# Patient Record
Sex: FEMALE | Race: WHITE | Employment: UNEMPLOYED | URBAN - METROPOLITAN AREA
[De-identification: names, ages, dates, MRNs, and addresses within clinical notes are randomized per-mention and may not be internally consistent; named-entity substitution may affect disease eponyms.]

---

## 2017-10-14 ENCOUNTER — APPOINTMENT (OUTPATIENT)
Dept: ULTRASOUND IMAGING | Age: 62
End: 2017-10-14
Attending: EMERGENCY MEDICINE
Payer: MEDICARE

## 2017-10-14 ENCOUNTER — HOSPITAL ENCOUNTER (EMERGENCY)
Age: 62
Discharge: HOME | End: 2017-10-14
Attending: EMERGENCY MEDICINE
Payer: MEDICARE

## 2017-10-14 VITALS
WEIGHT: 150 LBS | HEART RATE: 95 BPM | HEIGHT: 62 IN | TEMPERATURE: 98.1 F | DIASTOLIC BLOOD PRESSURE: 76 MMHG | SYSTOLIC BLOOD PRESSURE: 185 MMHG | RESPIRATION RATE: 20 BRPM | OXYGEN SATURATION: 95 % | BODY MASS INDEX: 27.6 KG/M2

## 2017-10-14 DIAGNOSIS — R60.9 PERIPHERAL EDEMA: Primary | ICD-10-CM

## 2017-10-14 PROCEDURE — 93971 EXTREMITY STUDY: CPT

## 2017-10-14 PROCEDURE — 99283 EMERGENCY DEPT VISIT LOW MDM: CPT | Performed by: EMERGENCY MEDICINE

## 2017-10-14 RX ORDER — HYDROCODONE BITARTRATE AND ACETAMINOPHEN 5; 325 MG/1; MG/1
1 TABLET ORAL
Qty: 10 TAB | Refills: 0 | Status: SHIPPED | OUTPATIENT
Start: 2017-10-14 | End: 2018-09-25

## 2017-10-14 RX ORDER — CEPHALEXIN 500 MG/1
500 CAPSULE ORAL 4 TIMES DAILY
COMMUNITY
End: 2018-09-25

## 2017-10-14 NOTE — ED NOTES
I have reviewed discharge instructions with the patient. The patient verbalized understanding. Patient left ED via Discharge Method: ambulatory to Home with (insert name of family/friend, self, transport). Calling friend    Opportunity for questions and clarification provided. Patient given 1 scripts. Medication explained to pt, and pt v/u to instructions. Pt in no acute distress at discharge.

## 2017-10-14 NOTE — DISCHARGE INSTRUCTIONS
Leg and Ankle Edema: Care Instructions  Your Care Instructions  Swelling in the legs, ankles, and feet is called edema. It is common after you sit or stand for a while. Long plane flights or car rides often cause swelling in the legs and feet. You may also have swelling if you have to stand for long periods of time at your job. Problems with the veins in the legs (varicose veins) and changes in hormones can also cause swelling. Sometimes the swelling in the ankles and feet is caused by a more serious problem, such as heart failure, infection, blood clots, or liver or kidney disease. Follow-up care is a key part of your treatment and safety. Be sure to make and go to all appointments, and call your doctor if you are having problems. Its also a good idea to know your test results and keep a list of the medicines you take. How can you care for yourself at home? · If your doctor gave you medicine, take it as prescribed. Call your doctor if you think you are having a problem with your medicine. · Whenever you are resting, raise your legs up. Try to keep the swollen area higher than the level of your heart. · Take breaks from standing or sitting in one position. ¨ Walk around to increase the blood flow in your lower legs. ¨ Move your feet and ankles often while you stand, or tighten and relax your leg muscles. · Wear support stockings. Put them on in the morning, before swelling gets worse. · Eat a balanced diet. Lose weight if you need to. · Limit the amount of salt (sodium) in your diet. Salt holds fluid in the body and may increase swelling. When should you call for help? Call 911 anytime you think you may need emergency care. For example, call if:  · You have symptoms of a blood clot in your lung (called a pulmonary embolism). These may include:  ¨ Sudden chest pain. ¨ Trouble breathing. ¨ Coughing up blood.   Call your doctor now or seek immediate medical care if:  · You have signs of a blood clot, such as:  ¨ Pain in your calf, back of the knee, thigh, or groin. ¨ Redness and swelling in your leg or groin. · You have symptoms of infection, such as:  ¨ Increased pain, swelling, warmth, or redness. ¨ Red streaks or pus. ¨ A fever. Watch closely for changes in your health, and be sure to contact your doctor if:  · Your swelling is getting worse. · You have new or worsening pain in your legs. · You do not get better as expected. Where can you learn more? Go to http://beatrice-roxann.info/. Enter F616 in the search box to learn more about \"Leg and Ankle Edema: Care Instructions. \"  Current as of: March 20, 2017  Content Version: 11.3  © 3845-1796 [employer]. Care instructions adapted under license by [employer] (which disclaims liability or warranty for this information). If you have questions about a medical condition or this instruction, always ask your healthcare professional. [place] [address] any warranty or liability for your use of this information.

## 2017-10-14 NOTE — ED TRIAGE NOTES
Swelling and pain left lower leg since 10/9. Seen at an urgent care and given Rx for Keflex 500 mg. Pt states area was getting better and now becoming severely worse.

## 2017-10-14 NOTE — ED PROVIDER NOTES
HPI Comments: On Clindamycin currently from  for presumed cellulitis. Patient is a 65 y.o. female presenting with ankle swelling. The history is provided by the patient. Ankle swelling    This is a new problem. The problem occurs constantly. The problem has been gradually worsening. The pain is present in the left ankle. The pain is at a severity of 2/10. The pain is mild. Treatments tried: oral antibiotics. There has been a history of trauma (cut with razor). History reviewed. No pertinent past medical history. History reviewed. No pertinent surgical history. History reviewed. No pertinent family history. Social History     Social History    Marital status:      Spouse name: N/A    Number of children: N/A    Years of education: N/A     Occupational History    Not on file. Social History Main Topics    Smoking status: Not on file    Smokeless tobacco: Not on file    Alcohol use Not on file    Drug use: Not on file    Sexual activity: Not on file     Other Topics Concern    Not on file     Social History Narrative    No narrative on file         ALLERGIES: Review of patient's allergies indicates no known allergies. Review of Systems   Constitutional: Negative. Negative for chills and fever. HENT: Negative. Negative for congestion, ear pain, postnasal drip and rhinorrhea. Eyes: Negative for pain and visual disturbance. Respiratory: Negative for cough and wheezing. Cardiovascular: Negative for chest pain and leg swelling. Gastrointestinal: Negative. Negative for abdominal distention and abdominal pain. Endocrine: Negative. Negative for polydipsia, polyphagia and polyuria. Genitourinary: Negative. Negative for difficulty urinating, flank pain and frequency. Musculoskeletal: Negative. Negative for arthralgias and myalgias. Skin: Negative. Neurological: Negative. Negative for dizziness and headaches. Hematological: Negative.         Vitals: 10/14/17 0033   BP: 192/78   Pulse: (!) 105   Resp: 20   Temp: 97.4 °F (36.3 °C)   SpO2: 93%   Weight: 68 kg (150 lb)   Height: 5' 2\" (1.575 m)            Physical Exam   Constitutional: She is oriented to person, place, and time. She appears well-developed and well-nourished. No distress. HENT:   Head: Normocephalic and atraumatic. Cardiovascular: Intact distal pulses. Pulmonary/Chest: Effort normal.   Abdominal: Soft. Musculoskeletal: She exhibits edema. LLE edema pretibial and ankle;  Skin symmetrical color and temp to right side   Neurological: She is alert and oriented to person, place, and time. Skin: Skin is warm and dry. Psychiatric: She has a normal mood and affect. Her behavior is normal.   Nursing note and vitals reviewed. MDM  Number of Diagnoses or Management Options  Peripheral edema: new and requires workup  Diagnosis management comments: Patient understands that the edema she isn't into the left lower extremity still could be related to the cellulitis she is being treated for as well as there is a healing cut from a razor injury of the left lower extremity. Ultrasound will exclude DVT and if negative would continue with the antibiotic therapy, compressive therapy and follow up with primary care physician. No arterial occlusion suspected, and given the unilateral nature also do not suspect heart failure, kidney disease or liver disease.        Amount and/or Complexity of Data Reviewed  Tests in the radiology section of CPT®: ordered and reviewed      ED Course       Procedures

## 2018-09-25 ENCOUNTER — APPOINTMENT (OUTPATIENT)
Dept: GENERAL RADIOLOGY | Age: 63
DRG: 193 | End: 2018-09-25
Attending: EMERGENCY MEDICINE
Payer: MEDICARE

## 2018-09-25 ENCOUNTER — HOSPITAL ENCOUNTER (INPATIENT)
Age: 63
LOS: 2 days | Discharge: HOME HEALTH | DRG: 193 | End: 2018-09-28
Attending: EMERGENCY MEDICINE | Admitting: INTERNAL MEDICINE
Payer: MEDICARE

## 2018-09-25 DIAGNOSIS — D72.829 LEUKOCYTOSIS, UNSPECIFIED TYPE: ICD-10-CM

## 2018-09-25 DIAGNOSIS — N30.00 ACUTE CYSTITIS WITHOUT HEMATURIA: Primary | ICD-10-CM

## 2018-09-25 DIAGNOSIS — R09.02 HYPOXIA: ICD-10-CM

## 2018-09-25 LAB
ALBUMIN SERPL-MCNC: 2.8 G/DL (ref 3.2–4.6)
ALBUMIN/GLOB SERPL: 0.6 {RATIO} (ref 1.2–3.5)
ALP SERPL-CCNC: 115 U/L (ref 50–136)
ALT SERPL-CCNC: 26 U/L (ref 12–65)
ANION GAP SERPL CALC-SCNC: 10 MMOL/L (ref 7–16)
ARTERIAL PATENCY WRIST A: POSITIVE
AST SERPL-CCNC: 24 U/L (ref 15–37)
BASE EXCESS BLDA CALC-SCNC: 1.9 MMOL/L (ref 0–3)
BASOPHILS # BLD: 0.1 K/UL (ref 0–0.2)
BASOPHILS NFR BLD: 0 % (ref 0–2)
BDY SITE: ABNORMAL
BILIRUB SERPL-MCNC: 0.9 MG/DL (ref 0.2–1.1)
BUN SERPL-MCNC: 16 MG/DL (ref 8–23)
CALCIUM SERPL-MCNC: 8.3 MG/DL (ref 8.3–10.4)
CHLORIDE SERPL-SCNC: 95 MMOL/L (ref 98–107)
CO2 SERPL-SCNC: 26 MMOL/L (ref 21–32)
COHGB MFR BLD: 0.9 % (ref 0.5–1.5)
CREAT SERPL-MCNC: 0.96 MG/DL (ref 0.6–1)
DIFFERENTIAL METHOD BLD: ABNORMAL
DO-HGB BLD-MCNC: 11 % (ref 0–5)
EOSINOPHIL # BLD: 0 K/UL (ref 0–0.8)
EOSINOPHIL NFR BLD: 0 % (ref 0.5–7.8)
ERYTHROCYTE [DISTWIDTH] IN BLOOD BY AUTOMATED COUNT: 13.2 %
FLUAV AG NPH QL IA: NEGATIVE
FLUBV AG NPH QL IA: NEGATIVE
GLOBULIN SER CALC-MCNC: 4.7 G/DL (ref 2.3–3.5)
GLUCOSE SERPL-MCNC: 118 MG/DL (ref 65–100)
HCO3 BLDA-SCNC: 24 MMOL/L (ref 22–26)
HCT VFR BLD AUTO: 40.7 % (ref 35.8–46.3)
HGB BLD-MCNC: 13.1 G/DL (ref 11.7–15.4)
HGB BLDMV-MCNC: 14.6 GM/DL (ref 11.7–15)
IMM GRANULOCYTES # BLD: 0.2 K/UL (ref 0–0.5)
IMM GRANULOCYTES NFR BLD AUTO: 1 % (ref 0–5)
LACTATE BLD-SCNC: 1.9 MMOL/L (ref 0.5–1.9)
LYMPHOCYTES # BLD: 1.9 K/UL (ref 0.5–4.6)
LYMPHOCYTES NFR BLD: 9 % (ref 13–44)
MCH RBC QN AUTO: 27.9 PG (ref 26.1–32.9)
MCHC RBC AUTO-ENTMCNC: 32.2 G/DL (ref 31.4–35)
MCV RBC AUTO: 86.6 FL (ref 79.6–97.8)
METHGB MFR BLD: 0.3 % (ref 0–1.5)
MONOCYTES # BLD: 2.2 K/UL (ref 0.1–1.3)
MONOCYTES NFR BLD: 10 % (ref 4–12)
NEUTS SEG # BLD: 17.9 K/UL (ref 1.7–8.2)
NEUTS SEG NFR BLD: 81 % (ref 43–78)
NRBC # BLD: 0 K/UL (ref 0–0.2)
OXYHGB MFR BLDA: 87.6 % (ref 94–97)
PCO2 BLDA: 31 MMHG (ref 35–45)
PH BLDA: 7.51 [PH] (ref 7.35–7.45)
PLATELET # BLD AUTO: 273 K/UL (ref 150–450)
PMV BLD AUTO: 9.9 FL (ref 9.4–12.3)
PO2 BLDA: 53 MMHG (ref 80–105)
POTASSIUM SERPL-SCNC: 3.7 MMOL/L (ref 3.5–5.1)
PROT SERPL-MCNC: 7.5 G/DL (ref 6.3–8.2)
RBC # BLD AUTO: 4.7 M/UL (ref 4.05–5.2)
SAO2 % BLD: 89 % (ref 92–98.5)
SERVICE CMNT-IMP: ABNORMAL
SODIUM SERPL-SCNC: 131 MMOL/L (ref 136–145)
SPECIMEN SOURCE: NORMAL
VENTILATION MODE VENT: ABNORMAL
WBC # BLD AUTO: 22.2 K/UL (ref 4.3–11.1)

## 2018-09-25 PROCEDURE — 36600 WITHDRAWAL OF ARTERIAL BLOOD: CPT

## 2018-09-25 PROCEDURE — 82803 BLOOD GASES ANY COMBINATION: CPT

## 2018-09-25 PROCEDURE — 99284 EMERGENCY DEPT VISIT MOD MDM: CPT | Performed by: EMERGENCY MEDICINE

## 2018-09-25 PROCEDURE — 85025 COMPLETE CBC W/AUTO DIFF WBC: CPT

## 2018-09-25 PROCEDURE — 80053 COMPREHEN METABOLIC PANEL: CPT

## 2018-09-25 PROCEDURE — 83605 ASSAY OF LACTIC ACID: CPT

## 2018-09-25 PROCEDURE — 71046 X-RAY EXAM CHEST 2 VIEWS: CPT

## 2018-09-25 PROCEDURE — 94640 AIRWAY INHALATION TREATMENT: CPT

## 2018-09-25 PROCEDURE — 81003 URINALYSIS AUTO W/O SCOPE: CPT | Performed by: EMERGENCY MEDICINE

## 2018-09-25 PROCEDURE — 74011000250 HC RX REV CODE- 250: Performed by: EMERGENCY MEDICINE

## 2018-09-25 PROCEDURE — 87804 INFLUENZA ASSAY W/OPTIC: CPT

## 2018-09-25 RX ORDER — IPRATROPIUM BROMIDE AND ALBUTEROL SULFATE 2.5; .5 MG/3ML; MG/3ML
SOLUTION RESPIRATORY (INHALATION)
Status: ACTIVE
Start: 2018-09-25 | End: 2018-09-26

## 2018-09-25 RX ORDER — IPRATROPIUM BROMIDE AND ALBUTEROL SULFATE 2.5; .5 MG/3ML; MG/3ML
3 SOLUTION RESPIRATORY (INHALATION)
Status: COMPLETED | OUTPATIENT
Start: 2018-09-25 | End: 2018-09-25

## 2018-09-25 RX ADMIN — IPRATROPIUM BROMIDE AND ALBUTEROL SULFATE 3 ML: .5; 3 SOLUTION RESPIRATORY (INHALATION) at 23:56

## 2018-09-25 NOTE — IP AVS SNAPSHOT
[address] [facility] 
[phone] Patient: Natasha Maxwell MRN: [id number] JDB:[id number] About your hospitalization You were admitted on:  September 26, 2018 You last received care in the:  [facility] You were discharged on:  September 28, 2018 Why you were hospitalized Your primary diagnosis was:  Acute Hypoxemic Respiratory Failure (Hcc) Your diagnoses also included:  Cap (Community Acquired Pneumonia), Leukocytosis, Hypoxia, Tobacco Abuse Follow-up Information Follow up With Details Comments Contact Info None  [facility] is connecting patient with a primary care physician. They will call you with an appointment date and time. If you do not hear from them by Monday afternoon,  please call [phone] aand ask for the . None [phone]) Patient stated that they have no PCP [address] [address] 
[phone] Discharge Orders None A check itzel indicates which time of day the medication should be taken. My Medications START taking these medications Instructions Each Dose to Equal  
 Morning Noon Evening Bedtime  
 albuterol 90 mcg/actuation inhaler Commonly known as:  PROAIR HFA Your next dose is:  Per as needed schedule Take 2 Puffs by inhalation every four (4) hours as needed for Wheezing. 2 Puff  
    
   
   
   
  
 albuterol-ipratropium 2.5 mg-0.5 mg/3 ml Nebu Commonly known as:  Wolf Carver Your next dose is:  Per as needed schedule 3 mL by Nebulization route every four (4) hours as needed. 3 mL  
    
   
   
   
  
 azithromycin 500 mg Tab Commonly known as:  Yann Ayers Your last dose was:  09/28/18 am  
Your next dose is:  09/29/18 am  
   
 Take 1 Tab by mouth daily.   
 500 mg  
    
  
   
   
   
  
 benzonatate 100 mg capsule Commonly known as:  TESSALON Your next dose is:  Per as needed schedule Take 1 Cap by mouth three (3) times daily as needed for Cough for up to 7 days. 100 mg Nebulizer & Compressor machine Your next dose is:  Per as needed schedule 1 Each by Nebulization route every four (4) hours as needed. 1 Each  
    
   
   
   
  
 nicotine 14 mg/24 hr patch Commonly known as:  Brigette Miller Your last dose was:  09/28/18 am  
Your next dose is:  09/29/18 am  
   
 1 Patch by TransDERmal route daily for 30 days. 1 Patch Where to Get Your Medications Information on where to get these meds will be given to you by the nurse or doctor. ! Ask your nurse or doctor about these medications  
  albuterol 90 mcg/actuation inhaler  
 albuterol-ipratropium 2.5 mg-0.5 mg/3 ml Nebu  
 azithromycin 500 mg Tab  
 benzonatate 100 mg capsule Nebulizer & Compressor machine  
 nicotine 14 mg/24 hr patch Discharge Instructions DISCHARGE SUMMARY from Nurse PATIENT INSTRUCTIONS: 
 
After general anesthesia or intravenous sedation, for 24 hours or while taking prescription Narcotics: · Limit your activities · Do not drive and operate hazardous machinery · Do not make important personal or business decisions · Do  not drink alcoholic beverages · If you have not urinated within 8 hours after discharge, please contact your surgeon on call. What to do at Home: 
Recommended activity: Activity as tolerated. If you experience any of the following symptoms temp > 101.5, worsening cough or wheezing, shortness of breath or fatigue not relieved with rest, please follow up with MD. 
 
*  Please give a list of your current medications to your Primary Care Provider.  
 
*  Please update this list whenever your medications are discontinued, doses are 
 changed, or new medications (including over-the-counter products) are added. *  Please carry medication information at all times in case of emergency situations. These are general instructions for a healthy lifestyle: No smoking/ No tobacco products/ Avoid exposure to second hand smoke Surgeon General's Warning:  Quitting smoking now greatly reduces serious risk to your health. Obesity, smoking, and sedentary lifestyle greatly increases your risk for illness A healthy diet, regular physical exercise & weight monitoring are important for maintaining a healthy lifestyle You may be retaining fluid if you have a history of heart failure or if you experience any of the following symptoms:  Weight gain of 3 pounds or more overnight or 5 pounds in a week, increased swelling in our hands or feet or shortness of breath while lying flat in bed. Please call your doctor as soon as you notice any of these symptoms; do not wait until your next office visit. Recognize signs and symptoms of STROKE: 
 
F-face looks uneven A-arms unable to move or move unevenly S-speech slurred or non-existent T-time-call 911 as soon as signs and symptoms begin-DO NOT go Back to bed or wait to see if you get better-TIME IS BRAIN. Warning Signs of HEART ATTACK Call 911 if you have these symptoms: 
? Chest discomfort. Most heart attacks involve discomfort in the center of the chest that lasts more than a few minutes, or that goes away and comes back. It can feel like uncomfortable pressure, squeezing, fullness, or pain. ? Discomfort in other areas of the upper body. Symptoms can include pain or discomfort in one or both arms, the back, neck, jaw, or stomach. ? Shortness of breath with or without chest discomfort. ? Other signs may include breaking out in a cold sweat, nausea, or lightheadedness. Don't wait more than five minutes to call [address]!  Fast action can save your life. Calling 911 is almost always the fastest way to get lifesaving treatment. Emergency Medical Services staff can begin treatment when they arrive  up to an hour sooner than if someone gets to the hospital by car. The discharge information has been reviewed with the patient. The patient verbalized understanding. Discharge medications reviewed with the patient and appropriate educational materials and side effects teaching were provided. Pneumonia: Care Instructions Your Care Instructions Pneumonia is an infection of the lungs. Most cases are caused by infections from bacteria or viruses. Pneumonia may be mild or very severe. If it is caused by bacteria, you will be treated with antibiotics. It may take a few weeks to a few months to recover fully from pneumonia, depending on how sick you were and whether your overall health is good. Follow-up care is a key part of your treatment and safety. Be sure to make and go to all appointments, and call your doctor if you are having problems. It's also a good idea to know your test results and keep a list of the medicines you take. How can you care for yourself at home? · Take your antibiotics exactly as directed. Do not stop taking the medicine just because you are feeling better. You need to take the full course of antibiotics. · Take your medicines exactly as prescribed. Call your doctor if you think you are having a problem with your medicine. · Get plenty of rest and sleep. You may feel weak and tired for a while, but your energy level will improve with time. · To prevent dehydration, drink plenty of fluids, enough so that your urine is light yellow or clear like water. Choose water and other caffeine-free clear liquids until you feel better. If you have kidney, heart, or liver disease and have to limit fluids, talk with your doctor before you increase the amount of fluids you drink. · Take care of your cough so you can rest. A cough that brings up mucus from your lungs is common with pneumonia. It is one way your body gets rid of the infection. But if coughing keeps you from resting or causes severe fatigue and chest-wall pain, talk to your doctor. He or she may suggest that you take a medicine to reduce the cough. · Use a vaporizer or humidifier to add moisture to your bedroom. Follow the directions for cleaning the machine. · Do not smoke or allow others to smoke around you. Smoke will make your cough last longer. If you need help quitting, talk to your doctor about stop-smoking programs and medicines. These can increase your chances of quitting for good. · Take an over-the-counter pain medicine, such as acetaminophen (Tylenol), ibuprofen (Advil, Motrin), or naproxen (Aleve). Read and follow all instructions on the label. · Do not take two or more pain medicines at the same time unless the doctor told you to. Many pain medicines have acetaminophen, which is Tylenol. Too much acetaminophen (Tylenol) can be harmful. · If you were given a spirometer to measure how well your lungs are working, use it as instructed. This can help your doctor tell how your recovery is going. · To prevent pneumonia in the future, talk to your doctor about getting a flu vaccine (once a year) and a pneumococcal vaccine (one time only for most people). When should you call for help? Call 911 anytime you think you may need emergency care. For example, call if: 
  · You have severe trouble breathing.  
 Call your doctor now or seek immediate medical care if: 
  · You cough up dark brown or bloody mucus (sputum).  
  · You have new or worse trouble breathing.  
  · You are dizzy or lightheaded, or you feel like you may faint.  
 Watch closely for changes in your health, and be sure to contact your doctor if: 
  · You have a new or higher fever.  
  · You are coughing more deeply or more often.   · You are not getting better after 2 days (48 hours).  
  · You do not get better as expected. Where can you learn more? Go to http://beatrice-roxann.info/. Enter [id number] in the search box to learn more about \"Pneumonia: Care Instructions. \" Current as of: December 6, 2017 Content Version: 11.7 © 2129-8045 Hurray!. Care instructions adapted under license by [employer] (which disclaims liability or warranty for this information). If you have questions about a medical condition or this instruction, always ask your healthcare professional. [address] any warranty or liability for your use of this information. ___________________________________________________________________________________________________________________________________ [employer] Announcement We are excited to announce that we are making your provider's discharge notes available to you in [place]. You will see these notes when they are completed and signed by the physician that discharged you from your recent hospital stay. If you have any questions or concerns about any information you see in [place], please call the Health Information Department where you were seen or reach out to your Primary Care Provider for more information about your plan of care. Introducing [facility]! [employer] introduces [place] patient portal. Now you can access parts of your medical record, email your doctor's office, and request medication refills online. 1. In your internet browser, go to [email or web address] 2. Click on the First Time User? Click Here link in the Sign In box. You will see the New Member Sign Up page. 3. Enter your [place] Access Code exactly as it appears below. You will not need to use this code after youve completed the sign-up process. If you do not sign up before the expiration date, you must request a new code. · Nursing Home Quality Access Code: [email or web address] Expires: 12/24/2018  8:15 PM 
 
4. Enter the last four digits of your Social Security Number (xxxx) and Date of Birth (mm/dd/yyyy) as indicated and click Submit. You will be taken to the next sign-up page. 5. Create a Nursing Home Quality ID. This will be your Nursing Home Quality login ID and cannot be changed, so think of one that is secure and easy to remember. 6. Create a Nursing Home Quality password. You can change your password at any time. 7. Enter your Password Reset Question and Answer. This can be used at a later time if you forget your password. 8. Enter your e-mail address. You will receive e-mail notification when new information is available in [address] 9. Click Sign Up. You can now view and download portions of your medical record. 10. Click the Download Summary menu link to download a portable copy of your medical information. If you have questions, please visit the Frequently Asked Questions section of the Nursing Home Quality website. Remember, Nursing Home Quality is NOT to be used for urgent needs. For medical emergencies, dial 911. Now available from your iPhone and Android! Introducing Darinel Padilla As a [facility] patient, I wanted to make you aware of our electronic visit tool called Darinel Bulmaroclarencegabriella. [facility] 24/7 allows you to connect within minutes with a medical provider 24 hours a day, seven days a week via a mobile device or tablet or logging into a secure website from your computer. You can access Darinel Padilla from anywhere in the [place]. A virtual visit might be right for you when you have a simple condition and feel like you just dont want to get out of bed, or cant get away from work for an appointment, when your regular [facility] provider is not available (evenings, weekends or holidays), or when youre out of town and need minor care.   Electronic visits cost only $49 and if the [facility] 24/7 provider determines a prescription is needed to treat your condition, one can be electronically transmitted to a nearby pharmacy*. Please take a moment to enroll today if you have not already done so. The enrollment process is free and takes just a few minutes. To enroll, please download the [employer] 24/7 inocencio to your tablet or phone, or visit [email or web address] to enroll on your computer. And, as an [address] [place] [address] patient with a App[employer] account, the results of your visits will be scanned into your electronic medical record and your primary care provider will be able to view the scanned results. We urge you to continue to see your regular [employer] provider for your ongoing medical care. And while your primary care provider may not be the one available when you seek a [employer] virtual visit, the peace of mind you get from getting a real diagnosis real time can be priceless. For more information on [employer] view our Frequently Asked Questions (FAQs) at [email or web address]. Sincerely, 
 
Anjali Walsh MD 
Chief Medical Officer [facility] Laura Steve *:  certain medications cannot be prescribed via [employer]. Unresulted Labs-Please follow up with your PCP about these lab tests Order Current Status CULTURE, BLOOD Preliminary result CULTURE, BLOOD Preliminary result CULTURE, RESPIRATORY/SPUTUM/BRONCH W GRAM STAIN Preliminary result CULTURE, URINE Preliminary result Providers Seen During Your Hospitalization Provider Specialty Primary office phone Thor Navarro MD Emergency Medicine [phone] Nicholas Pires MD Internal Medicine [phone] Immunizations Administered for This Admission Name Date  
 TB Skin Test (PPD) Intradermal 9/27/2018 Your Primary Care Physician (PCP) Primary Care Physician Office Phone Office Fax  NONE ** None ** ** None **  
  
 You are allergic to the following No active allergies Recent Documentation Height Weight Breastfeeding? BMI OB Status Smoking Status 1.575 m 63.5 kg No 25.61 kg/m2 Menopause Current Every Day Smoker Emergency Contacts Name Discharge Info Relation Home Work Mobile José Dillon  Other Relative [5] [phone] Patient Belongings The following personal items are in your possession at time of discharge: 
  Dental Appliances: Lowers, Uppers, With patient  Visual Aid: None      Home Medications: None   Jewelry: None  Clothing: Pants, Shirt    Other Valuables: None Please provide this summary of care documentation to your next provider. Signatures-by signing, you are acknowledging that this After Visit Summary has been reviewed with you and you have received a copy. Patient Signature:  ____________________________________________________________ Date:  ____________________________________________________________  
  
[facility] Provider Signature:  ____________________________________________________________ Date:  ____________________________________________________________

## 2018-09-25 NOTE — IP AVS SNAPSHOT
[address] 
[phone] Patient: Randa Glez MRN: [id number] KCX:[id number] A check itzel indicates which time of day the medication should be taken. My Medications START taking these medications Instructions Each Dose to Equal  
 Morning Noon Evening Bedtime  
 albuterol 90 mcg/actuation inhaler Commonly known as:  PROAIR HFA Your next dose is:  Per as needed schedule Take 2 Puffs by inhalation every four (4) hours as needed for Wheezing. 2 Puff  
    
   
   
   
  
 albuterol-ipratropium 2.5 mg-0.5 mg/3 ml Nebu Commonly known as:  Eddi Dee Your next dose is:  Per as needed schedule 3 mL by Nebulization route every four (4) hours as needed. 3 mL  
    
   
   
   
  
 azithromycin 500 mg Tab Commonly known as:  Lukas Solorio Your last dose was:  09/28/18 am  
Your next dose is:  09/29/18 am  
   
 Take 1 Tab by mouth daily. 500 mg  
    
  
   
   
   
  
 benzonatate 100 mg capsule Commonly known as:  TESSALON Your next dose is:  Per as needed schedule Take 1 Cap by mouth three (3) times daily as needed for Cough for up to 7 days. 100 mg Nebulizer & Compressor machine Your next dose is:  Per as needed schedule 1 Each by Nebulization route every four (4) hours as needed. 1 Each  
    
   
   
   
  
 nicotine 14 mg/24 hr patch Commonly known as:  Hernan Skinner Your last dose was:  09/28/18 am  
Your next dose is:  09/29/18 am  
   
 1 Patch by TransDERmal route daily for 30 days. 1 Patch Where to Get Your Medications Information on where to get these meds will be given to you by the nurse or doctor. ! Ask your nurse or doctor about these medications  
  albuterol 90 mcg/actuation inhaler  
 albuterol-ipratropium 2.5 mg-0.5 mg/3 ml Nebu  
 azithromycin 500 mg Tab benzonatate 100 mg capsule Nebulizer & Compressor machine  
 nicotine 14 mg/24 hr patch

## 2018-09-25 NOTE — IP AVS SNAPSHOT
Summary of Care Report The Summary of Care report has been created to help improve care coordination. Users with access to [employer] or [employer] [address] (Web-based application) may access additional patient information including the Discharge Summary. If you are not currently a [address] user and need more information, please call the number listed below in the [place] [address] section and ask to be connected with Medical Records. Facility Information Name Address Phone [address] [address] [address] [phone] Patient Information Patient Name Sex EMANI Yañez ([id number]) Female 1955 Discharge Information Admitting Provider Service Area Unit Lázaro Gonzalez MD / [id number]  8 [facility] / [phone] Discharge Provider Discharge Date/Time Discharge Disposition Destination (none) 2018 (Pending) AHR (none) Patient Language Language ENGLISH [13] Hospital Problems as of 2018  Never Reviewed Class Noted - Resolved Last Modified POA Active Problems CAP (community acquired pneumonia)  2018 - Present 2018 by Lázaro Gonzalez MD Unknown Entered by Lázaro Gonzalez MD  
  Leukocytosis  2018 - Present 2018 by Lázaro Gonzalez MD Unknown Entered by Lázaro Gonzalez MD  
  * (Principal)Acute hypoxemic respiratory failure ([facility] [address]  2018 - Present 2018 by Lázaro Gonzalez MD Unknown Entered by Lázaro Gonzalez MD  
  Tobacco abuse  2018 - Present 2018 by Lázaro Gonzalez MD Unknown Entered by Lázaro Gonzalez MD  
  
You are allergic to the following No active allergies Current Discharge Medication List  
  
START taking these medications Dose & Instructions Dispensing Information Comments albuterol 90 mcg/actuation inhaler Commonly known as:  PROAIR HFA Dose:  2 Puff Take 2 Puffs by inhalation every four (4) hours as needed for Wheezing. Quantity:  1 Inhaler Refills:  0  
   
 albuterol-ipratropium 2.5 mg-0.5 mg/3 ml Nebu Commonly known as:  Matson Balding Dose:  3 mL  
3 mL by Nebulization route every four (4) hours as needed. Quantity:  30 Nebule Refills:  0  
   
 azithromycin 500 mg Tab Commonly known as:  Helder Hove Dose:  500 mg Take 1 Tab by mouth daily. Quantity:  5 Tab Refills:  0  
   
 benzonatate 100 mg capsule Commonly known as:  TESSALON Dose:  100 mg Take 1 Cap by mouth three (3) times daily as needed for Cough for up to 7 days. Quantity:  21 Cap Refills:  0 Nebulizer & Compressor machine Dose:  1 Each  
1 Each by Nebulization route every four (4) hours as needed. Quantity:  1 Each Refills:  0  
   
 nicotine 14 mg/24 hr patch Commonly known as:  Brumfield Pester Dose:  1 Patch 1 Patch by TransDERmal route daily for 30 days. Quantity:  30 Patch Refills:  0 Current Immunizations Name Date  
 TB Skin Test (PPD) Intradermal 9/27/2018 Follow-up Information Follow up With Details Comments Contact Info None  [facility] is connecting patient with a primary care physician. They will call you with an appointment date and time. If you do not hear from them by Monday afternoon,  please call [phone] aand ask for the . None [phone] Patient stated that they have no PCP [address] [address] 
[phone] Discharge Instructions DISCHARGE SUMMARY from Nurse PATIENT INSTRUCTIONS: 
 
After general anesthesia or intravenous sedation, for 24 hours or while taking prescription Narcotics: · Limit your activities · Do not drive and operate hazardous machinery · Do not make important personal or business decisions · Do  not drink alcoholic beverages · If you have not urinated within 8 hours after discharge, please contact your surgeon on call. What to do at Home: 
Recommended activity: Activity as tolerated. If you experience any of the following symptoms temp > 101.5, worsening cough or wheezing, shortness of breath or fatigue not relieved with rest, please follow up with MD. 
 
*  Please give a list of your current medications to your Primary Care Provider. *  Please update this list whenever your medications are discontinued, doses are 
    changed, or new medications (including over-the-counter products) are added. *  Please carry medication information at all times in case of emergency situations. These are general instructions for a healthy lifestyle: No smoking/ No tobacco products/ Avoid exposure to second hand smoke Surgeon General's Warning:  Quitting smoking now greatly reduces serious risk to your health. Obesity, smoking, and sedentary lifestyle greatly increases your risk for illness A healthy diet, regular physical exercise & weight monitoring are important for maintaining a healthy lifestyle You may be retaining fluid if you have a history of heart failure or if you experience any of the following symptoms:  Weight gain of 3 pounds or more overnight or 5 pounds in a week, increased swelling in our hands or feet or shortness of breath while lying flat in bed. Please call your doctor as soon as you notice any of these symptoms; do not wait until your next office visit. Recognize signs and symptoms of STROKE: 
 
F-face looks uneven A-arms unable to move or move unevenly S-speech slurred or non-existent T-time-call 911 as soon as signs and symptoms begin-DO NOT go Back to bed or wait to see if you get better-TIME IS BRAIN. Warning Signs of HEART ATTACK Call 911 if you have these symptoms: ? Chest discomfort. Most heart attacks involve discomfort in the center of the chest that lasts more than a few minutes, or that goes away and comes back. It can feel like uncomfortable pressure, squeezing, fullness, or pain. ? Discomfort in other areas of the upper body. Symptoms can include pain or discomfort in one or both arms, the back, neck, jaw, or stomach. ? Shortness of breath with or without chest discomfort. ? Other signs may include breaking out in a cold sweat, nausea, or lightheadedness. Don't wait more than five minutes to call [address]! Fast action can save your life. Calling 911 is almost always the fastest way to get lifesaving treatment. Emergency Medical Services staff can begin treatment when they arrive  up to an hour sooner than if someone gets to the hospital by car. The discharge information has been reviewed with the patient. The patient verbalized understanding. Discharge medications reviewed with the patient and appropriate educational materials and side effects teaching were provided. Pneumonia: Care Instructions Your Care Instructions Pneumonia is an infection of the lungs. Most cases are caused by infections from bacteria or viruses. Pneumonia may be mild or very severe. If it is caused by bacteria, you will be treated with antibiotics. It may take a few weeks to a few months to recover fully from pneumonia, depending on how sick you were and whether your overall health is good. Follow-up care is a key part of your treatment and safety. Be sure to make and go to all appointments, and call your doctor if you are having problems. It's also a good idea to know your test results and keep a list of the medicines you take. How can you care for yourself at home? · Take your antibiotics exactly as directed. Do not stop taking the medicine just because you are feeling better. You need to take the full course of antibiotics. · Take your medicines exactly as prescribed. Call your doctor if you think you are having a problem with your medicine. · Get plenty of rest and sleep. You may feel weak and tired for a while, but your energy level will improve with time. · To prevent dehydration, drink plenty of fluids, enough so that your urine is light yellow or clear like water. Choose water and other caffeine-free clear liquids until you feel better. If you have kidney, heart, or liver disease and have to limit fluids, talk with your doctor before you increase the amount of fluids you drink. · Take care of your cough so you can rest. A cough that brings up mucus from your lungs is common with pneumonia. It is one way your body gets rid of the infection. But if coughing keeps you from resting or causes severe fatigue and chest-wall pain, talk to your doctor. He or she may suggest that you take a medicine to reduce the cough. · Use a vaporizer or humidifier to add moisture to your bedroom. Follow the directions for cleaning the machine. · Do not smoke or allow others to smoke around you. Smoke will make your cough last longer. If you need help quitting, talk to your doctor about stop-smoking programs and medicines. These can increase your chances of quitting for good. · Take an over-the-counter pain medicine, such as acetaminophen (Tylenol), ibuprofen (Advil, Motrin), or naproxen (Aleve). Read and follow all instructions on the label. · Do not take two or more pain medicines at the same time unless the doctor told you to. Many pain medicines have acetaminophen, which is Tylenol. Too much acetaminophen (Tylenol) can be harmful. · If you were given a spirometer to measure how well your lungs are working, use it as instructed. This can help your doctor tell how your recovery is going.  
· To prevent pneumonia in the future, talk to your doctor about getting a flu vaccine (once a year) and a pneumococcal vaccine (one time only for most people). When should you call for help? Call 911 anytime you think you may need emergency care. For example, call if: 
  · You have severe trouble breathing.  
 Call your doctor now or seek immediate medical care if: 
  · You cough up dark brown or bloody mucus (sputum).  
  · You have new or worse trouble breathing.  
  · You are dizzy or lightheaded, or you feel like you may faint.  
 Watch closely for changes in your health, and be sure to contact your doctor if: 
  · You have a new or higher fever.  
  · You are coughing more deeply or more often.  
  · You are not getting better after 2 days (48 hours).  
  · You do not get better as expected. Where can you learn more? Go to http://beatrice-roxann.info/. Enter 01.84.63.10.33 in the search box to learn more about \"Pneumonia: Care Instructions. \" Current as of: December 6, 2017 Content Version: 11.7 © 5548-3364 [employer]. Care instructions adapted under license by [employer] (which disclaims liability or warranty for this information). If you have questions about a medical condition or this instruction, always ask your healthcare professional. [address] any warranty or liability for your use of this information. ___________________________________________________________________________________________________________________________________ Chart Review Routing History No Routing History on File

## 2018-09-26 ENCOUNTER — APPOINTMENT (OUTPATIENT)
Dept: CT IMAGING | Age: 63
DRG: 193 | End: 2018-09-26
Attending: EMERGENCY MEDICINE
Payer: MEDICARE

## 2018-09-26 PROBLEM — D72.829 LEUKOCYTOSIS: Status: ACTIVE | Noted: 2018-09-26

## 2018-09-26 PROBLEM — Z72.0 TOBACCO ABUSE: Status: ACTIVE | Noted: 2018-09-26

## 2018-09-26 PROBLEM — J96.01 ACUTE HYPOXEMIC RESPIRATORY FAILURE (HCC): Status: ACTIVE | Noted: 2018-09-26

## 2018-09-26 PROBLEM — R09.02 HYPOXIA: Status: RESOLVED | Noted: 2018-09-26 | Resolved: 2018-09-26

## 2018-09-26 PROBLEM — J18.9 CAP (COMMUNITY ACQUIRED PNEUMONIA): Status: ACTIVE | Noted: 2018-09-26

## 2018-09-26 PROBLEM — R09.02 HYPOXIA: Status: ACTIVE | Noted: 2018-09-26

## 2018-09-26 LAB
AMORPH CRY URNS QL MICRO: ABNORMAL
BACTERIA URNS QL MICRO: ABNORMAL /HPF
CASTS URNS QL MICRO: ABNORMAL /LPF
CRYSTALS URNS QL MICRO: 0 /LPF
D DIMER PPP FEU-MCNC: 2.36 UG/ML(FEU)
EPI CELLS #/AREA URNS HPF: ABNORMAL /HPF
MUCOUS THREADS URNS QL MICRO: 0 /LPF
RBC #/AREA URNS HPF: ABNORMAL /HPF
WBC URNS QL MICRO: ABNORMAL /HPF

## 2018-09-26 PROCEDURE — 96361 HYDRATE IV INFUSION ADD-ON: CPT | Performed by: EMERGENCY MEDICINE

## 2018-09-26 PROCEDURE — 87086 URINE CULTURE/COLONY COUNT: CPT

## 2018-09-26 PROCEDURE — 74011000258 HC RX REV CODE- 258: Performed by: EMERGENCY MEDICINE

## 2018-09-26 PROCEDURE — 74011250636 HC RX REV CODE- 250/636: Performed by: FAMILY MEDICINE

## 2018-09-26 PROCEDURE — 85379 FIBRIN DEGRADATION QUANT: CPT

## 2018-09-26 PROCEDURE — 74011250637 HC RX REV CODE- 250/637: Performed by: INTERNAL MEDICINE

## 2018-09-26 PROCEDURE — 65270000029 HC RM PRIVATE

## 2018-09-26 PROCEDURE — 74011636320 HC RX REV CODE- 636/320: Performed by: EMERGENCY MEDICINE

## 2018-09-26 PROCEDURE — 87186 SC STD MICRODIL/AGAR DIL: CPT

## 2018-09-26 PROCEDURE — 96365 THER/PROPH/DIAG IV INF INIT: CPT | Performed by: EMERGENCY MEDICINE

## 2018-09-26 PROCEDURE — 87040 BLOOD CULTURE FOR BACTERIA: CPT

## 2018-09-26 PROCEDURE — 74011250637 HC RX REV CODE- 250/637: Performed by: FAMILY MEDICINE

## 2018-09-26 PROCEDURE — 71260 CT THORAX DX C+: CPT

## 2018-09-26 PROCEDURE — 77030032490 HC SLV COMPR SCD KNE COVD -B

## 2018-09-26 PROCEDURE — 87088 URINE BACTERIA CULTURE: CPT

## 2018-09-26 PROCEDURE — 77030020263 HC SOL INJ SOD CL0.9% LFCR 1000ML

## 2018-09-26 PROCEDURE — 81015 MICROSCOPIC EXAM OF URINE: CPT

## 2018-09-26 PROCEDURE — 74011250636 HC RX REV CODE- 250/636: Performed by: EMERGENCY MEDICINE

## 2018-09-26 PROCEDURE — 74011250636 HC RX REV CODE- 250/636: Performed by: INTERNAL MEDICINE

## 2018-09-26 RX ORDER — GUAIFENESIN 600 MG/1
1200 TABLET, EXTENDED RELEASE ORAL EVERY 12 HOURS
Status: DISCONTINUED | OUTPATIENT
Start: 2018-09-26 | End: 2018-09-28 | Stop reason: HOSPADM

## 2018-09-26 RX ORDER — SODIUM CHLORIDE 0.9 % (FLUSH) 0.9 %
10 SYRINGE (ML) INJECTION
Status: COMPLETED | OUTPATIENT
Start: 2018-09-26 | End: 2018-09-26

## 2018-09-26 RX ORDER — SODIUM CHLORIDE 0.9 % (FLUSH) 0.9 %
5-10 SYRINGE (ML) INJECTION AS NEEDED
Status: DISCONTINUED | OUTPATIENT
Start: 2018-09-26 | End: 2018-09-28 | Stop reason: HOSPADM

## 2018-09-26 RX ORDER — IBUPROFEN 200 MG
1 TABLET ORAL DAILY
Status: DISCONTINUED | OUTPATIENT
Start: 2018-09-26 | End: 2018-09-28 | Stop reason: HOSPADM

## 2018-09-26 RX ORDER — SODIUM CHLORIDE 0.9 % (FLUSH) 0.9 %
5-10 SYRINGE (ML) INJECTION EVERY 8 HOURS
Status: DISCONTINUED | OUTPATIENT
Start: 2018-09-26 | End: 2018-09-28 | Stop reason: HOSPADM

## 2018-09-26 RX ORDER — AZITHROMYCIN 250 MG/1
500 TABLET, FILM COATED ORAL EVERY 24 HOURS
Status: DISCONTINUED | OUTPATIENT
Start: 2018-09-26 | End: 2018-09-28 | Stop reason: HOSPADM

## 2018-09-26 RX ORDER — HEPARIN SODIUM 5000 [USP'U]/ML
5000 INJECTION, SOLUTION INTRAVENOUS; SUBCUTANEOUS EVERY 8 HOURS
Status: DISCONTINUED | OUTPATIENT
Start: 2018-09-26 | End: 2018-09-28 | Stop reason: HOSPADM

## 2018-09-26 RX ORDER — ACETAMINOPHEN 325 MG/1
650 TABLET ORAL
Status: DISCONTINUED | OUTPATIENT
Start: 2018-09-26 | End: 2018-09-28 | Stop reason: HOSPADM

## 2018-09-26 RX ORDER — IPRATROPIUM BROMIDE AND ALBUTEROL SULFATE 2.5; .5 MG/3ML; MG/3ML
3 SOLUTION RESPIRATORY (INHALATION)
Status: DISCONTINUED | OUTPATIENT
Start: 2018-09-26 | End: 2018-09-28 | Stop reason: HOSPADM

## 2018-09-26 RX ORDER — SODIUM CHLORIDE 9 MG/ML
75 INJECTION, SOLUTION INTRAVENOUS CONTINUOUS
Status: DISCONTINUED | OUTPATIENT
Start: 2018-09-26 | End: 2018-09-27

## 2018-09-26 RX ORDER — BENZONATATE 100 MG/1
100 CAPSULE ORAL
Status: DISCONTINUED | OUTPATIENT
Start: 2018-09-26 | End: 2018-09-28 | Stop reason: HOSPADM

## 2018-09-26 RX ADMIN — SODIUM CHLORIDE 1000 ML: 900 INJECTION, SOLUTION INTRAVENOUS at 01:04

## 2018-09-26 RX ADMIN — Medication 10 ML: at 03:27

## 2018-09-26 RX ADMIN — CEFTRIAXONE SODIUM 1 G: 1 INJECTION, POWDER, FOR SOLUTION INTRAMUSCULAR; INTRAVENOUS at 04:18

## 2018-09-26 RX ADMIN — HEPARIN SODIUM 5000 UNITS: 5000 INJECTION INTRAVENOUS; SUBCUTANEOUS at 21:05

## 2018-09-26 RX ADMIN — AZITHROMYCIN 500 MG: 250 TABLET, FILM COATED ORAL at 06:28

## 2018-09-26 RX ADMIN — GUAIFENESIN 1200 MG: 600 TABLET, EXTENDED RELEASE ORAL at 21:05

## 2018-09-26 RX ADMIN — ACETAMINOPHEN 650 MG: 325 TABLET ORAL at 16:01

## 2018-09-26 RX ADMIN — GUAIFENESIN 1200 MG: 600 TABLET, EXTENDED RELEASE ORAL at 05:15

## 2018-09-26 RX ADMIN — IOPAMIDOL 100 ML: 755 INJECTION, SOLUTION INTRAVENOUS at 03:27

## 2018-09-26 RX ADMIN — Medication 10 ML: at 21:06

## 2018-09-26 RX ADMIN — Medication 10 ML: at 16:02

## 2018-09-26 RX ADMIN — BENZONATATE 100 MG: 100 CAPSULE ORAL at 16:01

## 2018-09-26 RX ADMIN — SODIUM CHLORIDE 100 ML: 900 INJECTION, SOLUTION INTRAVENOUS at 03:27

## 2018-09-26 RX ADMIN — SODIUM CHLORIDE 75 ML/HR: 900 INJECTION, SOLUTION INTRAVENOUS at 05:33

## 2018-09-26 NOTE — PROGRESS NOTES
made initial visit. Pt was asleep appearing comfortable with no pain level expressed or observed.  prayed for pt and left a spiritual care card letting the pt know that a  came by and prayed for her.  provided spiritual care through presence, prayer, and leaving a spiritual care card.

## 2018-09-26 NOTE — PROGRESS NOTES
Progress Note Patient: Natalie Pierce MRN: [id number]  SSN: [id number] YOB: 1955  Age: 59 y.o. Sex: female Admit Date: 9/25/2018 LOS: 0 days Subjective: F/U RLL pneumonia Breathing much better with current treatment. Eating and drinking well. No chest pain. SOB improving. Current Facility-Administered Medications Medication Dose Route Frequency  sodium chloride (NS) flush 5-10 mL  5-10 mL IntraVENous Q8H  
 sodium chloride (NS) flush 5-10 mL  5-10 mL IntraVENous PRN  
 azithromycin (ZITHROMAX) tablet 500 mg  500 mg Oral Q24H  
 [START ON 9/27/2018] cefTRIAXone (ROCEPHIN) 1 g in 0.9% sodium chloride (MBP/ADV) 50 mL  1 g IntraVENous Q24H  
 acetaminophen (TYLENOL) tablet 650 mg  650 mg Oral Q4H PRN  
 guaiFENesin ER (MUCINEX) tablet 1,200 mg  1,200 mg Oral Q12H  
 0.9% sodium chloride infusion  75 mL/hr IntraVENous CONTINUOUS Objective:  
 
Vitals:  
 09/26/18 0544 09/26/18 1025 09/26/18 1208 09/26/18 1333 BP:  107/51 105/53 121/70 Pulse:  94 93 92 Resp:  18 17 18 Temp:   98.8 °F (37.1 °C) (!) 100.7 °F (38.2 °C) SpO2: 96% 97% 95% 99% Weight:      
Height:      
  
  
Intake and Output: 
Current Shift:   
Last three shifts:   
 
Physical Exam:  
General:  Alert, cooperative, no distress, appears stated age. Eyes:  Conjunctivae/corneas clear. Ears:  Normal TMs and external ear canals both ears. Nose: Nares normal. Septum midline. .  
Mouth/Throat: Lips, mucosa, and tongue normal. Teeth and gums normal.  
Neck:  no JVD. Back:   Symmetric, no curvature. ROM normal. No CVA tenderness. Lungs:   Mild inspiratory wheeze at RLL Heart:  Regular rate and rhythm, S1, S2 normal, no murmur, click, rub or gallop. Abdomen:   Soft, non-tender. Bowel sounds normal. No masses,  No organomegaly. Extremities: Extremities normal, atraumatic, no cyanosis or edema. Pulses: 2+ and symmetric all extremities. Skin: Skin color, texture, turgor normal. No rashes or lesions Lymph nodes: Cervical, supraclavicular, and axillary nodes normal.  
Neurologic: CNII-XII intact. Normal strength, sensation and reflexes throughout. Lab/Data Review: 
 
Recent Results (from the past 24 hour(s)) CBC WITH AUTOMATED DIFF Collection Time: 09/25/18  8:18 PM  
Result Value Ref Range WBC 22.2 (H) 4.3 - 11.1 K/uL  
 RBC 4.70 4.05 - 5.2 M/uL  
 HGB 13.1 11.7 - 15.4 g/dL HCT 40.7 35.8 - 46.3 % MCV 86.6 79.6 - 97.8 FL  
 MCH 27.9 26.1 - 32.9 PG  
 MCHC 32.2 31.4 - 35.0 g/dL  
 RDW 13.2 % PLATELET 463 243 - 113 K/uL MPV 9.9 9.4 - 12.3 FL ABSOLUTE NRBC 0.00 0.0 - 0.2 K/uL  
 DF AUTOMATED NEUTROPHILS 81 (H) 43 - 78 % LYMPHOCYTES 9 (L) 13 - 44 % MONOCYTES 10 4.0 - 12.0 % EOSINOPHILS 0 (L) 0.5 - 7.8 % BASOPHILS 0 0.0 - 2.0 % IMMATURE GRANULOCYTES 1 0.0 - 5.0 %  
 ABS. NEUTROPHILS 17.9 (H) 1.7 - 8.2 K/UL  
 ABS. LYMPHOCYTES 1.9 0.5 - 4.6 K/UL  
 ABS. MONOCYTES 2.2 (H) 0.1 - 1.3 K/UL  
 ABS. EOSINOPHILS 0.0 0.0 - 0.8 K/UL  
 ABS. BASOPHILS 0.1 0.0 - 0.2 K/UL  
 ABS. IMM. GRANS. 0.2 0.0 - 0.5 K/UL METABOLIC PANEL, COMPREHENSIVE Collection Time: 09/25/18  8:18 PM  
Result Value Ref Range Sodium 131 (L) 136 - 145 mmol/L Potassium 3.7 3.5 - 5.1 mmol/L Chloride 95 (L) 98 - 107 mmol/L  
 CO2 26 21 - 32 mmol/L Anion gap 10 7 - 16 mmol/L Glucose 118 (H) 65 - 100 mg/dL BUN 16 8 - 23 MG/DL Creatinine 0.96 0.6 - 1.0 MG/DL  
 GFR est AA >60 >60 ml/min/1.73m2 GFR est non-AA >60 >60 ml/min/1.73m2 Calcium 8.3 8.3 - 10.4 MG/DL Bilirubin, total 0.9 0.2 - 1.1 MG/DL  
 ALT (SGPT) 26 12 - 65 U/L  
 AST (SGOT) 24 15 - 37 U/L Alk. phosphatase 115 50 - 136 U/L Protein, total 7.5 6.3 - 8.2 g/dL Albumin 2.8 (L) 3.2 - 4.6 g/dL Globulin 4.7 (H) 2.3 - 3.5 g/dL A-G Ratio 0.6 (L) 1.2 - 3.5 INFLUENZA A & B AG (RAPID TEST)  Collection Time: 09/25/18  8:19 PM  
 Result Value Ref Range Influenza A Ag NEGATIVE  NEG Influenza B Ag NEGATIVE  NEG Source NASOPHARYNGEAL    
POC LACTIC ACID Collection Time: 09/25/18 10:19 PM  
Result Value Ref Range Lactic Acid (POC) 1.9 0.5 - 1.9 mmol/L  
BLOOD GAS, ARTERIAL Collection Time: 09/25/18 11:35 PM  
Result Value Ref Range pH 7.51 (H) 7.35 - 7.45    
 PCO2 31 (L) 35 - 45 mmHg PO2 53 (L) 80 - 105 mmHg BICARBONATE 24 22 - 26 mmol/L  
 BASE EXCESS 1.9 0 - 3 mmol/L  
 TOTAL HEMOGLOBIN 14.6 11.7 - 15.0 GM/DL  
 O2 SAT 89 (L) 92 - 98.5 % Arterial O2 Hgb 87.6 (L) 94 - 97 % CARBOXYHEMOGLOBIN 0.9 0.5 - 1.5 % METHEMOGLOBIN 0.3 0.0 - 1.5 % DEOXYHEMOGLOBIN 11 (H) 0.0 - 5.0 % SITE LR   
 ALLENS TEST POSITIVE    
 MODE RA Respiratory comment: Dr mendez at 9 25 2018 11 40 40 PM. Read back. D DIMER Collection Time: 09/26/18 12:00 AM  
Result Value Ref Range D DIMER 2.36 (HH) <0.56 ug/ml(FEU) URINE MICROSCOPIC Collection Time: 09/26/18 12:02 AM  
Result Value Ref Range WBC 10-20 0 /hpf  
 RBC 3-5 0 /hpf Epithelial cells 0-3 0 /hpf Bacteria 4+ (H) 0 /hpf Casts HYALINE 0 /lpf Crystals, urine 0 0 /LPF Amorphous Crystals 2+ (H) 0 Mucus 0 0 /lpf CULTURE, URINE Collection Time: 09/26/18  3:15 AM  
Result Value Ref Range Special Requests: NO SPECIAL REQUESTS Culture result:     
  NO GROWTH AFTER SHORT PERIOD OF INCUBATION. FURTHER RESULTS TO FOLLOW AFTER OVERNIGHT INCUBATION. Assessment/ Plan:  
 
Principal Problem: 
  Acute hypoxemic respiratory failure ([address] (9/26/2018) Active Problems: 
  CAP (community acquired pneumonia) (9/26/2018) Leukocytosis (9/26/2018) Tobacco abuse (9/26/2018) Agree with admitting diagnosis. Continue current antibiotics. Add benzonatate perles. Order duoneb treatments as needed. Order respiratory culture. Na low on admission but having good PO intake.  Continue NS and repeat Na level tomorrow. Order nicotine patch. DVT prophylaxis - add heparin Signed By: Juvencio Sharma DO September 26, 2018

## 2018-09-26 NOTE — PROGRESS NOTES
Patient sleeping  Respirations even and unlabored on room air  No signs of distress  No visual cues of pain  Will continue to monitor

## 2018-09-26 NOTE — ED NOTES
TRANSFER - OUT REPORT: 
 
Verbal report given to Arabella Ryan RN(name) on Randa Glez  being transferred to 8th floor(unit) for routine progression of care Report consisted of patients Situation, Background, Assessment and  
Recommendations(SBAR). Information from the following report(s) ED Summary was reviewed with the receiving nurse. Lines:  
Peripheral IV 09/26/18 Left Antecubital (Active) Site Assessment Clean, dry, & intact 9/26/2018  5:48 AM  
Phlebitis Assessment 0 9/26/2018  5:48 AM  
Infiltration Assessment 0 9/26/2018  5:48 AM  
Dressing Status Clean, dry, & intact 9/26/2018  5:48 AM  
Hub Color/Line Status Green 9/26/2018  5:48 AM  
Alcohol Cap Used Yes 9/26/2018  5:48 AM  
  
 
Opportunity for questions and clarification was provided.    
 
Patient transported with: 
 pt chart, pt belongings, IV fluids, oxygen @ 2L NC

## 2018-09-26 NOTE — PROGRESS NOTES
Admission assessment complete  Patient alert and oriented x4  Respirations even and unlabored on 2L nasal cannula  No signs of distress  Dual assessment completed with Armen Wallace RN  No skin breakdown noted  Blanchable redness to L heel  Safety measures in place  Will continue to monitor

## 2018-09-26 NOTE — PROGRESS NOTES
TRANSFER - IN REPORT: 
 
Verbal report received from Kritsy(name) on Daphne Lawton Oven  being received from ER(unit) for routine progression of care Report consisted of patients Situation, Background, Assessment and  
Recommendations(SBAR). Information from the following report(s) SBAR was reviewed with the receiving nurse. Opportunity for questions and clarification was provided. Assessment completed upon patients arrival to unit and care assumed.

## 2018-09-26 NOTE — H&P
Hospitalist H&P Note Admit Date:  2018 10:07 PM  
Name:  Merlin Delacruz Age:  59 y.o. 
:  1955 MRN:  [id number] PCP:  None Treatment Team: Attending Provider: Raissa Holley MD; Primary Nurse: Sona Daigle HPI:  
Ms. Gavi Tesfaye is a 58 y/o female with no significant PMH p/w fatigue, body aches and SOB for several days. No recent travel or sick contacts. She did have subjective fevers. She did not get a flu shot this year. She went to a nearby urgent care where she was noted to be hypoxic (68% per ED note review) and was then sent to the ED here. D-dimer was elevated; CT neg for PE but did show RLL consolidation. Flu swab is pending. She is on 2L NC O2 and feels better. She takes no meds at home. ROS otherwise negative. 10 systems reviewed and negative except as noted in HPI. History reviewed. No pertinent past medical history. History reviewed. No pertinent surgical history. No Known Allergies Social History Substance Use Topics  Smoking status: Current Every Day Smoker Types: Cigars  Smokeless tobacco: Not on file  Alcohol use No  
  
Family History Problem Relation Age of Onset  Cancer Mother  Liver Disease Father There is no immunization history on file for this patient. PTA Medications: 
None Objective:  
 
Patient Vitals for the past 24 hrs: 
 Temp Pulse Resp BP SpO2  
18 - - - - 96 %  
18 97.9 °F (36.6 °C) 96 20 100/50 97 % Oxygen Therapy O2 Sat (%): 96 % (18) O2 Device: Nasal cannula (18) O2 Flow Rate (L/min): 3 l/min (18) No intake or output data in the 24 hours ending 18 Physical Exam: 
General:    Well nourished. Alert. Eyes:   Normal sclera. Extraocular movements intact. ENT:  Normocephalic, atraumatic. Moist mucous membranes CV:   RRR. No m/r/g. Peripheral pulses 2+. Capillary refill <2s. Lungs:  Scattered rhonchi. Abdomen: Soft, nontender, nondistended. Extremities: Warm and dry. No cyanosis or edema. Neurologic: CN II-XII grossly intact. Sensation intact. Skin:     No rashes or jaundice. Normal coloration Psych:  Normal mood and affect. I reviewed the labs, imaging, EKGs, telemetry, and other studies done this admission. Data Review:  
Recent Results (from the past 24 hour(s)) CBC WITH AUTOMATED DIFF Collection Time: 09/25/18  8:18 PM  
Result Value Ref Range WBC 22.2 (H) 4.3 - 11.1 K/uL  
 RBC 4.70 4.05 - 5.2 M/uL  
 HGB 13.1 11.7 - 15.4 g/dL HCT 40.7 35.8 - 46.3 % MCV 86.6 79.6 - 97.8 FL  
 MCH 27.9 26.1 - 32.9 PG  
 MCHC 32.2 31.4 - 35.0 g/dL  
 RDW 13.2 % PLATELET 294 867 - 581 K/uL MPV 9.9 9.4 - 12.3 FL ABSOLUTE NRBC 0.00 0.0 - 0.2 K/uL  
 DF AUTOMATED NEUTROPHILS 81 (H) 43 - 78 % LYMPHOCYTES 9 (L) 13 - 44 % MONOCYTES 10 4.0 - 12.0 % EOSINOPHILS 0 (L) 0.5 - 7.8 % BASOPHILS 0 0.0 - 2.0 % IMMATURE GRANULOCYTES 1 0.0 - 5.0 %  
 ABS. NEUTROPHILS 17.9 (H) 1.7 - 8.2 K/UL  
 ABS. LYMPHOCYTES 1.9 0.5 - 4.6 K/UL  
 ABS. MONOCYTES 2.2 (H) 0.1 - 1.3 K/UL  
 ABS. EOSINOPHILS 0.0 0.0 - 0.8 K/UL  
 ABS. BASOPHILS 0.1 0.0 - 0.2 K/UL  
 ABS. IMM. GRANS. 0.2 0.0 - 0.5 K/UL METABOLIC PANEL, COMPREHENSIVE Collection Time: 09/25/18  8:18 PM  
Result Value Ref Range Sodium 131 (L) 136 - 145 mmol/L Potassium 3.7 3.5 - 5.1 mmol/L Chloride 95 (L) 98 - 107 mmol/L  
 CO2 26 21 - 32 mmol/L Anion gap 10 7 - 16 mmol/L Glucose 118 (H) 65 - 100 mg/dL BUN 16 8 - 23 MG/DL Creatinine 0.96 0.6 - 1.0 MG/DL  
 GFR est AA >60 >60 ml/min/1.73m2 GFR est non-AA >60 >60 ml/min/1.73m2 Calcium 8.3 8.3 - 10.4 MG/DL Bilirubin, total 0.9 0.2 - 1.1 MG/DL  
 ALT (SGPT) 26 12 - 65 U/L  
 AST (SGOT) 24 15 - 37 U/L Alk. phosphatase 115 50 - 136 U/L Protein, total 7.5 6.3 - 8.2 g/dL Albumin 2.8 (L) 3.2 - 4.6 g/dL Globulin 4.7 (H) 2.3 - 3.5 g/dL A-G Ratio 0.6 (L) 1.2 - 3.5 INFLUENZA A & B AG (RAPID TEST) Collection Time: 09/25/18  8:19 PM  
Result Value Ref Range Influenza A Ag NEGATIVE  NEG Influenza B Ag NEGATIVE  NEG Source NASOPHARYNGEAL    
POC LACTIC ACID Collection Time: 09/25/18 10:19 PM  
Result Value Ref Range Lactic Acid (POC) 1.9 0.5 - 1.9 mmol/L  
BLOOD GAS, ARTERIAL Collection Time: 09/25/18 11:35 PM  
Result Value Ref Range pH 7.51 (H) 7.35 - 7.45    
 PCO2 31 (L) 35 - 45 mmHg PO2 53 (L) 80 - 105 mmHg BICARBONATE 24 22 - 26 mmol/L  
 BASE EXCESS 1.9 0 - 3 mmol/L  
 TOTAL HEMOGLOBIN 14.6 11.7 - 15.0 GM/DL  
 O2 SAT 89 (L) 92 - 98.5 % Arterial O2 Hgb 87.6 (L) 94 - 97 % CARBOXYHEMOGLOBIN 0.9 0.5 - 1.5 % METHEMOGLOBIN 0.3 0.0 - 1.5 % DEOXYHEMOGLOBIN 11 (H) 0.0 - 5.0 % SITE LR   
 ALLENS TEST POSITIVE    
 MODE RA Respiratory comment: Dr mendez at 9 25 2018 11 40 40 PM. Read back. D DIMER Collection Time: 09/26/18 12:00 AM  
Result Value Ref Range D DIMER 2.36 (HH) <0.56 ug/ml(FEU) URINE MICROSCOPIC Collection Time: 09/26/18 12:02 AM  
Result Value Ref Range WBC 10-20 0 /hpf  
 RBC 3-5 0 /hpf Epithelial cells 0-3 0 /hpf Bacteria 4+ (H) 0 /hpf Casts HYALINE 0 /lpf Crystals, urine 0 0 /LPF Amorphous Crystals 2+ (H) 0 Mucus 0 0 /lpf All Micro Results Procedure Component Value Units Date/Time CULTURE, BLOOD [[id number]] Collected:  09/26/18 0413 Order Status:  Completed Specimen:  Whole Blood from Blood Updated:  09/26/18 5544 CULTURE, BLOOD [[id number]] Collected:  09/26/18 0413 Order Status:  Completed Specimen:  Whole Blood from Blood Updated:  09/26/18 7742 Apurva Campbell [[id number]] Collected:  09/26/18 0315 Order Status:  Completed Specimen:  Urine from Clean catch Updated:  09/26/18 4255 INFLUENZA A & B AG (RAPID TEST) [[id number]] Collected:  09/25/18 2019 Order Status:  Completed Specimen:  Nasopharyngeal from Nasal washing Updated:  09/25/18 2049 Influenza A Ag NEGATIVE      
   NEGATIVE FOR THE PRESENCE OF INFLUENZA A ANTIGEN 
INFECTION DUE TO INFLUENZA A CANNOT BE RULED OUT. BECAUSE THE ANTIGEN PRESENT IN THE SAMPLE MAY BE BELOW 
THE DETECTION LIMIT OF THE TEST. A NEGATIVE TEST IS PRESUMPTIVE AND IT IS RECOMMENDED THAT THESE RESULTS BE CONFIRMED BY VIRAL CULTURE OR AN FDA-CLEARED INFLUENZA A AND B MOLECULAR ASSAY. Influenza B Ag NEGATIVE      
   NEGATIVE FOR THE PRESENCE OF INFLUENZA B ANTIGEN 
INFECTION DUE TO INFLUENZA B CANNOT BE RULED OUT. BECAUSE THE ANTIGEN PRESENT IN THE SAMPLE MAY BE BELOW 
THE DETECTION LIMIT OF THE TEST. A NEGATIVE TEST IS PRESUMPTIVE AND IT IS RECOMMENDED THAT THESE RESULTS BE CONFIRMED BY VIRAL CULTURE OR AN FDA-CLEARED INFLUENZA A AND B MOLECULAR ASSAY. Source NASOPHARYNGEAL Current Facility-Administered Medications Medication Dose Route Frequency  guaiFENesin ER (MUCINEX) tablet 1,200 mg  1,200 mg Oral Q12H  
 0.9% sodium chloride infusion  75 mL/hr IntraVENous CONTINUOUS  
 albuterol-ipratropium (DUO-NEB) 2.5 mg-0.5 mg/3 ml nebulizer solution No current outpatient prescriptions on file. Other Studies: Xr Chest Pa Lat Result Date: 9/25/2018 EXAM:  XR CHEST PA LAT INDICATION:  cough, shob COMPARISON:  None. FINDINGS: A portable AP radiograph of the chest was obtained at 2151 hours. The patient is on a cardiac monitor. The lungs are clear. The cardiac and mediastinal contours and pulmonary vascularity are normal.  The bones and soft tissues are grossly within normal limits. IMPRESSION: Normal chest.  
 
Ct Chest W Cont Result Date: 9/26/2018 CTA OF THE CHEST - PE STUDY HISTORY: Shortness of breath and hypoxia COMPARISON: None TECHNIQUE: A helical acquisition was performed through the chest utilizing 0.66ZS slice thickness during the infusion of 100 cc of Isovue-370. 3-D post-processed images were created on an independent workstation. Multiplanar reformats were obtained. The exam was focused on the pulmonary arteries. Dose reduction techniques used: Automated exposure control, adjustment of the mAs and/or kVp according to patient's size, and iterative reconstruction techniques. FINDINGS: *  PULMONARY VESSELS: No evidence of pulmonary embolism. *  PLEURA / PERICARDIUM: Within normal limits. *  GEE / MEDIASTINUM: Hiatal hernia. *  LUNGS: Right lower lobe airspace disease *  TRACHEOBRONCHIAL TREE: Within normal limits. *  AORTA: Within normal limits. *  CORONARY ARTERIES: No coronary artery calcification is seen. *  CHEST WALL/AXILLA: Within normal limits. *  VISUALIZED UPPER ABDOMEN: Within normal limits. *  SPINE / BONES: Within normal limits. *  ADDITIONAL COMMENTS: None. IMPRESSION: No evidence of pulmonary emboli. Right lower lobe pneumonia. Hiatal hernia. Date of Dictation: 9/26/2018 3:54 AM 
 
 
Assessment and Plan:  
 
Hospital Problems as of 9/26/2018  Never Reviewed Codes Class Noted - Resolved POA  
 CAP (community acquired pneumonia) ICD-10-CM: J18.9 ICD-9-CM: 248  9/26/2018 - Present Unknown Leukocytosis ICD-10-CM: Y04.461 ICD-9-CM: 288.60  9/26/2018 - Present Unknown * (Principal)Acute hypoxemic respiratory failure ([facility] ICD-10-CM: J96.01 
ICD-9-CM: 518.81  9/26/2018 - Present Unknown Tobacco abuse ICD-10-CM: Z72.0 ICD-9-CM: 305.1  9/26/2018 - Present Unknown RESOLVED: Hypoxia ICD-10-CM: R09.02 
ICD-9-CM: 799.02  9/26/2018 - 9/26/2018 Unknown Plan: # Acute hypoxemic respiratory failure - 2/2 CAP 
 - Rocephin/Azithro; IVFs; LA 1.9 
 - flu swab pending - Supp O2 via NC, tolerating currently # RLL CAP 
 - as above # Leukocytosis - 2/2 above # Tobacco abuse 
 - educated DVT ppx: SCDs Code status:  Full Estimated LOS:  Greater than 2 midnights Risk:  high Signed: 
Iraida Sewell MD

## 2018-09-26 NOTE — ED TRIAGE NOTES
Pt was at [facility] urgent care for some flu-like symptoms x4days, gen body aches, cough, fever/chills, diarrhea. Pt has tried OTC cough/cold meds with no relief. While pt was at [facility] urgent care, they found her O2 level to be 68%, upon EMS arrival they found pt's fingers to be very cold, they put her on their O2 monitor and got 98% on 5L, they dropped her down to 2L, still 98%. Pt does have diminished breath sounds bilaterally in lower lobes. Upper lobes sound coarse bilaterally.

## 2018-09-26 NOTE — PROGRESS NOTES
SBAR shift report received from [facility]. Pt stable. Assessment complete. Pt is lying in bed, resting quietly. Resp even, unlabored. Pt is alert, orient X 4. Pt appears in no acute distress at this time. Pt is on 2L nasal cannula 02. Pt denies pain or SOB. Pt has SCD's in place. Pt encouraged to call for assistance, call light in reach. Safety measures in place. Will continue to monitor

## 2018-09-26 NOTE — ED PROVIDER NOTES
HPI Comments: Patient states she has felt badly for the past 3-4 days. She has had generalized body aches and a tactile fever. She has a cough occasionally productive of sputum. She does not smoke cigarettes but states she smokes \"small cigars\" about a pack a week. She denies any vomiting but has had some diarrhea. She has been trying over-the-counter cough and cold medicines without any improvement. She went to an urgent care and was found to be hypoxic and was sent here. She states she is feeling much better on the supplemental oxygen. She is not on supplemental oxygen at home. Elements of this note were created using speech recognition software. As such, errors of speech recognition may be present. Patient is a 59 y.o. female presenting with general illness. The history is provided by the patient. Generalized Body Aches No past medical history on file. No past surgical history on file. No family history on file. Social History Social History  Marital status:  Spouse name: N/A  
 Number of children: N/A  
 Years of education: N/A Occupational History  Not on file. Social History Main Topics  Smoking status: Not on file  Smokeless tobacco: Not on file  Alcohol use Not on file  Drug use: Not on file  Sexual activity: Not on file Other Topics Concern  Not on file Social History Narrative  No narrative on file ALLERGIES: Review of patient's allergies indicates no known allergies. Review of Systems Constitutional: Positive for fever. Negative for chills. Gastrointestinal: Positive for diarrhea. Negative for nausea and vomiting. All other systems reviewed and are negative. Vitals:  
 09/25/18 2015 BP: 100/50 Pulse: 96  
Resp: 20 Temp: 97.9 °F (36.6 °C) SpO2: 97% Weight: 63.5 kg (140 lb) Height: 5' 2\" (1.575 m) Physical Exam  
 Constitutional: She is oriented to person, place, and time. She appears well-developed and well-nourished. HENT:  
Head: Normocephalic and atraumatic. Eyes: Conjunctivae are normal. Pupils are equal, round, and reactive to light. Neck: Normal range of motion. Neck supple. Cardiovascular: Normal heart sounds. No murmur heard. Pulmonary/Chest: No respiratory distress. She has no wheezes. Mildly coarse breath sounds bilaterally Abdominal: Soft. Bowel sounds are normal.  
Musculoskeletal: She exhibits no edema or tenderness. Neurological: She is alert and oriented to person, place, and time. Skin: Skin is warm and dry. Psychiatric: She has a normal mood and affect. Her behavior is normal.  
Nursing note and vitals reviewed. MDM Number of Diagnoses or Management Options Acute cystitis without hematuria: new and requires workup Hypoxia: new and requires workup Leukocytosis, unspecified type: new and requires workup Diagnosis management comments: 1:14 AM elevated d-dimer, will get a CT scan. 3:35 AM spoke with hospitalist, to see patient for admission Amount and/or Complexity of Data Reviewed Clinical lab tests: ordered and reviewed Tests in the radiology section of CPT®: ordered and reviewed Discuss the patient with other providers: yes Risk of Complications, Morbidity, and/or Mortality Presenting problems: high Diagnostic procedures: moderate Management options: moderate Patient Progress Patient progress: improved ED Course Procedures

## 2018-09-27 LAB
ANION GAP SERPL CALC-SCNC: 8 MMOL/L (ref 7–16)
BUN SERPL-MCNC: 9 MG/DL (ref 8–23)
CALCIUM SERPL-MCNC: 8.3 MG/DL (ref 8.3–10.4)
CHLORIDE SERPL-SCNC: 101 MMOL/L (ref 98–107)
CO2 SERPL-SCNC: 26 MMOL/L (ref 21–32)
CREAT SERPL-MCNC: 0.52 MG/DL (ref 0.6–1)
ERYTHROCYTE [DISTWIDTH] IN BLOOD BY AUTOMATED COUNT: 13.3 %
GLUCOSE SERPL-MCNC: 115 MG/DL (ref 65–100)
HCT VFR BLD AUTO: 37.6 % (ref 35.8–46.3)
HGB BLD-MCNC: 12 G/DL (ref 11.7–15.4)
MAGNESIUM SERPL-MCNC: 1.9 MG/DL (ref 1.8–2.4)
MCH RBC QN AUTO: 28.1 PG (ref 26.1–32.9)
MCHC RBC AUTO-ENTMCNC: 31.9 G/DL (ref 31.4–35)
MCV RBC AUTO: 88.1 FL (ref 79.6–97.8)
NRBC # BLD: 0 K/UL (ref 0–0.2)
PLATELET # BLD AUTO: 236 K/UL (ref 150–450)
PMV BLD AUTO: 10 FL (ref 9.4–12.3)
POTASSIUM SERPL-SCNC: 3.3 MMOL/L (ref 3.5–5.1)
RBC # BLD AUTO: 4.27 M/UL (ref 4.05–5.2)
SODIUM SERPL-SCNC: 135 MMOL/L (ref 136–145)
WBC # BLD AUTO: 10.9 K/UL (ref 4.3–11.1)

## 2018-09-27 PROCEDURE — 74011250636 HC RX REV CODE- 250/636: Performed by: FAMILY MEDICINE

## 2018-09-27 PROCEDURE — 80048 BASIC METABOLIC PNL TOTAL CA: CPT

## 2018-09-27 PROCEDURE — 74011000302 HC RX REV CODE- 302: Performed by: INTERNAL MEDICINE

## 2018-09-27 PROCEDURE — 65270000029 HC RM PRIVATE

## 2018-09-27 PROCEDURE — 83735 ASSAY OF MAGNESIUM: CPT

## 2018-09-27 PROCEDURE — 77010033678 HC OXYGEN DAILY

## 2018-09-27 PROCEDURE — 97161 PT EVAL LOW COMPLEX 20 MIN: CPT

## 2018-09-27 PROCEDURE — 74011000258 HC RX REV CODE- 258: Performed by: INTERNAL MEDICINE

## 2018-09-27 PROCEDURE — 86580 TB INTRADERMAL TEST: CPT | Performed by: INTERNAL MEDICINE

## 2018-09-27 PROCEDURE — 74011250637 HC RX REV CODE- 250/637: Performed by: INTERNAL MEDICINE

## 2018-09-27 PROCEDURE — 74011250636 HC RX REV CODE- 250/636: Performed by: INTERNAL MEDICINE

## 2018-09-27 PROCEDURE — 97165 OT EVAL LOW COMPLEX 30 MIN: CPT

## 2018-09-27 PROCEDURE — 74011250637 HC RX REV CODE- 250/637: Performed by: FAMILY MEDICINE

## 2018-09-27 PROCEDURE — 36415 COLL VENOUS BLD VENIPUNCTURE: CPT

## 2018-09-27 PROCEDURE — 85027 COMPLETE CBC AUTOMATED: CPT

## 2018-09-27 PROCEDURE — 97530 THERAPEUTIC ACTIVITIES: CPT

## 2018-09-27 PROCEDURE — 87070 CULTURE OTHR SPECIMN AEROBIC: CPT

## 2018-09-27 PROCEDURE — 94760 N-INVAS EAR/PLS OXIMETRY 1: CPT

## 2018-09-27 RX ORDER — POTASSIUM CHLORIDE 1.5 G/1.77G
40 POWDER, FOR SOLUTION ORAL 2 TIMES DAILY WITH MEALS
Status: DISCONTINUED | OUTPATIENT
Start: 2018-09-27 | End: 2018-09-28 | Stop reason: HOSPADM

## 2018-09-27 RX ADMIN — HEPARIN SODIUM 5000 UNITS: 5000 INJECTION INTRAVENOUS; SUBCUTANEOUS at 05:09

## 2018-09-27 RX ADMIN — Medication 10 ML: at 21:23

## 2018-09-27 RX ADMIN — POTASSIUM CHLORIDE 40 MEQ: 1.5 POWDER, FOR SOLUTION ORAL at 09:20

## 2018-09-27 RX ADMIN — Medication 10 ML: at 18:30

## 2018-09-27 RX ADMIN — GUAIFENESIN 1200 MG: 600 TABLET, EXTENDED RELEASE ORAL at 09:16

## 2018-09-27 RX ADMIN — GUAIFENESIN 1200 MG: 600 TABLET, EXTENDED RELEASE ORAL at 21:22

## 2018-09-27 RX ADMIN — AZITHROMYCIN 500 MG: 250 TABLET, FILM COATED ORAL at 05:09

## 2018-09-27 RX ADMIN — Medication 10 ML: at 05:09

## 2018-09-27 RX ADMIN — TUBERCULIN PURIFIED PROTEIN DERIVATIVE 5 UNITS: 5 INJECTION, SOLUTION INTRADERMAL at 18:34

## 2018-09-27 RX ADMIN — BENZONATATE 100 MG: 100 CAPSULE ORAL at 05:17

## 2018-09-27 RX ADMIN — CEFTRIAXONE SODIUM 1 G: 1 INJECTION, POWDER, FOR SOLUTION INTRAMUSCULAR; INTRAVENOUS at 05:08

## 2018-09-27 RX ADMIN — HEPARIN SODIUM 5000 UNITS: 5000 INJECTION INTRAVENOUS; SUBCUTANEOUS at 18:29

## 2018-09-27 RX ADMIN — HEPARIN SODIUM 5000 UNITS: 5000 INJECTION INTRAVENOUS; SUBCUTANEOUS at 21:22

## 2018-09-27 RX ADMIN — POTASSIUM CHLORIDE 40 MEQ: 1.5 POWDER, FOR SOLUTION ORAL at 18:29

## 2018-09-27 NOTE — PROGRESS NOTES
Pt rested very little through out evening shift. Pt had no complaints or issues. SBAR end of shift report given to oncoming RN.

## 2018-09-27 NOTE — PROGRESS NOTES
Patient lying in bed  respriations even and unlabored on 2L nasal cannula  No signs of distress  No visual cues of pain  Safety measures in place Family at bedside

## 2018-09-27 NOTE — PROGRESS NOTES
OCCUPATIONAL THERAPY: Initial Assessment and Discharge 9/27/2018 INPATIENT: Hospital Day: 3 [place] / [place] / Product Type: Medicare /  
  
NAME/AGE/GENDER: Francisco J Rosas is a 59 y.o. female PRIMARY DIAGNOSIS:  CAP (community acquired pneumonia) Hypoxia Acute hypoxemic respiratory failure (HCC) Acute hypoxemic respiratory failure ([address] ICD-10: Treatment Diagnosis:  
 · Generalized Muscle Weakness (M62.81) Precautions/Allergies: 
   Review of patient's allergies indicates no known allergies. ASSESSMENT:  
 
Ms. Kala Felipe presents acute hypoxic respiratory failure. Upon arrival, pt sitting edge of bed and on 2L O2. Pt AOx4 and agreeable to OT evaluation. Pt reports living in a 1-story home with ~6 steps to enter. Pt reports having friends staying with her. At baseline, pt independent with ADLs and functional mobility. Pt endorses no recent falls and no use of any DME. Today, pt performed sit to stand transfer with supervision. Pt ambulated in room and completed self-grooming at sink with supervision. Pt left sitting upright in bed, with needs met, call light within reach, and RN notified. Pt is believed to be functioning at baseline for ADLs and functional mobility. Pt to be discharged from OT services at this time. This section established at most recent assessment PROBLEM LIST (Impairments causing functional limitations): 1. Decreased Activity Tolerance 2. Increased Shortness of Breath INTERVENTIONS PLANNED: (Benefits and precautions of occupational therapy have been discussed with the patient.) 1. none TREATMENT PLAN: Frequency/Duration: 
Rehabilitation Potential For Stated Goals:   
 
RECOMMENDED REHABILITATION/EQUIPMENT: (at time of discharge pending progress): Due to the probability of continued deficits (see above) this patient will not likely need continued skilled occupational therapy after discharge. Equipment:  
? None at this time OCCUPATIONAL PROFILE AND HISTORY:  
History of Present Injury/Illness (Reason for Referral): 
See H&P Past Medical History/Comorbidities: Ms. Robert Michel  has no past medical history on file. Ms. Robert Michel  has no past surgical history on file. Social History/Living Environment:  
Home Environment: Private residence # Steps to Enter: 6 One/Two Story Residence: One story Living Alone: No 
Support Systems: Friends \ neighbors, Family member(s) Patient Expects to be Discharged to[de-identified] Private residence Current DME Used/Available at Home: None Prior Level of Function/Work/Activity: 
Independent with ADls and functional mobility. Personal Factors:   
      Sex:  female Age:  59 y.o. Number of Personal Factors/Comorbidities that affect the Plan of Care: Brief history (0):  LOW COMPLEXITY ASSESSMENT OF OCCUPATIONAL PERFORMANCE[de-identified]  
Activities of Daily Living:  
Basic ADLs (From Assessment) Complex ADLs (From Assessment) Feeding: Independent Oral Facial Hygiene/Grooming: Independent Bathing: Minimum assistance Upper Body Dressing: Independent Lower Body Dressing: Independent Toileting: Independent Instrumental ADL Meal Preparation: Maximum assistance Homemaking: Maximum assistance Grooming/Bathing/Dressing Activities of Daily Living Bed/Mat Mobility Rolling: Independent Supine to Sit: Independent Sit to Stand: Supervision Scooting: Independent Most Recent Physical Functioning:  
Gross Assessment: 
  
         
  
Posture: 
  
Balance: 
Sitting: Intact Standing: Intact Bed Mobility: 
Rolling: Independent Supine to Sit: Independent Scooting: Independent Wheelchair Mobility: 
  
Transfers: 
Sit to Stand: Supervision Stand to Sit: Supervision Patient Vitals for the past 6 hrs: 
 BP BP Patient Position SpO2 Pulse  
09/27/18 0714 116/59 At rest 91 % 84  
09/27/18 1052 143/69 - 98 % 80 Physical Skills Involved: 1. Activity Tolerance Cognitive Skills Affected (resulting in the inability to perform in a timely and safe manner): 1. none Psychosocial Skills Affected: 1. Habits/Routines Number of elements that affect the Plan of Care: 1-3:  LOW COMPLEXITY CLINICAL DECISION MAKING:  
[employer] AM-PAC 6 Clicks Daily Activity Inpatient Short Form How much help from another person does the patient currently need. .. Total A Lot A Little None 1. Putting on and taking off regular lower body clothing? [] 1   [] 2   [] 3   [x] 4  
2. Bathing (including washing, rinsing, drying)? [] 1   [] 2   [x] 3   [] 4  
3. Toileting, which includes using toilet, bedpan or urinal?   [] 1   [] 2   [] 3   [x] 4  
4. Putting on and taking off regular upper body clothing? [] 1   [] 2   [] 3   [x] 4  
5. Taking care of personal grooming such as brushing teeth? [] 1   [] 2   [] 3   [x] 4  
6. Eating meals? [] 1   [] 2   [] 3   [x] 4  
© 2007, Trustees of [facility] [employer], under license to [employer]. All rights reserved Score:  Initial: 23 Most Recent: X (Date: -- ) Interpretation of Tool:  Represents activities that are increasingly more difficult (i.e. Bed mobility, Transfers, Gait). Score 24 23 22-20 19-15 14-10 9-7 6 Modifier CH CI CJ CK CL CM CN   
 
? Self Care:  
  - CURRENT STATUS: CI - 1%-19% impaired, limited or restricted  - GOAL STATUS: CI - 1%-19% impaired, limited or restricted  - D/C STATUS:  CI - 1%-19% impaired, limited or restricted [place] / [place] / Product Type: Medicare /   
 
Medical Necessity:    
· Reason for Services/Other Comments: · Use of outcome tool(s) and clinical judgement create a POC that gives a: LOW COMPLEXITY  
 
 
 
TREATMENT:  
(In addition to Assessment/Re-Assessment sessions the following treatments were rendered) Pre-treatment Symptoms/Complaints:  \"I feel much better today. \" 
Pain: Initial: Pain Intensity 1: 0 /10 Post Session:  same Assessment/Reassessment only, no treatment provided today Braces/Orthotics/Lines/Etc:  
· O2 Device: Nasal cannula Treatment/Session Assessment:   
· Response to Treatment:  eval only. · Interdisciplinary Collaboration:  
o Occupational Therapist 
o Registered Nurse · After treatment position/precautions:  
o Supine in bed 
o Bed/Chair-wheels locked 
o Bed in low position 
o Call light within reach 
o RN notified · Compliance with Program/Exercises: 
· Recommendations/Intent for next treatment session: Total Treatment Duration: OT Patient Time In/Time Out Time In: 7078 Time Out: 9109 Irina Landaverde, OT

## 2018-09-27 NOTE — PROGRESS NOTES
Problem: Mobility Impaired (Adult and Pediatric) Goal: *Acute Goals and Plan of Care (Insert Text) Discharge Goals: 
(1.)Ms. Peterson Barajas will transfer from bed to chair and chair to bed with INDEPENDENT using the least restrictive device within 7 treatment day(s). (2.)Ms. Peterson Barajas will ambulate with INDEPENDENT for 1000+ feet with stable vital signs with the least restrictive device within 7 treatment day(s). (3.)Ms. Nugent will ascend/descend 10 steps with handrail with stable vital signs with SUPERVISION within 7 treatment days. (4.)Ms. Peterson Barajas will tolerate 25+ minutes of therapeutic activity/exercise to improve activity tolerance within 7 treatment days. ________________________________________________________________________________________________ PHYSICAL THERAPY: Initial Assessment, Treatment Day: Day of Assessment, PM 9/27/2018 INPATIENT: Hospital Day: 3 [place] / Product Type: Medicare /  
  
NAME/AGE/GENDER: Veronica Link is a 59 y.o. female PRIMARY DIAGNOSIS: CAP (community acquired pneumonia) Hypoxia Acute hypoxemic respiratory failure (HCC) Acute hypoxemic respiratory failure ([facility] [address] ICD-10: Treatment Diagnosis: · Difficulty in walking, Not elsewhere classified (R26.2) Precaution/Allergies: 
Review of patient's allergies indicates no known allergies. ASSESSMENT:  
 
Ms. Peterson Barajas is a pleasant 59 y.o. Female with primary diagnosis of CAP and Hypoxia. Pt is supine in bed upon contact, A&O x 4, and agreeable to PT Evaluation. Pt states she is independent with ambulation at baseline and lives with a roommate, with no DME at home. Pt states she does no drive and has not had any recent falls. Pt is on 2L O2 currently with O2 sats of 93% and is not on supplemental O2 at home. Pt transferred from supine to sitting EOB independently, demonstrating good standing balance.  Pt performed STS with supervision, demonstrating good standing balance without support. Pt ambulated 750ft with no AD and SBA and 2L O2 NC, with O2 sats between 91-95%, with verbal/visual cuing for safety awareness, deep breathing techniques, and pacing before returning to sitting EOB with increased SOB. Educated pt about importance of mobility for overall health and recovery from pneumonia, pt verbalized understanding. Pt left sitting EOB with all needs met and within reach with nursing notified. Pt will benefit from skilled therapy for duration of hospital stay to address decreased activity tolerance. No DC needs anticipated. This section established at most recent assessment PROBLEM LIST (Impairments causing functional limitations): 1. Decreased Ambulation Ability/Technique 2. Decreased Activity Tolerance 3. Decreased Pacing Skills 4. Increased Fatigue 5. Increased Shortness of Breath 6. Decreased [place] with Home Exercise Program 
 INTERVENTIONS PLANNED: (Benefits and precautions of physical therapy have been discussed with the patient.) 1. Balance Exercise 2. Bed Mobility 3. Family Education 4. Gait Training 5. Home Exercise Program (HEP) 6. Manual Therapy 7. Neuromuscular Re-education/Strengthening 8. Range of Motion (ROM) 9. Therapeutic Activites 10. Therapeutic Exercise/Strengthening 11. Transfer Training 12. Group Therapy TREATMENT PLAN: Frequency/Duration: 3 times a week for duration of hospital stay Rehabilitation Potential For Stated Goals: Good RECOMMENDED REHABILITATION/EQUIPMENT: (at time of discharge pending progress): Due to the probability of continued deficits (see above) this patient will not likely need continued skilled physical therapy after discharge. Equipment:  
? None at this time HISTORY:  
History of Present Injury/Illness (Reason for Referral): Ms. Dorcas Machuca is a 60 y/o female with no significant PMH p/w fatigue, body aches and SOB for several days. No recent travel or sick contacts.  She did have subjective fevers. She did not get a flu shot this year. She went to a nearby urgent care where she was noted to be hypoxic (68% per ED note review) and was then sent to the ED here. D-dimer was elevated; CT neg for PE but did show RLL consolidation. Flu swab is pending. She is on 2L NC O2 and feels better. She takes no meds at home. ROS otherwise negative. Past Medical History/Comorbidities: Ms. Khloe Gill  has no past medical history on file. Ms. Klhoe Gill  has no past surgical history on file. Social History/Living Environment:  
Home Environment: Private residence # Steps to Enter: 10 
Rails to Enter: Yes Hand Rails : Right Wheelchair Ramp: No 
One/Two Story Residence: One story Living Alone: No 
Support Systems: Friends \ neighbors, Family member(s) Patient Expects to be Discharged to[de-identified] Private residence Current DME Used/Available at Home: None Tub or Shower Type: Tub/Shower combination Prior Level of Function/Work/Activity: 
Independent with ambulation, lives with roommate, no falls, no supplemental O2, no AD, doesn't drive Number of Personal Factors/Comorbidities that affect the Plan of Care: 1-2: MODERATE COMPLEXITY EXAMINATION:  
Most Recent Physical Functioning:  
Gross Assessment: 
AROM: Within functional limits Strength: Within functional limits Coordination: Within functional limits Tone: Normal 
         
  
Posture: 
  
Balance: 
Sitting: Intact Standing: Intact Bed Mobility: 
Supine to Sit: Independent Scooting: Independent Wheelchair Mobility: 
  
Transfers: 
Sit to Stand: Supervision Stand to Sit: Supervision Gait: 
  
Speed/Katt: Slow;Shuffled Step Length: Left shortened;Right shortened Gait Abnormalities: Decreased step clearance Distance (ft): 750 Feet (ft) Assistive Device: Other (comment) (none) Ambulation - Level of Assistance: Stand-by assistance Body Structures Involved: 1. Heart 2. Lungs 3. Muscles Body Functions Affected: 1. Cardio 2. Respiratory 3. Neuromusculoskeletal 
4. Movement Related Activities and Participation Affected: 1. Mobility 2. Domestic Life 3. Interpersonal Interactions and Relationships 4. Community, Social and [place] Number of elements that affect the Plan of Care: 4+: HIGH COMPLEXITY CLINICAL PRESENTATION:  
Presentation: Evolving clinical presentation with changing clinical characteristics: MODERATE COMPLEXITY CLINICAL DECISION MAKIN[address] [place] [address] AM-PAC 6 Clicks Basic Mobility Inpatient Short Form How much difficulty does the patient currently have. .. Unable A Lot A Little None 1. Turning over in bed (including adjusting bedclothes, sheets and blankets)? [] 1   [] 2   [] 3   [x] 4  
2. Sitting down on and standing up from a chair with arms ( e.g., wheelchair, bedside commode, etc.)   [] 1   [] 2   [] 3   [x] 4  
3. Moving from lying on back to sitting on the side of the bed? [] 1   [] 2   [] 3   [x] 4 How much help from another person does the patient currently need. .. Total A Lot A Little None 4. Moving to and from a bed to a chair (including a wheelchair)? [] 1   [] 2   [] 3   [x] 4  
5. Need to walk in hospital room? [] 1   [] 2   [] 3   [x] 4  
6. Climbing 3-5 steps with a railing? [] 1   [] 2   [x] 3   [] 4  
© , Trustees of [address] [place] [address], under license to [employer]. All rights reserved Score:  Initial: 23 Most Recent: X (Date: -- ) Interpretation of Tool:  Represents activities that are increasingly more difficult (i.e. Bed mobility, Transfers, Gait). Score 24 23 22-20 19-15 14-10 9-7 6 Modifier CH CI CJ CK CL CM CN   
 
? Mobility - Walking and Moving Around:  
  - CURRENT STATUS: CI - 1%-19% impaired, limited or restricted  - GOAL STATUS: CH - 0% impaired, limited or restricted  - D/C STATUS:  ---------------To be determined--------------- [place] / [place] / Product Type: Medicare /   
 
 Medical Necessity:    
· Patient demonstrates good rehab potential due to higher previous functional level. Reason for Services/Other Comments: 
· Patient continues to require skilled intervention due to impaired activity tolerance and functional mobility. Use of outcome tool(s) and clinical judgement create a POC that gives a: Clear prediction of patient's progress: LOW COMPLEXITY  
  
 
 
 
TREATMENT:  
(In addition to Assessment/Re-Assessment sessions the following treatments were rendered) Pre-treatment Symptoms/Complaints:  No complaints Pain: Initial:  
Pain Intensity 1: 0  Post Session:  0/10 In addition to PT Evaluation: 
Therapeutic Activity: (    8 minutes): Therapeutic activities including Bed transfers, Ambulation on level ground and education on importance of mobility for activity tolerance, pacing, deep breathing techniques, and safety awareness to improve mobility and strength. Required minimal   verbal/visual cuing to promote correct breathing techniques. Braces/Orthotics/Lines/Etc:  
· O2 Device: Nasal cannula Treatment/Session Assessment:   
· Response to Treatment:  See above · Interdisciplinary Collaboration:  
o Physical Therapist 
o Registered Nurse · After treatment position/precautions:  
o Bed/Chair-wheels locked 
o Bed in low position 
o Call light within reach 
o RN notified 
o sitting EOB · Compliance with Program/Exercises: Will assess as treatment progresses. · Recommendations/Intent for next treatment session: \"Next visit will focus on advancements to more challenging activities and reduction in assistance provided\". Total Treatment Duration: PT Patient Time In/Time Out Time In: 1618 Time Out: 8300 Barry Bennett, PT

## 2018-09-27 NOTE — PROGRESS NOTES
Patient sitting in chair  Respirations even and unlabored on 2L nasal cannula  No signs of distress  No complaints of pain  Will continue to monitor

## 2018-09-27 NOTE — PROGRESS NOTES
Progress Note Patient: Pedro Parkinson MRN: [id number]  SSN: [id number] YOB: 1955  Age: 59 y.o. Sex: female Admit Date: 9/25/2018 LOS: 1 day Subjective: F/U RLL pneumonia 
 
24hr Tmax 100.7. Breathing much better with current treatment. Eating and drinking well. No chest pain. SOB improving. WBC down to 10.9 from 22.2. K low today. Respiratory culture has not been obtained. Current Facility-Administered Medications Medication Dose Route Frequency  potassium chloride (KLOR-CON) packet 40 mEq  40 mEq Oral BID WITH MEALS  sodium chloride (NS) flush 5-10 mL  5-10 mL IntraVENous Q8H  
 sodium chloride (NS) flush 5-10 mL  5-10 mL IntraVENous PRN  
 azithromycin (ZITHROMAX) tablet 500 mg  500 mg Oral Q24H  cefTRIAXone (ROCEPHIN) 1 g in 0.9% sodium chloride (MBP/ADV) 50 mL  1 g IntraVENous Q24H  
 acetaminophen (TYLENOL) tablet 650 mg  650 mg Oral Q4H PRN  
 guaiFENesin ER (MUCINEX) tablet 1,200 mg  1,200 mg Oral Q12H  
 0.9% sodium chloride infusion  75 mL/hr IntraVENous CONTINUOUS  
 benzonatate (TESSALON) capsule 100 mg  100 mg Oral TID PRN  
 albuterol-ipratropium (DUO-NEB) 2.5 MG-0.5 MG/3 ML  3 mL Nebulization Q4H PRN  
 heparin (porcine) injection 5,000 Units  5,000 Units SubCUTAneous Q8H  
 nicotine (NICODERM CQ) 14 mg/24 hr patch 1 Patch  1 Patch TransDERmal DAILY Objective:  
 
Vitals:  
 09/26/18 2018 09/26/18 2352 09/27/18 0422 09/27/18 7738 BP: 95/57 123/71 144/70 116/59 Pulse: 68 70 80 84 Resp: 20 20 20 17 Temp: 98.2 °F (36.8 °C) 98.6 °F (37 °C) 97.7 °F (36.5 °C) 98.2 °F (36.8 °C) SpO2: 96% 94% 94% 91% Weight:      
Height:      
  
  
Intake and Output: 
Current Shift:   
Last three shifts: 09/25 1901 - 09/27 0700 In: 240 [P.O.:240] Out: 500 [Urine:500] Physical Exam:  
General:  Alert, cooperative, no distress, appears stated age. Eyes:  Conjunctivae/corneas clear. Ears:  Normal TMs and external ear canals both ears. Nose: Nares normal. Septum midline. .  
Mouth/Throat: Lips, mucosa, and tongue normal. Teeth and gums normal.  
Neck:  no JVD. Back:   Symmetric, no curvature. ROM normal. No CVA tenderness. Lungs:   Mild inspiratory wheeze at RLL Heart:  Regular rate and rhythm, S1, S2 normal, no murmur, click, rub or gallop. Abdomen:   Soft, non-tender. Bowel sounds normal. No masses,  No organomegaly. Extremities: Extremities normal, atraumatic, no cyanosis or edema. Pulses: 2+ and symmetric all extremities. Skin: Skin color, texture, turgor normal. No rashes or lesions Lymph nodes: Cervical, supraclavicular, and axillary nodes normal.  
Neurologic:  Normal strength, sensation and reflexes throughout. Lab/Data Review: 
 
Recent Results (from the past 24 hour(s)) METABOLIC PANEL, BASIC Collection Time: 09/27/18  6:25 AM  
Result Value Ref Range Sodium 135 (L) 136 - 145 mmol/L Potassium 3.3 (L) 3.5 - 5.1 mmol/L Chloride 101 98 - 107 mmol/L  
 CO2 26 21 - 32 mmol/L Anion gap 8 7 - 16 mmol/L Glucose 115 (H) 65 - 100 mg/dL BUN 9 8 - 23 MG/DL Creatinine 0.52 (L) 0.6 - 1.0 MG/DL  
 GFR est AA >60 >60 ml/min/1.73m2 GFR est non-AA >60 >60 ml/min/1.73m2 Calcium 8.3 8.3 - 10.4 MG/DL  
CBC W/O DIFF Collection Time: 09/27/18  6:25 AM  
Result Value Ref Range WBC 10.9 4.3 - 11.1 K/uL  
 RBC 4.27 4.05 - 5.2 M/uL  
 HGB 12.0 11.7 - 15.4 g/dL HCT 37.6 35.8 - 46.3 % MCV 88.1 79.6 - 97.8 FL  
 MCH 28.1 26.1 - 32.9 PG  
 MCHC 31.9 31.4 - 35.0 g/dL  
 RDW 13.3 % PLATELET 892 437 - 543 K/uL MPV 10.0 9.4 - 12.3 FL ABSOLUTE NRBC 0.00 0.0 - 0.2 K/uL Assessment/ Plan:  
 
Principal Problem: 
  Acute hypoxemic respiratory failure ([address] (9/26/2018) Active Problems: 
  CAP (community acquired pneumonia) (9/26/2018) Leukocytosis (9/26/2018) Tobacco abuse (9/26/2018) Continue current antibiotics. Respiratory culture has been sent to lab. Try to titrate off O2 if can tolerate, but if not, may send home with oxygen. Continue duoneb treatments as needed. Order respiratory culture. Na levels improved. Stop NS. Supplement K and check Mg levels. DVT prophylaxis - add heparin Signed By: Brandee Lobato DO September 27, 2018

## 2018-09-27 NOTE — PROGRESS NOTES
Verified Phone Number(s): [phone], or call Kelli at [phone] Preferred : Patient, with Kelli as emergency contact Expected Discharge Date: 09/29/2018 Follow-up within one week of Discharge with [facility] PCP? Yes No PCP - Needs new [facility] PCP established? Needs new PCP Patient Request Regarding PCP? Requests Dr. Brianna Gtz if accepting patients (friend goes to her), otherwise no preference. Patient Preference for Day or Time of Appointments? Any time of day.  Needed? No 
 
Met with patient regarding discharge planning. Patient is  and lives alone in her own home normally, but has a friend staying with her currently. Patient is independent in all ADLs at baseline. She uses no DME. She states she would like \"some oxygen to go\" if possible. Patient does not currently have a qualifying diagnosis for home O2.  will work with attending if home O2 is needed in order to find a qualifying diagnosis. Patient has no PCP currently and has requested referral for PCP follow-up. Case Management will continue to follow. Care Management Interventions PCP Verified by CM: No (Assign to [facility] physician for PCP coverage.) Transition of Care Consult (CM Consult): Discharge Planning Discharge Durable Medical Equipment: No 
Physical Therapy Consult: Yes Occupational Therapy Consult: Yes Speech Therapy Consult: No 
Current Support Network: Lives Alone, Own Home (A friend is temporarily living with her.) Confirm Follow Up Transport: Friends Plan discussed with Pt/Family/Caregiver: Yes Freedom of Choice Offered: Yes Discharge Location Discharge Placement: Home

## 2018-09-27 NOTE — PROGRESS NOTES
Report received from Óscar Mcfarlane RN. Alert and oriented sitting up in chair. Respirations even and unlabored on 2L NC. Diminished with auscultation. Call light within reach. Will continue to monitor.

## 2018-09-28 ENCOUNTER — HOME HEALTH ADMISSION (OUTPATIENT)
Dept: HOME HEALTH SERVICES | Facility: HOME HEALTH | Age: 63
End: 2018-09-28

## 2018-09-28 VITALS
HEART RATE: 68 BPM | DIASTOLIC BLOOD PRESSURE: 71 MMHG | RESPIRATION RATE: 17 BRPM | TEMPERATURE: 98.3 F | SYSTOLIC BLOOD PRESSURE: 118 MMHG | OXYGEN SATURATION: 94 % | HEIGHT: 62 IN | BODY MASS INDEX: 25.76 KG/M2 | WEIGHT: 140 LBS

## 2018-09-28 LAB
ANION GAP SERPL CALC-SCNC: 6 MMOL/L (ref 7–16)
BUN SERPL-MCNC: 9 MG/DL (ref 8–23)
CALCIUM SERPL-MCNC: 8.3 MG/DL (ref 8.3–10.4)
CHLORIDE SERPL-SCNC: 101 MMOL/L (ref 98–107)
CO2 SERPL-SCNC: 28 MMOL/L (ref 21–32)
CREAT SERPL-MCNC: 0.46 MG/DL (ref 0.6–1)
GLUCOSE SERPL-MCNC: 88 MG/DL (ref 65–100)
POTASSIUM SERPL-SCNC: 4 MMOL/L (ref 3.5–5.1)
SODIUM SERPL-SCNC: 135 MMOL/L (ref 136–145)

## 2018-09-28 PROCEDURE — 74011250637 HC RX REV CODE- 250/637: Performed by: FAMILY MEDICINE

## 2018-09-28 PROCEDURE — 74011250636 HC RX REV CODE- 250/636: Performed by: INTERNAL MEDICINE

## 2018-09-28 PROCEDURE — 80048 BASIC METABOLIC PNL TOTAL CA: CPT

## 2018-09-28 PROCEDURE — 74011250637 HC RX REV CODE- 250/637: Performed by: INTERNAL MEDICINE

## 2018-09-28 PROCEDURE — 74011250636 HC RX REV CODE- 250/636: Performed by: FAMILY MEDICINE

## 2018-09-28 PROCEDURE — 36415 COLL VENOUS BLD VENIPUNCTURE: CPT

## 2018-09-28 PROCEDURE — 74011000258 HC RX REV CODE- 258: Performed by: INTERNAL MEDICINE

## 2018-09-28 RX ORDER — ALBUTEROL SULFATE 90 UG/1
2 AEROSOL, METERED RESPIRATORY (INHALATION)
Qty: 1 INHALER | Refills: 0 | Status: ON HOLD | OUTPATIENT
Start: 2018-09-28 | End: 2020-08-26 | Stop reason: SDUPTHER

## 2018-09-28 RX ORDER — IPRATROPIUM BROMIDE AND ALBUTEROL SULFATE 2.5; .5 MG/3ML; MG/3ML
3 SOLUTION RESPIRATORY (INHALATION)
Qty: 30 NEBULE | Refills: 0 | Status: SHIPPED | OUTPATIENT
Start: 2018-09-28 | End: 2020-07-20

## 2018-09-28 RX ORDER — NEBULIZER AND COMPRESSOR
1 EACH MISCELLANEOUS
Qty: 1 EACH | Refills: 0 | Status: SHIPPED | OUTPATIENT
Start: 2018-09-28 | End: 2020-07-20

## 2018-09-28 RX ORDER — AZITHROMYCIN 500 MG/1
500 TABLET, FILM COATED ORAL DAILY
Qty: 5 TAB | Refills: 0 | Status: SHIPPED | OUTPATIENT
Start: 2018-09-28 | End: 2020-07-20

## 2018-09-28 RX ORDER — BENZONATATE 100 MG/1
100 CAPSULE ORAL
Qty: 21 CAP | Refills: 0 | Status: SHIPPED | OUTPATIENT
Start: 2018-09-28 | End: 2018-10-05

## 2018-09-28 RX ORDER — IBUPROFEN 200 MG
1 TABLET ORAL DAILY
Qty: 30 PATCH | Refills: 0 | Status: SHIPPED | OUTPATIENT
Start: 2018-09-28 | End: 2018-10-28

## 2018-09-28 RX ADMIN — BENZONATATE 100 MG: 100 CAPSULE ORAL at 02:53

## 2018-09-28 RX ADMIN — HEPARIN SODIUM 5000 UNITS: 5000 INJECTION INTRAVENOUS; SUBCUTANEOUS at 05:35

## 2018-09-28 RX ADMIN — POTASSIUM CHLORIDE 40 MEQ: 1.5 POWDER, FOR SOLUTION ORAL at 07:42

## 2018-09-28 RX ADMIN — Medication 10 ML: at 05:34

## 2018-09-28 RX ADMIN — CEFTRIAXONE SODIUM 1 G: 1 INJECTION, POWDER, FOR SOLUTION INTRAMUSCULAR; INTRAVENOUS at 03:49

## 2018-09-28 RX ADMIN — GUAIFENESIN 1200 MG: 600 TABLET, EXTENDED RELEASE ORAL at 07:41

## 2018-09-28 RX ADMIN — AZITHROMYCIN 500 MG: 250 TABLET, FILM COATED ORAL at 05:36

## 2018-09-28 NOTE — PROGRESS NOTES
Resting in bed, watching TV. Family at bedside. Denies any pain or discomfort at this time. Call light within reach.

## 2018-09-28 NOTE — PROGRESS NOTES
Patient is discharging home with nebulized medications, but does not have a home nebulizer. Home nebulizer arranged through [employer]. No other discharge planning needs identified at this time. Care Management Interventions PCP Verified by CM: No (Assign to [facility] physician for PCP coverage.) Transition of Care Consult (CM Consult): Discharge Planning Discharge Durable Medical Equipment: No 
Physical Therapy Consult: Yes Occupational Therapy Consult: Yes Speech Therapy Consult: No 
Current Support Network: Lives Alone, Own Home (A friend is temporarily living with her.) Confirm Follow Up Transport: Friends Plan discussed with Pt/Family/Caregiver: Yes Freedom of Choice Offered: Yes Discharge Location Discharge Placement: Home

## 2018-09-28 NOTE — PROGRESS NOTES
LATE NOTE: In accordance with Medicare Guidelines, a copy of the Important Letter from Medicare was presented to the patient in anticipation of expected discharge within 48 hours. An oral explanation was provided and all questions were answered. Patient signed one copy of the Important Letter from Medicare which was placed in the patient's medical chart, and a copy of the Important Letter from Medicare was provided to the patient. Care Managers were notified.

## 2018-09-28 NOTE — PROGRESS NOTES
Tessalon given for hacking cough. Patient resting in bed, awaken by coughing per patient. Call light within reach. Will continue to monitor.

## 2018-09-28 NOTE — DISCHARGE SUMMARY
Hospitalist Discharge Summary     Patient ID:  Miguel Navarro  [id number]  70 y.o.  1955  Admit date: 9/25/2018 10:07 PM  Discharge date and time: 9/28/2018  Attending: Clovis Loaiza MD  PCP:  None  Treatment Team: Attending Provider: Clovis Loaiza MD; Utilization Review: [place] ; Care Manager: Elly Burkitt, RN; Physician: Patrick Martinez DO    Principal Diagnosis Acute hypoxemic respiratory failure [facility])   Principal Problem:    Acute hypoxemic respiratory failure ([address] (9/26/2018)    Active Problems:    CAP (community acquired pneumonia) (9/26/2018)      Leukocytosis (9/26/2018)      Tobacco abuse (9/26/2018)     Hospital Course: 58 y/o female with no significant PMH p/w fatigue, body aches and SOB for several days. No recent travel or sick contacts. She did have subjective fevers. She did not get a flu shot this year. She went to a nearby urgent care where she was noted to be hypoxic (68% per ED note review) and was then sent to the ED here. D-dimer was elevated; CT neg for PE but did show RLL consolidation. Flu swab negative. Patient was started on duonebs, azithromycin, and ceftriaxone for community acquired pneumonia. Throughout stay, patient's dyspnea has improved and CTA today. Patient states she has been told by rommates that she will stop breathuing at night. Has not been tested for sleep apnea. Admits to snoring. Satting well on RA even during ambulation. Discharge with nebulizer machine, duonebs, azithromycin for the next 5 days, nicotine patch, tessalon perles, and Proair written at discharge. Please refer to the admission H&P for details of presentation.  In summary, the patient is stable for discharge     Significant Diagnostic Studies:       Labs: Results:       Chemistry Recent Labs      09/27/18 0625  09/25/18 2018   GLU  115*  118*   NA  135*  131*   K  3.3*  3.7   CL  101  95*   CO2  26  26   BUN  9  16   CREA  0.52*  0.96   CA  8.3  8.3   AGAP  8 10   AP   --   115   TP   --   7.5   ALB   --   2.8*   GLOB   --   4.7*   AGRAT   --   0.6*      CBC w/Diff Recent Labs      09/27/18   0625  09/25/18 2018   WBC  10.9  22.2*   RBC  4.27  4.70   HGB  12.0  13.1   HCT  37.6  40.7   PLT  236  273   GRANS   --   81*   LYMPH   --   9*   EOS   --   0*      Cardiac Enzymes No results for input(s): CPK, CKND1, MALLORY in the last 72 hours. No lab exists for component: CKRMB, TROIP   Coagulation No results for input(s): PTP, INR, APTT in the last 72 hours. No lab exists for component: INREXT    Lipid Panel No results found for: CHOL, CHOLPOCT, CHOLX, CHLST, CHOLV, 235898, HDL, LDL, LDLC, DLDLP, 924468, VLDLC, VLDL, TGLX, TRIGL, TRIGP, TGLPOCT, CHHD, CHHDX   BNP No results for input(s): BNPP in the last 72 hours. Liver Enzymes Recent Labs      09/25/18 2018   TP  7.5   ALB  2.8*   AP  115   SGOT  24      Thyroid Studies No results found for: T4, T3U, TSH, TSHEXT         Discharge Exam:  Visit Vitals    /71 (BP 1 Location: Right arm, BP Patient Position: At rest)    Pulse 68    Temp 98.3 °F (36.8 °C)    Resp 17    Ht 5' 2\" (1.575 m)    Wt 63.5 kg (140 lb)    SpO2 94%    Breastfeeding No    BMI 25.61 kg/m2     General appearance: alert, cooperative, no distress, appears stated age  Lungs: clear to auscultation bilaterally. No respiratory distress   Heart: regular rate and rhythm, S1, S2 normal, no murmur, click, rub or gallop  Abdomen: soft, non-tender. Bowel sounds normal.  Extremities: no cyanosis or edema  Neurologic: Grossly normal    Disposition: home   Discharge Condition: stable  Patient Instructions: as above   Current Discharge Medication List      START taking these medications    Details   albuterol-ipratropium (DUO-NEB) 2.5 mg-0.5 mg/3 ml nebu 3 mL by Nebulization route every four (4) hours as needed. Qty: 30 Nebule, Refills: 0      azithromycin (ZITHROMAX) 500 mg tab Take 1 Tab by mouth daily.   Qty: 5 Tab, Refills: 0      benzonatate (TESSALON) 100 mg capsule Take 1 Cap by mouth three (3) times daily as needed for Cough for up to 7 days. Qty: 21 Cap, Refills: 0      nicotine (NICODERM CQ) 14 mg/24 hr patch 1 Patch by TransDERmal route daily for 30 days. Qty: 30 Patch, Refills: 0      albuterol (PROAIR HFA) 90 mcg/actuation inhaler Take 2 Puffs by inhalation every four (4) hours as needed for Wheezing. Qty: 1 Inhaler, Refills: 0      Nebulizer & Compressor machine 1 Each by Nebulization route every four (4) hours as needed. Qty: 1 Each, Refills: 0             Activity: up and thelma   Diet: Regular   Wound Care: none     Follow-up PCP of her choice in one week. May benefit from outpatient sleep study if PCP determines.    ·     Time spent to discharge patient 35 minutes  Signed:  Louis Strickland DO  9/28/2018  7:40 AM

## 2018-09-28 NOTE — PROGRESS NOTES
Oxygen Qualifier Room air: SpO2 with O2 and liter flow Resting SpO2 93% Ambulating SpO2 90% Completed by: 
 
Serena Parks, RT

## 2018-09-28 NOTE — PROGRESS NOTES
Patient will discharge home today. She is independent in all ADLs at baseline. She was tested and did not qualify for home O2. After discussion, patient was agreeable to follow-up by [facility] for RN to follow-up with her for her PNA. Patient had no PCP at admission. Sary MelaGopi is arranging follow-up PCP appointment. No other discharge planning needs identified at this time. Case Management will remain available to assist as needed. Care Management Interventions PCP Verified by CM: No (Assign to [facility] physician for PCP coverage.) Transition of Care Consult (CM Consult): Discharge Planning Discharge Durable Medical Equipment: No 
Physical Therapy Consult: Yes Occupational Therapy Consult: Yes Speech Therapy Consult: No 
Current Support Network: Lives Alone, Own Home (A friend is temporarily living with her.) Confirm Follow Up Transport: Friends Plan discussed with Pt/Family/Caregiver: Yes Freedom of Choice Offered: Yes Discharge Location Discharge Placement: Home

## 2018-09-28 NOTE — PROGRESS NOTES
[address] [facility] Face to Face Encounter Patients Name: Gwendolyn Troncoso    YOB: 1955 Ordering Physician: Kath Paul MD 
 
Primary Diagnosis: CAP (community acquired pneumonia) Hypoxia Date of Face to Face:   9/28/2018 Face to Face Encounter findings are related to primary reason for home care:   yes. 1. I certify that the patient needs intermittent care as follows: skilled nursing care:  skilled observation/assessment, patient education, complex care plan management and rehabilitative nursing 2. I certify that this patient is homebound, that is: 1) patient requires the use of a N/A device, special transportation, or assistance of another to leave the home; or 2) patient's condition makes leaving the home medically contraindicated; and 3) patient has a normal inability to leave the home and leaving the home requires considerable and taxing effort. Patient may leave the home for infrequent and short duration for medical reasons, and occasional absences for non-medical reasons. Homebound status is due to the following functional limitations: Patient with increased shortness of breath and elevated heart rate with ambulation greater than 20 feet limiting patient's ability to ambulate safely within the community. Patient with strength deficits limiting the performance of all ADL's without caregiver assistance or the use of an assistive device. Patient with poor safety awareness and is at risk for falls without assistance of another person and the use of an assistive device. Patient with poor ambulation endurance limiting their safe ability to ascend/descend the required number of steps to leave the home.  
 
3. I certify that this patient is under my care and that I, or a nurse practitioner or [address] [place] [address], or clinical nurse specialist, or certified nurse midwife, working with me, had a Face-to-Face Encounter that meets the physician Face-to-Face Encounter requirements. The following are the clinical findings from the [address] encounter that support the need for skilled services and is a summary of the encounter: Medical Record See discharge summary and summary of the patient's illness Jeronimo Kirkpatrick RN 
9/28/2018 THE FOLLOWING TO BE COMPLETED BY THE COMMUNITY PHYSICIAN: 
 
I concur with the findings described above from the F2F encounter that this patient is homebound and in need of a skilled service. Certifying Physician: _____________________________________ Printed Certifying Physician Name: _____________________________________ Date: _________________

## 2018-09-28 NOTE — PROGRESS NOTES
Patient remains in stable condition. No acute distress noted. No needs noted or voiced at this time. Safety measures remain in place. Call light is within reach.  Report to oncoming shift.

## 2018-09-28 NOTE — PROGRESS NOTES
Discharge instructions, follow up information, medication list, and prescription information provided and explained to the pt. IV removed from right hand, no remote telemetry on. Opportunity for questions provided. Case Management to get patient a nebulizer for home prior to discharge. Patient states has already called her ride. Instructed to call once ready to leave.

## 2018-09-28 NOTE — DISCHARGE INSTRUCTIONS
DISCHARGE SUMMARY from Nurse    PATIENT INSTRUCTIONS:    After general anesthesia or intravenous sedation, for 24 hours or while taking prescription Narcotics:  · Limit your activities  · Do not drive and operate hazardous machinery  · Do not make important personal or business decisions  · Do  not drink alcoholic beverages  · If you have not urinated within 8 hours after discharge, please contact your surgeon on call. What to do at Home:  Recommended activity: Activity as tolerated. If you experience any of the following symptoms temp > 101.5, worsening cough or wheezing, shortness of breath or fatigue not relieved with rest, please follow up with MD.    *  Please give a list of your current medications to your Primary Care Provider. *  Please update this list whenever your medications are discontinued, doses are      changed, or new medications (including over-the-counter products) are added. *  Please carry medication information at all times in case of emergency situations. These are general instructions for a healthy lifestyle:    No smoking/ No tobacco products/ Avoid exposure to second hand smoke  Surgeon General's Warning:  Quitting smoking now greatly reduces serious risk to your health. Obesity, smoking, and sedentary lifestyle greatly increases your risk for illness    A healthy diet, regular physical exercise & weight monitoring are important for maintaining a healthy lifestyle    You may be retaining fluid if you have a history of heart failure or if you experience any of the following symptoms:  Weight gain of 3 pounds or more overnight or 5 pounds in a week, increased swelling in our hands or feet or shortness of breath while lying flat in bed. Please call your doctor as soon as you notice any of these symptoms; do not wait until your next office visit.     Recognize signs and symptoms of STROKE:    F-face looks uneven    A-arms unable to move or move unevenly    S-speech slurred or non-existent    T-time-call 911 as soon as signs and symptoms begin-DO NOT go       Back to bed or wait to see if you get better-TIME IS BRAIN. Warning Signs of HEART ATTACK     Call 911 if you have these symptoms:   Chest discomfort. Most heart attacks involve discomfort in the center of the chest that lasts more than a few minutes, or that goes away and comes back. It can feel like uncomfortable pressure, squeezing, fullness, or pain.  Discomfort in other areas of the upper body. Symptoms can include pain or discomfort in one or both arms, the back, neck, jaw, or stomach.  Shortness of breath with or without chest discomfort.  Other signs may include breaking out in a cold sweat, nausea, or lightheadedness. Don't wait more than five minutes to call 911 - MINUTES MATTER! Fast action can save your life. Calling 911 is almost always the fastest way to get lifesaving treatment. Emergency Medical Services staff can begin treatment when they arrive -- up to an hour sooner than if someone gets to the hospital by car. The discharge information has been reviewed with the patient. The patient verbalized understanding. Discharge medications reviewed with the patient and appropriate educational materials and side effects teaching were provided. Pneumonia: Care Instructions  Your Care Instructions    Pneumonia is an infection of the lungs. Most cases are caused by infections from bacteria or viruses. Pneumonia may be mild or very severe. If it is caused by bacteria, you will be treated with antibiotics. It may take a few weeks to a few months to recover fully from pneumonia, depending on how sick you were and whether your overall health is good. Follow-up care is a key part of your treatment and safety. Be sure to make and go to all appointments, and call your doctor if you are having problems. It's also a good idea to know your test results and keep a list of the medicines you take.   How can you care for yourself at home? · Take your antibiotics exactly as directed. Do not stop taking the medicine just because you are feeling better. You need to take the full course of antibiotics. · Take your medicines exactly as prescribed. Call your doctor if you think you are having a problem with your medicine. · Get plenty of rest and sleep. You may feel weak and tired for a while, but your energy level will improve with time. · To prevent dehydration, drink plenty of fluids, enough so that your urine is light yellow or clear like water. Choose water and other caffeine-free clear liquids until you feel better. If you have kidney, heart, or liver disease and have to limit fluids, talk with your doctor before you increase the amount of fluids you drink. · Take care of your cough so you can rest. A cough that brings up mucus from your lungs is common with pneumonia. It is one way your body gets rid of the infection. But if coughing keeps you from resting or causes severe fatigue and chest-wall pain, talk to your doctor. He or she may suggest that you take a medicine to reduce the cough. · Use a vaporizer or humidifier to add moisture to your bedroom. Follow the directions for cleaning the machine. · Do not smoke or allow others to smoke around you. Smoke will make your cough last longer. If you need help quitting, talk to your doctor about stop-smoking programs and medicines. These can increase your chances of quitting for good. · Take an over-the-counter pain medicine, such as acetaminophen (Tylenol), ibuprofen (Advil, Motrin), or naproxen (Aleve). Read and follow all instructions on the label. · Do not take two or more pain medicines at the same time unless the doctor told you to. Many pain medicines have acetaminophen, which is Tylenol. Too much acetaminophen (Tylenol) can be harmful. · If you were given a spirometer to measure how well your lungs are working, use it as instructed.  This can help your doctor tell how your recovery is going. · To prevent pneumonia in the future, talk to your doctor about getting a flu vaccine (once a year) and a pneumococcal vaccine (one time only for most people). When should you call for help? Call 911 anytime you think you may need emergency care. For example, call if:    · You have severe trouble breathing.    Call your doctor now or seek immediate medical care if:    · You cough up dark brown or bloody mucus (sputum).     · You have new or worse trouble breathing.     · You are dizzy or lightheaded, or you feel like you may faint.    Watch closely for changes in your health, and be sure to contact your doctor if:    · You have a new or higher fever.     · You are coughing more deeply or more often.     · You are not getting better after 2 days (48 hours).     · You do not get better as expected. Where can you learn more? Go to http://beatrice-roxann.info/. Enter [id number] in the search box to learn more about \"Pneumonia: Care Instructions. \"  Current as of: December 6, 2017  Content Version: 11.7  © 5171-1925 [employer]. Care instructions adapted under license by [employer] (which disclaims liability or warranty for this information). If you have questions about a medical condition or this instruction, always ask your healthcare professional. [address] any warranty or liability for your use of this information.     ___________________________________________________________________________________________________________________________________

## 2018-09-29 LAB
BACTERIA SPEC CULT: ABNORMAL
BACTERIA SPEC CULT: ABNORMAL
SERVICE CMNT-IMP: ABNORMAL

## 2018-09-30 LAB
BACTERIA SPEC CULT: NORMAL
GRAM STN SPEC: NORMAL
SERVICE CMNT-IMP: NORMAL

## 2018-10-01 LAB
BACTERIA SPEC CULT: NORMAL
BACTERIA SPEC CULT: NORMAL
SERVICE CMNT-IMP: NORMAL
SERVICE CMNT-IMP: NORMAL

## 2018-10-02 ENCOUNTER — HOME CARE VISIT (OUTPATIENT)
Dept: HOME HEALTH SERVICES | Facility: HOME HEALTH | Age: 63
End: 2018-10-02

## 2020-07-01 ENCOUNTER — APPOINTMENT (OUTPATIENT)
Dept: GENERAL RADIOLOGY | Age: 65
DRG: 871 | End: 2020-07-01
Attending: EMERGENCY MEDICINE
Payer: MEDICARE

## 2020-07-01 ENCOUNTER — HOSPITAL ENCOUNTER (INPATIENT)
Age: 65
LOS: 19 days | Discharge: NURSING, REHAB OR LONG-TERM CARE | DRG: 871 | End: 2020-07-20
Attending: EMERGENCY MEDICINE | Admitting: INTERNAL MEDICINE
Payer: MEDICARE

## 2020-07-01 DIAGNOSIS — Z72.0 TOBACCO ABUSE: ICD-10-CM

## 2020-07-01 DIAGNOSIS — J18.9 COMMUNITY ACQUIRED PNEUMONIA, UNSPECIFIED LATERALITY: ICD-10-CM

## 2020-07-01 DIAGNOSIS — U07.1 PNEUMONIA DUE TO COVID-19 VIRUS: ICD-10-CM

## 2020-07-01 DIAGNOSIS — J18.9 COMMUNITY ACQUIRED PNEUMONIA OF RIGHT LOWER LOBE OF LUNG: Primary | ICD-10-CM

## 2020-07-01 DIAGNOSIS — R65.20 SEPSIS WITH ACUTE HYPOXIC RESPIRATORY FAILURE WITHOUT SEPTIC SHOCK, DUE TO UNSPECIFIED ORGANISM (HCC): ICD-10-CM

## 2020-07-01 DIAGNOSIS — J98.6 ELEVATED DIAPHRAGM: ICD-10-CM

## 2020-07-01 DIAGNOSIS — Z20.822 SUSPECTED COVID-19 VIRUS INFECTION: ICD-10-CM

## 2020-07-01 DIAGNOSIS — J44.1 CHRONIC OBSTRUCTIVE PULMONARY DISEASE WITH ACUTE EXACERBATION (HCC): ICD-10-CM

## 2020-07-01 DIAGNOSIS — J96.01 ACUTE RESPIRATORY FAILURE WITH HYPOXEMIA (HCC): ICD-10-CM

## 2020-07-01 DIAGNOSIS — J96.01 SEPSIS WITH ACUTE HYPOXIC RESPIRATORY FAILURE WITHOUT SEPTIC SHOCK, DUE TO UNSPECIFIED ORGANISM (HCC): ICD-10-CM

## 2020-07-01 DIAGNOSIS — R09.02 HYPOXIA: ICD-10-CM

## 2020-07-01 DIAGNOSIS — A41.9 SEPSIS WITH ACUTE HYPOXIC RESPIRATORY FAILURE WITHOUT SEPTIC SHOCK, DUE TO UNSPECIFIED ORGANISM (HCC): ICD-10-CM

## 2020-07-01 DIAGNOSIS — J12.82 PNEUMONIA DUE TO COVID-19 VIRUS: ICD-10-CM

## 2020-07-01 DIAGNOSIS — D72.829 LEUKOCYTOSIS, UNSPECIFIED TYPE: ICD-10-CM

## 2020-07-01 DIAGNOSIS — J96.01 ACUTE HYPOXEMIC RESPIRATORY FAILURE (HCC): ICD-10-CM

## 2020-07-01 LAB
ALBUMIN SERPL-MCNC: 2.1 G/DL (ref 3.2–4.6)
ALBUMIN/GLOB SERPL: 0.4 {RATIO} (ref 1.2–3.5)
ALP SERPL-CCNC: 77 U/L (ref 50–136)
ALT SERPL-CCNC: 37 U/L (ref 12–65)
ANION GAP SERPL CALC-SCNC: 4 MMOL/L (ref 7–16)
ARTERIAL PATENCY WRIST A: YES
AST SERPL-CCNC: 71 U/L (ref 15–37)
BASE DEFICIT BLD-SCNC: 2 MMOL/L
BASOPHILS # BLD: 0 K/UL (ref 0–0.2)
BASOPHILS NFR BLD: 0 % (ref 0–2)
BDY SITE: NORMAL
BILIRUB DIRECT SERPL-MCNC: 0.2 MG/DL
BILIRUB SERPL-MCNC: 0.5 MG/DL (ref 0.2–1.1)
BUN SERPL-MCNC: 35 MG/DL (ref 8–23)
CALCIUM SERPL-MCNC: 7.9 MG/DL (ref 8.3–10.4)
CHLORIDE SERPL-SCNC: 95 MMOL/L (ref 98–107)
CO2 BLD-SCNC: 23 MMOL/L
CO2 SERPL-SCNC: 32 MMOL/L (ref 21–32)
COLLECT TIME,HTIME: 1418
COVID-19 RAPID TEST, COVR: DETECTED
CREAT SERPL-MCNC: 0.79 MG/DL (ref 0.6–1)
DIFFERENTIAL METHOD BLD: ABNORMAL
EOSINOPHIL # BLD: 0 K/UL (ref 0–0.8)
EOSINOPHIL NFR BLD: 0 % (ref 0.5–7.8)
ERYTHROCYTE [DISTWIDTH] IN BLOOD BY AUTOMATED COUNT: 13.5 % (ref 11.9–14.6)
GAS FLOW.O2 O2 DELIVERY SYS: NORMAL L/MIN
GLOBULIN SER CALC-MCNC: 4.8 G/DL (ref 2.3–3.5)
GLUCOSE SERPL-MCNC: 87 MG/DL (ref 65–100)
HCO3 BLD-SCNC: 22.1 MMOL/L (ref 22–26)
HCT VFR BLD AUTO: 43.1 % (ref 35.8–46.3)
HGB BLD-MCNC: 13.3 G/DL (ref 11.7–15.4)
IMM GRANULOCYTES # BLD AUTO: 0 K/UL (ref 0–0.5)
IMM GRANULOCYTES NFR BLD AUTO: 1 % (ref 0–5)
LACTATE SERPL-SCNC: 0.8 MMOL/L (ref 0.4–2)
LYMPHOCYTES # BLD: 0.5 K/UL (ref 0.5–4.6)
LYMPHOCYTES NFR BLD: 7 % (ref 13–44)
MCH RBC QN AUTO: 27.4 PG (ref 26.1–32.9)
MCHC RBC AUTO-ENTMCNC: 30.9 G/DL (ref 31.4–35)
MCV RBC AUTO: 88.7 FL (ref 79.6–97.8)
MONOCYTES # BLD: 0.6 K/UL (ref 0.1–1.3)
MONOCYTES NFR BLD: 10 % (ref 4–12)
NEUTS SEG # BLD: 5 K/UL (ref 1.7–8.2)
NEUTS SEG NFR BLD: 82 % (ref 43–78)
NRBC # BLD: 0 K/UL (ref 0–0.2)
O2/TOTAL GAS SETTING VFR VENT: 100 %
PCO2 BLD: 35.6 MMHG (ref 35–45)
PH BLD: 7.4 [PH] (ref 7.35–7.45)
PLATELET # BLD AUTO: 172 K/UL (ref 150–450)
PMV BLD AUTO: 10.6 FL (ref 9.4–12.3)
PO2 BLD: 75 MMHG (ref 75–100)
POTASSIUM SERPL-SCNC: 4.6 MMOL/L (ref 3.5–5.1)
PROCALCITONIN SERPL-MCNC: 1.59 NG/ML
PROT SERPL-MCNC: 6.9 G/DL (ref 6.3–8.2)
RBC # BLD AUTO: 4.86 M/UL (ref 4.05–5.2)
SAO2 % BLD: 95 % (ref 95–98)
SERVICE CMNT-IMP: NORMAL
SODIUM SERPL-SCNC: 131 MMOL/L (ref 136–145)
SOURCE, COVRS: ABNORMAL
SPECIMEN TYPE: NORMAL
WBC # BLD AUTO: 6.1 K/UL (ref 4.3–11.1)

## 2020-07-01 PROCEDURE — 94761 N-INVAS EAR/PLS OXIMETRY MLT: CPT

## 2020-07-01 PROCEDURE — 80076 HEPATIC FUNCTION PANEL: CPT

## 2020-07-01 PROCEDURE — 71045 X-RAY EXAM CHEST 1 VIEW: CPT

## 2020-07-01 PROCEDURE — 74011250636 HC RX REV CODE- 250/636: Performed by: INTERNAL MEDICINE

## 2020-07-01 PROCEDURE — 74011250636 HC RX REV CODE- 250/636: Performed by: EMERGENCY MEDICINE

## 2020-07-01 PROCEDURE — 74011250637 HC RX REV CODE- 250/637: Performed by: EMERGENCY MEDICINE

## 2020-07-01 PROCEDURE — 74011250637 HC RX REV CODE- 250/637: Performed by: INTERNAL MEDICINE

## 2020-07-01 PROCEDURE — 36600 WITHDRAWAL OF ARTERIAL BLOOD: CPT

## 2020-07-01 PROCEDURE — 83605 ASSAY OF LACTIC ACID: CPT

## 2020-07-01 PROCEDURE — 87635 SARS-COV-2 COVID-19 AMP PRB: CPT

## 2020-07-01 PROCEDURE — 80048 BASIC METABOLIC PNL TOTAL CA: CPT

## 2020-07-01 PROCEDURE — 74019 RADEX ABDOMEN 2 VIEWS: CPT

## 2020-07-01 PROCEDURE — 77010033711 HC HIGH FLOW OXYGEN

## 2020-07-01 PROCEDURE — 87086 URINE CULTURE/COLONY COUNT: CPT

## 2020-07-01 PROCEDURE — 99285 EMERGENCY DEPT VISIT HI MDM: CPT

## 2020-07-01 PROCEDURE — 85025 COMPLETE CBC W/AUTO DIFF WBC: CPT

## 2020-07-01 PROCEDURE — 87040 BLOOD CULTURE FOR BACTERIA: CPT

## 2020-07-01 PROCEDURE — 84145 PROCALCITONIN (PCT): CPT

## 2020-07-01 PROCEDURE — 65610000006 HC RM INTENSIVE CARE

## 2020-07-01 PROCEDURE — 74011000258 HC RX REV CODE- 258: Performed by: INTERNAL MEDICINE

## 2020-07-01 PROCEDURE — 81003 URINALYSIS AUTO W/O SCOPE: CPT

## 2020-07-01 PROCEDURE — 93005 ELECTROCARDIOGRAM TRACING: CPT | Performed by: INTERNAL MEDICINE

## 2020-07-01 PROCEDURE — 94762 N-INVAS EAR/PLS OXIMTRY CONT: CPT

## 2020-07-01 PROCEDURE — 82803 BLOOD GASES ANY COMBINATION: CPT

## 2020-07-01 RX ORDER — SODIUM CHLORIDE 0.9 % (FLUSH) 0.9 %
5-40 SYRINGE (ML) INJECTION EVERY 8 HOURS
Status: DISCONTINUED | OUTPATIENT
Start: 2020-07-01 | End: 2020-07-16 | Stop reason: SDUPTHER

## 2020-07-01 RX ORDER — ACETAMINOPHEN 325 MG/1
650 TABLET ORAL
Status: DISCONTINUED | OUTPATIENT
Start: 2020-07-01 | End: 2020-07-19

## 2020-07-01 RX ORDER — DIPHENHYDRAMINE HYDROCHLORIDE 50 MG/ML
12.5 INJECTION, SOLUTION INTRAMUSCULAR; INTRAVENOUS
Status: DISCONTINUED | OUTPATIENT
Start: 2020-07-01 | End: 2020-07-20 | Stop reason: HOSPADM

## 2020-07-01 RX ORDER — ONDANSETRON 2 MG/ML
4 INJECTION INTRAMUSCULAR; INTRAVENOUS
Status: DISCONTINUED | OUTPATIENT
Start: 2020-07-01 | End: 2020-07-20 | Stop reason: HOSPADM

## 2020-07-01 RX ORDER — BISACODYL 5 MG
5 TABLET, DELAYED RELEASE (ENTERIC COATED) ORAL DAILY PRN
Status: DISCONTINUED | OUTPATIENT
Start: 2020-07-01 | End: 2020-07-20 | Stop reason: HOSPADM

## 2020-07-01 RX ORDER — IBUPROFEN 200 MG
1 TABLET ORAL EVERY 24 HOURS
Status: DISCONTINUED | OUTPATIENT
Start: 2020-07-01 | End: 2020-07-19

## 2020-07-01 RX ORDER — ACETAMINOPHEN 500 MG
1000 TABLET ORAL
Status: COMPLETED | OUTPATIENT
Start: 2020-07-01 | End: 2020-07-01

## 2020-07-01 RX ORDER — SODIUM CHLORIDE 0.9 % (FLUSH) 0.9 %
5-40 SYRINGE (ML) INJECTION AS NEEDED
Status: DISCONTINUED | OUTPATIENT
Start: 2020-07-01 | End: 2020-07-16 | Stop reason: SDUPTHER

## 2020-07-01 RX ORDER — SODIUM CHLORIDE 9 MG/ML
75 INJECTION, SOLUTION INTRAVENOUS CONTINUOUS
Status: DISCONTINUED | OUTPATIENT
Start: 2020-07-01 | End: 2020-07-02

## 2020-07-01 RX ORDER — IPRATROPIUM BROMIDE AND ALBUTEROL SULFATE 2.5; .5 MG/3ML; MG/3ML
3 SOLUTION RESPIRATORY (INHALATION)
Status: DISCONTINUED | OUTPATIENT
Start: 2020-07-01 | End: 2020-07-20 | Stop reason: HOSPADM

## 2020-07-01 RX ORDER — ASCORBIC ACID 500 MG
1000 TABLET ORAL 2 TIMES DAILY
Status: COMPLETED | OUTPATIENT
Start: 2020-07-01 | End: 2020-07-11

## 2020-07-01 RX ORDER — ENOXAPARIN SODIUM 100 MG/ML
40 INJECTION SUBCUTANEOUS EVERY 24 HOURS
Status: DISCONTINUED | OUTPATIENT
Start: 2020-07-01 | End: 2020-07-20 | Stop reason: HOSPADM

## 2020-07-01 RX ORDER — DEXAMETHASONE SODIUM PHOSPHATE 100 MG/10ML
6 INJECTION INTRAMUSCULAR; INTRAVENOUS EVERY 6 HOURS
Status: DISCONTINUED | OUTPATIENT
Start: 2020-07-02 | End: 2020-07-07

## 2020-07-01 RX ORDER — VANCOMYCIN/0.9 % SOD CHLORIDE 1.5G/250ML
1500 PLASTIC BAG, INJECTION (ML) INTRAVENOUS ONCE
Status: COMPLETED | OUTPATIENT
Start: 2020-07-01 | End: 2020-07-01

## 2020-07-01 RX ORDER — UREA 10 %
220 LOTION (ML) TOPICAL DAILY
Status: COMPLETED | OUTPATIENT
Start: 2020-07-02 | End: 2020-07-11

## 2020-07-01 RX ADMIN — Medication 1000 MG: at 20:58

## 2020-07-01 RX ADMIN — ACETAMINOPHEN 1000 MG: 500 TABLET, FILM COATED ORAL at 15:14

## 2020-07-01 RX ADMIN — PIPERACILLIN AND TAZOBACTAM 3.38 G: 3; .375 INJECTION, POWDER, FOR SOLUTION INTRAVENOUS at 17:20

## 2020-07-01 RX ADMIN — PIPERACILLIN AND TAZOBACTAM 3.38 G: 3; .375 INJECTION, POWDER, FOR SOLUTION INTRAVENOUS at 23:09

## 2020-07-01 RX ADMIN — VANCOMYCIN HYDROCHLORIDE 1500 MG: 10 INJECTION, POWDER, LYOPHILIZED, FOR SOLUTION INTRAVENOUS at 21:38

## 2020-07-01 RX ADMIN — SODIUM CHLORIDE 10 ML: 9 INJECTION, SOLUTION INTRAMUSCULAR; INTRAVENOUS; SUBCUTANEOUS at 22:00

## 2020-07-01 RX ADMIN — DEXAMETHASONE SODIUM PHOSPHATE 6 MG: 10 INJECTION INTRAMUSCULAR; INTRAVENOUS at 23:09

## 2020-07-01 RX ADMIN — SODIUM CHLORIDE 75 ML/HR: 9 INJECTION, SOLUTION INTRAVENOUS at 17:29

## 2020-07-01 RX ADMIN — ENOXAPARIN SODIUM 40 MG: 40 INJECTION SUBCUTANEOUS at 17:24

## 2020-07-01 RX ADMIN — SODIUM CHLORIDE 1000 ML: 9 INJECTION, SOLUTION INTRAVENOUS at 14:00

## 2020-07-01 NOTE — ED TRIAGE NOTES
Pt arrives via EMS from home. Pt has had a fever and has been vomiting since last night. Per EMS Pt has rales and diminished lung sounds. Pt was cyanotic upon arrival per EMS. /80, , O2 83% Pt was place on 15L non re eather, Temp 102.2,   Pt received 1g Rocephin and 1000mL NS. EMS placed 18g LAC.

## 2020-07-01 NOTE — H&P
History and Physical    Patient: Clare Carmichael MRN: [id number]  SSN: [id number]    YOB: 1955  Age: 60 y.o. Sex: female      Subjective:      Clare Carmichael is a 60 y.o. female who was brought to ER due to being found with fever, diarrhea and feeling very sick. Patient has several family members being sick with fever and diarrhea. Yesterday, patient started with nausea, vomiting and fever. This morning, patient is somnolent and with fever. EMS was called and checked her temperature showing 102.2 F with O2 saturation 82% on room air. Her skin was mottled. She received Rocephin on the field and she was given IV fluid and brought to ER. Hospitalist service is requested to admit the patient. PMH   Unknown at this time   Patient must have had lung problem since she is using inhalers. No past medical history on file. No past surgical history on file. Family History   Problem Relation Age of Onset    Cancer Mother     Liver Disease Father      Social History     Tobacco Use    Smoking status: Current Every Day Smoker     Types: Cigars   Substance Use Topics    Alcohol use: No      Prior to Admission medications    Medication Sig Start Date End Date Taking? Authorizing Provider   albuterol-ipratropium (DUO-NEB) 2.5 mg-0.5 mg/3 ml nebu 3 mL by Nebulization route every four (4) hours as needed. 9/28/18   Elena Virk DO   azithromycin (ZITHROMAX) 500 mg tab Take 1 Tab by mouth daily. 9/28/18   Elena Virk DO   [facility]ol [facility] 90 mcg/actuation inhaler Take 2 Puffs by inhalation every four (4) hours as needed for Wheezing. 9/28/18   Elena Virk DO   Nebulizer & Compressor machine 1 Each by Nebulization route every four (4) hours as needed. 9/28/18   Elena Virk DO        No Known Allergies    Review of Systems:    10-point review of systems is negative except what is mentioned in the present illness section.      Objective: Vitals:    07/01/20 1314 07/01/20 1315 07/01/20 1353   BP: 144/64     Pulse: (!) 102 95    Resp: 24     Temp: (!) 103.1 °F (39.5 °C)     SpO2: (!) 86% (!) 75% 92%   Weight: 68 kg (150 lb)     Height: 5' 3\" (1.6 m)          Physical Exam:    General:                    The patient is a female in acute respiratory distress. She is on high flow oxygen. Confused. Head:                                   Normocephalic/atraumatic. Eyes:                                   palpebral pallor. No scleral icterus. ENT:                                    External auricular and nasal exam within normal limits. Mucous membranes are dry. Neck:                                   Supple, non-tender, no JVD. Lungs:                       decreased to auscultation bilaterally with occasional wheezes and crackles. No respiratory accessory muscle use. Heart:                                  Regular rhythm, tachycardia, without murmurs, rubs, or gallops. Abdomen:                  Soft, non-tender, non-distended with normoactive bowel sounds. Genitourinary:           No tenderness over the bladder or bilateral CVAs. Extremities:               Without clubbing, cyanosis, or edema. Skin:                                    mottled skin on all extremities especially on both arms. Pulses:                      Radial and dorsalis pedis pulses present 2+ bilaterally. Capillary refill <2s. Neurologic:                CN II-XII grossly intact and symmetrical.                                               Moving all four extremities well with no focal deficits. Psychiatric:                seems confused.      Lab and data     Recent Results (from the past 24 hour(s))   CBC WITH AUTOMATED DIFF    Collection Time: 07/01/20  1:41 PM   Result Value Ref Range    WBC 6.1 4.3 - 11.1 K/uL    RBC 4.86 4.05 - 5.2 M/uL    HGB 13.3 11.7 - 15.4 g/dL    HCT 43.1 35.8 - 46.3 %    MCV 88.7 79.6 - 97.8 FL    MCH 27.4 26.1 - 32.9 PG    MCHC 30.9 (L) 31.4 - 35.0 g/dL    RDW 13.5 11.9 - 14.6 %    PLATELET 386 709 - 860 K/uL    MPV 10.6 9.4 - 12.3 FL    ABSOLUTE NRBC 0.00 0.0 - 0.2 K/uL    DF AUTOMATED      NEUTROPHILS 82 (H) 43 - 78 %    LYMPHOCYTES 7 (L) 13 - 44 %    MONOCYTES 10 4.0 - 12.0 %    EOSINOPHILS 0 (L) 0.5 - 7.8 %    BASOPHILS 0 0.0 - 2.0 %    IMMATURE GRANULOCYTES 1 0.0 - 5.0 %    ABS. NEUTROPHILS 5.0 1.7 - 8.2 K/UL    ABS. LYMPHOCYTES 0.5 0.5 - 4.6 K/UL    ABS. MONOCYTES 0.6 0.1 - 1.3 K/UL    ABS. EOSINOPHILS 0.0 0.0 - 0.8 K/UL    ABS. BASOPHILS 0.0 0.0 - 0.2 K/UL    ABS. IMM. GRANS. 0.0 0.0 - 0.5 K/UL   METABOLIC PANEL, BASIC    Collection Time: 07/01/20  1:41 PM   Result Value Ref Range    Sodium 131 (L) 136 - 145 mmol/L    Potassium 4.6 3.5 - 5.1 mmol/L    Chloride 95 (L) 98 - 107 mmol/L    CO2 32 21 - 32 mmol/L    Anion gap 4 (L) 7 - 16 mmol/L    Glucose 87 65 - 100 mg/dL    BUN 35 (H) 8 - 23 MG/DL    Creatinine 0.79 0.6 - 1.0 MG/DL    GFR est AA >60 >60 ml/min/1.73m2    GFR est non-AA >60 >60 ml/min/1.73m2    Calcium 7.9 (L) 8.3 - 10.4 MG/DL   HEPATIC FUNCTION PANEL    Collection Time: 07/01/20  1:41 PM   Result Value Ref Range    Protein, total 6.9 6.3 - 8.2 g/dL    Albumin 2.1 (L) 3.2 - 4.6 g/dL    Globulin 4.8 (H) 2.3 - 3.5 g/dL    A-G Ratio 0.4 (L) 1.2 - 3.5      Bilirubin, total 0.5 0.2 - 1.1 MG/DL    Bilirubin, direct 0.2 <0.4 MG/DL    Alk.  phosphatase 77 50 - 136 U/L    AST (SGOT) 71 (H) 15 - 37 U/L    ALT (SGPT) 37 12 - 65 U/L   LACTIC ACID    Collection Time: 07/01/20  1:41 PM   Result Value Ref Range    Lactic acid 0.8 0.4 - 2.0 MMOL/L   POC G3    Collection Time: 07/01/20  2:14 PM   Result Value Ref Range    Device: Non rebreather      FIO2 (POC) 100 %    pH (POC) 7.40 7.35 - 7.45      pCO2 (POC) 35.6 35 - 45 MMHG    pO2 (POC) 75 75 - 100 MMHG    HCO3 (POC) 22.1 22 - 26 MMOL/L    sO2 (POC) 95 95 - 98 %    Base deficit (POC) 2 mmol/L    Allens test (POC) YES      Site RIGHT RADIAL      Specimen type (POC) ARTERIAL      Performed by John     CO2, POC 23 MMOL/L    COLLECT TIME 1,418        XR abdomen   7-1-2020  IMPRESSION: No free intraperitoneal air. Right lower lobe consolidation. XR chest   7-1-2020  IMPRESSION:     1. Consolidation in the right lung base.     2. Lucency along the right hemidiaphragm. Recommend a lateral decubitus view of  the abdomen to assess for free intraperitoneal air.       I have reviewed chest x-ray myself. Assessment:     Hospital Problems  Never Reviewed          Codes Class Noted POA    Suspected COVID-19 virus infection ICD-10-CM: Z20.828  ICD-9-CM: V01.79  7/1/2020 Unknown        Acute respiratory failure with hypoxemia [facility]) ICD-10-CM: J96.01  ICD-9-CM: 518.81  7/1/2020 Unknown        Sepsis ([facility] ICD-10-CM: A41.9  ICD-9-CM: 038.9, 995.91  7/1/2020 Unknown        CAP (community acquired pneumonia) ICD-10-CM: J18.9  ICD-9-CM: 131  9/26/2018 Yes        Leukocytosis ICD-10-CM: O72.418  ICD-9-CM: 288.60  9/26/2018 Yes        * (Principal) [facility] hypox[facility] ICD-10-CM: J96.01  ICD-9-CM: 518.81  9/26/2018 Yes        Tobacco abuse ICD-10-CM: Z72.0  ICD-9-CM: 305.1  9/26/2018 Yes              Plan:     Acute respiratory failure with hypoxia   Suspected COVID19 infection. Sepsis   Pneumonia   Admit to ICU since she requires very high amount of oxygen and shows signs of poor circulation. Blood and urine culture   Empiric IV antibiotics. Steroid   Nebulizer  Vitamin C and Zinc.   Tilley's catheter     Smoking. I advised patient to quit. Nicotine patch. Patient requires hospital stay as an in-patient and anticipated stay is more than 2 midnights due to the serious nature of the illness. I have discussed the plan of care with patient. I have discussed with patient regarding advance directive.  Patient would like to have a full-code status. Healthcare power of  is to be determined. DVT prophylaxis : Lovenox SC     Patient has no pain now. Will monitor. Further treatments will depend on initial responses and findings. I called phone number [phone] a few times. It showed busy or non-working signals.      Signed By: Adrienne Ventura MD     July 1, 2020

## 2020-07-01 NOTE — ED PROVIDER NOTES
Healthy 59-year-old white female brought in by EMS due to concern for possible COVID. Per EMS, several family members have had fever and diarrhea. Yesterday evening patient developed nausea and vomiting with fever. This morning family noticed that she was somnolent. EMS was called and found her with a temperature of 102.2 and O2 saturation of 83%. Patient was cyanotic and mottled. EMS treated her with Rocephin and IV fluids. Patient responds appropriately to voice and voices no specific complaints. She denies pain and shortness of breath. The history is provided by the patient and the EMS personnel. Respiratory Distress   Associated symptoms include a fever and vomiting. Pertinent negatives include no headaches, no neck pain, no cough, no chest pain, no abdominal pain and no rash. No past medical history on file. No past surgical history on file.       Family History:   Problem Relation Age of Onset    Cancer Mother     Liver Disease Father        Social History     Socioeconomic History    Marital status:      Spouse name: Not on file    Number of children: Not on file    Years of education: Not on file    Highest education level: Not on file   Occupational History    Not on file   Social Needs    Financial resource strain: Not on file    Food insecurity     Worry: Not on file     Inability: Not on file    Transportation needs     Medical: Not on file     Non-medical: Not on file   Tobacco Use    Smoking status: Current Every Day Smoker     Types: Cigars   Substance and Sexual Activity    Alcohol use: No    Drug use: No    Sexual activity: Never   Lifestyle    Physical activity     Days per week: Not on file     Minutes per session: Not on file    Stress: Not on file   Relationships    Social connections     Talks on phone: Not on file     Gets together: Not on file     Attends Anabaptism service: Not on file     Active member of club or organization: Not on file Attends meetings of clubs or organizations: Not on file     Relationship status: Not on file    Intimate partner violence     Fear of current or ex partner: Not on file     Emotionally abused: Not on file     Physically abused: Not on file     Forced sexual activity: Not on file   Other Topics Concern    Not on file   Social History Narrative    Not on file         ALLERGIES: Patient has no known allergies. Review of Systems   Constitutional: Positive for fever. HENT: Negative for congestion. Respiratory: Negative for cough and shortness of breath. Cardiovascular: Negative for chest pain. Gastrointestinal: Positive for diarrhea, nausea and vomiting. Negative for abdominal pain. Genitourinary: Negative for dysuria. Musculoskeletal: Negative for neck pain. Skin: Negative for rash. Neurological: Negative for headaches. Psychiatric/Behavioral: Positive for confusion. There were no vitals filed for this visit. Physical Exam  Vitals signs and nursing note reviewed. Constitutional:       General: She is not in acute distress. Appearance: Normal appearance. She is not toxic-appearing. Comments: Somnolent but responds appropriately to voice   HENT:      Head: Normocephalic and atraumatic. Nose: Nose normal.      Mouth/Throat:      Mouth: Mucous membranes are moist.      Pharynx: Oropharynx is clear. Eyes:      Extraocular Movements: Extraocular movements intact. Conjunctiva/sclera: Conjunctivae normal.      Pupils: Pupils are equal, round, and reactive to light. Neck:      Musculoskeletal: Normal range of motion and neck supple. No neck rigidity. Cardiovascular:      Rate and Rhythm: Regular rhythm. Tachycardia present. Heart sounds: No murmur. Pulmonary:      Comments: Coarse rhonchi throughout. No wheezes  Abdominal:      General: There is no distension. Palpations: Abdomen is soft. Tenderness: There is no abdominal tenderness.  There is no guarding. Musculoskeletal: Normal range of motion. General: No swelling. Skin:     General: Skin is warm and dry. Neurological:      Comments: Somnolent, responds appropriately to voice, moves all extremities. Psychiatric:         Mood and Affect: Mood normal.         Behavior: Behavior normal.          MDM  Number of Diagnoses or Management Options  Diagnosis management comments: This x-ray shows right lower lobe infiltrate. ABG shows hypoxia with PaO2 75 on a nonrebreather. Blood work all unremarkable. Urinalysis no signs of infection. Blood cultures are pending. Patient has already received Rocephin by EMS. She has been treated with IV fluids. Tylenol for fever. Hospitalist consulted for admission for hypoxia due to pneumonia versus COVID. COVID swab has been sent.        Amount and/or Complexity of Data Reviewed  Clinical lab tests: ordered and reviewed  Tests in the radiology section of CPT®: ordered and reviewed  Tests in the medicine section of CPT®: ordered and reviewed  Discuss the patient with other providers: yes  Independent visualization of images, tracings, or specimens: yes    Risk of Complications, Morbidity, and/or Mortality  Presenting problems: high  Diagnostic procedures: high  Management options: high           Procedures

## 2020-07-01 NOTE — ED NOTES
TRANSFER - OUT REPORT:    Verbal report given to Francisco Valle RN(name) on Benito Awad  being transferred to 1(unit) for routine progression of care       Report consisted of patients Situation, Background, Assessment and   Recommendations(SBAR). Information from the following report(s) ED Summary was reviewed with the receiving nurse. Lines:   Peripheral IV 07/01/20 Right Antecubital (Active)       Peripheral IV 07/01/20 Left Antecubital (Active)        Opportunity for questions and clarification was provided.       Patient transported with:   Registered Nurse

## 2020-07-01 NOTE — PROGRESS NOTES
TRANSFER - IN REPORT:    Verbal report received from Madonna RN(name) on Brendan Simon  being received from ED (unit) for routine progression of care      Report consisted of patients Situation, Background, Assessment and   Recommendations(SBAR). Information from the following report(s) SBAR, ED Summary, MAR and Alarm Parameters  was reviewed with the receiving nurse. Opportunity for questions and clarification was provided. Assessment completed upon patients arrival to unit and care assumed.

## 2020-07-02 PROBLEM — J44.1 CHRONIC OBSTRUCTIVE PULMONARY DISEASE WITH ACUTE EXACERBATION (HCC): Status: ACTIVE | Noted: 2020-07-02

## 2020-07-02 PROBLEM — U07.1 COVID-19 VIRUS INFECTION: Status: ACTIVE | Noted: 2020-07-02

## 2020-07-02 PROBLEM — J12.82 PNEUMONIA DUE TO COVID-19 VIRUS: Status: ACTIVE | Noted: 2020-07-02

## 2020-07-02 PROBLEM — J98.6 ELEVATED DIAPHRAGM: Status: ACTIVE | Noted: 2020-07-02

## 2020-07-02 LAB
ABO + RH BLD: NORMAL
ANION GAP SERPL CALC-SCNC: 8 MMOL/L (ref 7–16)
ARTERIAL PATENCY WRIST A: YES
ATRIAL RATE: 104 BPM
BASE DEFICIT BLD-SCNC: 2 MMOL/L
BASOPHILS # BLD: 0 K/UL (ref 0–0.2)
BASOPHILS NFR BLD: 0 % (ref 0–2)
BDY SITE: ABNORMAL
BUN SERPL-MCNC: 16 MG/DL (ref 8–23)
CALCIUM SERPL-MCNC: 7.7 MG/DL (ref 8.3–10.4)
CALCULATED P AXIS, ECG09: 56 DEGREES
CALCULATED R AXIS, ECG10: 1 DEGREES
CALCULATED T AXIS, ECG11: 58 DEGREES
CHLORIDE SERPL-SCNC: 101 MMOL/L (ref 98–107)
CO2 BLD-SCNC: 24 MMOL/L
CO2 SERPL-SCNC: 26 MMOL/L (ref 21–32)
COLLECT TIME,HTIME: 540
CREAT SERPL-MCNC: 0.55 MG/DL (ref 0.6–1)
DIAGNOSIS, 93000: NORMAL
DIFFERENTIAL METHOD BLD: ABNORMAL
EOSINOPHIL # BLD: 0 K/UL (ref 0–0.8)
EOSINOPHIL NFR BLD: 0 % (ref 0.5–7.8)
ERYTHROCYTE [DISTWIDTH] IN BLOOD BY AUTOMATED COUNT: 13.9 % (ref 11.9–14.6)
FLOW RATE ISTAT,IFRATE: 60 L/MIN
GAS FLOW.O2 O2 DELIVERY SYS: ABNORMAL L/MIN
GLUCOSE SERPL-MCNC: 121 MG/DL (ref 65–100)
HCO3 BLD-SCNC: 22.8 MMOL/L (ref 22–26)
HCT VFR BLD AUTO: 44.8 % (ref 35.8–46.3)
HGB BLD-MCNC: 12.9 G/DL (ref 11.7–15.4)
IMM GRANULOCYTES # BLD AUTO: 0 K/UL (ref 0–0.5)
IMM GRANULOCYTES NFR BLD AUTO: 1 % (ref 0–5)
LYMPHOCYTES # BLD: 0.6 K/UL (ref 0.5–4.6)
LYMPHOCYTES NFR BLD: 11 % (ref 13–44)
MCH RBC QN AUTO: 27.5 PG (ref 26.1–32.9)
MCHC RBC AUTO-ENTMCNC: 28.8 G/DL (ref 31.4–35)
MCV RBC AUTO: 95.5 FL (ref 79.6–97.8)
MONOCYTES # BLD: 0.2 K/UL (ref 0.1–1.3)
MONOCYTES NFR BLD: 4 % (ref 4–12)
NEUTS SEG # BLD: 4.6 K/UL (ref 1.7–8.2)
NEUTS SEG NFR BLD: 84 % (ref 43–78)
NRBC # BLD: 0 K/UL (ref 0–0.2)
O2/TOTAL GAS SETTING VFR VENT: 85 %
P-R INTERVAL, ECG05: 164 MS
PCO2 BLD: 39.4 MMHG (ref 35–45)
PH BLD: 7.37 [PH] (ref 7.35–7.45)
PLATELET # BLD AUTO: 125 K/UL (ref 150–450)
PMV BLD AUTO: 11 FL (ref 9.4–12.3)
PO2 BLD: 69 MMHG (ref 75–100)
POTASSIUM SERPL-SCNC: 4.1 MMOL/L (ref 3.5–5.1)
Q-T INTERVAL, ECG07: 308 MS
QRS DURATION, ECG06: 92 MS
QTC CALCULATION (BEZET), ECG08: 403 MS
RBC # BLD AUTO: 4.69 M/UL (ref 4.05–5.2)
SAO2 % BLD: 93 % (ref 95–98)
SERVICE CMNT-IMP: ABNORMAL
SODIUM SERPL-SCNC: 135 MMOL/L (ref 136–145)
SPECIMEN TYPE: ABNORMAL
VENTRICULAR RATE, ECG03: 103 BPM
WBC # BLD AUTO: 5.5 K/UL (ref 4.3–11.1)

## 2020-07-02 PROCEDURE — 36600 WITHDRAWAL OF ARTERIAL BLOOD: CPT

## 2020-07-02 PROCEDURE — 77010033711 HC HIGH FLOW OXYGEN

## 2020-07-02 PROCEDURE — 74011000258 HC RX REV CODE- 258: Performed by: INTERNAL MEDICINE

## 2020-07-02 PROCEDURE — 85025 COMPLETE CBC W/AUTO DIFF WBC: CPT

## 2020-07-02 PROCEDURE — C1751 CATH, INF, PER/CENT/MIDLINE: HCPCS

## 2020-07-02 PROCEDURE — 74011000250 HC RX REV CODE- 250: Performed by: INTERNAL MEDICINE

## 2020-07-02 PROCEDURE — 74011250637 HC RX REV CODE- 250/637: Performed by: INTERNAL MEDICINE

## 2020-07-02 PROCEDURE — 94640 AIRWAY INHALATION TREATMENT: CPT

## 2020-07-02 PROCEDURE — 77030012341 HC CHMB SPCR OPTC MDI VYRM -A

## 2020-07-02 PROCEDURE — 02HV33Z INSERTION OF INFUSION DEVICE INTO SUPERIOR VENA CAVA, PERCUTANEOUS APPROACH: ICD-10-PCS | Performed by: INTERNAL MEDICINE

## 2020-07-02 PROCEDURE — 74011250636 HC RX REV CODE- 250/636: Performed by: INTERNAL MEDICINE

## 2020-07-02 PROCEDURE — 77030021668 HC NEB PREFIL KT VYRM -A

## 2020-07-02 PROCEDURE — 80048 BASIC METABOLIC PNL TOTAL CA: CPT

## 2020-07-02 PROCEDURE — 82803 BLOOD GASES ANY COMBINATION: CPT

## 2020-07-02 PROCEDURE — 86900 BLOOD TYPING SEROLOGIC ABO: CPT

## 2020-07-02 PROCEDURE — 65610000006 HC RM INTENSIVE CARE

## 2020-07-02 PROCEDURE — 94762 N-INVAS EAR/PLS OXIMTRY CONT: CPT

## 2020-07-02 PROCEDURE — 36415 COLL VENOUS BLD VENIPUNCTURE: CPT

## 2020-07-02 PROCEDURE — 99223 1ST HOSP IP/OBS HIGH 75: CPT | Performed by: INTERNAL MEDICINE

## 2020-07-02 PROCEDURE — 36573 INSJ PICC RS&I 5 YR+: CPT | Performed by: INTERNAL MEDICINE

## 2020-07-02 PROCEDURE — 87635 SARS-COV-2 COVID-19 AMP PRB: CPT

## 2020-07-02 RX ORDER — SODIUM CHLORIDE 9 MG/ML
250 INJECTION, SOLUTION INTRAVENOUS AS NEEDED
Status: DISCONTINUED | OUTPATIENT
Start: 2020-07-02 | End: 2020-07-13

## 2020-07-02 RX ORDER — ALBUTEROL SULFATE 90 UG/1
2 AEROSOL, METERED RESPIRATORY (INHALATION)
Status: DISCONTINUED | OUTPATIENT
Start: 2020-07-02 | End: 2020-07-20 | Stop reason: HOSPADM

## 2020-07-02 RX ORDER — SODIUM CHLORIDE 9 MG/ML
50 INJECTION, SOLUTION INTRAVENOUS ONCE
Status: DISPENSED | OUTPATIENT
Start: 2020-07-02 | End: 2020-07-03

## 2020-07-02 RX ORDER — SODIUM CHLORIDE 0.9 % (FLUSH) 0.9 %
20 SYRINGE (ML) INJECTION AS NEEDED
Status: DISCONTINUED | OUTPATIENT
Start: 2020-07-02 | End: 2020-07-20 | Stop reason: HOSPADM

## 2020-07-02 RX ORDER — BUDESONIDE AND FORMOTEROL FUMARATE DIHYDRATE 160; 4.5 UG/1; UG/1
2 AEROSOL RESPIRATORY (INHALATION)
Status: DISCONTINUED | OUTPATIENT
Start: 2020-07-02 | End: 2020-07-20 | Stop reason: HOSPADM

## 2020-07-02 RX ORDER — SODIUM CHLORIDE 9 MG/ML
50 INJECTION, SOLUTION INTRAVENOUS EVERY 24 HOURS
Status: COMPLETED | OUTPATIENT
Start: 2020-07-03 | End: 2020-07-06

## 2020-07-02 RX ORDER — SODIUM CHLORIDE 0.9 % (FLUSH) 0.9 %
20 SYRINGE (ML) INJECTION EVERY 8 HOURS
Status: DISCONTINUED | OUTPATIENT
Start: 2020-07-02 | End: 2020-07-20 | Stop reason: HOSPADM

## 2020-07-02 RX ADMIN — DEXAMETHASONE SODIUM PHOSPHATE 6 MG: 10 INJECTION INTRAMUSCULAR; INTRAVENOUS at 05:21

## 2020-07-02 RX ADMIN — DEXAMETHASONE SODIUM PHOSPHATE 6 MG: 10 INJECTION INTRAMUSCULAR; INTRAVENOUS at 13:00

## 2020-07-02 RX ADMIN — Medication 220 MG: at 09:00

## 2020-07-02 RX ADMIN — PIPERACILLIN AND TAZOBACTAM 3.38 G: 3; .375 INJECTION, POWDER, FOR SOLUTION INTRAVENOUS at 08:30

## 2020-07-02 RX ADMIN — Medication 200 MG: at 17:00

## 2020-07-02 RX ADMIN — ALBUTEROL SULFATE 2 PUFF: 90 AEROSOL, METERED RESPIRATORY (INHALATION) at 15:07

## 2020-07-02 RX ADMIN — BUDESONIDE AND FORMOTEROL FUMARATE DIHYDRATE 2 PUFF: 160; 4.5 AEROSOL RESPIRATORY (INHALATION) at 19:36

## 2020-07-02 RX ADMIN — SODIUM CHLORIDE 20 ML: 9 INJECTION, SOLUTION INTRAMUSCULAR; INTRAVENOUS; SUBCUTANEOUS at 14:00

## 2020-07-02 RX ADMIN — BUDESONIDE AND FORMOTEROL FUMARATE DIHYDRATE 2 PUFF: 160; 4.5 AEROSOL RESPIRATORY (INHALATION) at 02:00

## 2020-07-02 RX ADMIN — ENOXAPARIN SODIUM 40 MG: 40 INJECTION SUBCUTANEOUS at 18:16

## 2020-07-02 RX ADMIN — SODIUM CHLORIDE 10 ML: 9 INJECTION, SOLUTION INTRAMUSCULAR; INTRAVENOUS; SUBCUTANEOUS at 06:00

## 2020-07-02 RX ADMIN — Medication 1000 MG: at 09:00

## 2020-07-02 RX ADMIN — SODIUM CHLORIDE 20 ML: 9 INJECTION, SOLUTION INTRAMUSCULAR; INTRAVENOUS; SUBCUTANEOUS at 22:31

## 2020-07-02 RX ADMIN — Medication 1000 MG: at 18:16

## 2020-07-02 RX ADMIN — SODIUM CHLORIDE 10 ML: 9 INJECTION, SOLUTION INTRAMUSCULAR; INTRAVENOUS; SUBCUTANEOUS at 22:31

## 2020-07-02 RX ADMIN — DEXAMETHASONE SODIUM PHOSPHATE 6 MG: 10 INJECTION INTRAMUSCULAR; INTRAVENOUS at 18:16

## 2020-07-02 RX ADMIN — IPRATROPIUM BROMIDE AND ALBUTEROL SULFATE 3 ML: .5; 3 SOLUTION RESPIRATORY (INHALATION) at 19:36

## 2020-07-02 NOTE — PROGRESS NOTES
Problem: Risk for Spread of Infection  Goal: Prevent transmission of infectious organism to others  Description: Prevent the transmission of infectious organisms to other patients, staff members, and visitors. Outcome: Progressing Towards Goal     Problem: Patient Education:  Go to Education Activity  Goal: Patient/Family Education  Outcome: Progressing Towards Goal     Problem: Falls - Risk of  Goal: *Absence of Falls  Description: Document Qasim Carver Fall Risk and appropriate interventions in the flowsheet. Outcome: Progressing Towards Goal  Note: Fall Risk Interventions:  Mobility Interventions: Assess mobility with egress test, Bed/chair exit alarm, Communicate number of staff needed for ambulation/transfer         Medication Interventions: Assess postural VS orthostatic hypotension, Bed/chair exit alarm, Evaluate medications/consider consulting pharmacy, Patient to call before getting OOB, Teach patient to arise slowly, Utilize gait belt for transfers/ambulation    Elimination Interventions: Bed/chair exit alarm, Call light in reach, Elevated toilet seat, Patient to call for help with toileting needs              Problem: Patient Education: Go to Patient Education Activity  Goal: Patient/Family Education  Outcome: Progressing Towards Goal     Problem: Pressure Injury - Risk of  Goal: *Prevention of pressure injury  Description: Document Dale Scale and appropriate interventions in the flowsheet.   Outcome: Progressing Towards Goal  Note: Pressure Injury Interventions:  Sensory Interventions: Assess changes in LOC, Assess need for specialty bed, Float heels, Discuss PT/OT consult with provider, Keep linens dry and wrinkle-free, Maintain/enhance activity level, Minimize linen layers    Moisture Interventions: Absorbent underpads, Assess need for specialty bed, Apply protective barrier, creams and emollients, Check for incontinence Q2 hours and as needed, Contain wound drainage, Internal/External urinary devices    Activity Interventions: Assess need for specialty bed, Pressure redistribution bed/mattress(bed type)    Mobility Interventions: Assess need for specialty bed, Chair cushion, Float heels, HOB 30 degrees or less, Pressure redistribution bed/mattress (bed type)    Nutrition Interventions: Document food/fluid/supplement intake, Discuss nutritional consult with provider                     Problem: Patient Education: Go to Patient Education Activity  Goal: Patient/Family Education  Outcome: Progressing Towards Goal

## 2020-07-02 NOTE — PROGRESS NOTES
07/02/20 0643   Oxygen Therapy   O2 Sat (%) 100 %   Pulse via Oximetry 83 beats per minute   O2 Device Heated; Hi flow nasal cannula   O2 Flow Rate (L/min) 60 l/min   O2 Temperature 87.8 °F (31 °C)   FIO2 (%) 75 %

## 2020-07-02 NOTE — PROGRESS NOTES
PICC Placement Note    PRE-PROCEDURE VERIFICATION  Correct Procedure: yes. Time out completed with assistant Milly Booth RN and all persons present in agreement with time out. Correct Site:  yes  Temperature: Temp: 98.6 °F (37 °C), Temperature Source: Temp Source: Oral  Recent Labs     07/02/20  1005   BUN 16   CREA 0.55*   *   WBC 5.5     Allergies: Patient has no known allergies. Education materials for [facility] Care given to patient or family. PROCEDURE DETAIL  A double lumen PICC line was started for vascular access. The following documentation is in addition to the PICC properties in the lines/airways flowsheet :  Lot #: [id number]  xylocaine used: yes  Mid-Arm Circumference: 29 (cm)  Internal Catheter Length: 37 (cm)  Internal Catheter Total Length: 38 (cm)  Vein Selection for PICC:right basilic  Central Line Bundle followed yes  Complication Related to Insertion: none  Both the insertion guidewire and ECG guidewire were removed intact all ports have positive blood return and were flush well with normal saline. The location of the tip of the PICC is verified using ECG technology. The tip is in the SVC per ECG reading. See image below.          Line is okay to use: yes

## 2020-07-02 NOTE — CDMP QUERY
Pt admitted with Acute respiratory failure, Covid-19 positive./Pt noted to have confusion. If possible, please document in the progress notes and d/c summary if you are evaluating and / or treating any of the following: ? Metabolic Encephalopathy 
? Encephalopathy due to medications or drugs (please specify) ? Other Encephalopathy 
? Other, please specify ? Clinically unable to determine The medical record reflects the following: 
 
   Risk Factors: Sepsis Clinical Indicators:7-1  physical exam - confused,  7-2 less confused Treatment: serial labs, monitor patient Thank you, Sami Dear, RN Clinical  
[phone]

## 2020-07-02 NOTE — PROGRESS NOTES
Progress Note    Patient: Edy Ferrer MRN: [id number]  SSN: [id number]    YOB: 1955  Age: 60 y.o. Sex: female      Admit Date: 7/1/2020    LOS: 1 day     Subjective:     Edy Ferrer is a 60 y.o. female who was brought to ER due to being found with fever, diarrhea and feeling very sick.      Patient has several family members being sick with fever and diarrhea. One day prior to admission, patient started with nausea, vomiting and fever.      On the day of admission, patient was somnolent and with fever.      EMS was called and checked her temperature showing 102.2 F with O2 saturation 82% on room air. Her skin was mottled.      She received Rocephin on the field and she was given IV fluid and brought to ER.      She has been receiving Empiric IV antibiotics including Vancomycin and Zosyn since admission. She is positive for COVID19 infection now. She is more alert and awake and eating. Still needing high flow oxygen. Objective:     Vitals:    07/02/20 0643 07/02/20 0700 07/02/20 0800 07/02/20 0900   BP:  147/58 157/67 134/49   Pulse:  78 83 82   Resp:  28 20 30   Temp:   98.6 °F (37 °C)    SpO2: 100%      Weight:       Height:            Intake and Output:  Current Shift: No intake/output data recorded. Last three shifts: 06/30 1901 - 07/02 0700  In: 1575 [I.V.:1575]  Out: 1350 [Urine:1350]    Physical Exam:   General:                    The patient is a female in acute respiratory distress. She is on high flow oxygen. Less confused. Able to answer simple questions. SACM:                                   CVNNVFIDENEOH/OOZKHUEUWK. Eyes:                                   palpebral pallor. No scleral icterus. ENT:                                    External auricular and nasal exam within normal limits.                                             EECVZN membranes are dry. Neck:                                   Supple, non-tender, no JVD.   Lungs:                       decreased to auscultation bilaterally with no wheezes and crackles.                                             No respiratory accessory muscle use. Heart:                                  Regular rhythm, tachycardia, without murmurs, rubs, or gallops. Abdomen:                  Soft, non-tender, non-distended with normoactive bowel sounds. Genitourinary:           No tenderness over the bladder or bilateral CVAs. Extremities:               Without clubbing, cyanosis, or edema. Skin:                                    mottled skin on all extremities especially on both arms. Pulses:                      Radial and dorsalis pedis pulses present 2+ bilaterally.                                               Capillary refill <2s. Neurologic:                CN II-XII grossly intact and symmetrical.                                               Moving all four extremities well with no focal deficits. Psychiatric:                less confused. Able to answer orientation to self and place. Lab/Data Review:    Recent Results (from the past 24 hour(s))   CBC WITH AUTOMATED DIFF    Collection Time: 07/01/20  1:41 PM   Result Value Ref Range    WBC 6.1 4.3 - 11.1 K/uL    RBC 4.86 4.05 - 5.2 M/uL    HGB 13.3 11.7 - 15.4 g/dL    HCT 43.1 35.8 - 46.3 %    MCV 88.7 79.6 - 97.8 FL    MCH 27.4 26.1 - 32.9 PG    MCHC 30.9 (L) 31.4 - 35.0 g/dL    RDW 13.5 11.9 - 14.6 %    PLATELET 128 803 - 950 K/uL    MPV 10.6 9.4 - 12.3 FL    ABSOLUTE NRBC 0.00 0.0 - 0.2 K/uL    DF AUTOMATED      NEUTROPHILS 82 (H) 43 - 78 %    LYMPHOCYTES 7 (L) 13 - 44 %    MONOCYTES 10 4.0 - 12.0 %    EOSINOPHILS 0 (L) 0.5 - 7.8 %    BASOPHILS 0 0.0 - 2.0 %    IMMATURE GRANULOCYTES 1 0.0 - 5.0 %    ABS. NEUTROPHILS 5.0 1.7 - 8.2 K/UL    ABS. LYMPHOCYTES 0.5 0.5 - 4.6 K/UL    ABS. MONOCYTES 0.6 0.1 - 1.3 K/UL    ABS. EOSINOPHILS 0.0 0.0 - 0.8 K/UL    ABS. BASOPHILS 0.0 0.0 - 0.2 K/UL    ABS. IMM.  GRANS. 0.0 0.0 - 0.5 K/UL METABOLIC PANEL, BASIC    Collection Time: 07/01/20  1:41 PM   Result Value Ref Range    Sodium 131 (L) 136 - 145 mmol/L    Potassium 4.6 3.5 - 5.1 mmol/L    Chloride 95 (L) 98 - 107 mmol/L    CO2 32 21 - 32 mmol/L    Anion gap 4 (L) 7 - 16 mmol/L    Glucose 87 65 - 100 mg/dL    BUN 35 (H) 8 - 23 MG/DL    Creatinine 0.79 0.6 - 1.0 MG/DL    GFR est AA >60 >60 ml/min/1.73m2    GFR est non-AA >60 >60 ml/min/1.73m2    Calcium 7.9 (L) 8.3 - 10.4 MG/DL   HEPATIC FUNCTION PANEL    Collection Time: 07/01/20  1:41 PM   Result Value Ref Range    Protein, total 6.9 6.3 - 8.2 g/dL    Albumin 2.1 (L) 3.2 - 4.6 g/dL    Globulin 4.8 (H) 2.3 - 3.5 g/dL    A-G Ratio 0.4 (L) 1.2 - 3.5      Bilirubin, total 0.5 0.2 - 1.1 MG/DL    Bilirubin, direct 0.2 <0.4 MG/DL    Alk. phosphatase 77 50 - 136 U/L    AST (SGOT) 71 (H) 15 - 37 U/L    ALT (SGPT) 37 12 - 65 U/L   CULTURE, BLOOD    Collection Time: 07/01/20  1:41 PM   Result Value Ref Range    Special Requests: LEFT ANTECUBITAL      Culture result: NO GROWTH AFTER 15 HOURS     CULTURE, BLOOD    Collection Time: 07/01/20  1:41 PM   Result Value Ref Range    Special Requests: RIGHT ANTECUBITAL      Culture result: NO GROWTH AFTER 15 HOURS     LACTIC ACID    Collection Time: 07/01/20  1:41 PM   Result Value Ref Range    Lactic acid 0.8 0.4 - 2.0 MMOL/L   PROCALCITONIN    Collection Time: 07/01/20  1:41 PM   Result Value Ref Range    Procalcitonin 1.59 ng/mL   EKG, 12 LEAD, INITIAL    Collection Time: 07/01/20  1:43 PM   Result Value Ref Range    Ventricular Rate 103 BPM    Atrial Rate 104 BPM    P-R Interval 164 ms    QRS Duration 92 ms    Q-T Interval 308 ms    QTC Calculation (Bezet) 403 ms    Calculated P Axis 56 degrees    Calculated R Axis 1 degrees    Calculated T Axis 58 degrees    Diagnosis       !! AGE AND GENDER SPECIFIC ECG ANALYSIS !!   Sinus tachycardia  Incomplete right bundle branch block  Anteroseptal infarct , age undetermined  Abnormal ECG  No previous ECGs available  Confirmed by ZACH CHAIREZ (), Luz Marshall ([id number]) on 7/2/2020 7:21:53 AM     POC G3    Collection Time: 07/01/20  2:14 PM   Result Value Ref Range    Device: Non rebreather      FIO2 (POC) 100 %    pH (POC) 7.40 7.35 - 7.45      pCO2 (POC) 35.6 35 - 45 MMHG    pO2 (POC) 75 75 - 100 MMHG    HCO3 (POC) 22.1 22 - 26 MMOL/L    sO2 (POC) 95 95 - 98 %    Base deficit (POC) 2 mmol/L    Allens test (POC) YES      Site RIGHT RADIAL      Specimen type (POC) ARTERIAL      Performed by John     CO2, POC 23 MMOL/L    COLLECT TIME 1,418     CULTURE, URINE    Collection Time: 07/01/20  2:17 PM   Result Value Ref Range    Special Requests: NO SPECIAL REQUESTS      Culture result:        NO GROWTH AFTER SHORT PERIOD OF INCUBATION. FURTHER RESULTS TO FOLLOW AFTER OVERNIGHT INCUBATION.    COVID-19 RAPID TEST    Collection Time: 07/01/20  3:27 PM   Result Value Ref Range    Specimen source SWAB      COVID-19 rapid test Detected (A) NOTD     POC G3    Collection Time: 07/02/20  5:57 AM   Result Value Ref Range    Device: High Flow Nasal Cannula      FIO2 (POC) 85 %    pH (POC) 7.37 7.35 - 7.45      pCO2 (POC) 39.4 35 - 45 MMHG    pO2 (POC) 69 (L) 75 - 100 MMHG    HCO3 (POC) 22.8 22 - 26 MMOL/L    sO2 (POC) 93 (L) 95 - 98 %    Base deficit (POC) 2 mmol/L    Allens test (POC) YES      Site RIGHT RADIAL      Specimen type (POC) ARTERIAL      Performed by Laura     CO2, POC 24 MMOL/L    Flow rate (POC) 60.000 L/min    COLLECT TIME 540       Results     Procedure Component Value Units Date/Time    CULTURE, URINE [[id number]] Collected:  07/01/20 1545    Order Status:  Canceled Specimen:  Cath Urine     COVID-19 RAPID TEST [[id number]]  (Abnormal) Collected:  07/01/20 1527    Order Status:  Completed Specimen:  Nasopharyngeal Updated:  07/01/20 2795     Specimen source SWAB        COVID-19 rapid test Detected        Comment:      The specimen is POSITIVE for SARS-CoV-2, the novel coronavirus associated with COVID-19. This test has been authorized by the FDA under an Emergency Use Authorization (EUA) for use by authorized laboratories. Fact sheet for Healthcare Providers: kstattoo.com  Fact sheet for Patients: kstattoo.com       Methodology: Isothermal Nucleic Acid Amplification  RESULTS VERIFIED, PHONED TO AND READ BACK BY  DR Colleen Favre 1513  RESULTS VERIFIED, PHONED TO AND READ BACK BY  DR Colleen Favre 1513 07/01/20 VELASQUEZ         CULTURE, URINE [[id number]] Collected:  07/01/20 1417    Order Status:  Completed Specimen:  Cath Urine Updated:  07/02/20 0817     Special Requests: NO SPECIAL REQUESTS        Culture result:       NO GROWTH AFTER SHORT PERIOD OF INCUBATION. FURTHER RESULTS TO FOLLOW AFTER OVERNIGHT INCUBATION. CULTURE, BLOOD [[id number]] Collected:  07/01/20 1341    Order Status:  Completed Specimen:  Blood Updated:  07/02/20 0618     Special Requests: LEFT ANTECUBITAL        Culture result: NO GROWTH AFTER 15 HOURS       CULTURE, BLOOD [[id number]] Collected:  07/01/20 1341    Order Status:  Completed Specimen:  Blood Updated:  07/02/20 0618     Special Requests: RIGHT ANTECUBITAL        Culture result: NO GROWTH AFTER 15 HOURS            XR abdomen   7-1-2020  IMPRESSION: No free intraperitoneal air. Right lower lobe consolidation.     XR chest   7-1-2020  IMPRESSION:     1. Consolidation in the right lung base.     2. Lucency along the right hemidiaphragm.  Recommend a lateral decubitus view of  the abdomen to assess for free intraperitoneal air.       Current Facility-Administered Medications:     sodium chloride (NS) flush 5-40 mL, 5-40 mL, IntraVENous, Q8H, Génesis Morse MD, 10 mL at 07/02/20 0600    sodium chloride (NS) flush 5-40 mL, 5-40 mL, IntraVENous, PRN, Génesis Morse MD    acetaminophen (TYLENOL) tablet 650 mg, 650 mg, Oral, Q4H PRN, Génesis Morse MD    diphenhydrAMINE (BENADRYL) injection 12.5 mg, 12.5 mg, IntraVENous, Q4H PRN, Génesis Morse MD    bisacodyL (DULCOLAX) tablet 5 mg, 5 mg, Oral, DAILY PRN, Génesis Morse MD    ondansetron (ZOFRAN) injection 4 mg, 4 mg, IntraVENous, Q4H PRN, Génesis Morse MD    enoxaparin (LOVENOX) injection 40 mg, 40 mg, SubCUTAneous, Q24H, Génesis Morse MD, 40 mg at 07/01/20 1724    ascorbic acid (vitamin C) (VITAMIN C) tablet 1,000 mg, 1,000 mg, Oral, BID, Sofía Amezquita MD, 1,000 mg at 07/02/20 0900    zinc sulfate tablet 220 mg, 220 mg, Oral, DAILY, Génesis Morse MD, 220 mg at 07/02/20 0900    albuterol-ipratropium (DUO-NEB) 2.5 MG-0.5 MG/3 ML, 3 mL, Nebulization, Q4H PRN, Génesis Morse MD    nicotine (NICODERM CQ) 14 mg/24 hr patch 1 Patch, 1 Patch, TransDERmal, Q24H, Géneiss Morse MD, 1 Patch at 07/01/20 1555    dexamethasone (DECADRON) 10 mg/mL injection 6 mg, 6 mg, IntraVENous, Q6H, Génesis Morse MD, 6 mg at 07/02/20 1785      Assessment:     Principal Problem:    Acute hypoxemic respiratory failure ([facility] [address] (9/26/2018)    Active Problems:    CAP (community acquired pneumonia) (9/26/2018)      Leukocytosis (9/26/2018)      Tobacco abuse (9/26/2018)      Suspected COVID-19 virus infection (7/1/2020)      Acute respiratory failure with hypoxemia ([address] (7/1/2020)      Sepsis ([facility] [address] (7/1/2020)        Plan:     Acute respiratory failure with hypoxia   Positive COVID19 infection. Metabolic Encephalopathy on admission   Sepsis   Pneumonia   Blood and urine culture so far are negative. Will stop antibacterials. Steroid, cut back on dose. Nebulizer  Vitamin C and Zinc.   Tilley's catheter   Ask ID to see patient for role of Remdesivir. Ask pulmonologist to see patient due to need for high amount of oxygen.      Smoking. I advised patient to quit.    Nicotine patch.        I have discussed the plan of care with patient and care team.      DVT prophylaxis : Lovenox SC        Signed By: Berhane Mccormack MD July 2, 2020

## 2020-07-02 NOTE — PROGRESS NOTES
Bedside and Verbal shift change report given to [address] (oncoming nurse) by Bashir House (offgoing nurse). Report included the following information SBAR, Kardex, Intake/Output, MAR, Recent Results, Cardiac Rhythm NSR and Alarm Parameters .

## 2020-07-02 NOTE — PROGRESS NOTES
Pharmacokinetic Consult to Pharmacist    Caroline Romeo is a 60 y.o. female being treated for sepsis with vanc. Height: 5' 3\" (160 cm)  Weight: 65.9 kg (145 lb 3.2 oz)  Lab Results   Component Value Date/Time    BUN 35 (H) 07/01/2020 01:41 PM    Creatinine 0.79 07/01/2020 01:41 PM    WBC 6.1 07/01/2020 01:41 PM    Procalcitonin 1.59 07/01/2020 01:41 PM    Lactic acid 0.8 07/01/2020 01:41 PM    Lactic Acid (POC) 1.9 09/25/2018 10:19 PM      Estimated Creatinine Clearance: 65.6 mL/min (based on SCr of 0.79 mg/dL). CULTURES:  7/1: Covid rapid test- positive         UAx2- pending         BCx2 pending    Day 1 of vancomycin. Goal trough is 15-20mcg/ml. Vancomycin dose initiated at 1.5gm load then 1gm q12hr. Pharmacy to monitor and adjust per protocol  Will continue to follow patient and order levels when clinically indicated.     Thank you for this consult,  Owen AbrahamD

## 2020-07-02 NOTE — PROGRESS NOTES
Orders for convalescent plasma discussed with Dr. Austin Eason. Treatment discussed with pt and consents signed, faxed, and placed in chart.

## 2020-07-02 NOTE — CONSULTS
Infectious Disease Consult    Today's Date: 7/2/2020   Admit Date: 7/1/2020    Impression:   · COVID-19 infection (7/1)  · Acute hypoxic respiratory failure  · COPD    Plan:   · Presentation is entirely consistent with acute viral respiratory illness. Would not continue zosyn under these circumstances, especially due to amount of fluid involved. Additionally, will stop maintenance fluids as well. She may benefit from diuresis. · Start remdesivir today for Jimmy. · Agree with dexamethasone and plan for convalescent plasma. · Trend inflammatory labs. · Repeat procalcitonin in AM and consider antibiotics if worsening. Anti-infectives:   · IV vanc/zosyn    Subjective:   Date of Consultation:  July 2, 2020  Referring Physician: Dr. Richmond Aburto    Patient is a 60 y.o. female who was brought to the ER from home with fever, diarrhea, and myalgias. She had positive sick contacts. She was given antibiotics and aggressive fluids in the field due to relative hypotension. Overnight, she received vanc/zosyn. Her COVID test on presentation was positive. Her procalcitonin was mildly elevated at 1.46. She reports illness since the past weekend. She is now on high flow nasal cannula. 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    Family History   Problem Relation Age of Onset    Cancer Mother     Liver Disease Father       Social History     Tobacco Use    Smoking status: Current Every Day Smoker     Types: Cigars   Substance Use Topics    Alcohol use: No     No past surgical history on file. Prior to Admission medications    Medication Sig Start Date End Date Taking? Authorizing Provider   albuterol-ipratropium (DUO-NEB) 2.5 mg-0.5 mg/3 ml nebu 3 mL by Nebulization route every four (4) hours as needed. 18   Tami Lipscomb, DO   azithromycin (ZITHROMAX) 500 mg tab Take 1 Tab by mouth daily. 18   Tami Lipscomb DO   al[facility]erol [facility]) 90 mcg/actuation inhaler Take 2 Puffs by inhalation every four (4) hours as needed for Wheezing. 18   Tami Lipscomb, DO   Nebulizer & Compressor machine 1 Each by Nebulization route every four (4) hours as needed. 18   Tami Lipscomb, DO       No Known Allergies     Review of Systems:  A comprehensive review of systems was negative except for that written in the History of Present Illness. Objective:     Visit Vitals  /46   Pulse 74   Temp 98.6 °F (37 °C)   Resp (!) 32   Ht 5' 3\" (1.6 m)   Wt 65.9 kg (145 lb 3.2 oz)   SpO2 100%   BMI 25.72 kg/m²     Temp (24hrs), Av.2 °F (36.8 °C), Min:97.6 °F (36.4 °C), Max:98.6 °F (37 °C)       Lines:  Peripheral IV:       Physical Exam:    General:  Alert, cooperative, appears fatigued   Eyes:  Sclera anicteric. Pupils equally round and reactive to light. Mouth/Throat: Mucous membranes normal, oral pharynx clear   Neck: Supple   Lungs:   Coarse breath sounds; no wheezing or rhonchi;   CV:  Regular rate and rhythm,no murmur, click, rub or gallop   Abdomen:   Soft, non-tender.  bowel sounds normal. non-distended   Extremities: No cyanosis; trace LE edema   Skin: Skin color, texture, turgor normal. no acute rash or lesions   Lymph nodes: Cervical and supraclavicular normal   Musculoskeletal: No swelling or deformity   Lines/Devices:  Intact, no erythema, drainage or tenderness   Psych: Alert and oriented, normal mood affect given the setting       Data Review:     CBC:  Recent Labs     20  1005 20  1341   WBC 5.5 6. 1   GRANS 84* 82*   MONOS 4 10   EOS 0* 0*   ANEU 4.6 5.0   ABL 0.6 0.5   HGB 12.9 13.3   HCT 44.8 43.1   * 172       BMP:  Recent Labs     07/02/20  1005 07/01/20  1341   CREA 0.55* 0.79   BUN 16 35*   * 131*   K 4.1 4.6    95*   CO2 26 32   AGAP 8 4*   * 87       LFTS:  Recent Labs     07/01/20  1341   TBILI 0.5   ALT 37   AP 77   TP 6.9   ALB 2.1*       Microbiology:     All Micro Results     Procedure Component Value Units Date/Time    CULTURE, RESPIRATORY/SPUTUM/BRONCH [place] STAIN [[id number]]     Order Status:  Sent Specimen:  Sputum     CULTURE, URINE [[id number]] Collected:  07/01/20 1417    Order Status:  Completed Specimen:  Cath Urine Updated:  07/02/20 0817     Special Requests: NO SPECIAL REQUESTS        Culture result:       NO GROWTH AFTER SHORT PERIOD OF INCUBATION. FURTHER RESULTS TO FOLLOW AFTER OVERNIGHT INCUBATION. CULTURE, BLOOD [[id number]] Collected:  07/01/20 1341    Order Status:  Completed Specimen:  Blood Updated:  07/02/20 0618     Special Requests: LEFT ANTECUBITAL        Culture result: NO GROWTH AFTER 15 HOURS       CULTURE, BLOOD [[id number]] Collected:  07/01/20 1341    Order Status:  Completed Specimen:  Blood Updated:  07/02/20 0618     Special Requests: RIGHT ANTECUBITAL        Culture result: NO GROWTH AFTER 15 HOURS       COVID-19 RAPID TEST [[id number]]  (Abnormal) Collected:  07/01/20 1527    Order Status:  Completed Specimen:  Nasopharyngeal Updated:  07/01/20 1714     Specimen source SWAB        COVID-19 rapid test Detected        Comment:      The specimen is POSITIVE for SARS-CoV-2, the novel coronavirus associated with COVID-19. This test has been authorized by the FDA under an Emergency Use Authorization (EUA) for use by authorized laboratories.         Fact sheet for Healthcare Providers: kstattoo.com  Fact sheet for Patients: kstattoo.com       Methodology: Isothermal Nucleic Acid Amplification  RESULTS VERIFIED, PHONED TO AND READ BACK BY  DR Wesley Fitzgerald 1513  RESULTS VERIFIED, PHONED TO AND READ BACK BY  DR Wesley Fitzgerald 151 20 [place]         CULTURE, URINE [[id number]] Collected:  20 1545    Order Status:  Canceled Specimen:  Cath Urine           Imagin/1/20 KUB: FINDINGS: Supine and a lateral decubitus view the abdomen were obtained. Gas is  scattered throughout the colon. Consolidation is present in the right lower lobe  and the right hemidiaphragm is elevated. On the lateral decubitus view, there is  no free intraperitoneal air. 20 CXR: IMPRESSION:     1. Consolidation in the right lung base.     2. Lucency along the right hemidiaphragm. Recommend a lateral decubitus view of  the abdomen to assess for free intraperitoneal air.     Signed By: Gama Ríos MD     2020

## 2020-07-02 NOTE — PROGRESS NOTES
Patient arrives to 531 from ED and placed on monitor. Pt is alert and oriented x4. Patient follows commands appropriately. Pupils are round, brisk, 3mm. Able to move extremities spontaneously. Pt is on optiflow 60L 85%. Lung sounds are coarse bilat. Abd intact, semi soft. BS active. Tilley is patent and draining. BUE and BLE are red/yusef and cool to the touch. Pulses are palpable and present. Skin assessment completed with TYLER Hernandez. Skin is cool, dry, intact. No skin breakdown noted. Sacrum intact. Allevyn placed.

## 2020-07-02 NOTE — PROGRESS NOTES
07/01/20 2024   Oxygen Therapy   O2 Sat (%) 95 %   Pulse via Oximetry 70 beats per minute   O2 Device Heated; Hi flow nasal cannula   O2 Flow Rate (L/min) 60 l/min   O2 Temperature 87.8 °F (31 °C)   FIO2 (%) 85 %

## 2020-07-02 NOTE — CONSULTS
CONSULT NOTE    Daphne White    2020    Date of Admission:  2020    The patient's chart is reviewed and the patient is discussed with the staff. Subjective:     Patient is a 60 y.o.  female seen and evaluated at the request of Dr. Benja Francois. Patient is a smoker who came in with worsening hypoxemia, fevers, diarrhea and dyspnea. She reports she has been staying home and non-one was sick but per chart indicates several family were sick. Temp was 102.2 and saturation was 82% ion admission. CXR with infiltrates. Started ABX and optiflow. Only uses albuterol and home and ?still smoking. Rapid COVID testing was positive. Has been coughing up yellow sputum. In [facility] currently and oxygen needs up on 70% optiflow and 60 LPM. Patient states dyspnea is less. Review of Systems:  +Fever  -Headaches  -Chest pain  +Dyspnea, +wheezing,+ cough  -Abdominal pain, -constipation  -Leg swelling  All other organ systems grossly normal.      Patient Active Problem List   Diagnosis Code    CAP (community acquired pneumonia) J18.9    Leukocytosis D72.829    Acute hypoxemic respiratory failure ([facility] J96.01    Tobacco abuse Z72.0    Suspected COVID-19 virus infection Z20.828    Acute respiratory failure with hypoxemia (HCC) J96.01    Sepsis ([facility] A41.9    Pneumonia due to COVID-19 virus U07.1, J12.89    Chronic obstructive pulmonary disease with acute exacerbation ([facility]  -- unspecified type J44.1    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 Taking? Nebulizer & Compressor machine   No No   Si Each by Nebulization route every four (4) hours as needed. albuterol (PROAIR HFA) 90 mcg/actuation inhaler   No No   Sig: Take 2 Puffs by inhalation every four (4) hours as needed for Wheezing.    albuterol-ipratropium (DUO-NEB) 2.5 mg-0.5 mg/3 ml nebu   No No   Sig: 3 mL by Nebulization route every four (4) hours as needed. azithromycin (ZITHROMAX) 500 mg tab   No No   Sig: Take 1 Tab by mouth daily. Facility-Administered Medications: None       No past medical history on file. No past surgical history on file.   Social History     Socioeconomic History    Marital status:      Spouse name: Not on file    Number of children: Not on file    Years of education: Not on file    Highest education level: Not on file   Occupational History    Not on file   Social Needs    Financial resource strain: Not on file    Food insecurity     Worry: Not on file     Inability: Not on file    Transportation needs     Medical: Not on file     Non-medical: Not on file   Tobacco Use    Smoking status: Current Every Day Smoker     Types: Cigars   Substance and Sexual Activity    Alcohol use: No    Drug use: No    Sexual activity: Never   Lifestyle    Physical activity     Days per week: Not on file     Minutes per session: Not on file    Stress: Not on file   Relationships    Social connections     Talks on phone: Not on file     Gets together: Not on file     Attends [employer] service: Not on file     Active member of club or organization: Not on file     Attends meetings of clubs or organizations: Not on file     Relationship status: Not on file    Intimate partner violence     Fear of current or ex partner: Not on file     Emotionally abused: Not on file     Physically abused: Not on file     Forced sexual activity: Not on file   Other Topics Concern    Not on file   Social History Narrative    Not on file     Family History   Problem Relation Age of Onset    Cancer Mother     Liver Disease Father      No Known Allergies    Current Facility-Administered Medications   Medication Dose Route Frequency    budesonide-formoteroL (SYMBICORT) 160-4.5 mcg/actuation HFA inhaler 2 Puff  2 Puff Inhalation BID RT    albuterol (PROVENTIL HFA, VENTOLIN HFA, PROAIR HFA) inhaler 2 Puff  2 Puff Inhalation Q4H PRN    0.9% sodium chloride infusion 250 mL  250 mL IntraVENous PRN    piperacillin-tazobactam (ZOSYN) 3.375 g in 0.9% sodium chloride (MBP/ADV) 100 mL  3.375 g IntraVENous Q8H    sodium chloride (NS) flush 5-40 mL  5-40 mL IntraVENous Q8H    sodium chloride (NS) flush 5-40 mL  5-40 mL IntraVENous PRN    acetaminophen (TYLENOL) tablet 650 mg  650 mg Oral Q4H PRN    diphenhydrAMINE (BENADRYL) injection 12.5 mg  12.5 mg IntraVENous Q4H PRN    bisacodyL (DULCOLAX) tablet 5 mg  5 mg Oral DAILY PRN    ondansetron (ZOFRAN) injection 4 mg  4 mg IntraVENous Q4H PRN    enoxaparin (LOVENOX) injection 40 mg  40 mg SubCUTAneous Q24H    ascorbic acid (vitamin C) (VITAMIN C) tablet 1,000 mg  1,000 mg Oral BID    zinc sulfate tablet 220 mg  220 mg Oral DAILY    albuterol-ipratropium (DUO-NEB) 2.5 MG-0.5 MG/3 ML  3 mL Nebulization Q4H PRN    nicotine (NICODERM CQ) 14 mg/24 hr patch 1 Patch  1 Patch TransDERmal Q24H    dexamethasone (DECADRON) 10 mg/mL injection 6 mg  6 mg IntraVENous Q6H         Objective:     Vitals:    07/02/20 0643 07/02/20 0700 07/02/20 0800 07/02/20 0900   BP:  147/58 157/67 134/49   Pulse:  78 83 82   Resp:  28 20 30   Temp:   98.6 °F (37 °C)    SpO2: 100%      Weight:       Height:           PHYSICAL EXAM     Constitutional:  the patient is well developed and in no acute distress on optiflow at 70% and 60 LPM  EENMT:  Sclera clear, pupils equal, oral mucosa moist  Respiratory: mild wheezing and coarse in bases. Cardiovascular:  RRR without M,G,R  Gastrointestinal: soft and non-tender; with positive bowel sounds. Musculoskeletal: warm without cyanosis. There is no lower extremity edema. Skin:  no jaundice or rashes, no wounds   Neurologic: no gross neuro deficits     Psychiatric:  alert and oriented x 3    CXR:    B/l infiltrates R > left with elevated Right diaphragm.         Recent Labs     07/02/20  1005 07/01/20  1341   WBC 5.5 6.1   HGB 12.9 13.3   HCT 44.8 43.1   * 172 Recent Labs     07/01/20  1341   *   K 4.6   CL 95*   GLU 87   CO2 32   BUN 35*   CREA 0.79   CA 7.9*   ALB 2.1*   TBILI 0.5   ALT 37     Recent Labs     07/02/20  0557 07/01/20  1414   PHI 7.37 7.40   PCO2I 39.4 35.6   PO2I 69* 75   HCO3I 22.8 22.1     Recent Labs     07/01/20  1341   LAC 0.8       Assessment:  (Medical Decision Making)     Hospital Problems  Never Reviewed          Codes Class Noted POA    Pneumonia due to COVID-19 virus ICD-10-CM: U07.1, J12.89  ICD-9-CM: 480.8  7/2/2020 Unknown        Chronic obstructive pulmonary disease with acute exacerbation ([facility]  -- unspecified type ICD-10-CM: J44.1  ICD-9-CM: 491.21  7/2/2020 Unknown        Elevated diaphragm ICD-10-CM: J98.6  ICD-9-CM: 519.4  7/2/2020 Unknown        Acute respiratory failure with hypoxemia ([facility] ICD-10-CM: J96.01  ICD-9-CM: 518.81  7/1/2020 Unknown        Seps[facility] ([facility] ICD-10-CM: A41.9  ICD-9-CM: 038.9, 995.91  7/1/2020 Unknown        CAP (community acquired pneumonia) ICD-10-CM: J18.9  ICD-9-CM: 350  9/26/2018 Yes        Leukocytosis ICD-10-CM: P94.682  ICD-9-CM: 288.60  9/26/2018 Yes        * (Principal) Acute hypoxemic respiratory failure (HCC) ICD-10-CM: J96.01  ICD-9-CM: 518.81  9/26/2018 Yes        Tobacco abuse ICD-10-CM: Z72.0  ICD-9-CM: 305.1  9/26/2018 Yes            Smoker with likely COPD came in with fevers/cough/PNA and is COVID +. Possible multiple family members were ill per CHART. On Optiflow and ABX. Plan:  (Medical Decision Making)     -- continue optiflow and taper as tolerated. I was able to decrease to 54% FIO2. Told respiratory to taper as tolerated. --add Symbicort and prn albuterol HFA. --on vanco and did get dose of Zosyn yesterday will order to complete at least 7 days of therapy.  Get sputum sample  -- For Covid + --> continue decadron D 1/10, Add convulsant plasma today and ID to check for Remdesavir today  -- continue vit C, Zn  --agree with nicotine patch and needs to quit smoking.   --sputum sample if expectorating.  -- labs tomorrow. --will need spirometry in the future in the office. -- PUD prophylaxis      More than 50% of the time documented was spent in face-to-face contact with the patient and in the care of the patient on the floor/unit where the patient is located. Thank you very much for this referral.  We appreciate the opportunity to participate in this patient's care. Will follow along with above stated plan.     Shirin Wilson MD

## 2020-07-03 LAB
ALBUMIN SERPL-MCNC: 1.8 G/DL (ref 3.2–4.6)
ALBUMIN/GLOB SERPL: 0.4 {RATIO} (ref 1.2–3.5)
ALP SERPL-CCNC: 80 U/L (ref 50–136)
ALT SERPL-CCNC: 46 U/L (ref 12–65)
ANION GAP SERPL CALC-SCNC: 8 MMOL/L (ref 7–16)
AST SERPL-CCNC: 88 U/L (ref 15–37)
BASOPHILS # BLD: 0 K/UL (ref 0–0.2)
BASOPHILS NFR BLD: 0 % (ref 0–2)
BILIRUB SERPL-MCNC: 0.3 MG/DL (ref 0.2–1.1)
BUN SERPL-MCNC: 12 MG/DL (ref 8–23)
CALCIUM SERPL-MCNC: 7.5 MG/DL (ref 8.3–10.4)
CHLORIDE SERPL-SCNC: 100 MMOL/L (ref 98–107)
CO2 SERPL-SCNC: 26 MMOL/L (ref 21–32)
COVID-19, XGCOVT: DETECTED
CREAT SERPL-MCNC: 0.38 MG/DL (ref 0.6–1)
CRP SERPL-MCNC: 12.3 MG/DL (ref 0–0.9)
DIFFERENTIAL METHOD BLD: ABNORMAL
EOSINOPHIL # BLD: 0 K/UL (ref 0–0.8)
EOSINOPHIL NFR BLD: 0 % (ref 0.5–7.8)
ERYTHROCYTE [DISTWIDTH] IN BLOOD BY AUTOMATED COUNT: 13.9 % (ref 11.9–14.6)
FERRITIN SERPL-MCNC: 1106 NG/ML (ref 8–388)
GLOBULIN SER CALC-MCNC: 4.1 G/DL (ref 2.3–3.5)
GLUCOSE SERPL-MCNC: 138 MG/DL (ref 65–100)
HCT VFR BLD AUTO: 37.8 % (ref 35.8–46.3)
HGB BLD-MCNC: 12.3 G/DL (ref 11.7–15.4)
IMM GRANULOCYTES # BLD AUTO: 0.1 K/UL (ref 0–0.5)
IMM GRANULOCYTES NFR BLD AUTO: 1 % (ref 0–5)
LDH SERPL L TO P-CCNC: 389 U/L (ref 110–210)
LYMPHOCYTES # BLD: 0.6 K/UL (ref 0.5–4.6)
LYMPHOCYTES NFR BLD: 10 % (ref 13–44)
MAGNESIUM SERPL-MCNC: 1.7 MG/DL (ref 1.8–2.4)
MCH RBC QN AUTO: 28.4 PG (ref 26.1–32.9)
MCHC RBC AUTO-ENTMCNC: 32.5 G/DL (ref 31.4–35)
MCV RBC AUTO: 87.3 FL (ref 79.6–97.8)
MONOCYTES # BLD: 0.4 K/UL (ref 0.1–1.3)
MONOCYTES NFR BLD: 6 % (ref 4–12)
NEUTS SEG # BLD: 5 K/UL (ref 1.7–8.2)
NEUTS SEG NFR BLD: 83 % (ref 43–78)
NRBC # BLD: 0 K/UL (ref 0–0.2)
PHOSPHATE SERPL-MCNC: 1.4 MG/DL (ref 2.3–3.7)
PLATELET # BLD AUTO: 171 K/UL (ref 150–450)
PLATELET COMMENTS,PCOM: SLIGHT
PMV BLD AUTO: 10.4 FL (ref 9.4–12.3)
POTASSIUM SERPL-SCNC: 4 MMOL/L (ref 3.5–5.1)
PROCALCITONIN SERPL-MCNC: 0.62 NG/ML
PROT SERPL-MCNC: 5.9 G/DL (ref 6.3–8.2)
RBC # BLD AUTO: 4.33 M/UL (ref 4.05–5.2)
RBC MORPH BLD: ABNORMAL
SODIUM SERPL-SCNC: 134 MMOL/L (ref 136–145)
WBC # BLD AUTO: 6.1 K/UL (ref 4.3–11.1)
WBC MORPH BLD: ABNORMAL

## 2020-07-03 PROCEDURE — 74011250636 HC RX REV CODE- 250/636: Performed by: INTERNAL MEDICINE

## 2020-07-03 PROCEDURE — 65610000006 HC RM INTENSIVE CARE

## 2020-07-03 PROCEDURE — 30233L1 TRANSFUSION OF NONAUTOLOGOUS FRESH PLASMA INTO PERIPHERAL VEIN, PERCUTANEOUS APPROACH: ICD-10-PCS | Performed by: INTERNAL MEDICINE

## 2020-07-03 PROCEDURE — 36430 TRANSFUSION BLD/BLD COMPNT: CPT

## 2020-07-03 PROCEDURE — 83735 ASSAY OF MAGNESIUM: CPT

## 2020-07-03 PROCEDURE — 74011250637 HC RX REV CODE- 250/637: Performed by: INTERNAL MEDICINE

## 2020-07-03 PROCEDURE — 94640 AIRWAY INHALATION TREATMENT: CPT

## 2020-07-03 PROCEDURE — 36592 COLLECT BLOOD FROM PICC: CPT

## 2020-07-03 PROCEDURE — 83615 LACTATE (LD) (LDH) ENZYME: CPT

## 2020-07-03 PROCEDURE — 99291 CRITICAL CARE FIRST HOUR: CPT | Performed by: INTERNAL MEDICINE

## 2020-07-03 PROCEDURE — 74011000250 HC RX REV CODE- 250: Performed by: INTERNAL MEDICINE

## 2020-07-03 PROCEDURE — 80053 COMPREHEN METABOLIC PANEL: CPT

## 2020-07-03 PROCEDURE — 84145 PROCALCITONIN (PCT): CPT

## 2020-07-03 PROCEDURE — 82728 ASSAY OF FERRITIN: CPT

## 2020-07-03 PROCEDURE — 86140 C-REACTIVE PROTEIN: CPT

## 2020-07-03 PROCEDURE — 84100 ASSAY OF PHOSPHORUS: CPT

## 2020-07-03 PROCEDURE — 74011000250 HC RX REV CODE- 250: Performed by: FAMILY MEDICINE

## 2020-07-03 PROCEDURE — 74011000258 HC RX REV CODE- 258: Performed by: INTERNAL MEDICINE

## 2020-07-03 PROCEDURE — 85025 COMPLETE CBC W/AUTO DIFF WBC: CPT

## 2020-07-03 PROCEDURE — C9113 INJ PANTOPRAZOLE SODIUM, VIA: HCPCS | Performed by: INTERNAL MEDICINE

## 2020-07-03 PROCEDURE — 74011250636 HC RX REV CODE- 250/636: Performed by: FAMILY MEDICINE

## 2020-07-03 RX ORDER — MAGNESIUM SULFATE HEPTAHYDRATE 40 MG/ML
2 INJECTION, SOLUTION INTRAVENOUS ONCE
Status: COMPLETED | OUTPATIENT
Start: 2020-07-03 | End: 2020-07-03

## 2020-07-03 RX ADMIN — PANTOPRAZOLE SODIUM 40 MG: 40 INJECTION, POWDER, FOR SOLUTION INTRAVENOUS at 08:27

## 2020-07-03 RX ADMIN — SODIUM CHLORIDE 20 ML: 9 INJECTION, SOLUTION INTRAMUSCULAR; INTRAVENOUS; SUBCUTANEOUS at 22:00

## 2020-07-03 RX ADMIN — REMDESIVIR 100 MG: 5 INJECTION INTRAVENOUS at 16:12

## 2020-07-03 RX ADMIN — DEXAMETHASONE SODIUM PHOSPHATE 6 MG: 10 INJECTION INTRAMUSCULAR; INTRAVENOUS at 17:09

## 2020-07-03 RX ADMIN — Medication 1000 MG: at 17:08

## 2020-07-03 RX ADMIN — BUDESONIDE AND FORMOTEROL FUMARATE DIHYDRATE 2 PUFF: 160; 4.5 AEROSOL RESPIRATORY (INHALATION) at 07:58

## 2020-07-03 RX ADMIN — Medication 1000 MG: at 08:27

## 2020-07-03 RX ADMIN — DEXAMETHASONE SODIUM PHOSPHATE 6 MG: 10 INJECTION INTRAMUSCULAR; INTRAVENOUS at 00:30

## 2020-07-03 RX ADMIN — BUDESONIDE AND FORMOTEROL FUMARATE DIHYDRATE 2 PUFF: 160; 4.5 AEROSOL RESPIRATORY (INHALATION) at 20:03

## 2020-07-03 RX ADMIN — ALBUTEROL SULFATE 2 PUFF: 90 AEROSOL, METERED RESPIRATORY (INHALATION) at 07:59

## 2020-07-03 RX ADMIN — DEXAMETHASONE SODIUM PHOSPHATE 6 MG: 10 INJECTION INTRAMUSCULAR; INTRAVENOUS at 23:46

## 2020-07-03 RX ADMIN — DEXAMETHASONE SODIUM PHOSPHATE 6 MG: 10 INJECTION INTRAMUSCULAR; INTRAVENOUS at 06:00

## 2020-07-03 RX ADMIN — ENOXAPARIN SODIUM 40 MG: 40 INJECTION SUBCUTANEOUS at 16:12

## 2020-07-03 RX ADMIN — MAGNESIUM SULFATE HEPTAHYDRATE 2 G: 40 INJECTION, SOLUTION INTRAVENOUS at 20:30

## 2020-07-03 RX ADMIN — SODIUM PHOSPHATE, MONOBASIC, MONOHYDRATE: 276; 142 INJECTION, SOLUTION INTRAVENOUS at 20:31

## 2020-07-03 RX ADMIN — SODIUM CHLORIDE 50 ML: 900 INJECTION, SOLUTION INTRAVENOUS at 16:23

## 2020-07-03 RX ADMIN — SODIUM CHLORIDE 20 ML: 9 INJECTION, SOLUTION INTRAMUSCULAR; INTRAVENOUS; SUBCUTANEOUS at 06:01

## 2020-07-03 RX ADMIN — DEXAMETHASONE SODIUM PHOSPHATE 6 MG: 10 INJECTION INTRAMUSCULAR; INTRAVENOUS at 12:20

## 2020-07-03 RX ADMIN — Medication 220 MG: at 08:27

## 2020-07-03 RX ADMIN — SODIUM CHLORIDE 10 ML: 9 INJECTION, SOLUTION INTRAMUSCULAR; INTRAVENOUS; SUBCUTANEOUS at 06:01

## 2020-07-03 NOTE — PROGRESS NOTES
Bedside, Verbal and Written shift change report given to [facility], RN (oncoming nurse) by Starleen Dance, RN (offgoing nurse). Report included the following information SBAR, Kardex, ED Summary, Intake/Output, MAR, Med Rec Status, Cardiac Rhythm sr and Alarm Parameters .

## 2020-07-03 NOTE — INTERDISCIPLINARY ROUNDS
Interdisciplinary team rounds were held 7/3/2020 with the following team members:Physician and Respiratory Therapy and the patient. Plan of care discussed. See clinical pathway and/or care plan for interventions and desired outcomes.

## 2020-07-03 NOTE — PROGRESS NOTES
Dr. Adwoa Montes notified about pt's electrolyte replacement. Orders for Nutritional support and replacement.

## 2020-07-03 NOTE — PROGRESS NOTES
[facility] Critical Care COVID-19 Note:    Critical Care Note: 7/3/2020    Sang Adames  Admission Date: 7/1/2020     Length of Stay: 2 days  Background: 60 y.o. y/o female with acute hypoxemic respiratory failure secondary to COVID-19. Notable PMH: smoker, possible COPD  Drug Allergies: No Known Allergies  24 Hour events: Weaned Optiflow to 53% lowest, currently 60%    ROS:  +Fever  -Headaches  -Chest pain  +Dyspnea, +wheezing,+ cough  -Abdominal pain, -constipation  -Leg swelling  All other organ systems grossly normal.    Lines: (insertion date)   Peripheral IV 07/01/20 Left Antecubital   1 day  Peripheral IV 07/01/20 Right Antecubital  1 day  PICC Double Lumen 21/63/13 Right;Basilic  Less than 1 day  Urinary Catheter 07/01/20 2- way 1 day    Drips: current dose (range)  None    Anti-infectives: none    COVID-19 Meds:  Remdesivir day 1/5  Dexamethasone day 2/10    Visit Vitals  /65   Pulse 85   Temp 97.4 °F (36.3 °C)   Resp 16   Ht 5' 3\" (1.6 m)   Wt 155 lb 1.6 oz (70.4 kg)   SpO2 94%   BMI 27.47 kg/m²     Pertinent Exam:            Constitutional:  intubated and mechanically ventilated. EENMT:  Sclera clear, pupils equal, oral mucosa moist  Respiratory: crackles bilateral  Cardiovascular:  RRR with no M,G,R;  Gastrointestinal:  soft with no tenderness; positive bowel sounds present  Musculoskeletal:  warm with no cyanosis, no lower extremity edema  Skin:  no jaundice or ecchymosis  Neurologic: no gross neuro deficits     Psychiatric: sedated and paralyzed    Pertinent Labs: WBC 6.1, Platelets 678, Cr 4.16    SARS-CoV-2 LAB Results  [facility] Test: No results found for: COV2NT   [facility] Test: No results found for: EDPR  [facility] Test: No components found for: [id number]     Pertinent Rads: none today  CXR Results  (Last 48 hours)               07/01/20 1328  XR CHEST PORT Final result    Impression:  IMPRESSION:       1. Consolidation in the right lung base.        2. Lucency along the right hemidiaphragm. Recommend a lateral decubitus view of   the abdomen to assess for free intraperitoneal air. Findings were discussed with the physician caring for the patient in the   emergency department. Narrative:  PORTABLE CHEST 1 VIEW       HISTORY: Hypoxia and respiratory distress. COMPARISON: 9/25/2018       FINDINGS: The right hemidiaphragm is elevated. Consolidation is present in the   right lung base. There is likely very minimal early consolidation in the left   lung base. There is no large pleural effusion. There is a lucency along the right hemidiaphragm that may be aerated lung,   however, pneumoperitoneum could cause a similar appearance. Consider an   additional lateral decubitus view of the abdomen for further assessment. Ventilator Settings:  5' 3\" (1.6 m)  Mode FIO2 Rate Tidal Volume Pressure PEEP      60 %               Peak airway pressure:     Minute ventilation:    ABG:   Recent Labs     07/02/20  0557 07/01/20  1414   PHI 7.37 7.40   PCO2I 39.4 35.6   PO2I 69* 75   HCO3I 22.8 22.1     Impression: 60 y.o. y/o female with acute hypoxemic respiratory failure secondary to COVID-19. NEURO: no issues  CV:   1. Volume Status: avoid fluids when possible. Gentle fluid bolus vs pressors for hypotension. Lasix if normotensive nd hypoxemic. PULM:   1. Acute hypoxemic/hypercapneic respiratory failure: continue AirVo, wean as able  2. COVID-19 Pneumonia/ARDS: dexamethasone day 2/10  3. Smoking: on inhalers, stop smoking, alex as outpatient  RENAL: no issues currently  GI:   1. Nutrition: oral diet  HEME: no acute issues  ID: WBC normal, afebrile.    1. COVID-19: COVID meds as above    ENDO: not on chronic steroids, monitor hyperglycemia  Skin: no decub, turns, preventive care  Prophy: Lovenox, PPI    Full Code    The patient is critically ill with respiratory failure, circulatory failure and requires high complexity decision making for assessment and support including frequent ventilator adjustment , frequent evaluation and titration of therapies , application of advanced monitoring technologies and extensive interpretation of multiple databases  Time devoted to patient care services described in this note- 15 min face to face/ 16 min total evaluation time    Cumulative time devoted to patient care services by me for day of service -31 min     Serge Landry MD

## 2020-07-03 NOTE — PROGRESS NOTES
Infectious Disease Progress Note    Today's Date: 7/3/2020   Admit Date: 2020    Impression:   · COVID-19 infection ()  · Acute hypoxic respiratory failure  · COPD  · Inflammatory syndrome - elevated CRP, ferritin    Plan:   · Continue remdesivir as outlined. · Continue dexamethasone. · Pt s/p CP. · Daily CMP. · Trend inflammatory labs q48hrs. · ID will sign off. Please reconsult ID with new questions or concerns. Anti-infectives:   · S/p IV vanc/zosyn    Subjective:   Date of Consultation:  July 3, 2020  Referring Physician: Dr. Emily Rodriguez    Resp status stable; fever improved; No Known Allergies     Review of Systems:  A comprehensive review of systems was negative except for that written in the History of Present Illness. Objective:     Visit Vitals  /54   Pulse 78   Temp 98.8 °F (37.1 °C)   Resp 24   Ht 5' 3\" (1.6 m)   Wt 70.4 kg (155 lb 1.6 oz)   SpO2 95%   BMI 27.47 kg/m²     Temp (24hrs), Av.4 °F (36.9 °C), Min:97.4 °F (36.3 °C), Max:99.2 °F (37.3 °C)       Lines:  Peripheral IV:       Physical Exam:    General:  Alert, cooperative, appears fatigued   Eyes:  Sclera anicteric   Mouth/Throat: oral pharynx clear   Neck: Supple   Lungs:   Breathing comfortably   CV:  Nl rate;    Abdomen:   non-distended   Extremities: No cyanosis; trace LE edema   Skin: no acute rash or lesions   : No Tilley present   Musculoskeletal: No swelling or deformity   Lines/Devices:  Intact, no erythema, drainage or tenderness   Psych: Alert and oriented, normal mood affect given the setting       Data Review:     CBC:  Recent Labs     205 20  1005 20  1341   WBC 6.1 5.5 6.1   GRANS 83* 84* 82*   MONOS 6 4 10   EOS 0* 0* 0*   ANEU 5.0 4.6 5.0   ABL 0.6 0.6 0.5   HGB 12.3 12.9 13.3   HCT 37.8 44.8 43.1    125* 172       BMP:  Recent Labs     20  0415 20  1005 20  1341   CREA 0.38* 0.55* 0.79   BUN 12 16 35*   * 135* 131*   K 4.0 4.1 4.6    101 95* CO2 26 26 32   AGAP 8 8 4*   * 121* 87       LFTS:  Recent Labs     07/03/20  0415 07/01/20  1341   TBILI 0.3 0.5   ALT 46 37   AP 80 77   TP 5.9* 6.9   ALB 1.8* 2.1*       Microbiology:     All Micro Results     Procedure Component Value Units Date/Time    CULTURE, BLOOD [[id number]] Collected:  07/01/20 1341    Order Status:  Completed Specimen:  Blood Updated:  07/03/20 1015     Special Requests: LEFT ANTECUBITAL        Culture result: NO GROWTH 2 DAYS       CULTURE, BLOOD [[id number]] Collected:  07/01/20 1341    Order Status:  Completed Specimen:  Blood Updated:  07/03/20 1015     Special Requests: RIGHT ANTECUBITAL        Culture result: NO GROWTH 2 DAYS       CULTURE, URINE [[id number]] Collected:  07/01/20 1417    Order Status:  Completed Specimen:  Cath Urine Updated:  07/03/20 0846     Special Requests: NO SPECIAL REQUESTS        Culture result: NO GROWTH 1 DAY       SARS-COV-2 [[id number]]  (Abnormal) Collected:  07/02/20 1000    Order Status:  Completed Specimen:  Nasopharyngeal Updated:  07/03/20 0812     COVID-19 Detected        Comment: PERFORMED AT [facility]       CULTURE, RESPIRATORY/SPUTUM/BRONCH [place] STAIN [[id number]]     Order Status:  Sent Specimen:  Sputum     COVID-19 RAPID TEST [[id number]]  (Abnormal) Collected:  07/01/20 1527    Order Status:  Completed Specimen:  Nasopharyngeal Updated:  07/01/20 1714     Specimen source SWAB        COVID-19 rapid test Detected        Comment:      The specimen is POSITIVE for SARS-CoV-2, the novel coronavirus associated with COVID-19. This test has been authorized by the FDA under an Emergency Use Authorization (EUA) for use by authorized laboratories.         Fact sheet for Healthcare Providers: ConventionUpdate.co.nz  Fact sheet for Patients: ConventionUpdate.co.nz       Methodology: Isothermal Nucleic Acid Amplification  RESULTS VERIFIED, PHONED TO AND READ BACK BY  DR Garima Shah [id number]  RESULTS VERIFIED, PHONED TO AND READ BACK BY  DR Garima Shah [id number] 07/01/20 VELASQUEZ         CULTURE, URINE [[id number]] Collected:  07/01/20 1545    Order Status:  Canceled Specimen:  Cath Urine           Imaging:   No new imaging    Signed By: Erasmo Olmos MD     July 3, 2020

## 2020-07-03 NOTE — PROGRESS NOTES
Progress Note    Patient: Perry Peng MRN: [id number]  SSN: [id number]    YOB: 1955  Age: 60 y.o. Sex: female      Admit Date: 2020    LOS: 2 days     Subjective:     Perry Peng is a 60 y.o. female who was brought to ER due to being found with fever, diarrhea and feeling very sick.      Patient has several family members being sick with fever and diarrhea. One day prior to admission, patient started with nausea, vomiting and fever.      On the day of admission, patient was somnolent and with fever.      EMS was called and checked her temperature showing 102.2 F with O2 saturation 82% on room air. Her skin was mottled.      She received Rocephin on the field and she was given IV fluid and brought to ER.      She has been receiving Empiric IV antibiotics including Vancomycin and Zosyn since admission. She is positive for COVID19 infection. Still on high flow oxygen. Objective:     Vitals:    20 0801 20 0831 20 0840 20 0856   BP: 135/55 155/65     Pulse: 77 85     Resp: 17 16     Temp:       SpO2: 96%  95% 94%   Weight:       Height:            Intake and Output:  Current Shift: No intake/output data recorded. Last three shifts:  1901 -  0700  In: 1815 [P.O.:240; I.V.:1575]  Out: 1975 [DBFQ]    Physical Exam:   General:                    The patient is a female in acute respiratory distress. She is on high flow oxygen. Less confused. Able to answer simple questions. KYOC:                                   FALWBVURAOBQQ/XCGBVTLTCJ. Eyes:                                   palpebral pallor. No scleral icterus. ENT:                                    External auricular and nasal exam within normal limits.                                             YDDEEL membranes are dry. Neck:                                   Supple, non-tender, no JVD.   Lungs:                       decreased to auscultation bilaterally with no wheezes and crackles.                                             No respiratory accessory muscle use. Heart:                                  Regular rhythm, tachycardia, without murmurs, rubs, or gallops. Abdomen:                  Soft, non-tender, non-distended with normoactive bowel sounds. Genitourinary:           No tenderness over the bladder or bilateral CVAs. Extremities:               Without clubbing, cyanosis, or edema. Skin:                                    mottled skin on all extremities especially on both arms. Pulses:                      Radial and dorsalis pedis pulses present 2+ bilaterally.                                               Capillary refill <2s. Neurologic:                CN II-XII grossly intact and symmetrical.                                               Moving all four extremities well with no focal deficits. Psychiatric:                less confused. Able to answer orientation to self and place.        Lab/Data Review:    Recent Results (from the past 24 hour(s))   SARS-COV-2    Collection Time: 07/02/20 10:00 AM   Result Value Ref Range    COVID-19 Detected (A) NOTD     METABOLIC PANEL, BASIC    Collection Time: 07/02/20 10:05 AM   Result Value Ref Range    Sodium 135 (L) 136 - 145 mmol/L    Potassium 4.1 3.5 - 5.1 mmol/L    Chloride 101 98 - 107 mmol/L    CO2 26 21 - 32 mmol/L    Anion gap 8 7 - 16 mmol/L    Glucose 121 (H) 65 - 100 mg/dL    BUN 16 8 - 23 MG/DL    Creatinine 0.55 (L) 0.6 - 1.0 MG/DL    GFR est AA >60 >60 ml/min/1.73m2    GFR est non-AA >60 >60 ml/min/1.73m2    Calcium 7.7 (L) 8.3 - 10.4 MG/DL   CBC WITH AUTOMATED DIFF    Collection Time: 07/02/20 10:05 AM   Result Value Ref Range    WBC 5.5 4.3 - 11.1 K/uL    RBC 4.69 4.05 - 5.2 M/uL    HGB 12.9 11.7 - 15.4 g/dL    HCT 44.8 35.8 - 46.3 %    MCV 95.5 79.6 - 97.8 FL    MCH 27.5 26.1 - 32.9 PG    MCHC 28.8 (L) 31.4 - 35.0 g/dL    RDW 13.9 11.9 - 14.6 %    PLATELET 908 (L) 569 - 450 K/uL    MPV 11.0 9.4 - 12.3 FL    ABSOLUTE NRBC 0.00 0.0 - 0.2 K/uL    DF AUTOMATED      NEUTROPHILS 84 (H) 43 - 78 %    LYMPHOCYTES 11 (L) 13 - 44 %    MONOCYTES 4 4.0 - 12.0 %    EOSINOPHILS 0 (L) 0.5 - 7.8 %    BASOPHILS 0 0.0 - 2.0 %    IMMATURE GRANULOCYTES 1 0.0 - 5.0 %    ABS. NEUTROPHILS 4.6 1.7 - 8.2 K/UL    ABS. LYMPHOCYTES 0.6 0.5 - 4.6 K/UL    ABS. MONOCYTES 0.2 0.1 - 1.3 K/UL    ABS. EOSINOPHILS 0.0 0.0 - 0.8 K/UL    ABS. BASOPHILS 0.0 0.0 - 0.2 K/UL    ABS. IMM. GRANS. 0.0 0.0 - 0.5 K/UL   CONVALESCENT PLASMA, ALLOCATE    Collection Time: 07/02/20 11:00 AM   Result Value Ref Range    Comment FDA STUDY NUMBER [id number]     Unit number [id number]     Blood component type ConvPls,     Unit division B0     Status of unit ALLOCATED    BLOOD TYPE, (ABO+RH)    Collection Time: 07/02/20  8:03 PM   Result Value Ref Range    ABO/Rh(D) AB POSITIVE    CBC WITH AUTOMATED DIFF    Collection Time: 07/03/20  4:15 AM   Result Value Ref Range    WBC 6.1 4.3 - 11.1 K/uL    RBC 4.33 4.05 - 5.2 M/uL    HGB 12.3 11.7 - 15.4 g/dL    HCT 37.8 35.8 - 46.3 %    MCV 87.3 79.6 - 97.8 FL    MCH 28.4 26.1 - 32.9 PG    MCHC 32.5 31.4 - 35.0 g/dL    RDW 13.9 11.9 - 14.6 %    PLATELET 970 415 - 456 K/uL    MPV 10.4 9.4 - 12.3 FL    ABSOLUTE NRBC 0.00 0.0 - 0.2 K/uL    NEUTROPHILS 83 (H) 43 - 78 %    LYMPHOCYTES 10 (L) 13 - 44 %    MONOCYTES 6 4.0 - 12.0 %    EOSINOPHILS 0 (L) 0.5 - 7.8 %    BASOPHILS 0 0.0 - 2.0 %    IMMATURE GRANULOCYTES 1 0.0 - 5.0 %    ABS. NEUTROPHILS 5.0 1.7 - 8.2 K/UL    ABS. LYMPHOCYTES 0.6 0.5 - 4.6 K/UL    ABS. MONOCYTES 0.4 0.1 - 1.3 K/UL    ABS. EOSINOPHILS 0.0 0.0 - 0.8 K/UL    ABS. BASOPHILS 0.0 0.0 - 0.2 K/UL    ABS. IMM.  GRANS. 0.1 0.0 - 0.5 K/UL    RBC COMMENTS SLIGHT  HYPOCHROMIA        WBC COMMENTS OCCASIONAL      PLATELET COMMENTS SLIGHT      DF AUTOMATED     PHOSPHORUS    Collection Time: 07/03/20  4:15 AM   Result Value Ref Range    Phosphorus 1.4 (L) 2.3 - 3.7 MG/DL   MAGNESIUM    Collection Time: 07/03/20  4:15 AM Result Value Ref Range    Magnesium 1.7 (L) 1.8 - 2.4 mg/dL   METABOLIC PANEL, COMPREHENSIVE    Collection Time: 07/03/20  4:15 AM   Result Value Ref Range    Sodium 134 (L) 136 - 145 mmol/L    Potassium 4.0 3.5 - 5.1 mmol/L    Chloride 100 98 - 107 mmol/L    CO2 26 21 - 32 mmol/L    Anion gap 8 7 - 16 mmol/L    Glucose 138 (H) 65 - 100 mg/dL    BUN 12 8 - 23 MG/DL    Creatinine 0.38 (L) 0.6 - 1.0 MG/DL    GFR est AA >60 >60 ml/min/1.73m2    GFR est non-AA >60 >60 ml/min/1.73m2    Calcium 7.5 (L) 8.3 - 10.4 MG/DL    Bilirubin, total 0.3 0.2 - 1.1 MG/DL    ALT (SGPT) 46 12 - 65 U/L    AST (SGOT) 88 (H) 15 - 37 U/L    Alk.  phosphatase 80 50 - 136 U/L    Protein, total 5.9 (L) 6.3 - 8.2 g/dL    Albumin 1.8 (L) 3.2 - 4.6 g/dL    Globulin 4.1 (H) 2.3 - 3.5 g/dL    A-G Ratio 0.4 (L) 1.2 - 3.5     PROCALCITONIN    Collection Time: 07/03/20  4:15 AM   Result Value Ref Range    Procalcitonin 0.62 ng/mL   FERRITIN    Collection Time: 07/03/20  4:15 AM   Result Value Ref Range    Ferritin 1,106 (H) 8 - 388 NG/ML   LD    Collection Time: 07/03/20  4:15 AM   Result Value Ref Range     (H) 110 - 210 U/L   C REACTIVE PROTEIN, QT    Collection Time: 07/03/20  4:15 AM   Result Value Ref Range    C-Reactive protein 12.3 (H) 0.0 - 0.9 mg/dL     Results     Procedure Component Value Units Date/Time    CULTURE, RESPIRATORY/SPUTUM/BRONCH [place] STAIN [[id number]]     Order Status:  Sent Specimen:  Sputum     SARS-COV-2 [[id number]]  (Abnormal) Collected:  07/02/20 1000    Order Status:  Completed Specimen:  Nasopharyngeal Updated:  07/03/20 0812     COVID-19 Detected        Comment: PERFORMED AT [facility]       CULTURE, URINE [[id number]] Collected:  07/01/20 1545    Order Status:  Canceled Specimen:  Cath Urine     COVID-19 RAPID TEST [[id number]]  (Abnormal) Collected:  07/01/20 1527    Order Status:  Completed Specimen:  Nasopharyngeal Updated:  07/01/20 1714     Specimen source SWAB        COVID-19 rapid test Detected        Comment:      The specimen is POSITIVE for SARS-CoV-2, the novel coronavirus associated with COVID-19. This test has been authorized by the FDA under an Emergency Use Authorization (EUA) for use by authorized laboratories. Fact sheet for Healthcare Providers: kstattoo.com  Fact sheet for Patients: kstattoo.com       Methodology: Isothermal Nucleic Acid Amplification  RESULTS VERIFIED, PHONED TO AND READ BACK BY  DR Tony Rush 1513  RESULTS VERIFIED, PHONED TO AND READ BACK BY  DR Tony Rush 1513 07/01/20 V[place]ASQUEZ         CULTURE, URINE [[id number]] Collected:  07/01/20 1417    Order Status:  Completed Specimen:  Cath Urine Updated:  07/03/20 0846     Special Requests: NO SPECIAL REQUESTS        Culture result: NO GROWTH 1 DAY       CULTURE, BLOOD [[id number]] Collected:  07/01/20 1341    Order Status:  Completed Specimen:  Blood Updated:  07/02/20 0618     Special Requests: LEFT ANTECUBITAL        Culture result: NO GROWTH AFTER 15 HOURS       CULTURE, BLOOD [[id number]] Collected:  07/01/20 1341    Order Status:  Completed Specimen:  Blood Updated:  07/02/20 0618     Special Requests: RIGHT ANTECUBITAL        Culture result: NO GROWTH AFTER 15 HOURS            XR abdomen   7-1-2020  IMPRESSION: No free intraperitoneal air. Right lower lobe consolidation.     XR chest   7-1-2020  IMPRESSION:     1. Consolidation in the right lung base.     2. Lucency along the right hemidiaphragm.  Recommend a lateral decubitus view of  the abdomen to assess for free intraperitoneal air.       Current Facility-Administered Medications:     budesonide-formoteroL (SYMBICORT) 160-4.5 mcg/actuation HFA inhaler 2 Puff, 2 Puff, Inhalation, BID RT, Tammy Kc MD, 2 Puff at 07/03/20 0758    albuterol (PROVENTIL HFA, VENTOLIN HFA, PROAIR HFA) inhaler 2 Puff, 2 Puff, Inhalation, Q4H PRN, Tammy Kc MD, 2 Puff at 07/03/20 0759    0.9% sodium chloride infusion 250 mL, 250 mL, IntraVENous, PRN, Joelle Kc MD    remdesivir 100 mg in 0.9% sodium chloride 250 mL IVPB, 100 mg, IntraVENous, Q24H **AND** 0.9% sodium chloride infusion 50 mL, 50 mL, IntraVENous, Q24H, Renzo Norris MD    pantoprazole (PROTONIX) 40 mg in 0.9% sodium chloride 10 mL injection, 40 mg, IntraVENous, DAILY, Génesis Morse MD, 40 mg at 07/03/20 0827    sodium chloride (NS) flush 20 mL, 20 mL, InterCATHeter, Q8H, Dinh Kc MD, 20 mL at 07/03/20 0601    sodium chloride (NS) flush 20 mL, 20 mL, InterCATHeter, PRN, Joelle Kc MD    sodium chloride (NS) flush 5-40 mL, 5-40 mL, IntraVENous, Q8H, Génesis Morse MD, 10 mL at 07/03/20 0601    sodium chloride (NS) flush 5-40 mL, 5-40 mL, IntraVENous, PRN, Génesis Morse MD    acetaminophen (TYLENOL) tablet 650 mg, 650 mg, Oral, Q4H PRN, Bandar Grewal MD    diphenhydrAMINE (BENADRYL) injection 12.5 mg, 12.5 mg, IntraVENous, Q4H PRN, Génesis Morse MD    bisacodyL (DULCOLAX) tablet 5 mg, 5 mg, Oral, DAILY PRN, Génesis Morse MD    ondansetron (ZOFRAN) injection 4 mg, 4 mg, IntraVENous, Q4H PRN, Génesis Morse MD    enoxaparin (LOVENOX) injection 40 mg, 40 mg, SubCUTAneous, Q24H, Génesis Morse MD, 40 mg at 07/02/20 1816    ascorbic acid (vitamin C) (VITAMIN C) tablet 1,000 mg, 1,000 mg, Oral, BID, Bandar Grewal MD, 1,000 mg at 07/03/20 0827    zinc sulfate tablet 220 mg, 220 mg, Oral, DAILY, Génesis Morse MD, 220 mg at 07/03/20 0827    albuterol-ipratropium (DUO-NEB) 2.5 MG-0.5 MG/3 ML, 3 mL, Nebulization, Q4H PRN, Bandar Grewal MD, 3 mL at 07/02/20 1936    nicotine (NICODERM CQ) 14 mg/24 hr patch 1 Patch, 1 Patch, TransDERmal, Q24H, Génesis Morse MD, 1 Patch at 07/02/20 1817    dexamethasone (DECADRON) 10 mg/mL injection 6 mg, 6 mg, IntraVENous, Q6H, Génesis Morse MD, 6 mg at 07/03/20 0600      Assessment:     Principal Problem:    Acute hypoxemic respiratory failure ([address] (9/26/2018)    Active Problems:    CAP (community acquired pneumonia) (9/26/2018)      Leukocytosis (9/26/2018)      Tobacco abuse (9/26/2018)      Acute respiratory failure with hypoxemia ([address] (7/1/2020)      Sepsis ([address] (7/1/2020)      Pneumonia due to COVID-19 virus (7/2/2020)      Chronic obstructive pulmonary disease with acute exacerbation ([address]  -- unspecified type (7/2/2020)      Elevated diaphragm (7/2/2020)        Plan:     Acute respiratory failure with hypoxia   Positive COVID19 infection. Metabolic Encephalopathy on admission   Sepsis   Pneumonia   Blood and urine culture so far are negative. Stopped antibacterials. On Dexamethasone. Nebulizer  Vitamin C and Zinc.   Tilley's catheter   ID is following. Patient is on Remdesivir. Pulmonology is helping.      Smoking. I advised patient to quit.    Nicotine patch.        I have discussed the plan of care with patient and care team.      DVT prophylaxis : Lovenox SC        Signed By: Brea Collier MD     July 3, 2020

## 2020-07-04 LAB
ALBUMIN SERPL-MCNC: 1.8 G/DL (ref 3.2–4.6)
ALBUMIN/GLOB SERPL: 0.5 {RATIO} (ref 1.2–3.5)
ALP SERPL-CCNC: 77 U/L (ref 50–136)
ALT SERPL-CCNC: 47 U/L (ref 12–65)
ANION GAP SERPL CALC-SCNC: 9 MMOL/L (ref 7–16)
AST SERPL-CCNC: 80 U/L (ref 15–37)
BACTERIA SPEC CULT: NORMAL
BASOPHILS # BLD: 0 K/UL (ref 0–0.2)
BASOPHILS NFR BLD: 0 % (ref 0–2)
BILIRUB SERPL-MCNC: 0.3 MG/DL (ref 0.2–1.1)
BLD PROD TYP BPU: NORMAL
BLOOD BANK CMNT PATIENT-IMP: NORMAL
BPU ID: NORMAL
BUN SERPL-MCNC: 14 MG/DL (ref 8–23)
CALCIUM SERPL-MCNC: 7.2 MG/DL (ref 8.3–10.4)
CHLORIDE SERPL-SCNC: 98 MMOL/L (ref 98–107)
CO2 SERPL-SCNC: 28 MMOL/L (ref 21–32)
CREAT SERPL-MCNC: 0.56 MG/DL (ref 0.6–1)
DIFFERENTIAL METHOD BLD: ABNORMAL
EOSINOPHIL # BLD: 0 K/UL (ref 0–0.8)
EOSINOPHIL NFR BLD: 0 % (ref 0.5–7.8)
ERYTHROCYTE [DISTWIDTH] IN BLOOD BY AUTOMATED COUNT: 13.8 % (ref 11.9–14.6)
GLOBULIN SER CALC-MCNC: 4 G/DL (ref 2.3–3.5)
GLUCOSE SERPL-MCNC: 177 MG/DL (ref 65–100)
HCT VFR BLD AUTO: 39.3 % (ref 35.8–46.3)
HGB BLD-MCNC: 12.1 G/DL (ref 11.7–15.4)
IMM GRANULOCYTES # BLD AUTO: 0.3 K/UL (ref 0–0.5)
IMM GRANULOCYTES NFR BLD AUTO: 6 % (ref 0–5)
LYMPHOCYTES # BLD: 0.8 K/UL (ref 0.5–4.6)
LYMPHOCYTES NFR BLD: 20 % (ref 13–44)
MAGNESIUM SERPL-MCNC: 1.9 MG/DL (ref 1.8–2.4)
MCH RBC QN AUTO: 27.3 PG (ref 26.1–32.9)
MCHC RBC AUTO-ENTMCNC: 30.8 G/DL (ref 31.4–35)
MCV RBC AUTO: 88.5 FL (ref 79.6–97.8)
MONOCYTES # BLD: 0.5 K/UL (ref 0.1–1.3)
MONOCYTES NFR BLD: 12 % (ref 4–12)
NEUTS SEG # BLD: 2.6 K/UL (ref 1.7–8.2)
NEUTS SEG NFR BLD: 62 % (ref 43–78)
NRBC # BLD: 0 K/UL (ref 0–0.2)
PHOSPHATE SERPL-MCNC: 2.5 MG/DL (ref 2.3–3.7)
PLATELET # BLD AUTO: 189 K/UL (ref 150–450)
PLATELET COMMENTS,PCOM: ADEQUATE
PMV BLD AUTO: 10.3 FL (ref 9.4–12.3)
POTASSIUM SERPL-SCNC: 3.8 MMOL/L (ref 3.5–5.1)
PROT SERPL-MCNC: 5.8 G/DL (ref 6.3–8.2)
RBC # BLD AUTO: 4.44 M/UL (ref 4.05–5.2)
RBC MORPH BLD: ABNORMAL
SERVICE CMNT-IMP: NORMAL
SODIUM SERPL-SCNC: 135 MMOL/L (ref 136–145)
STATUS OF UNIT,%ST: NORMAL
UNIT DIVISION, %UDIV: NORMAL
WBC # BLD AUTO: 4.2 K/UL (ref 4.3–11.1)
WBC MORPH BLD: ABNORMAL

## 2020-07-04 PROCEDURE — 74011000258 HC RX REV CODE- 258: Performed by: INTERNAL MEDICINE

## 2020-07-04 PROCEDURE — 85025 COMPLETE CBC W/AUTO DIFF WBC: CPT

## 2020-07-04 PROCEDURE — 99232 SBSQ HOSP IP/OBS MODERATE 35: CPT | Performed by: INTERNAL MEDICINE

## 2020-07-04 PROCEDURE — 94640 AIRWAY INHALATION TREATMENT: CPT

## 2020-07-04 PROCEDURE — 36592 COLLECT BLOOD FROM PICC: CPT

## 2020-07-04 PROCEDURE — 65610000006 HC RM INTENSIVE CARE

## 2020-07-04 PROCEDURE — 94762 N-INVAS EAR/PLS OXIMTRY CONT: CPT

## 2020-07-04 PROCEDURE — C9113 INJ PANTOPRAZOLE SODIUM, VIA: HCPCS | Performed by: INTERNAL MEDICINE

## 2020-07-04 PROCEDURE — 80053 COMPREHEN METABOLIC PANEL: CPT

## 2020-07-04 PROCEDURE — 84100 ASSAY OF PHOSPHORUS: CPT

## 2020-07-04 PROCEDURE — 83735 ASSAY OF MAGNESIUM: CPT

## 2020-07-04 PROCEDURE — 74011000250 HC RX REV CODE- 250: Performed by: INTERNAL MEDICINE

## 2020-07-04 PROCEDURE — 77010033711 HC HIGH FLOW OXYGEN

## 2020-07-04 PROCEDURE — 74011250636 HC RX REV CODE- 250/636: Performed by: INTERNAL MEDICINE

## 2020-07-04 PROCEDURE — 74011250637 HC RX REV CODE- 250/637: Performed by: INTERNAL MEDICINE

## 2020-07-04 RX ADMIN — DEXAMETHASONE SODIUM PHOSPHATE 6 MG: 10 INJECTION INTRAMUSCULAR; INTRAVENOUS at 12:11

## 2020-07-04 RX ADMIN — BUDESONIDE AND FORMOTEROL FUMARATE DIHYDRATE 2 PUFF: 160; 4.5 AEROSOL RESPIRATORY (INHALATION) at 08:00

## 2020-07-04 RX ADMIN — SODIUM CHLORIDE 10 ML: 9 INJECTION, SOLUTION INTRAMUSCULAR; INTRAVENOUS; SUBCUTANEOUS at 05:06

## 2020-07-04 RX ADMIN — SODIUM CHLORIDE 50 ML: 900 INJECTION, SOLUTION INTRAVENOUS at 16:09

## 2020-07-04 RX ADMIN — PANTOPRAZOLE SODIUM 40 MG: 40 INJECTION, POWDER, FOR SOLUTION INTRAVENOUS at 08:25

## 2020-07-04 RX ADMIN — SODIUM CHLORIDE 10 ML: 9 INJECTION, SOLUTION INTRAMUSCULAR; INTRAVENOUS; SUBCUTANEOUS at 14:00

## 2020-07-04 RX ADMIN — BISACODYL 5 MG: 5 TABLET, COATED ORAL at 12:11

## 2020-07-04 RX ADMIN — SODIUM CHLORIDE 20 ML: 9 INJECTION, SOLUTION INTRAMUSCULAR; INTRAVENOUS; SUBCUTANEOUS at 22:00

## 2020-07-04 RX ADMIN — REMDESIVIR 100 MG: 5 INJECTION INTRAVENOUS at 16:09

## 2020-07-04 RX ADMIN — Medication 1000 MG: at 08:25

## 2020-07-04 RX ADMIN — SODIUM CHLORIDE 20 ML: 9 INJECTION, SOLUTION INTRAMUSCULAR; INTRAVENOUS; SUBCUTANEOUS at 14:00

## 2020-07-04 RX ADMIN — BUDESONIDE AND FORMOTEROL FUMARATE DIHYDRATE 2 PUFF: 160; 4.5 AEROSOL RESPIRATORY (INHALATION) at 21:03

## 2020-07-04 RX ADMIN — ENOXAPARIN SODIUM 40 MG: 40 INJECTION SUBCUTANEOUS at 16:09

## 2020-07-04 RX ADMIN — Medication 1000 MG: at 17:39

## 2020-07-04 RX ADMIN — DEXAMETHASONE SODIUM PHOSPHATE 6 MG: 10 INJECTION INTRAMUSCULAR; INTRAVENOUS at 17:39

## 2020-07-04 RX ADMIN — Medication 220 MG: at 08:25

## 2020-07-04 RX ADMIN — DEXAMETHASONE SODIUM PHOSPHATE 6 MG: 10 INJECTION INTRAMUSCULAR; INTRAVENOUS at 05:05

## 2020-07-04 RX ADMIN — SODIUM CHLORIDE 20 ML: 9 INJECTION, SOLUTION INTRAMUSCULAR; INTRAVENOUS; SUBCUTANEOUS at 05:05

## 2020-07-04 RX ADMIN — SODIUM CHLORIDE 10 ML: 9 INJECTION, SOLUTION INTRAMUSCULAR; INTRAVENOUS; SUBCUTANEOUS at 22:00

## 2020-07-04 NOTE — PROGRESS NOTES
Bedside, Verbal and Written shift change report given to [facility], RN (oncoming nurse) by Fatou Ching RN (offgoing nurse). Report included the following information Intake/Output, MAR, Cardiac Rhythm sr and Alarm Parameters .

## 2020-07-04 NOTE — PROGRESS NOTES
Silvino Cheung  Admission Date: 7/1/2020             Daily Progress Note: 7/4/2020   Patient is a 60 y.o.  female seen and evaluated at the request of Dr. Caba .     Patient is a smoker who came in with worsening hypoxemia, fevers, diarrhea and dyspnea. She reports she has been staying home and non-one was sick but per chart indicates several family were sick. Temp was 102.2 and saturation was 82% ion admission. CXR with infiltrates. Started ABX and optiflow. Only uses albuterol and home and ?still smoking. Rapid COVID testing was positive.  Has been coughing up yellow sputum.      In [facility] currently and oxygen needs up on 70% optiflow and 60 LPM. Patient states dyspnea is less  Subjective:   Afebrile, remains on Optiflow at [place]  Mode FIO2 Rate Tidal Volume Pressure PEEP      50 %                   Review of Systems  Constitutional: negative for fevers, chills and sweats  Respiratory: positive for cough, sputum or dyspnea on exertion  Cardiovascular: positive for dyspnea, fatigue  Gastrointestinal: negative for nausea, vomiting, diarrhea and constipation    Current Facility-Administered Medications   Medication Dose Route Frequency    NUTRITIONAL SUPPORT ELECTROLYTE PRN ORDERS   Does Not Apply PRN    budesonide-formoteroL (SYMBICORT) 160-4.5 mcg/actuation HFA inhaler 2 Puff  2 Puff Inhalation BID RT    albuterol (PROVENTIL HFA, VENTOLIN HFA, PROAIR HFA) inhaler 2 Puff  2 Puff Inhalation Q4H PRN    0.9% sodium chloride infusion 250 mL  250 mL IntraVENous PRN    remdesivir 100 mg in 0.9% sodium chloride 250 mL IVPB  100 mg IntraVENous Q24H    And    0.9% sodium chloride infusion 50 mL  50 mL IntraVENous Q24H    pantoprazole (PROTONIX) 40 mg in 0.9% sodium chloride 10 mL injection  40 mg IntraVENous DAILY    sodium chloride (NS) flush 20 mL  20 mL InterCATHeter Q8H    sodium chloride (NS) flush 20 mL  20 mL InterCATHeter PRN    sodium chloride (NS) flush 5-40 mL 5-40 mL IntraVENous Q8H    sodium chloride (NS) flush 5-40 mL  5-40 mL IntraVENous PRN    acetaminophen (TYLENOL) tablet 650 mg  650 mg Oral Q4H PRN    diphenhydrAMINE (BENADRYL) injection 12.5 mg  12.5 mg IntraVENous Q4H PRN    bisacodyL (DULCOLAX) tablet 5 mg  5 mg Oral DAILY PRN    ondansetron (ZOFRAN) injection 4 mg  4 mg IntraVENous Q4H PRN    enoxaparin (LOVENOX) injection 40 mg  40 mg SubCUTAneous Q24H    ascorbic acid (vitamin C) (VITAMIN C) tablet 1,000 mg  1,000 mg Oral BID    zinc sulfate tablet 220 mg  220 mg Oral DAILY    albuterol-ipratropium (DUO-NEB) 2.5 MG-0.5 MG/3 ML  3 mL Nebulization Q4H PRN    nicotine (NICODERM CQ) 14 mg/24 hr patch 1 Patch  1 Patch TransDERmal Q24H    dexamethasone (DECADRON) 10 mg/mL injection 6 mg  6 mg IntraVENous Q6H         Objective:     Vitals:    07/04/20 0936 07/04/20 1002 07/04/20 1032 07/04/20 1101   BP: 172/79 159/77 140/69 134/59   Pulse: 76 73 71 64   Resp: 18 17 23 21   Temp:       SpO2: 91% 92% 94% 94%   Weight:       Height:         Intake and Output:   07/02 1901 - 07/04 0700  In: 1635.8 [P.O.:880; I.V.:550]  Out: 3982 [GUZIO:2546]  07/04 0701 - 07/04 1900  In: 120 [P.O.:120]  Out: 400 [Urine:400]      Intake/Output Summary (Last 24 hours) at 7/4/2020 1147  Last data filed at 7/4/2020 2334  Gross per 24 hour   Intake 1275.8 ml   Output 1650 ml   Net -374.2 ml       Physical Exam:            GEN: well developed and in no acute distress,   HEENT:  PERRL, EOMI, no alar flaring or epistaxis, oral mucosa moist without cyanosis,   NECK:  no JVD, no retractions, no thyromegaly or masses,   LUNGS:  crackles  HEART:  RRR with no M,G,R;  ABDOMEN:  soft with no tenderness, positive bowel sounds present  EXTREMITIES:  warm with no cyanosis, no edema  SKIN:  no jaundice or ecchymosis   NEURO:  alert and oriented, grossly non-focal      Lines/Drains:optiflow, PICC  Nutrition:regular  Activity:ambulate with assistance    CHEST XRAY:   7/1/2020        LAB  Recent Labs     07/04/20  0351 07/03/20  0415 07/02/20  1005   WBC 4.2* 6.1 5.5   HGB 12.1 12.3 12.9   HCT 39.3 37.8 44.8    171 125*     Recent Labs     07/04/20  0351 07/03/20  0415 07/02/20  1005   * 134* 135*   K 3.8 4.0 4.1   CL 98 100 101   CO2 28 26 26   * 138* 121*   BUN 14 12 16   CREA 0.56* 0.38* 0.55*   MG 1.9 1.7*  --    PHOS 2.5 1.4*  --      ABG:  No results found for: PH, PHI, PCO2, PCO2I, PO2, PO2I, HCO3, HCO3I, FIO2, FIO2I    Cultures:   Results     Procedure Component Value Units Date/Time    CULTURE, RESPIRATORY/SPUTUM/BRONCH Eugenia Mcneal [[id number]]     Order Status:  Sent Specimen:  Sputum     SARS-COV-2 [[id number]]  (Abnormal) Collected:  07/02/20 1000    Order Status:  Completed Specimen:  Nasopharyngeal Updated:  07/03/20 0812     COVID-19 Detected        Comment: PERFORMED AT [facility]       CULTURE, URINE [[id number]] Collected:  07/01/20 1545    Order Status:  Canceled Specimen:  Cath Urine     COVID-19 RAPID TEST [[id number]]  (Abnormal) Collected:  07/01/20 1527    Order Status:  Completed Specimen:  Nasopharyngeal Updated:  07/01/20 1714     Specimen source SWAB        COVID-19 rapid test Detected        Comment:      The specimen is POSITIVE for SARS-CoV-2, the novel coronavirus associated with COVID-19. This test has been authorized by the FDA under an Emergency Use Authorization (EUA) for use by authorized laboratories.         Fact sheet for Healthcare Providers: ConventionUpdate.co.nz  Fact sheet for Patients: ConventionUpdate.co.nz       Methodology: Isothermal Nucleic Acid Amplification  RESULTS VERIFIED, PHONED TO AND READ BACK BY  DR Kirby Soria 1513  RESULTS VERIFIED, PHONED TO AND READ BACK BY  DR Kirby Soria 1513 07/01/20 VELASQUEZ         CULTURE, URINE [[id number]] Collected:  07/01/20 1417    Order Status:  Completed Specimen:  Cath Urine Updated:  07/04/20 0830     Special Requests: NO SPECIAL REQUESTS        Culture result: NO GROWTH 2 DAYS       CULTURE, BLOOD [[id number]] Collected:  07/01/20 1341    Order Status:  Completed Specimen:  Blood Updated:  07/04/20 0608     Special Requests: LEFT ANTECUBITAL        Culture result: NO GROWTH 3 DAYS       CULTURE, BLOOD [[id number]] Collected:  07/01/20 1341    Order Status:  Completed Specimen:  Blood Updated:  07/04/20 0608     Special Requests: RIGHT ANTECUBITAL        Culture result: NO GROWTH 3 DAYS               Assessment:     Hospital Problems  Never Reviewed          Codes Class Noted POA    Pneumonia due to COVID-19 virus ICD-10-CM: U07.1, J12.89  ICD-9-CM: 480.8  7/2/2020 Unknown        Chronic obstructive pulmonary disease with acute exacerbation ([facility]  -- unspecified type ICD-10-CM: J44.1  ICD-9-CM: 491.21  7/2/2020 Unknown        Elevated diaphragm ICD-10-CM: J98.6  ICD-9-CM: 519.4  7/2/2020 Unknown        Acute respiratory failure with hypoxemia ([facility] ICD-10-CM: J96.01  ICD-9-CM: 518.81  7/1/2020 Unknown        Sepsis ([facility] ICD-10-CM: A41.9  ICD-9-CM: 038.9, 995.91  7/1/2020 Unknown        CAP (community acquired pneumonia) ICD-10-CM: J18.9  ICD-9-CM: 301  9/26/2018 Yes        Leukocytosis ICD-10-CM: I44.439  ICD-9-CM: 288.60  9/26/2018 Yes        * (Principal) Acute hypoxemic respiratory failure ([facility] ICD-10-CM: J96.01  ICD-9-CM: 518.81  9/26/2018 Yes        Tobacco abuse ICD-10-CM: Z72.0  ICD-9-CM: 305.1  9/26/2018 Yes              PLAN:   --COVID + dexamethasone D3/10  --cont inhalers, will need alex as outpatient  --I/Os  --monitor hyperglycemia r/t steroids  --wean O2 as tolerated  --mobilize if able in room        Krissy Tomlin NP   Lungs:  Crackles bilateral  Heart:  RRR with no Murmur/Rubs/Gallops    Additional Comments:  Continue remdesivir and dexa  Wean O2    I have spoken with and examined the patient. I agree with the above assessment and plan as documented.     Sumanth White MD      More than 50% of time documented was spent in face-to-face contact with the patient and in the care of the patient on the floor/unit where the patient is located.

## 2020-07-04 NOTE — PROGRESS NOTES
Progress Note    Patient: Sang Adames MRN: [id number]  SSN: [id number]    YOB: 1955  Age: 60 y.o. Sex: female      Admit Date: 7/1/2020    LOS: 3 days     Subjective:     Sang Adames is a 60 y.o. female who was brought to ER due to being found with fever, diarrhea and feeling very sick.      Patient has several family members being sick with fever and diarrhea. One day prior to admission, patient started with nausea, vomiting and fever.      On the day of admission, patient was somnolent and with fever.      EMS was called and checked her temperature showing 102.2 F with O2 saturation 82% on room air. Her skin was mottled.      She received Rocephin on the field and she was given IV fluid and brought to ER.      She was receiving Empiric IV antibiotics including Vancomycin and Zosyn since admission. She is positive for COVID19 infection. Those antibiotics were stopped. Still on high flow oxygen. Tilley's catheter is draining good amount of urine. Objective:     Vitals:    07/04/20 0732 07/04/20 0801 07/04/20 0830 07/04/20 0832   BP: 139/63 135/61  140/76   Pulse: 65 66  89   Resp: 22 25  25   Temp:       SpO2: 94% 92% 93% 94%   Weight:       Height:            Intake and Output:  Current Shift: No intake/output data recorded. Last three shifts: 07/02 1901 - 07/04 0700  In: 1635.8 [P.O.:880; I.V.:550]  Out: 1875 [Urine:1875]    Physical Exam:   General:                    The patient is a female in acute respiratory distress. She is on high flow oxygen. Answering questions better. DWCZ:                                   YKBMPGCLAXLPJ/FGAHCWPCKX. Eyes:                                   palpebral pallor. No scleral icterus. ENT:                                    External auricular and nasal exam within normal limits.                                             DJZUIN membranes are dry. Neck:                                   Supple, non-tender, no JVD.   Lungs:                       decreased to auscultation bilaterally with no wheezes and crackles.                                             No respiratory accessory muscle use. Heart:                                  Regular rhythm, tachycardia, without murmurs, rubs, or gallops. Abdomen:                  Soft, non-tender, non-distended with normoactive bowel sounds. Genitourinary:           No tenderness over the bladder or bilateral CVAs. Extremities:               Without clubbing, cyanosis, or edema. Skin:                                    mottled skin on all extremities especially on both arms. Pulses:                      Radial and dorsalis pedis pulses present 2+ bilaterally.                                               Capillary refill <2s. Neurologic:                CN II-XII grossly intact and symmetrical.                                               Moving all four extremities well with no focal deficits. Lab/Data Review:    Recent Results (from the past 24 hour(s))   PHOSPHORUS    Collection Time: 07/04/20  3:51 AM   Result Value Ref Range    Phosphorus 2.5 2.3 - 3.7 MG/DL   MAGNESIUM    Collection Time: 07/04/20  3:51 AM   Result Value Ref Range    Magnesium 1.9 1.8 - 2.4 mg/dL   METABOLIC PANEL, COMPREHENSIVE    Collection Time: 07/04/20  3:51 AM   Result Value Ref Range    Sodium 135 (L) 136 - 145 mmol/L    Potassium 3.8 3.5 - 5.1 mmol/L    Chloride 98 98 - 107 mmol/L    CO2 28 21 - 32 mmol/L    Anion gap 9 7 - 16 mmol/L    Glucose 177 (H) 65 - 100 mg/dL    BUN 14 8 - 23 MG/DL    Creatinine 0.56 (L) 0.6 - 1.0 MG/DL    GFR est AA >60 >60 ml/min/1.73m2    GFR est non-AA >60 >60 ml/min/1.73m2    Calcium 7.2 (L) 8.3 - 10.4 MG/DL    Bilirubin, total 0.3 0.2 - 1.1 MG/DL    ALT (SGPT) 47 12 - 65 U/L    AST (SGOT) 80 (H) 15 - 37 U/L    Alk.  phosphatase 77 50 - 136 U/L    Protein, total 5.8 (L) 6.3 - 8.2 g/dL    Albumin 1.8 (L) 3.2 - 4.6 g/dL    Globulin 4.0 (H) 2.3 - 3.5 g/dL    A-G Ratio 0.5 (L) 1.2 - 3.5     CBC WITH AUTOMATED DIFF    Collection Time: 07/04/20  3:51 AM   Result Value Ref Range    WBC 4.2 (L) 4.3 - 11.1 K/uL    RBC 4.44 4.05 - 5.2 M/uL    HGB 12.1 11.7 - 15.4 g/dL    HCT 39.3 35.8 - 46.3 %    MCV 88.5 79.6 - 97.8 FL    MCH 27.3 26.1 - 32.9 PG    MCHC 30.8 (L) 31.4 - 35.0 g/dL    RDW 13.8 11.9 - 14.6 %    PLATELET 452 996 - 545 K/uL    MPV 10.3 9.4 - 12.3 FL    ABSOLUTE NRBC 0.00 0.0 - 0.2 K/uL    NEUTROPHILS 62 43 - 78 %    LYMPHOCYTES 20 13 - 44 %    MONOCYTES 12 4.0 - 12.0 %    EOSINOPHILS 0 (L) 0.5 - 7.8 %    BASOPHILS 0 0.0 - 2.0 %    IMMATURE GRANULOCYTES 6 (H) 0.0 - 5.0 %    ABS. NEUTROPHILS 2.6 1.7 - 8.2 K/UL    ABS. LYMPHOCYTES 0.8 0.5 - 4.6 K/UL    ABS. MONOCYTES 0.5 0.1 - 1.3 K/UL    ABS. EOSINOPHILS 0.0 0.0 - 0.8 K/UL    ABS. BASOPHILS 0.0 0.0 - 0.2 K/UL    ABS. IMM. GRANS. 0.3 0.0 - 0.5 K/UL    RBC COMMENTS NORMOCYTIC/NORMOCHROMIC      WBC COMMENTS Result Confirmed By Smear      PLATELET COMMENTS ADEQUATE      DF AUTOMATED       Results     Procedure Component Value Units Date/Time    CULTURE, RESPIRATORY/SPUTUM/BRONCH Ashleigh Gallegos [[id number]]     Order Status:  Sent Specimen:  Sputum     SARS-COV-2 [[id number]]  (Abnormal) Collected:  07/02/20 1000    Order Status:  Completed Specimen:  Nasopharyngeal Updated:  07/03/20 0812     COVID-19 Detected        Comment: PERFORMED AT [facility]       CULTURE, URINE [[id number]] Collected:  07/01/20 1545    Order Status:  Canceled Specimen:  Cath Urine     COVID-19 RAPID TEST [[id number]]  (Abnormal) Collected:  07/01/20 1527    Order Status:  Completed Specimen:  Nasopharyngeal Updated:  07/01/20 1714     Specimen source SWAB        COVID-19 rapid test Detected        Comment:      The specimen is POSITIVE for SARS-CoV-2, the novel coronavirus associated with COVID-19. This test has been authorized by the FDA under an Emergency Use Authorization (EUA) for use by authorized laboratories.         Fact sheet for Healthcare Providers: kstattoo.com  Fact sheet for Patients: kstattoo.com       Methodology: Isothermal Nucleic Acid Amplification  RESULTS VERIFIED, PHONED TO AND READ BACK BY  DR Lázaro Acevedo 1513  RESULTS VERIFIED, PHONED TO AND READ BACK BY  DR Lázaro Acevedo 1513 07/01/20 VELASQUEZ         CULTURE, URINE [[id number]] Collected:  07/01/20 1417    Order Status:  Completed Specimen:  Cath Urine Updated:  07/04/20 0830     Special Requests: NO SPECIAL REQUESTS        Culture result: NO GROWTH 2 DAYS       CULTURE, BLOOD [[id number]] Collected:  07/01/20 1341    Order Status:  Completed Specimen:  Blood Updated:  07/04/20 0608     Special Requests: LEFT ANTECUBITAL        Culture result: NO GROWTH 3 DAYS       CULTURE, BLOOD [[id number]] Collected:  07/01/20 1341    Order Status:  Completed Specimen:  Blood Updated:  07/04/20 0608     Special Requests: RIGHT ANTECUBITAL        Culture result: NO GROWTH 3 DAYS            XR abdomen   7-1-2020  IMPRESSION: No free intraperitoneal air. Right lower lobe consolidation.     XR chest   7-1-2020  IMPRESSION:     1. Consolidation in the right lung base.     2. Lucency along the right hemidiaphragm.  Recommend a lateral decubitus view of  the abdomen to assess for free intraperitoneal air.       Current Facility-Administered Medications:     NUTRITIONAL SUPPORT ELECTROLYTE PRN ORDERS, , Does Not Apply, PRN, Rickie Krishnamurthy MD    budesonide-formoteroL (SYMBICORT) 160-4.5 mcg/actuation HFA inhaler 2 Puff, 2 Puff, Inhalation, BID RT, Dinh Kc MD, 2 Puff at 07/04/20 0800    albuterol (PROVENTIL HFA, VENTOLIN HFA, PROAIR HFA) inhaler 2 Puff, 2 Puff, Inhalation, Q4H PRN, Dinh Kc MD, 2 Puff at 07/03/20 0759    0.9% sodium chloride infusion 250 mL, 250 mL, IntraVENous, PRN, Evan Kc MD    remdesivir 100 mg in 0.9% sodium chloride 250 mL IVPB, 100 mg, IntraVENous, Q24H, 100 mg at 07/03/20 1612 **AND** 0.9% sodium chloride infusion 50 mL, 50 mL, IntraVENous, Q24H, Ree Stephen MD, 50 mL at 07/03/20 1623    pantoprazole (PROTONIX) 40 mg in 0.9% sodium chloride 10 mL injection, 40 mg, IntraVENous, DAILY, Génesis Morse MD, 40 mg at 07/04/20 0825    sodium chloride (NS) flush 20 mL, 20 mL, InterCATHeter, Q8H, Dinh Kc MD, 20 mL at 07/04/20 0505    sodium chloride (NS) flush 20 mL, 20 mL, InterCATHeter, PRN, Nba Kc MD    sodium chloride (NS) flush 5-40 mL, 5-40 mL, IntraVENous, Q8H, Génesis Morse MD, 10 mL at 07/04/20 0506    sodium chloride (NS) flush 5-40 mL, 5-40 mL, IntraVENous, PRN, Génesis Morse MD    acetaminophen (TYLENOL) tablet 650 mg, 650 mg, Oral, Q4H PRN, Abdirahman Chino MD    diphenhydrAMINE (BENADRYL) injection 12.5 mg, 12.5 mg, IntraVENous, Q4H PRN, Génesis Morse MD    bisacodyL (DULCOLAX) tablet 5 mg, 5 mg, Oral, DAILY PRN, Génesis Morse MD    ondansetron (ZOFRAN) injection 4 mg, 4 mg, IntraVENous, Q4H PRN, Génesis Morse MD    enoxaparin (LOVENOX) injection 40 mg, 40 mg, SubCUTAneous, Q24H, Génesis Morse MD, 40 mg at 07/03/20 1612    ascorbic acid (vitamin C) (VITAMIN C) tablet 1,000 mg, 1,000 mg, Oral, BID, Abdirahman Chino MD, 1,000 mg at 07/04/20 0825    zinc sulfate tablet 220 mg, 220 mg, Oral, DAILY, Génesis Morse MD, 220 mg at 07/04/20 0825    albuterol-ipratropium (DUO-NEB) 2.5 MG-0.5 MG/3 ML, 3 mL, Nebulization, Q4H PRN, Génesis Morse MD, 3 mL at 07/02/20 1936    nicotine (NICODERM CQ) 14 mg/24 hr patch 1 Patch, 1 Patch, TransDERmal, Q24H, Génesis Morse MD, 1 Patch at 07/03/20 1612    dexamethasone (DECADRON) 10 mg/mL injection 6 mg, 6 mg, IntraVENous, Q6H, Génesis Morse MD, 6 mg at 07/04/20 0505      Assessment:     Principal Problem:    Acute hypoxemic respiratory failure ([facility] (9/26/2018)    Active Problems:    CAP (community acquired pneumonia) (9/26/2018)      Leukocytosis (9/26/2018)      Tobacco abuse (9/26/2018)      Acute respiratory failure with hypoxemia ([facility] (7/1/2020)      Sepsis ([facility] (7/1/2020)      Pneumonia due to COVID-19 virus (7/2/2020)      Chronic obstructive pulmonary disease with acute exacerbation ([facility] [address]  -- unspecified type (7/2/2020)      Elevated diaphragm (7/2/2020)        Plan:     Acute respiratory failure with hypoxia   Positive COVID19 infection. Metabolic Encephalopathy on admission, improved. Sepsis   Viral Pneumonia   Blood and urine culture so far are negative. Stopped antibacterials. On Dexamethasone. Nebulizer  Vitamin C and Zinc.   Tilley's catheter is removed today. ID is following. Patient is on Remdesivir until 7-6-2020 and will see if further doses are needed. Pulmonology is helping.      Smoking. I advised patient to quit.    Nicotine patch.        I have discussed the plan of care with patient and care team.      DVT prophylaxis : Lovenox SC        Signed By: Arlene Traore MD     July 4, 2020

## 2020-07-05 LAB
ALBUMIN SERPL-MCNC: 2.1 G/DL (ref 3.2–4.6)
ALBUMIN/GLOB SERPL: 0.5 {RATIO} (ref 1.2–3.5)
ALP SERPL-CCNC: 87 U/L (ref 50–136)
ALT SERPL-CCNC: 52 U/L (ref 12–65)
ANION GAP SERPL CALC-SCNC: 7 MMOL/L (ref 7–16)
AST SERPL-CCNC: 65 U/L (ref 15–37)
BASOPHILS # BLD: 0.1 K/UL (ref 0–0.2)
BASOPHILS NFR BLD: 1 % (ref 0–2)
BILIRUB SERPL-MCNC: 0.3 MG/DL (ref 0.2–1.1)
BUN SERPL-MCNC: 14 MG/DL (ref 8–23)
CALCIUM SERPL-MCNC: 7.8 MG/DL (ref 8.3–10.4)
CHLORIDE SERPL-SCNC: 95 MMOL/L (ref 98–107)
CO2 SERPL-SCNC: 32 MMOL/L (ref 21–32)
CREAT SERPL-MCNC: 0.5 MG/DL (ref 0.6–1)
DIFFERENTIAL METHOD BLD: ABNORMAL
EOSINOPHIL # BLD: 0 K/UL (ref 0–0.8)
EOSINOPHIL NFR BLD: 0 % (ref 0.5–7.8)
ERYTHROCYTE [DISTWIDTH] IN BLOOD BY AUTOMATED COUNT: 13.4 % (ref 11.9–14.6)
GLOBULIN SER CALC-MCNC: 4.3 G/DL (ref 2.3–3.5)
GLUCOSE SERPL-MCNC: 195 MG/DL (ref 65–100)
HCT VFR BLD AUTO: 42.8 % (ref 35.8–46.3)
HGB BLD-MCNC: 14.1 G/DL (ref 11.7–15.4)
IMM GRANULOCYTES # BLD AUTO: 0.4 K/UL (ref 0–0.5)
IMM GRANULOCYTES NFR BLD AUTO: 8 % (ref 0–5)
LYMPHOCYTES # BLD: 1 K/UL (ref 0.5–4.6)
LYMPHOCYTES NFR BLD: 19 % (ref 13–44)
MAGNESIUM SERPL-MCNC: 1.9 MG/DL (ref 1.8–2.4)
MCH RBC QN AUTO: 28.3 PG (ref 26.1–32.9)
MCHC RBC AUTO-ENTMCNC: 32.9 G/DL (ref 31.4–35)
MCV RBC AUTO: 85.8 FL (ref 79.6–97.8)
MONOCYTES # BLD: 0.5 K/UL (ref 0.1–1.3)
MONOCYTES NFR BLD: 10 % (ref 4–12)
NEUTS SEG # BLD: 3.2 K/UL (ref 1.7–8.2)
NEUTS SEG NFR BLD: 62 % (ref 43–78)
NRBC # BLD: 0 K/UL (ref 0–0.2)
PHOSPHATE SERPL-MCNC: 1.6 MG/DL (ref 2.3–3.7)
PLATELET # BLD AUTO: 241 K/UL (ref 150–450)
PLATELET COMMENTS,PCOM: ADEQUATE
PMV BLD AUTO: 10.2 FL (ref 9.4–12.3)
POTASSIUM SERPL-SCNC: 3.7 MMOL/L (ref 3.5–5.1)
PROT SERPL-MCNC: 6.4 G/DL (ref 6.3–8.2)
RBC # BLD AUTO: 4.99 M/UL (ref 4.05–5.2)
RBC MORPH BLD: ABNORMAL
SODIUM SERPL-SCNC: 134 MMOL/L (ref 136–145)
WBC # BLD AUTO: 5.2 K/UL (ref 4.3–11.1)
WBC MORPH BLD: ABNORMAL

## 2020-07-05 PROCEDURE — 74011250636 HC RX REV CODE- 250/636: Performed by: INTERNAL MEDICINE

## 2020-07-05 PROCEDURE — 84100 ASSAY OF PHOSPHORUS: CPT

## 2020-07-05 PROCEDURE — 85025 COMPLETE CBC W/AUTO DIFF WBC: CPT

## 2020-07-05 PROCEDURE — 74011000250 HC RX REV CODE- 250: Performed by: INTERNAL MEDICINE

## 2020-07-05 PROCEDURE — 94640 AIRWAY INHALATION TREATMENT: CPT

## 2020-07-05 PROCEDURE — 99233 SBSQ HOSP IP/OBS HIGH 50: CPT | Performed by: INTERNAL MEDICINE

## 2020-07-05 PROCEDURE — 94762 N-INVAS EAR/PLS OXIMTRY CONT: CPT

## 2020-07-05 PROCEDURE — 74011000258 HC RX REV CODE- 258: Performed by: INTERNAL MEDICINE

## 2020-07-05 PROCEDURE — 80053 COMPREHEN METABOLIC PANEL: CPT

## 2020-07-05 PROCEDURE — 74011250637 HC RX REV CODE- 250/637: Performed by: INTERNAL MEDICINE

## 2020-07-05 PROCEDURE — C9113 INJ PANTOPRAZOLE SODIUM, VIA: HCPCS | Performed by: INTERNAL MEDICINE

## 2020-07-05 PROCEDURE — 83735 ASSAY OF MAGNESIUM: CPT

## 2020-07-05 PROCEDURE — 65610000006 HC RM INTENSIVE CARE

## 2020-07-05 RX ADMIN — SODIUM CHLORIDE 50 ML: 900 INJECTION, SOLUTION INTRAVENOUS at 16:21

## 2020-07-05 RX ADMIN — DEXAMETHASONE SODIUM PHOSPHATE 6 MG: 10 INJECTION INTRAMUSCULAR; INTRAVENOUS at 12:13

## 2020-07-05 RX ADMIN — DEXAMETHASONE SODIUM PHOSPHATE 6 MG: 10 INJECTION INTRAMUSCULAR; INTRAVENOUS at 01:02

## 2020-07-05 RX ADMIN — Medication 1000 MG: at 17:19

## 2020-07-05 RX ADMIN — SODIUM CHLORIDE 20 ML: 9 INJECTION, SOLUTION INTRAMUSCULAR; INTRAVENOUS; SUBCUTANEOUS at 13:33

## 2020-07-05 RX ADMIN — Medication 1000 MG: at 08:02

## 2020-07-05 RX ADMIN — SODIUM CHLORIDE 20 ML: 9 INJECTION, SOLUTION INTRAMUSCULAR; INTRAVENOUS; SUBCUTANEOUS at 22:00

## 2020-07-05 RX ADMIN — SODIUM CHLORIDE 20 ML: 9 INJECTION, SOLUTION INTRAMUSCULAR; INTRAVENOUS; SUBCUTANEOUS at 08:03

## 2020-07-05 RX ADMIN — SODIUM CHLORIDE 10 ML: 9 INJECTION, SOLUTION INTRAMUSCULAR; INTRAVENOUS; SUBCUTANEOUS at 22:00

## 2020-07-05 RX ADMIN — ENOXAPARIN SODIUM 40 MG: 40 INJECTION SUBCUTANEOUS at 16:21

## 2020-07-05 RX ADMIN — PANTOPRAZOLE SODIUM 40 MG: 40 INJECTION, POWDER, FOR SOLUTION INTRAVENOUS at 08:02

## 2020-07-05 RX ADMIN — ACETAMINOPHEN 650 MG: 325 TABLET, FILM COATED ORAL at 22:52

## 2020-07-05 RX ADMIN — Medication 20 ML: at 08:03

## 2020-07-05 RX ADMIN — BUDESONIDE AND FORMOTEROL FUMARATE DIHYDRATE 2 PUFF: 160; 4.5 AEROSOL RESPIRATORY (INHALATION) at 19:44

## 2020-07-05 RX ADMIN — DEXAMETHASONE SODIUM PHOSPHATE 6 MG: 10 INJECTION INTRAMUSCULAR; INTRAVENOUS at 08:02

## 2020-07-05 RX ADMIN — Medication 220 MG: at 08:02

## 2020-07-05 RX ADMIN — SODIUM CHLORIDE 10 ML: 9 INJECTION, SOLUTION INTRAMUSCULAR; INTRAVENOUS; SUBCUTANEOUS at 13:33

## 2020-07-05 RX ADMIN — REMDESIVIR 100 MG: 5 INJECTION INTRAVENOUS at 16:21

## 2020-07-05 RX ADMIN — SODIUM PHOSPHATE, MONOBASIC, MONOHYDRATE: 276; 142 INJECTION, SOLUTION INTRAVENOUS at 09:50

## 2020-07-05 RX ADMIN — SODIUM CHLORIDE 10 ML: 9 INJECTION, SOLUTION INTRAMUSCULAR; INTRAVENOUS; SUBCUTANEOUS at 08:03

## 2020-07-05 RX ADMIN — BUDESONIDE AND FORMOTEROL FUMARATE DIHYDRATE 2 PUFF: 160; 4.5 AEROSOL RESPIRATORY (INHALATION) at 08:29

## 2020-07-05 RX ADMIN — DEXAMETHASONE SODIUM PHOSPHATE 6 MG: 10 INJECTION INTRAMUSCULAR; INTRAVENOUS at 17:19

## 2020-07-05 NOTE — PROGRESS NOTES
Call received from monitor room saying pt Osat 76%. Upon entering room pt short of breath, restless in bed. Pt took prolonged time to recover, increased Airvo FIO2 to 90% to hold Osat above 90. RT made aware and to assess pt.

## 2020-07-05 NOTE — PROGRESS NOTES
[facility] Verduzco is unable to visit patient presently due to covid restrictions    Reviewed chart for spiritual concerns    No needs noted currently  Patient is resting peacefully      Jose Rafael Licea, staff

## 2020-07-05 NOTE — PROGRESS NOTES
Chart screened by LIYAH  for discharge planning. Patient admitted by hospitalist service due to [address] positive / acute respiratory failure with hypoxia. Patient is presently in room [facility] in [facility]. Per chart review, patient is on high flow O2 at 60lpm. She is on day 4/10 of Remdesivir. SW attempted to contact patient via telephone call to room, but no answer. SW attempted to contact patient's son, Joycelyn Miriam [phone] (busy tone with no voicemail available. Will await further progress during this admission to determine specific DC plan. LIYAH will continue to monitor this case. Care Management Interventions  Mode of Transport at Discharge:  Other (see comment)  Transition of Care Consult (CM Consult): Discharge Planning(Not consulted)  Physical Therapy Consult: Yes  Occupational Therapy Consult: Yes  Speech Therapy Consult: No  Discharge Location  Discharge Placement: Unable to determine at this time

## 2020-07-05 NOTE — ACP (ADVANCE CARE PLANNING)
Attempted to contact patient via telephone contact to room [phone]; no answer. Attempted to contact son, Claudia Mistry, via telephone contact; busy tone. Unable to complete ACP discussion.

## 2020-07-05 NOTE — PROGRESS NOTES
Progress Note    Patient: Bacilio Albarran MRN: [id number]  SSN: [id number]    YOB: 1955  Age: 60 y.o. Sex: female      Admit Date: 7/1/2020    LOS: 4 days     Subjective:     Bacilio Albarran is a 60 y.o. female who was brought to ER due to being found with fever, diarrhea and feeling very sick.      Patient has several family members being sick with fever and diarrhea. One day prior to admission, patient started with nausea, vomiting and fever.      On the day of admission, patient was somnolent and with fever.      EMS was called and checked her temperature showing 102.2 F with O2 saturation 82% on room air. Her skin was mottled.      She received Rocephin on the field and she was given IV fluid and brought to ER.      She was receiving Empiric IV antibiotics including Vancomycin and Zosyn since admission. She is positive for COVID19 infection. Those antibiotics were stopped. Still on high flow oxygen. Tilley's catheter is removed. Objective:     Vitals:    07/05/20 0332 07/05/20 0706 07/05/20 0818 07/05/20 0829   BP: 157/63 153/77     Pulse: 73 72     Resp: 11 17     Temp: 98.1 °F (36.7 °C) 99 °F (37.2 °C)     SpO2: 92% 91% 96% 92%   Weight:       Height:            Intake and Output:  Current Shift: 07/05 0701 - 07/05 1900  In: 120 [P.O.:120]  Out: 600 [Urine:600]  Last three shifts: 07/03 1901 - 07/05 0700  In: 1120 [P.O.:520; I.V.:600]  Out: 3250 [Urine:3250]    Physical Exam:   General:                    The patient is a female in acute respiratory distress. She is on high flow oxygen. Answering questions better. Alert and oreinted. YITK:                                   [id number]/[id number]. Eyes:                                   palpebral pallor. No scleral icterus. ENT:                                    External auricular and nasal exam within normal limits.                                             VVSFZQ membranes are dry. Neck:                                   Supple, non-tender, no JVD. Lungs:                       decreased to auscultation bilaterally with no wheezes and crackles.                                             No respiratory accessory muscle use. Heart:                                  Regular rhythm, tachycardia, without murmurs, rubs, or gallops. Abdomen:                  Soft, non-tender, non-distended with normoactive bowel sounds. Genitourinary:           No tenderness over the bladder or bilateral CVAs. Extremities:               Without clubbing, cyanosis, or edema. Skin:                                    mottled skin on all extremities especially on both arms. Pulses:                      Radial and dorsalis pedis pulses present 2+ bilaterally.                                               Capillary refill <2s. Neurologic:                CN II-XII grossly intact and symmetrical.                                               Moving all four extremities well with no focal deficits. Lab/Data Review:    Recent Results (from the past 24 hour(s))   CBC WITH AUTOMATED DIFF    Collection Time: 07/05/20  3:32 AM   Result Value Ref Range    WBC 5.2 4.3 - 11.1 K/uL    RBC 4.99 4.05 - 5.2 M/uL    HGB 14.1 11.7 - 15.4 g/dL    HCT 42.8 35.8 - 46.3 %    MCV 85.8 79.6 - 97.8 FL    MCH 28.3 26.1 - 32.9 PG    MCHC 32.9 31.4 - 35.0 g/dL    RDW 13.4 11.9 - 14.6 %    PLATELET 546 360 - 690 K/uL    MPV 10.2 9.4 - 12.3 FL    ABSOLUTE NRBC 0.00 0.0 - 0.2 K/uL    NEUTROPHILS 62 43 - 78 %    LYMPHOCYTES 19 13 - 44 %    MONOCYTES 10 4.0 - 12.0 %    EOSINOPHILS 0 (L) 0.5 - 7.8 %    BASOPHILS 1 0.0 - 2.0 %    IMMATURE GRANULOCYTES 8 (H) 0.0 - 5.0 %    ABS. NEUTROPHILS 3.2 1.7 - 8.2 K/UL    ABS. LYMPHOCYTES 1.0 0.5 - 4.6 K/UL    ABS. MONOCYTES 0.5 0.1 - 1.3 K/UL    ABS. EOSINOPHILS 0.0 0.0 - 0.8 K/UL    ABS. BASOPHILS 0.1 0.0 - 0.2 K/UL    ABS. IMM. GRANS.  0.4 0.0 - 0.5 K/UL    RBC COMMENTS NORMOCYTIC/NORMOCHROMIC      WBC COMMENTS Result Confirmed By Smear      PLATELET COMMENTS ADEQUATE      DF AUTOMATED     METABOLIC PANEL, COMPREHENSIVE    Collection Time: 07/05/20  3:33 AM   Result Value Ref Range    Sodium 134 (L) 136 - 145 mmol/L    Potassium 3.7 3.5 - 5.1 mmol/L    Chloride 95 (L) 98 - 107 mmol/L    CO2 32 21 - 32 mmol/L    Anion gap 7 7 - 16 mmol/L    Glucose 195 (H) 65 - 100 mg/dL    BUN 14 8 - 23 MG/DL    Creatinine 0.50 (L) 0.6 - 1.0 MG/DL    GFR est AA >60 >60 ml/min/1.73m2    GFR est non-AA >60 >60 ml/min/1.73m2    Calcium 7.8 (L) 8.3 - 10.4 MG/DL    Bilirubin, total 0.3 0.2 - 1.1 MG/DL    ALT (SGPT) 52 12 - 65 U/L    AST (SGOT) 65 (H) 15 - 37 U/L    Alk. phosphatase 87 50 - 136 U/L    Protein, total 6.4 6.3 - 8.2 g/dL    Albumin 2.1 (L) 3.2 - 4.6 g/dL    Globulin 4.3 (H) 2.3 - 3.5 g/dL    A-G Ratio 0.5 (L) 1.2 - 3.5     MAGNESIUM    Collection Time: 07/05/20  3:33 AM   Result Value Ref Range    Magnesium 1.9 1.8 - 2.4 mg/dL   PHOSPHORUS    Collection Time: 07/05/20  3:33 AM   Result Value Ref Range    Phosphorus 1.6 (L) 2.3 - 3.7 MG/DL     Results     Procedure Component Value Units Date/Time    CULTURE, RESPIRATORY/SPUTUM/BRONCH Pasha Pennant STAIN [[id number]] Collected:  07/02/20 1100    Order Status:  Canceled Specimen:  Sputum     SARS-COV-2 [[id number]]  (Abnormal) Collected:  07/02/20 1000    Order Status:  Completed Specimen:  Nasopharyngeal Updated:  07/03/20 0812     COVID-19 Detected        Comment: PERFORMED AT [facility]       CULTURE, URINE [[id number]] Collected:  07/01/20 1545    Order Status:  Canceled Specimen:  Cath Urine     COVID-19 RAPID TEST [[id number]]  (Abnormal) Collected:  07/01/20 1527    Order Status:  Completed Specimen:  Nasopharyngeal Updated:  07/01/20 1714     Specimen source SWAB        COVID-19 rapid test Detected        Comment:      The specimen is POSITIVE for SARS-CoV-2, the novel coronavirus associated with COVID-19.        This test has been authorized by the FDA under an Emergency Use Authorization (EUA) for use by authorized laboratories. Fact sheet for Healthcare Providers: ConventionUpdate.co.nz  Fact sheet for Patients: ConventionUpdate.co.nz       Methodology: Isothermal Nucleic Acid Amplification  RESULTS VERIFIED, PHONED TO AND READ BACK BY  DR Raynold Severance 1513  RESULTS VERIFIED, PHONED TO AND READ BACK BY  DR Raynold Severance 1513 07/01/20 VELASQUEZ         CULTURE, URINE [[id number]] Collected:  07/01/20 1417    Order Status:  Completed Specimen:  Cath Urine Updated:  07/04/20 0830     Special Requests: NO SPECIAL REQUESTS        Culture result: NO GROWTH 2 DAYS       CULTURE, BLOOD [[id number]] Collected:  07/01/20 1341    Order Status:  Completed Specimen:  Blood Updated:  07/05/20 0637     Special Requests: LEFT ANTECUBITAL        Culture result: NO GROWTH 4 DAYS       CULTURE, BLOOD [[id number]] Collected:  07/01/20 1341    Order Status:  Completed Specimen:  Blood Updated:  07/05/20 0637     Special Requests: RIGHT ANTECUBITAL        Culture result: NO GROWTH 4 DAYS            XR abdomen   7-1-2020  IMPRESSION: No free intraperitoneal air. Right lower lobe consolidation.     XR chest   7-1-2020  IMPRESSION:     1. Consolidation in the right lung base.     2. Lucency along the right hemidiaphragm.  Recommend a lateral decubitus view of  the abdomen to assess for free intraperitoneal air.       Current Facility-Administered Medications:     sodium phosphate 30 mmol in 0.9% sodium chloride 250 mL infusion, , IntraVENous, ONCE, Génesis Morse MD    NUTRITIONAL SUPPORT ELECTROLYTE PRN ORDERS, , Does Not Apply, PRN, Stacy Moarles MD    budesonide-formoteroL (SYMBICORT) 160-4.5 mcg/actuation HFA inhaler 2 Puff, 2 Puff, Inhalation, BID RT, Kendall Kc MD, 2 Puff at 07/05/20 0829    albuterol (PROVENTIL HFA, VENTOLIN HFA, PROAIR HFA) inhaler 2 Puff, 2 Puff, Inhalation, Q4H PRN, Kendall Kc MD, 2 Puff at 07/03/20 0759    0.9% sodium chloride infusion 250 mL, 250 mL, IntraVENous, PRN, De, Dionicio Martínez MD    remdesivir 100 mg in 0.9% sodium chloride 250 mL IVPB, 100 mg, IntraVENous, Q24H, 100 mg at 07/04/20 1609 **AND** 0.9% sodium chloride infusion 50 mL, 50 mL, IntraVENous, Q24H, Manuela Morocho MD, 50 mL at 07/04/20 1609    pantoprazole (PROTONIX) 40 mg in 0.9% sodium chloride 10 mL injection, 40 mg, IntraVENous, DAILY, Kerwin Das MD, 40 mg at 07/05/20 0802    sodium chloride (NS) flush 20 mL, 20 mL, InterCATHeter, Q8H, Dinh Kc MD, 20 mL at 07/05/20 0803    sodium chloride (NS) flush 20 mL, 20 mL, InterCATHeter, PRN, Dinh Kc MD, 20 mL at 07/05/20 0803    sodium chloride (NS) flush 5-40 mL, 5-40 mL, IntraVENous, Q8H, Génesis Morse MD, 10 mL at 07/05/20 0803    sodium chloride (NS) flush 5-40 mL, 5-40 mL, IntraVENous, PRN, Génesis Morse MD    acetaminophen (TYLENOL) tablet 650 mg, 650 mg, Oral, Q4H PRN, Kerwin Das MD    diphenhydrAMINE (BENADRYL) injection 12.5 mg, 12.5 mg, IntraVENous, Q4H PRN, Génesis Morse MD    bisacodyL (DULCOLAX) tablet 5 mg, 5 mg, Oral, DAILY PRN, Kerwin Das MD, 5 mg at 07/04/20 1211    ondansetron (ZOFRAN) injection 4 mg, 4 mg, IntraVENous, Q4H PRN, Génesis Morse MD    enoxaparin (LOVENOX) injection 40 mg, 40 mg, SubCUTAneous, Q24H, Génesis Morse MD, 40 mg at 07/04/20 1609    ascorbic acid (vitamin C) (VITAMIN C) tablet 1,000 mg, 1,000 mg, Oral, BID, Génesis Morse MD, 1,000 mg at 07/05/20 0802    zinc sulfate tablet 220 mg, 220 mg, Oral, DAILY, Génesis Morse MD, 220 mg at 07/05/20 0802    albuterol-ipratropium (DUO-NEB) 2.5 MG-0.5 MG/3 ML, 3 mL, Nebulization, Q4H PRN, Génesis Morse MD, 3 mL at 07/02/20 1936    nicotine (NICODERM CQ) 14 mg/24 hr patch 1 Patch, 1 Patch, TransDERmal, Q24H, Génesis Morse MD, 1 Patch at 07/04/20 1609    dexamethasone (DECADRON) 10 mg/mL injection 6 mg, 6 mg, IntraVENous, Q6H, Génesis Morse MD, 6 mg at 07/05/20 0802      Assessment:     Principal Problem:    Acute hypoxemic respiratory failure ([address] (9/26/2018)    Active Problems:    CAP (community acquired pneumonia) (9/26/2018)      Leukocytosis (9/26/2018)      Tobacco abuse (9/26/2018)      Acute respiratory failure with hypoxemia ([address] (7/1/2020)      Sepsis ([address] (7/1/2020)      Pneumonia due to COVID-19 virus (7/2/2020)      Chronic obstructive pulmonary disease with acute exacerbation ([address]  -- unspecified type (7/2/2020)      Elevated diaphragm (7/2/2020)        Plan:     Acute respiratory failure with hypoxia   Positive COVID19 infection. Metabolic Encephalopathy on admission, improved. Sepsis   Viral Pneumonia   Blood and urine culture so far are negative. Stopped antibacterials. On Dexamethasone. Nebulizer  Vitamin C and Zinc.   ID is following. Patient is on Remdesivir until 7-6-2020 and will see if further doses are needed. Pulmonology is helping. Still on high flow oxygen.      Smoking. I advised patient to quit.    Nicotine patch.        I have discussed the plan of care with patient and care team.      DVT prophylaxis : Lovenox SC        Signed By: Juan Weiner MD     July 5, 2020

## 2020-07-05 NOTE — PROGRESS NOTES
Bedside, Verbal and Written shift change report given to Lania Russo RN (oncoming nurse) by Arbie Fothergill, RN (offgoing nurse). Report included the following information SBAR, ED Summary, Intake/Output, MAR, Med Rec Status, Cardiac Rhythm sr and Alarm Parameters .

## 2020-07-05 NOTE — PROGRESS NOTES
Bedside, Verbal and Written shift change report given to Mohini Alexander RN (oncoming nurse) by St. suzanne RN (offgoing nurse). Report included the following information SBAR, Kardex, ED Summary, Procedure Summary, Intake/Output, MAR, Recent Results and Cardiac Rhythm NSR.

## 2020-07-05 NOTE — PROGRESS NOTES
Jorge Saunders  Admission Date: 7/1/2020             Daily Progress Note: 7/5/2020   Patient is [phone] y.o.  female seen and evaluated at the request of Dr. John Bishop.     Patient is a smoker who came in with worsening hypoxemia, fevers, diarrhea and dyspnea. She reports she has been staying home and non-one was sick but per chart indicates several family were sick. Temp was 102.2 and saturation was 82% ion admission. CXR with infiltrates. Started ABX and optiflow. Only uses albuterol and home and ?still smoking. Rapid COVID testing was positive. Has been coughing up yellow sputum.      In [facility] currently and oxygen needs up on 70% optiflow and 60 LPM. Patient states dyspnea is less  Subjective:   Remains afebrile. Optiflow at 60 L/min FiO2 90%, increased this AM from 55 L/min 55%. Reports dyspnea with exertion.   Eating OK      Review of Systems  Constitutional: positive for fatigue  Respiratory: positive for cough, pneumonia or dyspnea on exertion  Cardiovascular: positive for fatigue, dyspnea on exertion  Gastrointestinal: negative for nausea, vomiting, diarrhea and constipation    Current Facility-Administered Medications   Medication Dose Route Frequency    sodium phosphate 30 mmol in 0.9% sodium chloride 250 mL infusion   IntraVENous ONCE    NUTRITIONAL SUPPORT ELECTROLYTE PRN ORDERS   Does Not Apply PRN    budesonide-formoteroL (SYMBICORT) 160-4.5 mcg/actuation HFA inhaler 2 Puff  2 Puff Inhalation BID RT    albuterol (PROVENTIL HFA, VENTOLIN HFA, PROAIR HFA) inhaler 2 Puff  2 Puff Inhalation Q4H PRN    0.9% sodium chloride infusion 250 mL  250 mL IntraVENous PRN    remdesivir 100 mg in 0.9% sodium chloride 250 mL IVPB  100 mg IntraVENous Q24H    And    0.9% sodium chloride infusion 50 mL  50 mL IntraVENous Q24H    pantoprazole (PROTONIX) 40 mg in 0.9% sodium chloride 10 mL injection  40 mg IntraVENous DAILY    sodium chloride (NS) flush 20 mL  20 mL InterCATHeter Q8H    sodium chloride (NS) flush 20 mL  20 mL InterCATHeter PRN    sodium chloride (NS) flush 5-40 mL  5-40 mL IntraVENous Q8H    sodium chloride (NS) flush 5-40 mL  5-40 mL IntraVENous PRN    acetaminophen (TYLENOL) tablet 650 mg  650 mg Oral Q4H PRN    diphenhydrAMINE (BENADRYL) injection 12.5 mg  12.5 mg IntraVENous Q4H PRN    bisacodyL (DULCOLAX) tablet 5 mg  5 mg Oral DAILY PRN    ondansetron (ZOFRAN) injection 4 mg  4 mg IntraVENous Q4H PRN    enoxaparin (LOVENOX) injection 40 mg  40 mg SubCUTAneous Q24H    ascorbic acid (vitamin C) (VITAMIN C) tablet 1,000 mg  1,000 mg Oral BID    zinc sulfate tablet 220 mg  220 mg Oral DAILY    albuterol-ipratropium (DUO-NEB) 2.5 MG-0.5 MG/3 ML  3 mL Nebulization Q4H PRN    nicotine (NICODERM CQ) 14 mg/24 hr patch 1 Patch  1 Patch TransDERmal Q24H    dexamethasone (DECADRON) 10 mg/mL injection 6 mg  6 mg IntraVENous Q6H         Objective:     Vitals:    07/05/20 0829 07/05/20 0958 07/05/20 1001 07/05/20 1101   BP:  147/76 157/80 137/56   Pulse:  84 84 72   Resp:  15 29 20   Temp:       SpO2: 92% 96% 95% 92%   Weight:       Height:         Intake and Output:   07/03 1901 - 07/05 0700  In: 1120 [P.O.:520;  I.V.:600]  Out: 3250 [Urine:3250]  07/05 0701 - 07/05 1900  In: 120 [P.O.:120]  Out: 600 [Urine:600]      Intake/Output Summary (Last 24 hours) at 7/5/2020 1106  Last data filed at 7/5/2020 0816  Gross per 24 hour   Intake 420 ml   Output 2500 ml   Net -2080 ml       Physical Exam:            GEN: well developed and in no acute distress,   HEENT:  PERRL, EOMI, no alar flaring or epistaxis, oral mucosa moist without cyanosis,   NECK:  no JVD, no retractions, no thyromegaly or masses,   LUNGS:  crackles on Optiflow 90%  HEART:  RRR with no M,G,R;  ABDOMEN:  soft with no tenderness, positive bowel sounds present  EXTREMITIES:  warm with no cyanosis, plus 1  edema  SKIN:  no jaundice or ecchymosis   NEURO:  alert and oriented, grossly non-focal      Lines/Drains:PICC, gabriele  Nutrition:regular  Activity:ambulate with assistance    CHEST XRAY:   7/1/2020          LAB  Recent Labs     07/05/20  0332 07/04/20  0351 07/03/20  0415   WBC 5.2 4.2* 6.1   HGB 14.1 12.1 12.3   HCT 42.8 39.3 37.8    189 171     Recent Labs     07/05/20  0333 07/04/20  0351 07/03/20  0415   * 135* 134*   K 3.7 3.8 4.0   CL 95* 98 100   CO2 32 28 26   * 177* 138*   BUN 14 14 12   CREA 0.50* 0.56* 0.38*   MG 1.9 1.9 1.7*   PHOS 1.6* 2.5 1.4*     ABG:  No results found for: PH, PHI, PCO2, PCO2I, PO2, PO2I, HCO3, HCO3I, FIO2, FIO2I    Cultures:   Results     Procedure Component Value Units Date/Time    CULTURE, RESPIRATORY/SPUTUM/BRONCH Dufm Pun [[id number]] Collected:  07/02/20 1100    Order Status:  Canceled Specimen:  Sputum     SARS-COV-2 [[id number]]  (Abnormal) Collected:  07/02/20 1000    Order Status:  Completed Specimen:  Nasopharyngeal Updated:  07/03/20 0812     COVID-19 Detected        Comment: PERFORMED AT [facility]       CULTURE, URINE [[id number]] Collected:  07/01/20 1545    Order Status:  Canceled Specimen:  Cath Urine     COVID-19 RAPID TEST [[id number]]  (Abnormal) Collected:  07/01/20 1527    Order Status:  Completed Specimen:  Nasopharyngeal Updated:  07/01/20 1714     Specimen source SWAB        COVID-19 rapid test Detected        Comment:      The specimen is POSITIVE for SARS-CoV-2, the novel coronavirus associated with COVID-19. This test has been authorized by the FDA under an Emergency Use Authorization (EUA) for use by authorized laboratories.         Fact sheet for Healthcare Providers: ConventionUpdate.co.nz  Fact sheet for Patients: ConventionUpdate.co.nz       Methodology: Isothermal Nucleic Acid Amplification  RESULTS VERIFIED, PHONED TO AND READ BACK BY  DR Milad Forrest 1513  RESULTS VERIFIED, PHONED TO AND READ BACK BY  DR Milad Forrest 1513 07/01/20 VELASQUEZ         CULTURE, URINE [[id number]] Collected:  07/01/20 1414 Order Status:  Completed Specimen:  Cath Urine Updated:  07/04/20 0830     Special Requests: NO SPECIAL REQUESTS        Culture result: NO GROWTH 2 DAYS       CULTURE, BLOOD [[id number]] Collected:  07/01/20 1341    Order Status:  Completed Specimen:  Blood Updated:  07/05/20 0637     Special Requests: LEFT ANTECUBITAL        Culture result: NO GROWTH 4 DAYS       CULTURE, BLOOD [[id number]] Collected:  07/01/20 1341    Order Status:  Completed Specimen:  Blood Updated:  07/05/20 0637     Special Requests: RIGHT ANTECUBITAL        Culture result: NO GROWTH 4 DAYS               Assessment:     Hospital Problems  Never Reviewed          Codes Class Noted POA    Pneumonia due to COVID-19 virus ICD-10-CM: U07.1, J12.89  ICD-9-CM: 480.8  7/2/2020 Unknown        Chronic obstructive pulmonary disease with acute exacerbation ([facility]  -- unspecified type ICD-10-CM: J44.1  ICD-9-CM: 491.21  7/2/2020 Unknown        Elevated diaphragm ICD-10-CM: J98.6  ICD-9-CM: 519.4  7/2/2020 Unknown        Acute respiratory failure with hypoxemia ([facility] ICD-10-CM: J96.01  ICD-9-CM: 518.81  7/1/2020 Unknown        Sepsis ([facility] ICD-10-CM: A41.9  ICD-9-CM: 038.9, 995.91  7/1/2020 Unknown        CAP (community acquired pneumonia) ICD-10-CM: J18.9  ICD-9-CM: 017  9/26/2018 Yes        Leukocytosis ICD-10-CM: J82.727  ICD-9-CM: 288.60  9/26/2018 Yes        * (Principal) Acute hypoxemic respiratory failure ([facility] ICD-10-CM: J96.01  ICD-9-CM: 518.81  9/26/2018 Yes        Tobacco abuse ICD-10-CM: Z72.0  ICD-9-CM: 305.1  9/26/2018 Yes              PLAN:   --COVID + dexamethasone D4/10  --cont remdesivir  --cont inhalers,  --I/Os  --monitor hyperglycemia r/t steroids  --wean optiflow as tolerated            Sami Galley, NP    More than 50% of time documented was spent in face-to-face contact with the patient and in the care of the patient on the floor/unit where the patient is located.        Additional Comments:  Continue to wean optiflow, prognosis guarded, she is full code     I have spoken with and examined the patient. I agree with the above assessment and plan as documented.     Allison Liang MD

## 2020-07-06 ENCOUNTER — APPOINTMENT (OUTPATIENT)
Dept: GENERAL RADIOLOGY | Age: 65
DRG: 871 | End: 2020-07-06
Attending: INTERNAL MEDICINE
Payer: MEDICARE

## 2020-07-06 LAB
ALBUMIN SERPL-MCNC: 2.2 G/DL (ref 3.2–4.6)
ALBUMIN/GLOB SERPL: 0.5 {RATIO} (ref 1.2–3.5)
ALP SERPL-CCNC: 83 U/L (ref 50–136)
ALT SERPL-CCNC: 55 U/L (ref 12–65)
ANION GAP SERPL CALC-SCNC: 6 MMOL/L (ref 7–16)
AST SERPL-CCNC: 53 U/L (ref 15–37)
BACTERIA SPEC CULT: NORMAL
BACTERIA SPEC CULT: NORMAL
BASOPHILS # BLD: 0.1 K/UL (ref 0–0.2)
BASOPHILS NFR BLD: 1 % (ref 0–2)
BILIRUB SERPL-MCNC: 0.5 MG/DL (ref 0.2–1.1)
BUN SERPL-MCNC: 14 MG/DL (ref 8–23)
CALCIUM SERPL-MCNC: 7.7 MG/DL (ref 8.3–10.4)
CHLORIDE SERPL-SCNC: 96 MMOL/L (ref 98–107)
CO2 SERPL-SCNC: 34 MMOL/L (ref 21–32)
CREAT SERPL-MCNC: 0.64 MG/DL (ref 0.6–1)
DIFFERENTIAL METHOD BLD: ABNORMAL
EOSINOPHIL # BLD: 0 K/UL (ref 0–0.8)
EOSINOPHIL NFR BLD: 0 % (ref 0.5–7.8)
ERYTHROCYTE [DISTWIDTH] IN BLOOD BY AUTOMATED COUNT: 13.3 % (ref 11.9–14.6)
GLOBULIN SER CALC-MCNC: 4.1 G/DL (ref 2.3–3.5)
GLUCOSE BLD STRIP.AUTO-MCNC: 227 MG/DL (ref 65–100)
GLUCOSE BLD STRIP.AUTO-MCNC: 237 MG/DL (ref 65–100)
GLUCOSE BLD STRIP.AUTO-MCNC: 253 MG/DL (ref 65–100)
GLUCOSE BLD STRIP.AUTO-MCNC: 265 MG/DL (ref 65–100)
GLUCOSE SERPL-MCNC: 232 MG/DL (ref 65–100)
HCT VFR BLD AUTO: 41.8 % (ref 35.8–46.3)
HGB BLD-MCNC: 13.9 G/DL (ref 11.7–15.4)
IMM GRANULOCYTES # BLD AUTO: 0.8 K/UL (ref 0–0.5)
IMM GRANULOCYTES NFR BLD AUTO: 9 % (ref 0–5)
LYMPHOCYTES # BLD: 1.1 K/UL (ref 0.5–4.6)
LYMPHOCYTES NFR BLD: 13 % (ref 13–44)
MCH RBC QN AUTO: 28.7 PG (ref 26.1–32.9)
MCHC RBC AUTO-ENTMCNC: 33.3 G/DL (ref 31.4–35)
MCV RBC AUTO: 86.2 FL (ref 79.6–97.8)
MONOCYTES # BLD: 0.4 K/UL (ref 0.1–1.3)
MONOCYTES NFR BLD: 5 % (ref 4–12)
NEUTS SEG # BLD: 6.1 K/UL (ref 1.7–8.2)
NEUTS SEG NFR BLD: 72 % (ref 43–78)
NRBC # BLD: 0 K/UL (ref 0–0.2)
PLATELET # BLD AUTO: 272 K/UL (ref 150–450)
PLATELET COMMENTS,PCOM: ADEQUATE
PMV BLD AUTO: 10 FL (ref 9.4–12.3)
POTASSIUM SERPL-SCNC: 3.8 MMOL/L (ref 3.5–5.1)
PROT SERPL-MCNC: 6.3 G/DL (ref 6.3–8.2)
RBC # BLD AUTO: 4.85 M/UL (ref 4.05–5.2)
RBC MORPH BLD: ABNORMAL
SERVICE CMNT-IMP: NORMAL
SERVICE CMNT-IMP: NORMAL
SODIUM SERPL-SCNC: 136 MMOL/L (ref 136–145)
WBC # BLD AUTO: 8.5 K/UL (ref 4.3–11.1)
WBC MORPH BLD: ABNORMAL

## 2020-07-06 PROCEDURE — 74011250636 HC RX REV CODE- 250/636: Performed by: INTERNAL MEDICINE

## 2020-07-06 PROCEDURE — 74011250637 HC RX REV CODE- 250/637: Performed by: INTERNAL MEDICINE

## 2020-07-06 PROCEDURE — 97165 OT EVAL LOW COMPLEX 30 MIN: CPT

## 2020-07-06 PROCEDURE — 94640 AIRWAY INHALATION TREATMENT: CPT

## 2020-07-06 PROCEDURE — 74011000250 HC RX REV CODE- 250: Performed by: INTERNAL MEDICINE

## 2020-07-06 PROCEDURE — 80053 COMPREHEN METABOLIC PANEL: CPT

## 2020-07-06 PROCEDURE — 65610000006 HC RM INTENSIVE CARE

## 2020-07-06 PROCEDURE — 36592 COLLECT BLOOD FROM PICC: CPT

## 2020-07-06 PROCEDURE — 94762 N-INVAS EAR/PLS OXIMTRY CONT: CPT

## 2020-07-06 PROCEDURE — 74011000258 HC RX REV CODE- 258: Performed by: INTERNAL MEDICINE

## 2020-07-06 PROCEDURE — 85025 COMPLETE CBC W/AUTO DIFF WBC: CPT

## 2020-07-06 PROCEDURE — 71045 X-RAY EXAM CHEST 1 VIEW: CPT

## 2020-07-06 PROCEDURE — 97161 PT EVAL LOW COMPLEX 20 MIN: CPT

## 2020-07-06 PROCEDURE — 97530 THERAPEUTIC ACTIVITIES: CPT

## 2020-07-06 PROCEDURE — 99233 SBSQ HOSP IP/OBS HIGH 50: CPT | Performed by: INTERNAL MEDICINE

## 2020-07-06 PROCEDURE — 77010033711 HC HIGH FLOW OXYGEN

## 2020-07-06 PROCEDURE — 82962 GLUCOSE BLOOD TEST: CPT

## 2020-07-06 PROCEDURE — C9113 INJ PANTOPRAZOLE SODIUM, VIA: HCPCS | Performed by: INTERNAL MEDICINE

## 2020-07-06 PROCEDURE — 74011636637 HC RX REV CODE- 636/637: Performed by: FAMILY MEDICINE

## 2020-07-06 RX ORDER — INSULIN LISPRO 100 [IU]/ML
INJECTION, SOLUTION INTRAVENOUS; SUBCUTANEOUS
Status: DISCONTINUED | OUTPATIENT
Start: 2020-07-06 | End: 2020-07-20 | Stop reason: HOSPADM

## 2020-07-06 RX ADMIN — Medication 1000 MG: at 18:00

## 2020-07-06 RX ADMIN — SODIUM CHLORIDE 10 ML: 9 INJECTION, SOLUTION INTRAMUSCULAR; INTRAVENOUS; SUBCUTANEOUS at 22:00

## 2020-07-06 RX ADMIN — SODIUM CHLORIDE 20 ML: 9 INJECTION, SOLUTION INTRAMUSCULAR; INTRAVENOUS; SUBCUTANEOUS at 14:00

## 2020-07-06 RX ADMIN — DEXAMETHASONE SODIUM PHOSPHATE 6 MG: 10 INJECTION INTRAMUSCULAR; INTRAVENOUS at 12:20

## 2020-07-06 RX ADMIN — BUDESONIDE AND FORMOTEROL FUMARATE DIHYDRATE 2 PUFF: 160; 4.5 AEROSOL RESPIRATORY (INHALATION) at 07:58

## 2020-07-06 RX ADMIN — PANTOPRAZOLE SODIUM 40 MG: 40 INJECTION, POWDER, FOR SOLUTION INTRAVENOUS at 09:08

## 2020-07-06 RX ADMIN — DEXAMETHASONE SODIUM PHOSPHATE 6 MG: 10 INJECTION INTRAMUSCULAR; INTRAVENOUS at 06:21

## 2020-07-06 RX ADMIN — SODIUM CHLORIDE 20 ML: 9 INJECTION, SOLUTION INTRAMUSCULAR; INTRAVENOUS; SUBCUTANEOUS at 06:21

## 2020-07-06 RX ADMIN — INSULIN LISPRO 6 UNITS: 100 INJECTION, SOLUTION INTRAVENOUS; SUBCUTANEOUS at 22:00

## 2020-07-06 RX ADMIN — ENOXAPARIN SODIUM 40 MG: 40 INJECTION SUBCUTANEOUS at 16:30

## 2020-07-06 RX ADMIN — ALBUTEROL SULFATE 2 PUFF: 90 AEROSOL, METERED RESPIRATORY (INHALATION) at 20:18

## 2020-07-06 RX ADMIN — SODIUM CHLORIDE 10 ML: 9 INJECTION, SOLUTION INTRAMUSCULAR; INTRAVENOUS; SUBCUTANEOUS at 14:00

## 2020-07-06 RX ADMIN — SODIUM CHLORIDE 50 ML: 900 INJECTION, SOLUTION INTRAVENOUS at 16:30

## 2020-07-06 RX ADMIN — Medication 220 MG: at 09:08

## 2020-07-06 RX ADMIN — REMDESIVIR 100 MG: 5 INJECTION INTRAVENOUS at 16:30

## 2020-07-06 RX ADMIN — INSULIN LISPRO 4 UNITS: 100 INJECTION, SOLUTION INTRAVENOUS; SUBCUTANEOUS at 16:36

## 2020-07-06 RX ADMIN — BUDESONIDE AND FORMOTEROL FUMARATE DIHYDRATE 2 PUFF: 160; 4.5 AEROSOL RESPIRATORY (INHALATION) at 20:18

## 2020-07-06 RX ADMIN — INSULIN LISPRO 6 UNITS: 100 INJECTION, SOLUTION INTRAVENOUS; SUBCUTANEOUS at 12:20

## 2020-07-06 RX ADMIN — DIPHENHYDRAMINE HYDROCHLORIDE 12.5 MG: 50 INJECTION, SOLUTION INTRAMUSCULAR; INTRAVENOUS at 00:07

## 2020-07-06 RX ADMIN — DEXAMETHASONE SODIUM PHOSPHATE 6 MG: 10 INJECTION INTRAMUSCULAR; INTRAVENOUS at 18:00

## 2020-07-06 RX ADMIN — INSULIN LISPRO 4 UNITS: 100 INJECTION, SOLUTION INTRAVENOUS; SUBCUTANEOUS at 07:39

## 2020-07-06 RX ADMIN — SODIUM CHLORIDE 20 ML: 9 INJECTION, SOLUTION INTRAMUSCULAR; INTRAVENOUS; SUBCUTANEOUS at 22:00

## 2020-07-06 RX ADMIN — Medication 1000 MG: at 09:08

## 2020-07-06 RX ADMIN — DEXAMETHASONE SODIUM PHOSPHATE 6 MG: 10 INJECTION INTRAMUSCULAR; INTRAVENOUS at 00:07

## 2020-07-06 RX ADMIN — SODIUM CHLORIDE 20 ML: 9 INJECTION, SOLUTION INTRAMUSCULAR; INTRAVENOUS; SUBCUTANEOUS at 06:22

## 2020-07-06 NOTE — PROGRESS NOTES
Patient's DIL Angy Patel called, security code received, update given. Angy Patel also wished to speak to patient, so assisted patient to call ELIZABETH from phone in patient room.

## 2020-07-06 NOTE — PROGRESS NOTES
Perry Peng  Admission Date: 7/1/2020             Daily Progress Note: 7/6/2020   Patient is [phone] y.o.  female seen and evaluated at the request of Dr. Serge Barnhart.     Patient is a smoker who came in with worsening hypoxemia, fevers, diarrhea and dyspnea. She reports she has been staying home and non-one was sick but per chart indicates several family were sick. Temp was 102.2 and saturation was 82% ion admission. CXR with infiltrates. Started ABX and optiflow. Only uses albuterol and home and ?still smoking. Rapid COVID testing was positive. Has been coughing up yellow sputum.      In [facility] currently and oxygen needs up on 70% optiflow and 60 LPM. Patient states dyspnea is less  Subjective:   Remains afebrile. Remains on Optiflow up to 85% currently. Was up to 90% yesterday. States feels ill, but no new symptoms. Not coughing or having sputum.      Review of Systems  Constitutional: positive for fatigue  Respiratory: positive for cough, pneumonia or dyspnea on exertion  Cardiovascular: positive for fatigue, dyspnea on exertion  Gastrointestinal: negative for nausea, vomiting, diarrhea and constipation    Current Facility-Administered Medications   Medication Dose Route Frequency    insulin lispro (HUMALOG) injection   SubCUTAneous AC&HS    NUTRITIONAL SUPPORT ELECTROLYTE PRN ORDERS   Does Not Apply PRN    budesonide-formoteroL (SYMBICORT) 160-4.5 mcg/actuation HFA inhaler 2 Puff  2 Puff Inhalation BID RT    albuterol (PROVENTIL HFA, VENTOLIN HFA, PROAIR HFA) inhaler 2 Puff  2 Puff Inhalation Q4H PRN    0.9% sodium chloride infusion 250 mL  250 mL IntraVENous PRN    remdesivir 100 mg in 0.9% sodium chloride 250 mL IVPB  100 mg IntraVENous Q24H    And    0.9% sodium chloride infusion 50 mL  50 mL IntraVENous Q24H    pantoprazole (PROTONIX) 40 mg in 0.9% sodium chloride 10 mL injection  40 mg IntraVENous DAILY    sodium chloride (NS) flush 20 mL  20 mL InterCATHeter Q8H    sodium chloride (NS) flush 20 mL  20 mL InterCATHeter PRN    sodium chloride (NS) flush 5-40 mL  5-40 mL IntraVENous Q8H    sodium chloride (NS) flush 5-40 mL  5-40 mL IntraVENous PRN    acetaminophen (TYLENOL) tablet 650 mg  650 mg Oral Q4H PRN    diphenhydrAMINE (BENADRYL) injection 12.5 mg  12.5 mg IntraVENous Q4H PRN    bisacodyL (DULCOLAX) tablet 5 mg  5 mg Oral DAILY PRN    ondansetron (ZOFRAN) injection 4 mg  4 mg IntraVENous Q4H PRN    enoxaparin (LOVENOX) injection 40 mg  40 mg SubCUTAneous Q24H    ascorbic acid (vitamin C) (VITAMIN C) tablet 1,000 mg  1,000 mg Oral BID    zinc sulfate tablet 220 mg  220 mg Oral DAILY    albuterol-ipratropium (DUO-NEB) 2.5 MG-0.5 MG/3 ML  3 mL Nebulization Q4H PRN    nicotine (NICODERM CQ) 14 mg/24 hr patch 1 Patch  1 Patch TransDERmal Q24H    dexamethasone (DECADRON) 10 mg/mL injection 6 mg  6 mg IntraVENous Q6H     Objective:     Vitals:    07/06/20 0401 07/06/20 0502 07/06/20 0602 07/06/20 0758   BP: 104/51 148/82 110/49    Pulse: (!) 59 74 63    Resp: 17 12 13    Temp: 99.1 °F (37.3 °C)      SpO2: 99% 100% 100% 92%   Weight:       Height:         Intake and Output:   07/04 1901 - 07/06 0700  In: 910 [P.O.:360; I.V.:550]  Out: 3400 [Urine:3400]  No intake/output data recorded.       Intake/Output Summary (Last 24 hours) at 7/6/2020 0802  Last data filed at 7/6/2020 0602  Gross per 24 hour   Intake 910 ml   Output 2050 ml   Net -1140 ml     Physical Exam:          GEN: well developed and in no acute distress,   HEENT:  PERRL, EOMI, no alar flaring or epistaxis, oral mucosa moist without cyanosis,   NECK:  no JVD, no retractions, no thyromegaly or masses,   LUNGS:  crackles on Optiflow 90%  HEART:  RRR with no M,G,R;  ABDOMEN:  soft with no tenderness, positive bowel sounds present  EXTREMITIES:  warm with no cyanosis, plus 1  edema  SKIN:  no jaundice or ecchymosis   NEURO:  alert and oriented, grossly non-focal    Lines/Drains:PICC, suazo  Nutrition:regular  Activity:ambulate with assistance    CHEST XRAY: 7/6: CXR stable, chronic right atelectasis with elevated diaphragm seen on 2018 CT    7/1/2020      LAB  Recent Labs     07/06/20  0511 07/05/20  0332 07/04/20  0351   WBC 8.5 5.2 4.2*   HGB 13.9 14.1 12.1   HCT 41.8 42.8 39.3    241 189     Recent Labs     07/06/20  0511 07/05/20  0333 07/04/20  0351    134* 135*   K 3.8 3.7 3.8   CL 96* 95* 98   CO2 34* 32 28   * 195* 177*   BUN 14 14 14   CREA 0.64 0.50* 0.56*   MG  --  1.9 1.9   PHOS  --  1.6* 2.5     ABG:  No results found for: PH, PHI, PCO2, PCO2I, PO2, PO2I, HCO3, HCO3I, FIO2, FIO2I    Cultures:   Results     Procedure Component Value Units Date/Time    CULTURE, RESPIRATORY/SPUTUM/BRONCH Gavin Kee [[id number]] Collected:  07/02/20 1100    Order Status:  Canceled Specimen:  Sputum     SARS-COV-2 [[id number]]  (Abnormal) Collected:  07/02/20 1000    Order Status:  Completed Specimen:  Nasopharyngeal Updated:  07/03/20 0812     COVID-19 Detected        Comment: PERFORMED AT [facility]       CULTURE, URINE [[id number]] Collected:  07/01/20 1545    Order Status:  Canceled Specimen:  Cath Urine     COVID-19 RAPID TEST [[id number]]  (Abnormal) Collected:  07/01/20 1527    Order Status:  Completed Specimen:  Nasopharyngeal Updated:  07/01/20 1714     Specimen source SWAB        COVID-19 rapid test Detected        Comment:      The specimen is POSITIVE for SARS-CoV-2, the novel coronavirus associated with COVID-19. This test has been authorized by the FDA under an Emergency Use Authorization (EUA) for use by authorized laboratories.         Fact sheet for Healthcare Providers: ConventionUpdate.co.nz  Fact sheet for Patients: ConventionUpdate.co.nz       Methodology: Isothermal Nucleic Acid Amplification  RESULTS VERIFIED, PHONED TO AND READ BACK BY  DR Maria Lundborg 1513  RESULTS VERIFIED, PHONED TO AND READ BACK BY  DR Maria Lundborg 1513 07/01/20 VELASQUEZ         CULTURE, URINE [[id number]] Collected:  07/01/20 1417    Order Status:  Completed Specimen:  Cath Urine Updated:  07/04/20 0830     Special Requests: NO SPECIAL REQUESTS        Culture result: NO GROWTH 2 DAYS       CULTURE, BLOOD [[id number]] Collected:  07/01/20 1341    Order Status:  Completed Specimen:  Blood Updated:  07/06/20 0651     Special Requests: LEFT ANTECUBITAL        Culture result: NO GROWTH 5 DAYS       CULTURE, BLOOD [[id number]] Collected:  07/01/20 1341    Order Status:  Completed Specimen:  Blood Updated:  07/06/20 0651     Special Requests: RIGHT ANTECUBITAL        Culture result: NO GROWTH 5 DAYS           Assessment:     Hospital Problems  Never Reviewed          Codes Class Noted POA    Pneumonia due to COVID-19 virus ICD-10-CM: U07.1, J12.89  ICD-9-CM: 480.8  7/2/2020 Unknown        Chronic obstructive pulmonary disease with acute exacerbation ([facility]  -- unspecified type ICD-10-CM: J44.1  ICD-9-CM: 491.21  7/2/2020 Unknown        Elevated diaphragm ICD-10-CM: J98.6  ICD-9-CM: 519.4  7/2/2020 Unknown        Acute respiratory failure with hypoxemia ([facility] ICD-10-CM: J96.01  ICD-9-CM: 518.81  7/1/2020 Unknown        Sepsis ([facility] ICD-10-CM: A41.9  ICD-9-CM: 038.9, 995.91  7/1/2020 Unknown        CAP (community acquired pneumonia) ICD-10-CM: J18.9  ICD-9-CM: 786  9/26/2018 Yes        Leukocytosis ICD-10-CM: X48.276  ICD-9-CM: 288.60  9/26/2018 Yes        * (Principal) Acute hypoxemic respiratory failure ([facility] ICD-10-CM: J96.01  ICD-9-CM: 518.81  9/26/2018 Yes        Tobacco abuse ICD-10-CM: Z72.0  ICD-9-CM: 305.1  9/26/2018 Yes            PLAN:   --COVID + dexamethasone D5/10  --Convalescent plasma 7/2  --cont remdesivir  --cont inhalers  --I/Os  --monitor hyperglycemia r/t steroids  --wean optiflow as tolerated       Betty Coleman MD    More than 50% of time documented was spent in face-to-face contact with the patient and in the care of the patient on the floor/unit where the patient is located.

## 2020-07-06 NOTE — PROGRESS NOTES
Progress Note    Patient: Brooklyn Tabares MRN: [id number]  SSN: [id number]    YOB: 1955  Age: 60 y.o. Sex: female      Admit Date: 7/1/2020    LOS: 5 days     Subjective:     Brooklyn Tabares is a 60 y.o. female who was brought to ER due to being found with fever, diarrhea and feeling very sick.      Patient has several family members being sick with fever and diarrhea. One day prior to admission, patient started with nausea, vomiting and fever.      On the day of admission, patient was somnolent and with fever.      EMS was called and checked her temperature showing 102.2 F with O2 saturation 82% on room air. Her skin was mottled.      She received Rocephin on the field and she was given IV fluid and brought to ER.      She was receiving Empiric IV antibiotics including Vancomycin and Zosyn since admission. She is positive for COVID19 infection. Those antibiotics were stopped. Still on high flow oxygen. Tilley's catheter is removed. BP is trending up. Objective:     Vitals:    07/06/20 0602 07/06/20 0737 07/06/20 0758 07/06/20 0828   BP: 110/49 173/79  151/89   Pulse: 63 80  83   Resp: 13 15  23   Temp:  96.8 °F (36 °C)     SpO2: 100% 90% 92% 94%   Weight:       Height:            Intake and Output:  Current Shift: No intake/output data recorded. Last three shifts: 07/04 1901 - 07/06 0700  In: 910 [P.O.:360; I.V.:550]  Out: 3400 [Urine:3400]    Physical Exam:   General:                    The patient is a female in acute respiratory distress. She is on high flow oxygen. CZTS:                                   [id number]/HSILWGKNCB. Eyes:                                   palpebral pallor. No scleral icterus. ENT:                                    External auricular and nasal exam within normal limits.                                             TTTZSE membranes are dry. Neck:                                   Supple, non-tender, no JVD.   Lungs:                       decreased to auscultation bilaterally with no wheezes and crackles.                                             No respiratory accessory muscle use. Heart:                                  Regular rhythm, tachycardia, without murmurs, rubs, or gallops. Abdomen:                  Soft, non-tender, non-distended with normoactive bowel sounds. Genitourinary:           No tenderness over the bladder or bilateral CVAs. Extremities:               Without clubbing, cyanosis, or edema. Skin:                                    mottled skin on all extremities especially on both arms. Pulses:                      Radial and dorsalis pedis pulses present 2+ bilaterally.                                               Capillary refill <2s. Neurologic:                CN II-XII grossly intact and symmetrical.                                               Moving all four extremities well with no focal deficits. Lab/Data Review:    Recent Results (from the past 24 hour(s))   METABOLIC PANEL, COMPREHENSIVE    Collection Time: 07/06/20  5:11 AM   Result Value Ref Range    Sodium 136 136 - 145 mmol/L    Potassium 3.8 3.5 - 5.1 mmol/L    Chloride 96 (L) 98 - 107 mmol/L    CO2 34 (H) 21 - 32 mmol/L    Anion gap 6 (L) 7 - 16 mmol/L    Glucose 232 (H) 65 - 100 mg/dL    BUN 14 8 - 23 MG/DL    Creatinine 0.64 0.6 - 1.0 MG/DL    GFR est AA >60 >60 ml/min/1.73m2    GFR est non-AA >60 >60 ml/min/1.73m2    Calcium 7.7 (L) 8.3 - 10.4 MG/DL    Bilirubin, total 0.5 0.2 - 1.1 MG/DL    ALT (SGPT) 55 12 - 65 U/L    AST (SGOT) 53 (H) 15 - 37 U/L    Alk.  phosphatase 83 50 - 136 U/L    Protein, total 6.3 6.3 - 8.2 g/dL    Albumin 2.2 (L) 3.2 - 4.6 g/dL    Globulin 4.1 (H) 2.3 - 3.5 g/dL    A-G Ratio 0.5 (L) 1.2 - 3.5     CBC WITH AUTOMATED DIFF    Collection Time: 07/06/20  5:11 AM   Result Value Ref Range    WBC 8.5 4.3 - 11.1 K/uL    RBC 4.85 4.05 - 5.2 M/uL    HGB 13.9 11.7 - 15.4 g/dL    HCT 41.8 35.8 - 46.3 %    MCV 86.2 79.6 - 97.8 FL    MCH 28.7 26.1 - 32.9 PG    MCHC 33.3 31.4 - 35.0 g/dL    RDW 13.3 11.9 - 14.6 %    PLATELET 891 007 - 401 K/uL    MPV 10.0 9.4 - 12.3 FL    ABSOLUTE NRBC 0.00 0.0 - 0.2 K/uL    NEUTROPHILS 72 43 - 78 %    LYMPHOCYTES 13 13 - 44 %    MONOCYTES 5 4.0 - 12.0 %    EOSINOPHILS 0 (L) 0.5 - 7.8 %    BASOPHILS 1 0.0 - 2.0 %    IMMATURE GRANULOCYTES 9 (H) 0.0 - 5.0 %    ABS. NEUTROPHILS 6.1 1.7 - 8.2 K/UL    ABS. LYMPHOCYTES 1.1 0.5 - 4.6 K/UL    ABS. MONOCYTES 0.4 0.1 - 1.3 K/UL    ABS. EOSINOPHILS 0.0 0.0 - 0.8 K/UL    ABS. BASOPHILS 0.1 0.0 - 0.2 K/UL    ABS. IMM. GRANS. 0.8 (H) 0.0 - 0.5 K/UL    RBC COMMENTS NORMOCYTIC/NORMOCHROMIC      WBC COMMENTS Result Confirmed By Smear      PLATELET COMMENTS ADEQUATE      DF AUTOMATED     GLUCOSE, POC    Collection Time: 07/06/20  7:37 AM   Result Value Ref Range    Glucose (POC) 237 (H) 65 - 100 mg/dL     Results     Procedure Component Value Units Date/Time    CULTURE, RESPIRATORY/SPUTUM/BRONCH Chung Rodriguez [[id number]] Collected:  07/02/20 1100    Order Status:  Canceled Specimen:  Sputum     SARS-COV-2 [[id number]]  (Abnormal) Collected:  07/02/20 1000    Order Status:  Completed Specimen:  Nasopharyngeal Updated:  07/03/20 0812     COVID-19 Detected        Comment: PERFORMED AT [facility]       CULTURE, URINE [[id number]] Collected:  07/01/20 1545    Order Status:  Canceled Specimen:  Cath Urine     COVID-19 RAPID TEST [[id number]]  (Abnormal) Collected:  07/01/20 1527    Order Status:  Completed Specimen:  Nasopharyngeal Updated:  07/01/20 1714     Specimen source SWAB        COVID-19 rapid test Detected        Comment:      The specimen is POSITIVE for SARS-CoV-2, the novel coronavirus associated with COVID-19. This test has been authorized by the FDA under an Emergency Use Authorization (EUA) for use by authorized laboratories.         Fact sheet for Healthcare Providers: ConventionUpdate.co.nz  Fact sheet for Patients: kstattoo.com       Methodology: Isothermal Nucleic Acid Amplification  RESULTS VERIFIED, PHONED TO AND READ BACK BY  DR Wesley Fitzgerald 1513  RESULTS VERIFIED, PHONED TO AND READ BACK BY  DR Wesley Fitzgerald 1513 07/01/20 VELASQUEZ         CULTURE, URINE [[id number]] Collected:  07/01/20 1417    Order Status:  Completed Specimen:  Cath Urine Updated:  07/04/20 0830     Special Requests: NO SPECIAL REQUESTS        Culture result: NO GROWTH 2 DAYS       CULTURE, BLOOD [[id number]] Collected:  07/01/20 1341    Order Status:  Completed Specimen:  Blood Updated:  07/06/20 0651     Special Requests: LEFT ANTECUBITAL        Culture result: NO GROWTH 5 DAYS       CULTURE, BLOOD [[id number]] Collected:  07/01/20 1341    Order Status:  Completed Specimen:  Blood Updated:  07/06/20 0651     Special Requests: RIGHT ANTECUBITAL        Culture result: NO GROWTH 5 DAYS            XR abdomen   7-1-2020  IMPRESSION: No free intraperitoneal air. Right lower lobe consolidation.     XR chest   7-1-2020  IMPRESSION:     1. Consolidation in the right lung base.     2. Lucency along the right hemidiaphragm.  Recommend a lateral decubitus view of  the abdomen to assess for free intraperitoneal air.       Current Facility-Administered Medications:     insulin lispro (HUMALOG) injection, , SubCUTAneous, AC&HS, Annel Velasquez MD, 4 Units at 07/06/20 0739    NUTRITIONAL SUPPORT ELECTROLYTE PRN ORDERS, , Does Not Apply, PRN, Annel Velasquez MD    budesonide-formoteroL (SYMBICORT) 160-4.5 mcg/actuation HFA inhaler 2 Puff, 2 Puff, Inhalation, BID RT, Dinh Kc MD, 2 Puff at 07/06/20 0758    albuterol (PROVENTIL HFA, VENTOLIN HFA, PROAIR HFA) inhaler 2 Puff, 2 Puff, Inhalation, Q4H PRN, Dinh Kc MD, 2 Puff at 07/03/20 0759    0.9% sodium chloride infusion 250 mL, 250 mL, IntraVENous, PRN, Esperanza Kc MD    remdesivir 100 mg in 0.9% sodium chloride 250 mL IVPB, 100 mg, IntraVENous, Q24H, 100 mg at 07/05/20 1621 **AND** 0.9% sodium chloride infusion 50 mL, 50 mL, IntraVENous, Q24H, Hadley Noble MD, 50 mL at 07/05/20 1621    pantoprazole (PROTONIX) 40 mg in 0.9% sodium chloride 10 mL injection, 40 mg, IntraVENous, DAILY, Génesis Morse MD, 40 mg at 07/06/20 0908    sodium chloride (NS) flush 20 mL, 20 mL, InterCATHeter, Q8H, Dinh Kc MD, 20 mL at 07/06/20 0621    sodium chloride (NS) flush 20 mL, 20 mL, InterCATHeter, PRN, Dinh Kc MD, 20 mL at 07/05/20 0803    sodium chloride (NS) flush 5-40 mL, 5-40 mL, IntraVENous, Q8H, Génesis Morse MD, 20 mL at 07/06/20 0622    sodium chloride (NS) flush 5-40 mL, 5-40 mL, IntraVENous, PRN, Génesis Morse MD    acetaminophen (TYLENOL) tablet 650 mg, 650 mg, Oral, Q4H PRN, Sergei Calhoun MD, 650 mg at 07/05/20 2252    diphenhydrAMINE (BENADRYL) injection 12.5 mg, 12.5 mg, IntraVENous, Q4H PRN, Sergei Calhoun MD, 12.5 mg at 07/06/20 0007    bisacodyL (DULCOLAX) tablet 5 mg, 5 mg, Oral, DAILY PRN, Sergei Calhoun MD, 5 mg at 07/04/20 1211    ondansetron (ZOFRAN) injection 4 mg, 4 mg, IntraVENous, Q4H PRN, Sergei Calhonu MD    enoxaparin (LOVENOX) injection 40 mg, 40 mg, SubCUTAneous, Q24H, Génesis Morse MD, 40 mg at 07/05/20 1621    ascorbic acid (vitamin C) (VITAMIN C) tablet 1,000 mg, 1,000 mg, Oral, BID, Sergei Calhoun MD, 1,000 mg at 07/06/20 0908    zinc sulfate tablet 220 mg, 220 mg, Oral, DAILY, Sergei Calhoun MD, 220 mg at 07/06/20 0908    albuterol-ipratropium (DUO-NEB) 2.5 MG-0.5 MG/3 ML, 3 mL, Nebulization, Q4H PRN, Sergei Calhoun MD, 3 mL at 07/02/20 1936    nicotine (NICODERM CQ) 14 mg/24 hr patch 1 Patch, 1 Patch, TransDERmal, Q24H, Génesis Morse MD, 1 Patch at 07/05/20 1621    dexamethasone (DECADRON) 10 mg/mL injection 6 mg, 6 mg, IntraVENous, Q6H, Génesis Morse MD, 6 mg at 07/06/20 0809      Assessment:     Principal Problem:    Acute hypoxemic respiratory failure ([address] (9/26/2018)    Active Problems:    CAP (community acquired pneumonia) (9/26/2018)      Leukocytosis (9/26/2018)      Tobacco abuse (9/26/2018)      Acute respiratory failure with hypoxemia ([address] (7/1/2020)      Sepsis ([address] (7/1/2020)      Pneumonia due to COVID-19 virus (7/2/2020)      Chronic obstructive pulmonary disease with acute exacerbation ([address]  -- unspecified type (7/2/2020)      Elevated diaphragm (7/2/2020)        Plan:     Acute respiratory failure with hypoxia   Positive COVID19 infection. Metabolic Encephalopathy on admission, improved. Sepsis   Viral Pneumonia   Blood and urine culture so far are negative. Stopped antibacterials. On Dexamethasone. Nebulizer  Vitamin C and Zinc.   ID is following. Patient is on Remdesivir until 7-6-2020 and will see if further doses are needed. Pulmonology is helping. Still on high flow oxygen. Still at risk of requiring ventilator.      Smoking. I advised patient to quit. Nicotine patch.        I have discussed the plan of care with patient and care team.      DVT prophylaxis : Lovenox SC     Disposition plan :   To be determined.        Signed By: Pallavi Rodriguez MD     July 6, 2020

## 2020-07-06 NOTE — PROGRESS NOTES
Problem: Mobility Impaired (Adult and Pediatric)  Goal: *Therapy Goal (Edit Goal, Insert Text)  Outcome: Progressing Towards Goal  Note: STG:  (1.)Ms. Ml Velázquez will move from supine to sit and sit to supine , scoot up and down, and roll side to side with CONTACT GUARD ASSIST within 4 treatment day(s). (2.)Ms. Ml Velázquez will transfer from bed to chair and chair to bed with CONTACT GUARD ASSIST using the least restrictive device within 4 treatment day(s). (3.)Ms. Ml Velázquez will ambulate with CONTACT GUARD ASSIST for 50 feet with the least restrictive device within 4 treatment day(s). LTG:  (1.)Ms. Ml Velázquez will move from supine to sit and sit to supine , scoot up and down, and roll side to side in bed with STAND BY ASSIST within 7 treatment day(s). (2.)Ms. Ml Velázquez will transfer from bed to chair and chair to bed with STAND BY ASSIST using the least restrictive device within 7 treatment day(s). (3.)Ms. Nugent will ambulate with STAND BY ASSIST for 100 feet with the least restrictive device within 7 treatment day(s). ________________________________________________________________________________________________       PHYSICAL THERAPY: Initial Assessment and AM 7/6/2020  INPATIENT: PT Visit Days : 1  [place] / Product Type: Medicare /       NAME/AGE/GENDER: Tiarra Miranda is a 60 y.o. female   PRIMARY DIAGNOSIS: Acute respiratory failure with hypoxemia ([address] [J96.01] Acute hypoxemic respiratory failure ([facility] Acute hypoxemic respiratory failure (HCC)        ICD-10: Treatment Diagnosis:    Generalized Muscle Weakness (M62.81)  Other lack of cordination (R27.8)  Difficulty in walking, Not elsewhere classified (R26.2)  Other abnormalities of gait and mobility (R26.89)   Precaution/Allergies:  Patient has no known allergies.       ASSESSMENT:     Ms. Ml Velázquez is a pleasant disabled female with above diagnosis who demonstrates with decreased transfers, ambulation and mobility below her prior functional baseline currently on airvo MAX 85% and 80 liters per minutes with saturation at 95% at rests and down to 85% with ambulation and transfers. All transfers are currently limited at [facility] A x 1 out of bed to sit and stand followed by ambulation with hand held assistance x 2 for 5 feet with the deficits stated below. She then returns to seated edge of bed before return to supine with minimal assistance x 1 with 02 desaturation down to 85% but quickly rebounds to 95%. Skilled PT is indicated for this patient's functional mobility deficits. Overall good progress towards physical therapy goals is anticipated. Goals listed above are appropriate. PT will continue efforts as patient is still below functional baseline. At this time, patient is appropriate for Co-treatment with occupational therapy due to patient's decreased overall endurance/tolerance levels, as well as need for high level skilled assistance to complete functional transfers/mobility and functional tasks. Matthew Drummond is appropriate for a multidisciplinary co-treatment of PT and OT to address goals of both disciplines. This section established at most recent assessment   PROBLEM LIST (Impairments causing functional limitations):  Decreased Strength  Decreased ADL/Functional Activities  Decreased Transfer Abilities  Decreased Ambulation Ability/Technique  Decreased Balance  Decreased Activity Tolerance   INTERVENTIONS PLANNED: (Benefits and precautions of physical therapy have been discussed with the patient.)  Balance Exercise  Bed Mobility  Gait Training  Therapeutic Activites  Therapeutic Exercise/Strengthening     TREATMENT PLAN: Frequency/Duration: 4 times a week for duration of hospital stay  Rehabilitation Potential For Stated Goals: Good     REHAB RECOMMENDATIONS (at time of discharge pending progress):    Placement: It is my opinion, based on this patient's performance to date, that Ms. Magdy Morales benefit from participating in 1-2 additional therapy sessions in order to continue to assess for rehab potential and then make recommendation for disposition at discharge. Equipment:   None at this time              HISTORY:   History of Present Injury/Illness (Reason for Referral):  Patient is a 60 y.o.  female seen and evaluated at the request of Dr. Alexandro Gallego. Patient is a smoker who came in with worsening hypoxemia, fevers, diarrhea and dyspnea. She reports she has been staying home and non-one was sick but per chart indicates several family were sick. Temp was 102.2 and saturation was 82% ion admission. CXR with infiltrates. Started ABX and optiflow. Only uses albuterol and home and ?still smoking. Rapid COVID testing was positive. Has been coughing up yellow sputum. In [facility] currently and oxygen needs up on 70% optiflow and 60 LPM. Patient states dyspnea is less  Subjective:   Remains afebrile. Remains on Optiflow up to 85% currently. Was up to 90% yesterday. States feels ill, but no new symptoms. Not coughing or having sputum. Past Medical History/Comorbidities:   Ms. Madiha Olson  has no past medical history on file. Ms. Madiha Olson  has no past surgical history on file. Social History/Living Environment:   Home Environment: Private residence  # Steps to Enter: 0  One/Two Story Residence: One story  Living Alone: No  Support Systems: Family member(s)  Patient Expects to be Discharged to[de-identified] Unknown  Current DME Used/Available at Home: None  Prior Level of Function/Work/Activity:  Per patient had been independent with all functional mobility . Personal Factors:          Sex:  female        Age:  60 y.o.    Number of Personal Factors/Comorbidities that affect the Plan of Care: 0: LOW COMPLEXITY   EXAMINATION:   Most Recent Physical Functioning:   Gross Assessment:  AROM: Generally decreased, functional  PROM: Generally decreased, functional  Strength: Generally decreased, functional  Coordination: Generally decreased, functional  Tone: Normal  Sensation: Intact               Posture:  Posture (WDL): Exceptions to WDL  Posture Assessment: Cervical, Forward head  Balance:  Sitting: Intact  Standing: Impaired  Standing - Static: Good;Fair  Standing - Dynamic : Fair Bed Mobility:  Rolling: Contact guard assistance  Supine to Sit: Minimum assistance  Sit to Supine: Minimum assistance  Scooting: Minimum assistance  Wheelchair Mobility:     Transfers:  Sit to Stand: Minimum assistance  Stand to Sit: Minimum assistance  Gait:     Base of Support: Center of gravity altered  Speed/Katt: Fluctuations  Step Length: Left shortened;Right shortened  Swing Pattern: Left asymmetrical;Right asymmetrical  Stance: Time;Weight shift  Gait Abnormalities: Decreased step clearance  Distance (ft): 5 Feet (ft)  Assistive Device: (hand held assistance x 2 )  Ambulation - Level of Assistance: Minimal assistance  Interventions: Manual cues; Safety awareness training; Tactile cues; Verbal cues; Visual/Demos      Body Structures Involved:  Bones  Joints  Muscles  Ligaments Body Functions Affected:  Neuromusculoskeletal  Movement Related  Skin Related  Metobolic/Endocrine Activities and Participation Affected:  General Tasks and Demands  Communication  Mobility  Self Care  Community, Social and [place]   Number of elements that affect the Plan of Care: 1-2: LOW COMPLEXITY   CLINICAL PRESENTATION:   Presentation: Stable and uncomplicated: LOW COMPLEXITY   CLINICAL DECISION MAKIN [facility] [address] [id number] AM-PAC 6 Clicks   Basic Mobility Inpatient Short Form  How much difficulty does the patient currently have. .. Unable A Lot A Little None   1. Turning over in bed (including adjusting bedclothes, sheets and blankets)? [] 1   [] 2   [x] 3   [] 4   2. Sitting down on and standing up from a chair with arms ( e.g., wheelchair, bedside commode, etc.)   [] 1   [] 2   [x] 3   [] 4   3.   Moving from lying on back to sitting on the side of the bed? [] 1   [] 2   [x] 3   [] 4   How much help from another person does the patient currently need. .. Total A Lot A Little None   4. Moving to and from a bed to a chair (including a wheelchair)? [] 1   [] 2   [x] 3   [] 4   5. Need to walk in hospital room? [] 1   [] 2   [x] 3   [] 4   6. Climbing 3-5 steps with a railing? [] 1   [] 2   [x] 3   [] 4   © 2007, Trustees of [address] [place] [address] [address] [id number], under license to [employer]. All rights reserved      Score:  Initial: 18 Most Recent: X (Date: -- )    Interpretation of Tool:  Represents activities that are increasingly more difficult (i.e. Bed mobility, Transfers, Gait). Medical Necessity:     Patient demonstrates   good   rehab potential due to higher previous functional level. Reason for Services/Other Comments:  Patient continues to require skilled intervention due to   medical complications, patient unable to attend/participate in therapy as expected, and decreased transfers and mobility and AIRVO max. .   Use of outcome tool(s) and clinical judgement create a POC that gives a: Clear prediction of patient's progress: LOW COMPLEXITY            TREATMENT:   (In addition to Assessment/Re-Assessment sessions the following treatments were rendered)   Pre-treatment Symptoms/Complaints:  0/10 no increased pain reported. Pain: Initial:   Pain Intensity 1: 0  Post Session:  0/10 no increased pain reported. Today's treatment session addressed Decreased Strength, Decreased ADL/Functional Activities, Decreased Transfer Abilities, and Decreased Ambulation Ability/Technique to progress towards achieving goal(s) 3. During this session, Occupational Therapy addressed ADLs to progress towards their discipline specific goal(s).   Co-treatment was necessary to improve patient's cognitive participation, ability to follow higher level commands, ability to increase activity demands, and ability to return to normal functional activity. Therapeutic Activity: (    10 mins): Therapeutic activities including Bed transfers, Ambulation on level ground, and standing and seated dynamic mobility to improve mobility, strength, balance, and coordination. Required minimal Manual cues; Safety awareness training; Tactile cues; Verbal cues; Visual/Demos to promote coordination of bilateral, lower extremity(s) and promote motor control of bilateral, lower extremity(s). Braces/Orthotics/Lines/Etc:   O2 Device: Heated, Hi flow nasal cannula  Treatment/Session Assessment:    Response to Treatment:  SOB with exertion but recovers upon return to supine. Interdisciplinary Collaboration:   Physical Therapist  Registered Nurse  After treatment position/precautions:   Supine in bed  Bed alarm/tab alert on  Bed/Chair-wheels locked  Bed in low position  Call light within reach  RN notified  Side rails x 3   Compliance with Program/Exercises: Will assess as treatment progresses  Recommendations/Intent for next treatment session: \"Next visit will focus on advancements to more challenging activities, reduction in assistance provided, and transfers, ambulation and mobility. \".   Total Treatment Duration:  PT Patient Time In/Time Out  Time In: 1104  Time Out: [address] [place], [place]

## 2020-07-06 NOTE — PROGRESS NOTES
Bedside and Verbal shift change report given to [address] (oncoming nurse) by Edward Cooper RN (offgoing nurse). Report included the following information SBAR, Kardex, Intake/Output, MAR, Recent Results and Cardiac Rhythm NSR with BBB and occasional PVCs. Patient remains on AirVo 85%/60L with O2 sats 90-95%. Alert. Afebrile. Dyspnea on exertion.

## 2020-07-06 NOTE — PROGRESS NOTES
Problem: Patient Education: Go to Patient Education Activity  Goal: Patient/Family Education  Description: 1. Patient will complete lower body bathing and dressing with MOD I and adaptive equipment as needed. 2. Patient will complete toileting with MOD I.   3. Patient will tolerate 25 minutes of OT treatment with 2-3 rest breaks to increase activity tolerance for ADLs. 4. Patient will complete functional transfers with MOD I and adaptive equipment as needed. 5. Patient will complete functional mobility for household distances with MOD I and adaptive equipment as needed. 6. Patient will complete self-grooming tasks while standing edge of sink with MOD I and adaptive equipment as needed. Timeframe: 7 visits      Outcome: Progressing Towards Goal      OCCUPATIONAL THERAPY: Initial Assessment and AM 7/6/2020  INPATIENT:    [place] / Product Type: Medicare /      NAME/AGE/GENDER: Bryon Wong is a 60 y.o. female   PRIMARY DIAGNOSIS:  Acute respiratory failure with hypoxemia ([address] [J96.01] Acute hypoxemic respiratory failure ([address] Acute hypoxemic respiratory failure (HCC)        ICD-10: Treatment Diagnosis:    Generalized Muscle Weakness (M62.81)  Other lack of cordination (R27.8)   Precautions/Allergies:     Patient has no known allergies. ASSESSMENT:     Ms. Marino Zee presents in the ICU for the above diagnoses. Upon arrival, pt supine in bed and currently resting on Airvo at 85%. Pt is alert and oriented x 4; agreeable to OT evaluation. Pt reports living alone in a 1-story home. At baseline, pt notes independence with all ADLs and functional mobility with no DME required. Does not drive however has family who will assists. Endorses no hx of falls. Today, pt presents with deficits in overall strength and activity tolerance which has decreased her ability to complete ADLs and functional mobility.  Pt transitioned to sitting edge of bed with min A; Sp02 dropping to mid 80s. Pt educated on pursed lip breathing strategies with Sp02 rising to low 90s with rest-break. Pt then stood with min A; Sp02 maintaining in the high 80s while in static standing. Pt then took side steps towards [facility] with CGA x 2. Sp02 dropping to low 80s with pt subsequently being returned to  sitting edge of bed. Pt once again educated on pursed lip breathing strategies. Pt denying the need to complete any ADLs today; pt returned to supine in bed with min A. Sp02 in low 90s after return to bed; RN notified. At this time Coreen Correa is functioning below baseline for ADLs and functional mobility. Pt would benefit from skilled OT services to address OT goals and plan of care. This section established at most recent assessment   PROBLEM LIST (Impairments causing functional limitations):  Decreased Strength  Decreased ADL/Functional Activities  Decreased Transfer Abilities  Decreased Ambulation Ability/Technique  Decreased Balance  Decreased Activity Tolerance  Decreased Pacing Skills  Decreased Work Simplification/Energy Conservation Techniques  Increased Shortness of Breath   INTERVENTIONS PLANNED: (Benefits and precautions of occupational therapy have been discussed with the patient.)  Activities of daily living training  Adaptive equipment training  Balance training  Clothing management  Community reintergration  Donning&doffing training  Neuromuscular re-eduation  Re-evaluation  Therapeutic activity  Therapeutic exercise     TREATMENT PLAN: Frequency/Duration: Follow patient 3x/week to address above goals. Rehabilitation Potential For Stated Goals: Good     REHAB RECOMMENDATIONS (at time of discharge pending progress):    Placement: It is my opinion, based on this patient's performance to date, that Ms. Luz Bergeron may benefit from [address] after discharge due to the functional deficits listed above that are likely to improve with skilled rehabilitation because he/she has multiple medical issues that affect his/her functional mobility in the community. Equipment:   None at this time              OCCUPATIONAL PROFILE AND HISTORY:   History of Present Injury/Illness (Reason for Referral):  See H&P  Past Medical History/Comorbidities:   Ms. Rip Oliver  has no past medical history on file. Ms. Rip Oliver  has no past surgical history on file. Social History/Living Environment:   Home Environment: Private residence  # Steps to Enter: 0  One/Two Story Residence: One story  Living Alone: No  Support Systems: Family member(s)  Patient Expects to be Discharged to[de-identified] Unknown  Current DME Used/Available at Home: None  Prior Level of Function/Work/Activity:  Independent with ADls and functional mobility. Personal Factors:          Sex:  female        Age:  60 y.o. Other factors that influence how disability is experienced by the patient:  multiple co-morbidities    Number of Personal Factors/Comorbidities that affect the Plan of Care: Brief history (0):  LOW COMPLEXITY   ASSESSMENT OF OCCUPATIONAL PERFORMANCE[de-identified]   Activities of Daily Living:   Basic ADLs (From Assessment) Complex ADLs (From Assessment)   Feeding: Setup  Oral Facial Hygiene/Grooming: Setup  Bathing: Minimum assistance  Upper Body Dressing: Setup  Lower Body Dressing: Minimum assistance  Toileting: Minimum assistance     Grooming/Bathing/Dressing Activities of Daily Living     Cognitive Retraining  Safety/Judgement: Awareness of environment; Fall prevention                       Bed/Mat Mobility  Rolling: Contact guard assistance  Supine to Sit: Minimum assistance  Sit to Supine: Minimum assistance  Sit to Stand: Minimum assistance  Stand to Sit: Minimum assistance  Scooting: Minimum assistance     Most Recent Physical Functioning:   Gross Assessment:  AROM: Generally decreased, functional  PROM: Generally decreased, functional  Strength: Generally decreased, functional  Coordination: Generally decreased, functional  Tone: Normal  Sensation: Intact               Posture:     Balance:  Sitting: Intact  Standing: Impaired  Standing - Static: Good;Fair  Standing - Dynamic : Fair Bed Mobility:  Rolling: Contact guard assistance  Supine to Sit: Minimum assistance  Sit to Supine: Minimum assistance  Scooting: Minimum assistance  Wheelchair Mobility:     Transfers:  Sit to Stand: Minimum assistance  Stand to Sit: Minimum assistance            Patient Vitals for the past 6 hrs:   BP BP Patient Position SpO2 O2 Flow Rate (L/min) Pulse   07/06/20 0602 110/49 -- 100 % -- 63   07/06/20 0737 173/79 At rest 90 % 60 l/min 80   07/06/20 0758 -- -- 92 % 60 l/min --   07/06/20 0828 151/89 -- 94 % -- 83   07/06/20 0836 153/57 -- -- -- 68   07/06/20 0906 151/67 -- 93 % -- 75   07/06/20 0935 138/58 -- -- -- 74   07/06/20 1006 135/63 -- 95 % -- 75       Mental Status  Neurologic State: Alert  Orientation Level: Oriented X4  Cognition: Appropriate decision making, Follows commands  Perception: Appears intact  Perseveration: No perseveration noted  Safety/Judgement: Awareness of environment, Fall prevention                          Physical Skills Involved:  Balance  Strength  Activity Tolerance  Gross Motor Control Cognitive Skills Affected (resulting in the inability to perform in a timely and safe manner):  none  Psychosocial Skills Affected:  Habits/Routines  Environmental Adaptation   Number of elements that affect the Plan of Care: 5+:  HIGH COMPLEXITY   CLINICAL DECISION MAKING:   [facility]-PAC 6 Clicks   Daily Activity Inpatient Short Form  How much help from another person does the patient currently need. .. Total A Lot A Little None   1. Putting on and taking off regular lower body clothing? [] 1   [] 2   [x] 3   [] 4   2. Bathing (including washing, rinsing, drying)? [] 1   [] 2   [x] 3   [] 4   3. Toileting, which includes using toilet, bedpan or urinal?   [] 1   [] 2   [x] 3   [] 4   4.   Putting on and taking off regular upper body clothing? [] 1   [] 2   [x] 3   [] 4   5. Taking care of personal grooming such as brushing teeth? [] 1   [] 2   [x] 3   [] 4   6. Eating meals? [] 1   [] 2   [x] 3   [] 4   © 2007, Trustees of [address] [place] [address] [address] [address], under license to [employer]. All rights reserved      Score:  Initial: 18 Most Recent: X (Date: -- )    Interpretation of Tool:  Represents activities that are increasingly more difficult (i.e. Bed mobility, Transfers, Gait). Medical Necessity:     Patient demonstrates   good   rehab potential due to higher previous functional level. Reason for Services/Other Comments:  Patient continues to require skilled intervention due to   medical complications and patient unable to attend/participate in therapy as expected  . Use of outcome tool(s) and clinical judgement create a POC that gives a: LOW COMPLEXITY         TREATMENT:   (In addition to Assessment/Re-Assessment sessions the following treatments were rendered)     Pre-treatment Symptoms/Complaints:    Pain: Initial:   Pain Intensity 1: 0  Post Session:  same     Assessment/Reassessment only, no treatment provided today    Braces/Orthotics/Lines/Etc:   O2 Device: Heated, Hi flow nasal cannula  Treatment/Session Assessment:    Response to Treatment:  tolerated well just de-sats with mobility. Interdisciplinary Collaboration:   Physical Therapist  Occupational Therapist  Registered Nurse  After treatment position/precautions:   Supine in bed  Bed/Chair-wheels locked  Bed in low position  Call light within reach  RN notified   Compliance with Program/Exercises: Will assess as treatment progresses. Recommendations/Intent for next treatment session: \"Next visit will focus on advancements to more challenging activities and reduction in assistance provided\".   Total Treatment Duration:  OT Patient Time In/Time Out  Time In: 1104  Time Out: Gordon Pinto [address]

## 2020-07-06 NOTE — PROGRESS NOTES
Bedside, Verbal and Written shift change report given to [address] [place] [address] (oncoming nurse) by Juanjo Ramirez (offgoing nurse). Report included the following information SBAR, Kardex, Intake/Output, MAR, Recent Results and Alarm Parameters .

## 2020-07-06 NOTE — PROGRESS NOTES
Patient A&Ox4, follows commands, eyes focus and track. Breath sounds coarse and diminished, symmetrical chest expansion on air ov 85%/60L; dyspnea with exertion. NSR on monitor, S1/S2 auscultated. Bowel sounds active, abdomen intact and semi-soft. Skin intact. Lines: RUE PICC      Patient denies pain at this time.

## 2020-07-07 ENCOUNTER — APPOINTMENT (OUTPATIENT)
Dept: GENERAL RADIOLOGY | Age: 65
DRG: 871 | End: 2020-07-07
Attending: INTERNAL MEDICINE
Payer: MEDICARE

## 2020-07-07 LAB
ANION GAP SERPL CALC-SCNC: 9 MMOL/L (ref 7–16)
ARTERIAL PATENCY WRIST A: YES
BASE EXCESS BLD CALC-SCNC: 7 MMOL/L
BASOPHILS # BLD: 0.1 K/UL (ref 0–0.2)
BASOPHILS NFR BLD: 1 % (ref 0–2)
BDY SITE: ABNORMAL
BUN SERPL-MCNC: 15 MG/DL (ref 8–23)
CALCIUM SERPL-MCNC: 7.4 MG/DL (ref 8.3–10.4)
CHLORIDE SERPL-SCNC: 96 MMOL/L (ref 98–107)
CO2 BLD-SCNC: 31 MMOL/L
CO2 SERPL-SCNC: 30 MMOL/L (ref 21–32)
COLLECT TIME,HTIME: 1520
CREAT SERPL-MCNC: 0.69 MG/DL (ref 0.6–1)
DIFFERENTIAL METHOD BLD: ABNORMAL
EOSINOPHIL # BLD: 0 K/UL (ref 0–0.8)
EOSINOPHIL NFR BLD: 0 % (ref 0.5–7.8)
ERYTHROCYTE [DISTWIDTH] IN BLOOD BY AUTOMATED COUNT: 13.1 % (ref 11.9–14.6)
EXHALED MINUTE VOLUME, VE: 15.6 L/MIN
GAS FLOW.O2 O2 DELIVERY SYS: ABNORMAL L/MIN
GAS FLOW.O2 SETTING OXYMISER: 14 BPM
GLUCOSE BLD STRIP.AUTO-MCNC: 120 MG/DL (ref 65–100)
GLUCOSE BLD STRIP.AUTO-MCNC: 169 MG/DL (ref 65–100)
GLUCOSE BLD STRIP.AUTO-MCNC: 256 MG/DL (ref 65–100)
GLUCOSE BLD STRIP.AUTO-MCNC: 271 MG/DL (ref 65–100)
GLUCOSE SERPL-MCNC: 290 MG/DL (ref 65–100)
HCO3 BLD-SCNC: 29.8 MMOL/L (ref 22–26)
HCT VFR BLD AUTO: 41.8 % (ref 35.8–46.3)
HGB BLD-MCNC: 13.1 G/DL (ref 11.7–15.4)
IMM GRANULOCYTES # BLD AUTO: 1 K/UL (ref 0–0.5)
IMM GRANULOCYTES NFR BLD AUTO: 9 % (ref 0–5)
INSPIRATION.DURATION SETTING TIME VENT: 0.9 SEC
LYMPHOCYTES # BLD: 1.1 K/UL (ref 0.5–4.6)
LYMPHOCYTES NFR BLD: 10 % (ref 13–44)
MAGNESIUM SERPL-MCNC: 1.7 MG/DL (ref 1.8–2.4)
MCH RBC QN AUTO: 27.1 PG (ref 26.1–32.9)
MCHC RBC AUTO-ENTMCNC: 31.3 G/DL (ref 31.4–35)
MCV RBC AUTO: 86.4 FL (ref 79.6–97.8)
MONOCYTES # BLD: 0.8 K/UL (ref 0.1–1.3)
MONOCYTES NFR BLD: 7 % (ref 4–12)
NEUTS SEG # BLD: 7.7 K/UL (ref 1.7–8.2)
NEUTS SEG NFR BLD: 73 % (ref 43–78)
NRBC # BLD: 0 K/UL (ref 0–0.2)
O2/TOTAL GAS SETTING VFR VENT: 70 %
PCO2 BLD: 34.8 MMHG (ref 35–45)
PEEP RESPIRATORY: 6 CMH2O
PH BLD: 7.54 [PH] (ref 7.35–7.45)
PIP ISTAT,IPIP: 10
PLATELET # BLD AUTO: 286 K/UL (ref 150–450)
PMV BLD AUTO: 9.9 FL (ref 9.4–12.3)
PO2 BLD: 72 MMHG (ref 75–100)
POTASSIUM SERPL-SCNC: 3.4 MMOL/L (ref 3.5–5.1)
RBC # BLD AUTO: 4.84 M/UL (ref 4.05–5.2)
SAO2 % BLD: 96 % (ref 95–98)
SERVICE CMNT-IMP: ABNORMAL
SODIUM SERPL-SCNC: 135 MMOL/L (ref 136–145)
SPECIMEN TYPE: ABNORMAL
VT SETTING VENT: 496 ML
WBC # BLD AUTO: 10.6 K/UL (ref 4.3–11.1)

## 2020-07-07 PROCEDURE — 71045 X-RAY EXAM CHEST 1 VIEW: CPT

## 2020-07-07 PROCEDURE — 83735 ASSAY OF MAGNESIUM: CPT

## 2020-07-07 PROCEDURE — 74011250636 HC RX REV CODE- 250/636: Performed by: FAMILY MEDICINE

## 2020-07-07 PROCEDURE — C9113 INJ PANTOPRAZOLE SODIUM, VIA: HCPCS | Performed by: INTERNAL MEDICINE

## 2020-07-07 PROCEDURE — 36600 WITHDRAWAL OF ARTERIAL BLOOD: CPT

## 2020-07-07 PROCEDURE — 94660 CPAP INITIATION&MGMT: CPT

## 2020-07-07 PROCEDURE — 5A09357 ASSISTANCE WITH RESPIRATORY VENTILATION, LESS THAN 24 CONSECUTIVE HOURS, CONTINUOUS POSITIVE AIRWAY PRESSURE: ICD-10-PCS | Performed by: INTERNAL MEDICINE

## 2020-07-07 PROCEDURE — 74011250637 HC RX REV CODE- 250/637: Performed by: INTERNAL MEDICINE

## 2020-07-07 PROCEDURE — 74011636637 HC RX REV CODE- 636/637: Performed by: HOSPITALIST

## 2020-07-07 PROCEDURE — 74011250636 HC RX REV CODE- 250/636: Performed by: INTERNAL MEDICINE

## 2020-07-07 PROCEDURE — 74011000250 HC RX REV CODE- 250: Performed by: INTERNAL MEDICINE

## 2020-07-07 PROCEDURE — 85025 COMPLETE CBC W/AUTO DIFF WBC: CPT

## 2020-07-07 PROCEDURE — 65610000006 HC RM INTENSIVE CARE

## 2020-07-07 PROCEDURE — 99233 SBSQ HOSP IP/OBS HIGH 50: CPT | Performed by: INTERNAL MEDICINE

## 2020-07-07 PROCEDURE — 97530 THERAPEUTIC ACTIVITIES: CPT

## 2020-07-07 PROCEDURE — 36592 COLLECT BLOOD FROM PICC: CPT

## 2020-07-07 PROCEDURE — 77010033711 HC HIGH FLOW OXYGEN

## 2020-07-07 PROCEDURE — 77030021668 HC NEB PREFIL KT VYRM -A

## 2020-07-07 PROCEDURE — 80048 BASIC METABOLIC PNL TOTAL CA: CPT

## 2020-07-07 PROCEDURE — 82803 BLOOD GASES ANY COMBINATION: CPT

## 2020-07-07 PROCEDURE — 74011636637 HC RX REV CODE- 636/637: Performed by: FAMILY MEDICINE

## 2020-07-07 PROCEDURE — 97112 NEUROMUSCULAR REEDUCATION: CPT

## 2020-07-07 PROCEDURE — 94640 AIRWAY INHALATION TREATMENT: CPT

## 2020-07-07 PROCEDURE — 82962 GLUCOSE BLOOD TEST: CPT

## 2020-07-07 PROCEDURE — 94762 N-INVAS EAR/PLS OXIMTRY CONT: CPT

## 2020-07-07 RX ORDER — PANTOPRAZOLE SODIUM 40 MG/1
40 TABLET, DELAYED RELEASE ORAL DAILY
Status: DISCONTINUED | OUTPATIENT
Start: 2020-07-08 | End: 2020-07-19

## 2020-07-07 RX ORDER — POTASSIUM CHLORIDE 14.9 MG/ML
20 INJECTION INTRAVENOUS
Status: COMPLETED | OUTPATIENT
Start: 2020-07-07 | End: 2020-07-07

## 2020-07-07 RX ORDER — DEXAMETHASONE SODIUM PHOSPHATE 100 MG/10ML
6 INJECTION INTRAMUSCULAR; INTRAVENOUS EVERY 12 HOURS
Status: DISCONTINUED | OUTPATIENT
Start: 2020-07-07 | End: 2020-07-08 | Stop reason: DRUGHIGH

## 2020-07-07 RX ORDER — INSULIN GLARGINE 100 [IU]/ML
10 INJECTION, SOLUTION SUBCUTANEOUS
Status: DISCONTINUED | OUTPATIENT
Start: 2020-07-07 | End: 2020-07-12

## 2020-07-07 RX ORDER — MAGNESIUM SULFATE HEPTAHYDRATE 40 MG/ML
2 INJECTION, SOLUTION INTRAVENOUS ONCE
Status: COMPLETED | OUTPATIENT
Start: 2020-07-07 | End: 2020-07-07

## 2020-07-07 RX ADMIN — BUDESONIDE AND FORMOTEROL FUMARATE DIHYDRATE 2 PUFF: 160; 4.5 AEROSOL RESPIRATORY (INHALATION) at 09:10

## 2020-07-07 RX ADMIN — SODIUM CHLORIDE 20 ML: 9 INJECTION, SOLUTION INTRAMUSCULAR; INTRAVENOUS; SUBCUTANEOUS at 13:10

## 2020-07-07 RX ADMIN — INSULIN LISPRO 6 UNITS: 100 INJECTION, SOLUTION INTRAVENOUS; SUBCUTANEOUS at 11:46

## 2020-07-07 RX ADMIN — SODIUM CHLORIDE 20 ML: 9 INJECTION, SOLUTION INTRAMUSCULAR; INTRAVENOUS; SUBCUTANEOUS at 23:28

## 2020-07-07 RX ADMIN — POTASSIUM CHLORIDE 20 MEQ: 200 INJECTION, SOLUTION INTRAVENOUS at 04:37

## 2020-07-07 RX ADMIN — PANTOPRAZOLE SODIUM 40 MG: 40 INJECTION, POWDER, FOR SOLUTION INTRAVENOUS at 08:30

## 2020-07-07 RX ADMIN — INSULIN LISPRO 2 UNITS: 100 INJECTION, SOLUTION INTRAVENOUS; SUBCUTANEOUS at 21:52

## 2020-07-07 RX ADMIN — BUDESONIDE AND FORMOTEROL FUMARATE DIHYDRATE 2 PUFF: 160; 4.5 AEROSOL RESPIRATORY (INHALATION) at 20:22

## 2020-07-07 RX ADMIN — DEXAMETHASONE SODIUM PHOSPHATE 6 MG: 10 INJECTION INTRAMUSCULAR; INTRAVENOUS at 00:00

## 2020-07-07 RX ADMIN — SODIUM CHLORIDE 10 ML: 9 INJECTION, SOLUTION INTRAMUSCULAR; INTRAVENOUS; SUBCUTANEOUS at 23:27

## 2020-07-07 RX ADMIN — SODIUM CHLORIDE 20 ML: 9 INJECTION, SOLUTION INTRAMUSCULAR; INTRAVENOUS; SUBCUTANEOUS at 14:00

## 2020-07-07 RX ADMIN — INSULIN LISPRO 6 UNITS: 100 INJECTION, SOLUTION INTRAVENOUS; SUBCUTANEOUS at 07:14

## 2020-07-07 RX ADMIN — INSULIN GLARGINE 10 UNITS: 100 INJECTION, SOLUTION SUBCUTANEOUS at 21:52

## 2020-07-07 RX ADMIN — Medication 1000 MG: at 08:26

## 2020-07-07 RX ADMIN — SODIUM CHLORIDE 10 ML: 9 INJECTION, SOLUTION INTRAMUSCULAR; INTRAVENOUS; SUBCUTANEOUS at 06:00

## 2020-07-07 RX ADMIN — DEXAMETHASONE SODIUM PHOSPHATE 6 MG: 10 INJECTION INTRAMUSCULAR; INTRAVENOUS at 21:00

## 2020-07-07 RX ADMIN — POTASSIUM CHLORIDE 20 MEQ: 200 INJECTION, SOLUTION INTRAVENOUS at 07:14

## 2020-07-07 RX ADMIN — DEXAMETHASONE SODIUM PHOSPHATE 6 MG: 10 INJECTION INTRAMUSCULAR; INTRAVENOUS at 06:02

## 2020-07-07 RX ADMIN — Medication 220 MG: at 08:30

## 2020-07-07 RX ADMIN — ACETAMINOPHEN 650 MG: 325 TABLET, FILM COATED ORAL at 19:54

## 2020-07-07 RX ADMIN — ENOXAPARIN SODIUM 40 MG: 40 INJECTION SUBCUTANEOUS at 16:09

## 2020-07-07 RX ADMIN — Medication 20 ML: at 23:27

## 2020-07-07 RX ADMIN — Medication 1000 MG: at 17:20

## 2020-07-07 RX ADMIN — SODIUM CHLORIDE 20 ML: 9 INJECTION, SOLUTION INTRAMUSCULAR; INTRAVENOUS; SUBCUTANEOUS at 06:00

## 2020-07-07 RX ADMIN — MAGNESIUM SULFATE IN WATER 2 G: 40 INJECTION, SOLUTION INTRAVENOUS at 04:36

## 2020-07-07 NOTE — PROGRESS NOTES
Called for patient's O2 sat in 70s; patient currently laying on R side. Patient instructed to lay supine and O2 sats recovered to 96%.

## 2020-07-07 NOTE — PROGRESS NOTES
Problem: Patient Education: Go to Patient Education Activity  Goal: Patient/Family Education  Description: 1. Patient will complete lower body bathing and dressing with MOD I and adaptive equipment as needed. 2. Patient will complete toileting with MOD I.   3. Patient will tolerate 25 minutes of OT treatment with 2-3 rest breaks to increase activity tolerance for ADLs. 4. Patient will complete functional transfers with MOD I and adaptive equipment as needed. 5. Patient will complete functional mobility for household distances with MOD I and adaptive equipment as needed. 6. Patient will complete self-grooming tasks while standing edge of sink with MOD I and adaptive equipment as needed. Timeframe: 7 visits      Outcome: Progressing Towards Goal      OCCUPATIONAL THERAPY: Daily Note and AM 7/7/2020  INPATIENT: OT Visit Days: 1  [place] / [place] / Product Type: Medicare /      NAME/AGE/GENDER: Johnny Degroot is a 60 y.o. female   PRIMARY DIAGNOSIS:  Acute respiratory failure with hypoxemia ([facility] [J96.01] Acute hypoxemic respiratory failure ([facility] Acute hypoxemic respiratory failure (HCC)       ICD-10: Treatment Diagnosis:    · Generalized Muscle Weakness (M62.81)  · Other lack of cordination (R27.8)   Precautions/Allergies:     Patient has no known allergies. ASSESSMENT:     Ms. Chinedu Harrell presents in the ICU for the above diagnoses. Upon arrival, pt supine in bed and currently resting on Airvo at 85%. Pt is alert and oriented x 4; agreeable to OT evaluation. Pt reports living alone in a 1-story home. At baseline, pt notes independence with all ADLs and functional mobility with no DME required. Does not drive however has family who will assists. Endorses no hx of falls. 7/7/2020  Today, pt presents supine in bed, currently resting on BIPAP. Pt completed functional transfers with SBA. Sp02 maintaining at 90% and above during mobility.  Intact unsupported sitting balance. Pt stood and transferred to [facility] with CGA. Pt unable to void during session. Pt then stood and ambulated ~3 steps to bedside chair with CGA. Pt left working with PT. Pt is progressing towards goals. Will continue to address OT goals and plan of care as indicated. This section established at most recent assessment   PROBLEM LIST (Impairments causing functional limitations):  1. Decreased Strength  2. Decreased ADL/Functional Activities  3. Decreased Transfer Abilities  4. Decreased Ambulation Ability/Technique  5. Decreased Balance  6. Decreased Activity Tolerance  7. Decreased Pacing Skills  8. Decreased Work Simplification/Energy Conservation Techniques  9. Increased Shortness of Breath   INTERVENTIONS PLANNED: (Benefits and precautions of occupational therapy have been discussed with the patient.)  1. Activities of daily living training  2. Adaptive equipment training  3. Balance training  4. Clothing management  5. Community reintergration  6. Donning&doffing training  7. Neuromuscular re-eduation  8. Re-evaluation  9. Therapeutic activity  10. Therapeutic exercise     TREATMENT PLAN: Frequency/Duration: Follow patient 3x/week to address above goals. Rehabilitation Potential For Stated Goals: Good     REHAB RECOMMENDATIONS (at time of discharge pending progress):    Placement: It is my opinion, based on this patient's performance to date, that Ms. Chinedu Harrell may benefit from [address] after discharge due to the functional deficits listed above that are likely to improve with skilled rehabilitation because he/she has multiple medical issues that affect his/her functional mobility in the community. Equipment:    None at this time              OCCUPATIONAL PROFILE AND HISTORY:   History of Present Injury/Illness (Reason for Referral):  See H&P  Past Medical History/Comorbidities:   Ms. Chinedu Harrell  has no past medical history on file. Ms. Chinedu Harrell  has no past surgical history on file.   Social History/Living Environment:   Home Environment: Private residence  # Steps to Enter: 0  One/Two Story Residence: One story  Living Alone: No  Support Systems: Family member(s)  Patient Expects to be Discharged to[de-identified] Unknown  Current DME Used/Available at Home: None  Prior Level of Function/Work/Activity:  Independent with ADls and functional mobility. Personal Factors:          Sex:  female        Age:  60 y.o.         Other factors that influence how disability is experienced by the patient:  multiple co-morbidities    Number of Personal Factors/Comorbidities that affect the Plan of Care: Brief history (0):  LOW COMPLEXITY   ASSESSMENT OF OCCUPATIONAL PERFORMANCE[de-identified]   Activities of Daily Living:   Basic ADLs (From Assessment) Complex ADLs (From Assessment)   Feeding: Setup  Oral Facial Hygiene/Grooming: Setup  Bathing: Minimum assistance  Upper Body Dressing: Setup  Lower Body Dressing: Minimum assistance  Toileting: Minimum assistance     Grooming/Bathing/Dressing Activities of Daily Living                             Bed/Mat Mobility  Rolling: Stand-by assistance  Supine to Sit: Stand-by assistance  Sit to Supine: Stand-by assistance  Sit to Stand: Contact guard assistance  Stand to Sit: Contact guard assistance  Scooting: Stand-by assistance     Most Recent Physical Functioning:   Gross Assessment:  AROM: Generally decreased, functional  PROM: Generally decreased, functional  Strength: Generally decreased, functional  Coordination: Generally decreased, functional  Tone: Normal  Sensation: Intact               Posture:  Posture (WDL): Exceptions to WDL  Posture Assessment: Cervical, Forward head  Balance:  Sitting: Intact  Standing: Impaired  Standing - Static: Good;Fair  Standing - Dynamic : Fair Bed Mobility:  Rolling: Stand-by assistance  Supine to Sit: Stand-by assistance  Sit to Supine: Stand-by assistance  Scooting: Stand-by assistance  Wheelchair Mobility:     Transfers:  Sit to Stand: Contact guard assistance  Stand to Sit: Contact guard assistance            Patient Vitals for the past 6 hrs:   BP SpO2 O2 Flow Rate (L/min) Pulse   20 0920  96 % 60 l/min    20 0931 167/84   87   20 1001 118/56 96 %  73   20 1031 144/59   79   20 1131 171/77   82   20 1149 (!) 178/105 (!) 88 %  81   20 1201 165/78 (!) 85 %  83   20 1236  94 %     20 1309 (!) 190/96   91   20 1331 147/73   79   20 1507 169/76   92       Mental Status  Neurologic State: Alert, Eyes open spontaneously  Orientation Level: Oriented X4  Cognition: Follows commands  Perception: Appears intact  Perseveration: No perseveration noted  Safety/Judgement: Awareness of environment, Fall prevention                          Physical Skills Involved:  1. Balance  2. Strength  3. Activity Tolerance  4. Gross Motor Control Cognitive Skills Affected (resulting in the inability to perform in a timely and safe manner): 1. none  Psychosocial Skills Affected:  1. Habits/Routines  2. Environmental Adaptation   Number of elements that affect the Plan of Care: 5+:  HIGH COMPLEXITY   CLINICAL DECISION MAKIN[address] [place] [id number] AM-PAC 6 Clicks   Daily Activity Inpatient Short Form  How much help from another person does the patient currently need. .. Total A Lot A Little None   1. Putting on and taking off regular lower body clothing? [] 1   [] 2   [x] 3   [] 4   2. Bathing (including washing, rinsing, drying)? [] 1   [] 2   [x] 3   [] 4   3. Toileting, which includes using toilet, bedpan or urinal?   [] 1   [] 2   [x] 3   [] 4   4. Putting on and taking off regular upper body clothing? [] 1   [] 2   [x] 3   [] 4   5. Taking care of personal grooming such as brushing teeth? [] 1   [] 2   [x] 3   [] 4   6. Eating meals? [] 1   [] 2   [x] 3   [] 4   © , Trustees of [address] [place] [address] [address] [id number], under license to [employer].  All rights reserved      Score:  Initial: 18 Most Recent: X (Date: -- )    Interpretation of Tool:  Represents activities that are increasingly more difficult (i.e. Bed mobility, Transfers, Gait). Medical Necessity:     · Patient demonstrates   · good  ·  rehab potential due to higher previous functional level. Reason for Services/Other Comments:  · Patient continues to require skilled intervention due to   · medical complications and patient unable to attend/participate in therapy as expected  · . Use of outcome tool(s) and clinical judgement create a POC that gives a: LOW COMPLEXITY         TREATMENT:   (In addition to Assessment/Re-Assessment sessions the following treatments were rendered)     Pre-treatment Symptoms/Complaints:    Pain: Initial:   Pain Intensity 1: 0  Post Session:  same     Neuromuscular Re-education: ( 8 minutes):  Exercise/activities per grid below to improve balance, coordination and posture. Required minimal verbal cues to promote static and dynamic balance in sitting and standing. Braces/Orthotics/Lines/Etc:   · O2 Device: Heated, Hi flow nasal cannula  Treatment/Session Assessment:    · Response to Treatment:  tolerated well just de-sats with mobility. · Interdisciplinary Collaboration:   o Physical Therapist  o Occupational Therapist  o Registered Nurse  · After treatment position/precautions:   o working with PT   · Compliance with Program/Exercises: Compliant all of the time, Will assess as treatment progresses. · Recommendations/Intent for next treatment session: \"Next visit will focus on advancements to more challenging activities and reduction in assistance provided\".   Total Treatment Duration:  OT Patient Time In/Time Out  Time In: 1351  Time Out: [facility]

## 2020-07-07 NOTE — PROGRESS NOTES
Patient O2 sats 85% on 100%/60L airvo. Dr. Kacey lFynn notified and order received to start patient on BiPAP and get ABG 2 hours after starting. RT notified.

## 2020-07-07 NOTE — PROGRESS NOTES
Progress Note    Patient: Brendan Simon MRN: [id number]  SSN: [id number]    YOB: 1955  Age: 60 y.o. Sex: female      Admit Date: 7/1/2020    LOS: 6 days     Subjective:     Brendan Simon is a 60 y.o. female who was brought to ER due to being found with fever, diarrhea and feeling very sick.      Patient has several family members being sick with fever and diarrhea. One day prior to admission, patient started with nausea, vomiting and fever.      On the day of admission, patient was somnolent and with fever.      EMS was called and checked her temperature showing 102.2 F with O2 saturation 82% on room air. Her skin was mottled.      She received Rocephin on the field and she was given IV fluid and brought to ER.      She was receiving Empiric IV antibiotics including Vancomycin and Zosyn since admission. She is positive for COVID19 infection. Those antibiotics were stopped. Still on high flow oxygen. Tilley's catheter is removed. 07/07 patient is on on OptiFlow 85%, switched to BIPAP this afternoon. Patient desaturates even with minimal movement. No fever no chills. Objective:     Vitals:    07/07/20 1236 07/07/20 1309 07/07/20 1331 07/07/20 1507   BP:  (!) 190/96 147/73 169/76   Pulse:  91 79 92   Resp:  30 22 (!) 36   Temp:       SpO2: 94%      Weight:       Height:            Intake and Output:  Current Shift: No intake/output data recorded. Last three shifts: 07/05 1901 - 07/07 0700  In: 480 [P.O.:480]  Out: 2050 [Urine:2050]    Physical Exam:   General:                    The patient is a female in acute respiratory distress. She is on high flow oxygen. DSKU:                                   WAJDBXIOYRBDE/BNCQNEYNYU. Eyes:                                   palpebral pallor. No scleral icterus. ENT:                                    External auricular and nasal exam within normal limits.                                             ZWHHJL membranes are dry. Neck:                                   Supple, non-tender, no JVD. Lungs:                       Diminished breath sounds bilaterally, no wheezing, minimal bibasilar crackles,                                               No respiratory accessory muscle use. Heart:                                  Regular rhythm, tachycardia, without murmurs, rubs, or gallops. Abdomen:                  Soft, non-tender, non-distended with normoactive bowel sounds. Genitourinary:           No tenderness over the bladder or bilateral CVAs. Extremities:               Without clubbing, cyanosis, or edema. Skin:                                    mottled skin on all extremities especially on both arms. Pulses:                      Radial and dorsalis pedis pulses present 2+ bilaterally.                                               Capillary refill <2s. Neurologic:                CN II-XII grossly intact and symmetrical.                                               Moving all four extremities well with no focal deficits.     Lab/Data Review:    Recent Results (from the past 24 hour(s))   GLUCOSE, POC    Collection Time: 07/06/20  4:36 PM   Result Value Ref Range    Glucose (POC) 227 (H) 65 - 100 mg/dL   GLUCOSE, POC    Collection Time: 07/06/20 10:02 PM   Result Value Ref Range    Glucose (POC) 265 (H) 65 - 100 mg/dL   CBC WITH AUTOMATED DIFF    Collection Time: 07/07/20  3:13 AM   Result Value Ref Range    WBC 10.6 4.3 - 11.1 K/uL    RBC 4.84 4.05 - 5.2 M/uL    HGB 13.1 11.7 - 15.4 g/dL    HCT 41.8 35.8 - 46.3 %    MCV 86.4 79.6 - 97.8 FL    MCH 27.1 26.1 - 32.9 PG    MCHC 31.3 (L) 31.4 - 35.0 g/dL    RDW 13.1 11.9 - 14.6 %    PLATELET 884 216 - 495 K/uL    MPV 9.9 9.4 - 12.3 FL    ABSOLUTE NRBC 0.00 0.0 - 0.2 K/uL    DF AUTOMATED      NEUTROPHILS 73 43 - 78 %    LYMPHOCYTES 10 (L) 13 - 44 %    MONOCYTES 7 4.0 - 12.0 %    EOSINOPHILS 0 (L) 0.5 - 7.8 %    BASOPHILS 1 0.0 - 2.0 %    IMMATURE GRANULOCYTES 9 (H) 0.0 - 5.0 % ABS. NEUTROPHILS 7.7 1.7 - 8.2 K/UL    ABS. LYMPHOCYTES 1.1 0.5 - 4.6 K/UL    ABS. MONOCYTES 0.8 0.1 - 1.3 K/UL    ABS. EOSINOPHILS 0.0 0.0 - 0.8 K/UL    ABS. BASOPHILS 0.1 0.0 - 0.2 K/UL    ABS. IMM. GRANS.  1.0 (H) 0.0 - 0.5 K/UL   METABOLIC PANEL, BASIC    Collection Time: 07/07/20  3:13 AM   Result Value Ref Range    Sodium 135 (L) 136 - 145 mmol/L    Potassium 3.4 (L) 3.5 - 5.1 mmol/L    Chloride 96 (L) 98 - 107 mmol/L    CO2 30 21 - 32 mmol/L    Anion gap 9 7 - 16 mmol/L    Glucose 290 (H) 65 - 100 mg/dL    BUN 15 8 - 23 MG/DL    Creatinine 0.69 0.6 - 1.0 MG/DL    GFR est AA >60 >60 ml/min/1.73m2    GFR est non-AA >60 >60 ml/min/1.73m2    Calcium 7.4 (L) 8.3 - 10.4 MG/DL   MAGNESIUM    Collection Time: 07/07/20  3:13 AM   Result Value Ref Range    Magnesium 1.7 (L) 1.8 - 2.4 mg/dL   GLUCOSE, POC    Collection Time: 07/07/20  7:10 AM   Result Value Ref Range    Glucose (POC) 256 (H) 65 - 100 mg/dL   GLUCOSE, POC    Collection Time: 07/07/20 11:45 AM   Result Value Ref Range    Glucose (POC) 271 (H) 65 - 100 mg/dL   POC G3    Collection Time: 07/07/20  3:20 PM   Result Value Ref Range    Device: BIPAP      FIO2 (POC) 70 %    pH (POC) 7.54 (H) 7.35 - 7.45      pCO2 (POC) 34.8 (L) 35 - 45 MMHG    pO2 (POC) 72 (L) 75 - 100 MMHG    HCO3 (POC) 29.8 (H) 22 - 26 MMOL/L    sO2 (POC) 96 95 - 98 %    Base excess (POC) 7 mmol/L    Tidal volume 496 ml    Set Rate 14 bpm    PEEP/CPAP (POC) 6 cmH2O    PIP (POC) 10      Allens test (POC) YES      Inspiratory Time 0.90 sec    Site RIGHT BRACHIAL      Specimen type (POC) ARTERIAL      Performed by John     CO2, POC 31 MMOL/L    Exhaled minute volume 15.60 L/min    COLLECT TIME 1,520       Results     Procedure Component Value Units Date/Time    CULTURE, RESPIRATORY/SPUTUM/BRONCH Chung Rodriguez [[id number]] Collected:  07/02/20 1100    Order Status:  Canceled Specimen:  Sputum     SARS-COV-2 [[id number]]  (Abnormal) Collected:  07/02/20 1000    Order Status:  Completed Specimen:  Nasopharyngeal Updated:  07/03/20 0812     COVID-19 Detected        Comment: PERFORMED AT "[facility]" LAB       CULTURE, URINE [[id number]] Collected:  07/01/20 1545    Order Status:  Canceled Specimen:  Cath Urine     COVID-19 RAPID TEST [[id number]]  (Abnormal) Collected:  07/01/20 1527    Order Status:  Completed Specimen:  Nasopharyngeal Updated:  07/01/20 1714     Specimen source SWAB        COVID-19 rapid test Detected        Comment:      The specimen is POSITIVE for SARS-CoV-2, the novel coronavirus associated with COVID-19. This test has been authorized by the FDA under an Emergency Use Authorization (EUA) for use by authorized laboratories. Fact sheet for Healthcare Providers: [employer]nz  Fact sheet for Patients: [employer]       Methodology: Isothermal Nucleic Acid Amplification  RESULTS VERIFIED, PHONED TO AND READ BACK BY  DR Cresencio Pickens 1513  RESULTS VERIFIED, PHONED TO AND READ BACK BY  DR Cresencio Pickens 1513 07/01/20 [place]AS[place]         CULTURE, URINE [[id number]] Collected:  07/01/20 1417    Order Status:  Completed Specimen:  Cath Urine Updated:  07/04/20 0830     Special Requests: NO SPECIAL REQUESTS        Culture result: NO GROWTH 2 DAYS       CULTURE, BLOOD [[id number]] Collected:  07/01/20 1341    Order Status:  Completed Specimen:  Blood Updated:  07/06/20 0651     Special Requests: LEFT ANTECUBITAL        Culture result: NO GROWTH 5 DAYS       CULTURE, BLOOD [[id number]] Collected:  07/01/20 1341    Order Status:  Completed Specimen:  Blood Updated:  07/06/20 0651     Special Requests: RIGHT ANTECUBITAL        Culture result: NO GROWTH 5 DAYS            XR abdomen   7-1-2020  IMPRESSION: No free intraperitoneal air. Right lower lobe consolidation.     XR chest   7-1-2020  IMPRESSION:     1. Consolidation in the right lung base.     2. Lucency along the right hemidiaphragm.  Recommend a lateral decubitus view of  the abdomen to assess for free intraperitoneal air.       Current Facility-Administered Medications:     [START ON 7/8/2020] pantoprazole (PROTONIX) tablet 40 mg, 40 mg, Oral, DAILY, Génesis Morse MD    dexamethasone (DECADRON) 10 mg/mL injection 6 mg, 6 mg, IntraVENous, Q12H, Frankey Fanny, MD    insulin lispro (HUMALOG) injection, , SubCUTAneous, AC&HS, Devora Edwards MD, 6 Units at 07/07/20 1146    NUTRITIONAL SUPPORT ELECTROLYTE PRN ORDERS, , Does Not Apply, MILLI, Devora Edwards MD    budesonide-formoteroL (SYMBICORT) 160-4.5 mcg/actuation HFA inhaler 2 Puff, 2 Puff, Inhalation, BID RT, Morenita Kc MD, 2 Puff at 07/07/20 0910    albuterol (PROVENTIL HFA, VENTOLIN HFA, PROAIR HFA) inhaler 2 Puff, 2 Puff, Inhalation, Q4H PRN, Morenita Kc MD, 2 Puff at 07/06/20 2018    0.9% sodium chloride infusion 250 mL, 250 mL, IntraVENous, PRN, Morenita Kc MD    sodium chloride (NS) flush 20 mL, 20 mL, InterCATHeter, Q8H, Dinh Kc MD, 20 mL at 07/07/20 1310    sodium chloride (NS) flush 20 mL, 20 mL, InterCATHeter, PRN, Dinh Kc MD, 20 mL at 07/05/20 0803    sodium chloride (NS) flush 5-40 mL, 5-40 mL, IntraVENous, Q8H, Génesis Morse MD, 20 mL at 07/07/20 1400    sodium chloride (NS) flush 5-40 mL, 5-40 mL, IntraVENous, PRN, Génesis Morse MD    acetaminophen (TYLENOL) tablet 650 mg, 650 mg, Oral, Q4H PRN, Génesis Morse MD, 650 mg at 07/05/20 2252    diphenhydrAMINE (BENADRYL) injection 12.5 mg, 12.5 mg, IntraVENous, Q4H PRN, Jenny Ayon MD, 12.5 mg at 07/06/20 0007    bisacodyL (DULCOLAX) tablet 5 mg, 5 mg, Oral, DAILY PRN, Jenny Ayon MD, 5 mg at 07/04/20 1211    ondansetron (ZOFRAN) injection 4 mg, 4 mg, IntraVENous, Q4H PRN, Génesis Morse MD    enoxaparin (LOVENOX) injection 40 mg, 40 mg, SubCUTAneous, Q24H, Génesis Morse MD, 40 mg at 07/06/20 1630    ascorbic acid (vitamin C) (VITAMIN C) tablet 1,000 mg, 1,000 mg, Oral, BID, Fernando Castillo MD, 1,000 mg at 07/07/20 0826    zinc sulfate tablet 220 mg, 220 mg, Oral, DAILY, Génesis Morse MD, 220 mg at 07/07/20 0830    albuterol-ipratropium (DUO-NEB) 2.5 MG-0.5 MG/3 ML, 3 mL, Nebulization, Q4H PRN, Génesis Morse MD, 3 mL at 07/02/20 1936    nicotine (NICODERM CQ) 14 mg/24 hr patch 1 Patch, 1 Patch, TransDERmal, Q24H, Fernando Castillo MD, 1 Patch at 07/06/20 1555      Assessment:     Principal Problem:    Acute hypoxemic respiratory failure ([address] (9/26/2018)    Active Problems:    CAP (community acquired pneumonia) (9/26/2018)      Leukocytosis (9/26/2018)      Tobacco abuse (9/26/2018)      Acute respiratory failure with hypoxemia ([address] (7/1/2020)      Sepsis ([address] (7/1/2020)      Pneumonia due to COVID-19 virus (7/2/2020)      Chronic obstructive pulmonary disease with acute exacerbation ([address]  -- unspecified type (7/2/2020)      Elevated diaphragm (7/2/2020)        Plan:     Acute respiratory failure with hypoxia POA  Positive COVID19 infection. Metabolic Encephalopathy on admission, improved. Sepsis   Viral Pneumonia   Blood and urine culture so far are negative. Off of antibiotics. .   On Dexamethasone. Nebulizer  Vitamin C and Zinc.   ID is following. Patient is on Remdesivir until 7-6-2020 and will see if further doses are needed. Pulmonology on board. Appreciate recs. On Optiflow and switched to BIPAP this afternoon. Still at risk of requiring ventilator. Hyperglycemia:  Blood glucoses ranging between 200-250. Will check A1c. Switch diet to diabetic diet,   Lantus at bedtime.     Smoking. I advised patient to quit.    Nicotine patch.        I have discussed the plan of care with patient and care team.    Attempted to contact patient son over the phone but unable to reach him.     DVT prophylaxis : Lovenox [place]     Disposition plan : Patient is still needing OptiFlow/BiPAP and anticipate inpatient hospital stay for at least 48 to 72 hours.       Signed By: Belva Snellen, MD     July 7, 2020

## 2020-07-07 NOTE — PROGRESS NOTES
Patient A&Ox4, follows commands, eyes focus and track. Breath sounds coarse, symmetrical chest expansion on 60L/85% airvo; dyspnea with exertion. NSR with frequent PVCs, S1/S2 auscultated. Bowel sounds active, abdomen soft and intact. Skin intact. Lines: RUE PICC      Patient denies any pain at this time.

## 2020-07-07 NOTE — PROGRESS NOTES
Pt anxious and O2 sat= 85% upon arrival to the pt unit. Pt encouraged to take deep breaths; sats= 90%. Pt wanting to get back to bed. Pt taken back to bed.

## 2020-07-07 NOTE — PROGRESS NOTES
Bryn Issa  Admission Date: 7/1/2020             Daily Progress Note: 7/7/2020   Patient is [phone] y.o.  female seen and evaluated at the request of Dr. Rossy Larson.     Patient is a smoker who came in with worsening hypoxemia, fevers, diarrhea and dyspnea. She reports she has been staying home and non-one was sick but per chart indicates several family were sick. Temp was 102.2 and saturation was 82% ion admission. CXR with infiltrates. Started ABX and optiflow. Only uses albuterol and home and ?still smoking. Rapid COVID testing was positive. Has been coughing up yellow sputum.      In [facility] currently and oxygen needs up on 70% optiflow and 60 LPM. Patient states dyspnea is less  Subjective:   Remains on Optiflow up to 85% as yesterday -drops easily to 60 % with minimal movement    Not coughing or having sputum.      Review of Systems  Constitutional: positive for fatigue  Respiratory: positive for cough, pneumonia or dyspnea on exertion  Cardiovascular: positive for fatigue, dyspnea on exertion  Gastrointestinal: negative for nausea, vomiting, diarrhea and constipation    Current Facility-Administered Medications   Medication Dose Route Frequency    potassium chloride 20 mEq in 100 ml IVPB  20 mEq IntraVENous Q2H    insulin lispro (HUMALOG) injection   SubCUTAneous AC&HS    NUTRITIONAL SUPPORT ELECTROLYTE PRN ORDERS   Does Not Apply PRN    budesonide-formoteroL (SYMBICORT) 160-4.5 mcg/actuation HFA inhaler 2 Puff  2 Puff Inhalation BID RT    albuterol (PROVENTIL HFA, VENTOLIN HFA, PROAIR HFA) inhaler 2 Puff  2 Puff Inhalation Q4H PRN    0.9% sodium chloride infusion 250 mL  250 mL IntraVENous PRN    pantoprazole (PROTONIX) 40 mg in 0.9% sodium chloride 10 mL injection  40 mg IntraVENous DAILY    sodium chloride (NS) flush 20 mL  20 mL InterCATHeter Q8H    sodium chloride (NS) flush 20 mL  20 mL InterCATHeter PRN    sodium chloride (NS) flush 5-40 mL  5-40 mL IntraVENous Q8H    sodium chloride (NS) flush 5-40 mL  5-40 mL IntraVENous PRN    acetaminophen (TYLENOL) tablet 650 mg  650 mg Oral Q4H PRN    diphenhydrAMINE (BENADRYL) injection 12.5 mg  12.5 mg IntraVENous Q4H PRN    bisacodyL (DULCOLAX) tablet 5 mg  5 mg Oral DAILY PRN    ondansetron (ZOFRAN) injection 4 mg  4 mg IntraVENous Q4H PRN    enoxaparin (LOVENOX) injection 40 mg  40 mg SubCUTAneous Q24H    ascorbic acid (vitamin C) (VITAMIN C) tablet 1,000 mg  1,000 mg Oral BID    zinc sulfate tablet 220 mg  220 mg Oral DAILY    albuterol-ipratropium (DUO-NEB) 2.5 MG-0.5 MG/3 ML  3 mL Nebulization Q4H PRN    nicotine (NICODERM CQ) 14 mg/24 hr patch 1 Patch  1 Patch TransDERmal Q24H    dexamethasone (DECADRON) 10 mg/mL injection 6 mg  6 mg IntraVENous Q6H     Objective:     Vitals:    07/07/20 0431 07/07/20 0500 07/07/20 0701 07/07/20 0731   BP: 158/75 173/75 (!) 156/97 142/69   Pulse: 85 94 91 72   Resp: 15 (!) 31 (!) 32 24   Temp: 97.9 °F (36.6 °C)  97.5 °F (36.4 °C)    SpO2: 91%  94%    Weight:  145 lb 1.6 oz (65.8 kg)     Height:         Intake and Output:   07/05 1901 - 07/07 0700  In: 480 [P.O.:480]  Out: 2050 [Urine:2050]  No intake/output data recorded.       Intake/Output Summary (Last 24 hours) at 7/7/2020 0836  Last data filed at 7/7/2020 0538  Gross per 24 hour   Intake 240 ml   Output 1400 ml   Net -1160 ml     Physical Exam:          GEN: well developed and in no acute distress,   HEENT:  PERRL, EOMI, no alar flaring or epistaxis, oral mucosa moist without cyanosis,   NECK:  no JVD, no retractions, no thyromegaly or masses,   LUNGS:  crackles on Optiflow 90%  HEART:  RRR with no M,G,R;  ABDOMEN:  soft with no tenderness, positive bowel sounds present  EXTREMITIES:  warm with no cyanosis, plus 1  edema  SKIN:  no jaundice or ecchymosis   NEURO:  alert and oriented, grossly non-focal    Lines/Drains:PICC, suazo  Nutrition:regular  Activity:ambulate with assistance    CHEST XRAY: 7/6: CXR stable, chronic right atelectasis with elevated diaphragm seen on 2018 CT    7/1/2020      LAB  Recent Labs     07/07/20  0313 07/06/20  0511 07/05/20  0332   WBC 10.6 8.5 5.2   HGB 13.1 13.9 14.1   HCT 41.8 41.8 42.8    272 241       Cultures:   Results     Procedure Component Value Units Date/Time    CULTURE, RESPIRATORY/SPUTUM/BRONCH [place] STAIN [[id number]] Collected:  07/02/20 1100    Order Status:  Canceled Specimen:  Sputum     SARS-COV-2 [[id number]]  (Abnormal) Collected:  07/02/20 1000    Order Status:  Completed Specimen:  Nasopharyngeal Updated:  07/03/20 0812     COVID-19 Detected        Comment: PERFORMED AT [facility]       CULTURE, URINE [[id number]] Collected:  07/01/20 1545    Order Status:  Canceled Specimen:  Cath Urine     COVID-19 RAPID TEST [[id number]]  (Abnormal) Collected:  07/01/20 1527    Order Status:  Completed Specimen:  Nasopharyngeal Updated:  07/01/20 1714     Specimen source SWAB        COVID-19 rapid test Detected        Comment:      The specimen is POSITIVE for SARS-CoV-2, the novel coronavirus associated with COVID-19. This test has been authorized by the FDA under an Emergency Use Authorization (EUA) for use by authorized laboratories.         Fact sheet for Healthcare Providers: ConventionUpdate.co.nz  Fact sheet for Patients: ConventionUpdate.co.nz       Methodology: Isothermal Nucleic Acid Amplification  RESULTS VERIFIED, PHONED TO AND READ BACK BY  DR Crseencio Pickens 1513  RESULTS VERIFIED, PHONED TO AND READ BACK BY  DR Cresencio Pickens 1513 07/01/20 VELASQUEZ         CULTURE, URINE [[id number]] Collected:  07/01/20 1417    Order Status:  Completed Specimen:  Cath Urine Updated:  07/04/20 0830     Special Requests: NO SPECIAL REQUESTS        Culture result: NO GROWTH 2 DAYS       CULTURE, BLOOD [[id number]] Collected:  07/01/20 1341    Order Status:  Completed Specimen:  Blood Updated:  07/06/20 0651     Special Requests: LEFT ANTECUBITAL        Culture result: NO GROWTH 5 DAYS       CULTURE, BLOOD [[id number]] Collected:  07/01/20 1341    Order Status:  Completed Specimen:  Blood Updated:  07/06/20 0651     Special Requests: RIGHT ANTECUBITAL        Culture result: NO GROWTH 5 DAYS           Assessment:     Hospital Problems  Never Reviewed          Codes Class Noted POA    Pneumonia due to COVID-19 virus ICD-10-CM: U07.1, J12.89  ICD-9-CM: 480.8  7/2/2020 Unknown        Chronic obstructive pulmonary disease with acute exacerbation ([facility]  -- unspecified type ICD-10-CM: J44.1  ICD-9-CM: 491.21  7/2/2020 Unknown        Elevated diaphragm ICD-10-CM: J98.6  ICD-9-CM: 519.4  7/2/2020 Unknown        Acute respiratory failure with hypoxemia ([facility] ICD-10-CM: J96.01  ICD-9-CM: 518.81  7/1/2020 Unknown        Sepsis ([facility] ICD-10-CM: A41.9  ICD-9-CM: 038.9, 995.91  7/1/2020 Unknown        CAP (community acquired pneumonia) ICD-10-CM: J18.9  ICD-9-CM: 317  9/26/2018 Yes        Leukocytosis ICD-10-CM: P88.903  ICD-9-CM: 288.60  9/26/2018 Yes        * (Principal) Acute hypoxemic respiratory failure ([facility] ICD-10-CM: J96.01  ICD-9-CM: 518.81  9/26/2018 Yes        Tobacco abuse ICD-10-CM: Z72.0  ICD-9-CM: 305.1  9/26/2018 Yes            PLAN:   -wean optiflow as tolerated but so far she has not been able  To   --dexamethasone D6/10  --Convalescent plasma 7/2  --cont remdesivir- last dose today   - replace lisa Ng MD    More than 50% of time documented was spent in face-to-face contact with the patient and in the care of the patient on the floor/unit where the patient is located.

## 2020-07-08 ENCOUNTER — APPOINTMENT (OUTPATIENT)
Dept: GENERAL RADIOLOGY | Age: 65
DRG: 871 | End: 2020-07-08
Attending: INTERNAL MEDICINE
Payer: MEDICARE

## 2020-07-08 LAB
ALBUMIN SERPL-MCNC: 2.3 G/DL (ref 3.2–4.6)
ALBUMIN/GLOB SERPL: 0.5 {RATIO} (ref 1.2–3.5)
ALP SERPL-CCNC: 82 U/L (ref 50–136)
ALT SERPL-CCNC: 54 U/L (ref 12–65)
ANION GAP SERPL CALC-SCNC: 4 MMOL/L (ref 7–16)
ARTERIAL PATENCY WRIST A: YES
AST SERPL-CCNC: 34 U/L (ref 15–37)
BASE EXCESS BLD CALC-SCNC: 1 MMOL/L
BASOPHILS # BLD: 0.1 K/UL (ref 0–0.2)
BASOPHILS NFR BLD: 1 % (ref 0–2)
BDY SITE: ABNORMAL
BILIRUB SERPL-MCNC: 0.6 MG/DL (ref 0.2–1.1)
BUN SERPL-MCNC: 21 MG/DL (ref 8–23)
CALCIUM SERPL-MCNC: 7.8 MG/DL (ref 8.3–10.4)
CHLORIDE SERPL-SCNC: 99 MMOL/L (ref 98–107)
CO2 BLD-SCNC: 24 MMOL/L
CO2 SERPL-SCNC: 34 MMOL/L (ref 21–32)
COLLECT TIME,HTIME: 1930
CREAT SERPL-MCNC: 0.59 MG/DL (ref 0.6–1)
DIFFERENTIAL METHOD BLD: ABNORMAL
EOSINOPHIL # BLD: 0 K/UL (ref 0–0.8)
EOSINOPHIL NFR BLD: 0 % (ref 0.5–7.8)
ERYTHROCYTE [DISTWIDTH] IN BLOOD BY AUTOMATED COUNT: 13.2 % (ref 11.9–14.6)
EST. AVERAGE GLUCOSE BLD GHB EST-MCNC: 157 MG/DL
EXHALED MINUTE VOLUME, VE: 26 L/MIN
GAS FLOW.O2 O2 DELIVERY SYS: ABNORMAL L/MIN
GAS FLOW.O2 SETTING OXYMISER: 14 BPM
GLOBULIN SER CALC-MCNC: 4.3 G/DL (ref 2.3–3.5)
GLUCOSE BLD STRIP.AUTO-MCNC: 177 MG/DL (ref 65–100)
GLUCOSE BLD STRIP.AUTO-MCNC: 186 MG/DL (ref 65–100)
GLUCOSE BLD STRIP.AUTO-MCNC: 216 MG/DL (ref 65–100)
GLUCOSE BLD STRIP.AUTO-MCNC: 229 MG/DL (ref 65–100)
GLUCOSE SERPL-MCNC: 188 MG/DL (ref 65–100)
HBA1C MFR BLD: 7.1 % (ref 4.8–6)
HCO3 BLD-SCNC: 23 MMOL/L (ref 22–26)
HCT VFR BLD AUTO: 45.9 % (ref 35.8–46.3)
HGB BLD-MCNC: 14.5 G/DL (ref 11.7–15.4)
IMM GRANULOCYTES # BLD AUTO: 1.2 K/UL (ref 0–0.5)
IMM GRANULOCYTES NFR BLD AUTO: 7 % (ref 0–5)
INSPIRATION.DURATION SETTING TIME VENT: 0.9 SEC
LYMPHOCYTES # BLD: 1 K/UL (ref 0.5–4.6)
LYMPHOCYTES NFR BLD: 6 % (ref 13–44)
MCH RBC QN AUTO: 27.3 PG (ref 26.1–32.9)
MCHC RBC AUTO-ENTMCNC: 31.6 G/DL (ref 31.4–35)
MCV RBC AUTO: 86.3 FL (ref 79.6–97.8)
MONOCYTES # BLD: 0.9 K/UL (ref 0.1–1.3)
MONOCYTES NFR BLD: 5 % (ref 4–12)
NEUTS SEG # BLD: 14.6 K/UL (ref 1.7–8.2)
NEUTS SEG NFR BLD: 82 % (ref 43–78)
NRBC # BLD: 0 K/UL (ref 0–0.2)
O2/TOTAL GAS SETTING VFR VENT: 50 %
PCO2 BLD: 30.1 MMHG (ref 35–45)
PEEP RESPIRATORY: 6 CMH2O
PH BLD: 7.49 [PH] (ref 7.35–7.45)
PIP ISTAT,IPIP: 10
PLATELET # BLD AUTO: 343 K/UL (ref 150–450)
PMV BLD AUTO: 10 FL (ref 9.4–12.3)
PO2 BLD: 98 MMHG (ref 75–100)
POTASSIUM SERPL-SCNC: 4.1 MMOL/L (ref 3.5–5.1)
PRESSURE CONTROL, IPC: 10
PROT SERPL-MCNC: 6.6 G/DL (ref 6.3–8.2)
RBC # BLD AUTO: 5.32 M/UL (ref 4.05–5.2)
SAO2 % BLD: 98 % (ref 95–98)
SERVICE CMNT-IMP: ABNORMAL
SODIUM SERPL-SCNC: 137 MMOL/L (ref 136–145)
SPECIMEN TYPE: ABNORMAL
SPONTANEOUS TIMED, IST: 14
WBC # BLD AUTO: 17.7 K/UL (ref 4.3–11.1)

## 2020-07-08 PROCEDURE — 77010033711 HC HIGH FLOW OXYGEN

## 2020-07-08 PROCEDURE — 36592 COLLECT BLOOD FROM PICC: CPT

## 2020-07-08 PROCEDURE — 94660 CPAP INITIATION&MGMT: CPT

## 2020-07-08 PROCEDURE — 74011250637 HC RX REV CODE- 250/637: Performed by: INTERNAL MEDICINE

## 2020-07-08 PROCEDURE — 83036 HEMOGLOBIN GLYCOSYLATED A1C: CPT

## 2020-07-08 PROCEDURE — 94640 AIRWAY INHALATION TREATMENT: CPT

## 2020-07-08 PROCEDURE — 74011636637 HC RX REV CODE- 636/637: Performed by: HOSPITALIST

## 2020-07-08 PROCEDURE — 65610000006 HC RM INTENSIVE CARE

## 2020-07-08 PROCEDURE — 82962 GLUCOSE BLOOD TEST: CPT

## 2020-07-08 PROCEDURE — 74011250636 HC RX REV CODE- 250/636: Performed by: INTERNAL MEDICINE

## 2020-07-08 PROCEDURE — 82803 BLOOD GASES ANY COMBINATION: CPT

## 2020-07-08 PROCEDURE — 71045 X-RAY EXAM CHEST 1 VIEW: CPT

## 2020-07-08 PROCEDURE — 36600 WITHDRAWAL OF ARTERIAL BLOOD: CPT

## 2020-07-08 PROCEDURE — 74011250636 HC RX REV CODE- 250/636: Performed by: HOSPITALIST

## 2020-07-08 PROCEDURE — 99232 SBSQ HOSP IP/OBS MODERATE 35: CPT | Performed by: INTERNAL MEDICINE

## 2020-07-08 PROCEDURE — 85025 COMPLETE CBC W/AUTO DIFF WBC: CPT

## 2020-07-08 PROCEDURE — 74011250636 HC RX REV CODE- 250/636

## 2020-07-08 PROCEDURE — 80053 COMPREHEN METABOLIC PANEL: CPT

## 2020-07-08 PROCEDURE — 74011636637 HC RX REV CODE- 636/637: Performed by: FAMILY MEDICINE

## 2020-07-08 RX ORDER — HYDRALAZINE HYDROCHLORIDE 20 MG/ML
20 INJECTION INTRAMUSCULAR; INTRAVENOUS
Status: DISCONTINUED | OUTPATIENT
Start: 2020-07-08 | End: 2020-07-20 | Stop reason: HOSPADM

## 2020-07-08 RX ORDER — LORAZEPAM 2 MG/ML
1 INJECTION INTRAMUSCULAR
Status: DISCONTINUED | OUTPATIENT
Start: 2020-07-08 | End: 2020-07-19

## 2020-07-08 RX ORDER — DEXAMETHASONE 4 MG/1
6 TABLET ORAL EVERY 12 HOURS
Status: DISCONTINUED | OUTPATIENT
Start: 2020-07-08 | End: 2020-07-12

## 2020-07-08 RX ORDER — MORPHINE SULFATE 2 MG/ML
2 INJECTION, SOLUTION INTRAMUSCULAR; INTRAVENOUS
Status: DISCONTINUED | OUTPATIENT
Start: 2020-07-08 | End: 2020-07-17

## 2020-07-08 RX ORDER — LORAZEPAM 2 MG/ML
INJECTION INTRAMUSCULAR
Status: COMPLETED
Start: 2020-07-08 | End: 2020-07-08

## 2020-07-08 RX ADMIN — LORAZEPAM 1 MG: 2 INJECTION INTRAMUSCULAR; INTRAVENOUS at 18:59

## 2020-07-08 RX ADMIN — SODIUM CHLORIDE 20 ML: 9 INJECTION, SOLUTION INTRAMUSCULAR; INTRAVENOUS; SUBCUTANEOUS at 22:00

## 2020-07-08 RX ADMIN — HYDRALAZINE HYDROCHLORIDE 20 MG: 20 INJECTION, SOLUTION INTRAMUSCULAR; INTRAVENOUS at 16:21

## 2020-07-08 RX ADMIN — INSULIN GLARGINE 10 UNITS: 100 INJECTION, SOLUTION SUBCUTANEOUS at 22:00

## 2020-07-08 RX ADMIN — SODIUM CHLORIDE 10 ML: 9 INJECTION, SOLUTION INTRAMUSCULAR; INTRAVENOUS; SUBCUTANEOUS at 14:00

## 2020-07-08 RX ADMIN — DEXAMETHASONE 6 MG: 4 TABLET ORAL at 08:29

## 2020-07-08 RX ADMIN — ENOXAPARIN SODIUM 40 MG: 40 INJECTION SUBCUTANEOUS at 16:21

## 2020-07-08 RX ADMIN — SODIUM CHLORIDE 20 ML: 9 INJECTION, SOLUTION INTRAMUSCULAR; INTRAVENOUS; SUBCUTANEOUS at 05:24

## 2020-07-08 RX ADMIN — PANTOPRAZOLE SODIUM 40 MG: 40 TABLET, DELAYED RELEASE ORAL at 08:29

## 2020-07-08 RX ADMIN — INSULIN LISPRO 2 UNITS: 100 INJECTION, SOLUTION INTRAVENOUS; SUBCUTANEOUS at 07:18

## 2020-07-08 RX ADMIN — BUDESONIDE AND FORMOTEROL FUMARATE DIHYDRATE 2 PUFF: 160; 4.5 AEROSOL RESPIRATORY (INHALATION) at 19:40

## 2020-07-08 RX ADMIN — SODIUM CHLORIDE 10 ML: 9 INJECTION, SOLUTION INTRAMUSCULAR; INTRAVENOUS; SUBCUTANEOUS at 05:25

## 2020-07-08 RX ADMIN — DEXAMETHASONE 6 MG: 4 TABLET ORAL at 21:58

## 2020-07-08 RX ADMIN — INSULIN LISPRO 4 UNITS: 100 INJECTION, SOLUTION INTRAVENOUS; SUBCUTANEOUS at 12:09

## 2020-07-08 RX ADMIN — Medication 220 MG: at 08:29

## 2020-07-08 RX ADMIN — MORPHINE SULFATE 2 MG: 2 INJECTION, SOLUTION INTRAMUSCULAR; INTRAVENOUS at 19:27

## 2020-07-08 RX ADMIN — BUDESONIDE AND FORMOTEROL FUMARATE DIHYDRATE 2 PUFF: 160; 4.5 AEROSOL RESPIRATORY (INHALATION) at 07:30

## 2020-07-08 RX ADMIN — Medication 1000 MG: at 08:29

## 2020-07-08 RX ADMIN — INSULIN LISPRO 2 UNITS: 100 INJECTION, SOLUTION INTRAVENOUS; SUBCUTANEOUS at 22:00

## 2020-07-08 RX ADMIN — SODIUM CHLORIDE 20 ML: 9 INJECTION, SOLUTION INTRAMUSCULAR; INTRAVENOUS; SUBCUTANEOUS at 14:00

## 2020-07-08 RX ADMIN — Medication 1000 MG: at 17:54

## 2020-07-08 RX ADMIN — INSULIN LISPRO 4 UNITS: 100 INJECTION, SOLUTION INTRAVENOUS; SUBCUTANEOUS at 17:54

## 2020-07-08 NOTE — PROGRESS NOTES
Bedside, Verbal and Written shift change report given to Prieto Bullock RN (oncoming nurse) by Radames Hernandez RN (offgoing nurse). Report included the following information SBAR, Kardex, Intake/Output, Cardiac Rhythm ST and Alarm Parameters .

## 2020-07-08 NOTE — PROGRESS NOTES
Patient extremely anxious, HR 140s, hypertensive. Placed on CPAP O2 sat 96%. Dr. Huseyin Olivo notified and order received to give 1mg IV ativan PRN.  Order confirmed with readback

## 2020-07-08 NOTE — PROGRESS NOTES
Critical Care Daily Progress Note: 7/8/2020    Grant Dobbs   Admission Date: 7/1/2020         The patient's chart is reviewed and the patient is discussed with the staff. 65 y.o.  female seen and evaluated at the request of Dr. Richmond Aburto.     Patient is a smoker who came in with worsening hypoxemia, fevers, diarrhea and dyspnea. She reports she has been staying home and non-one was sick but per chart indicates several family were sick. Temp was 102.2 and saturation was 82% ion admission. CXR with infiltrates. Started ABX and optiflow. Only uses albuterol and home and ?still smoking. Rapid COVID testing was positive.  Has been coughing up yellow sputum.      In [facility] currently and oxygen needs up on 70% optiflow and 60 LPM. Patient states dyspnea is less     Subjective:   Was on bipap then cpap last pm, now on 67% airvo and looks comfortable    Current Facility-Administered Medications   Medication Dose Route Frequency    pantoprazole (PROTONIX) tablet 40 mg  40 mg Oral DAILY    dexamethasone (DECADRON) 10 mg/mL injection 6 mg  6 mg IntraVENous Q12H    insulin glargine (LANTUS) injection 10 Units  10 Units SubCUTAneous QHS    insulin lispro (HUMALOG) injection   SubCUTAneous AC&HS    NUTRITIONAL SUPPORT ELECTROLYTE PRN ORDERS   Does Not Apply PRN    budesonide-formoteroL (SYMBICORT) 160-4.5 mcg/actuation HFA inhaler 2 Puff  2 Puff Inhalation BID RT    albuterol (PROVENTIL HFA, VENTOLIN HFA, PROAIR HFA) inhaler 2 Puff  2 Puff Inhalation Q4H PRN    0.9% sodium chloride infusion 250 mL  250 mL IntraVENous PRN    sodium chloride (NS) flush 20 mL  20 mL InterCATHeter Q8H    sodium chloride (NS) flush 20 mL  20 mL InterCATHeter PRN    sodium chloride (NS) flush 5-40 mL  5-40 mL IntraVENous Q8H    sodium chloride (NS) flush 5-40 mL  5-40 mL IntraVENous PRN    acetaminophen (TYLENOL) tablet 650 mg  650 mg Oral Q4H PRN    diphenhydrAMINE (BENADRYL) injection 12.5 mg  12.5 mg IntraVENous Q4H PRN    bisacodyL (DULCOLAX) tablet 5 mg  5 mg Oral DAILY PRN    ondansetron (ZOFRAN) injection 4 mg  4 mg IntraVENous Q4H PRN    enoxaparin (LOVENOX) injection 40 mg  40 mg SubCUTAneous Q24H    ascorbic acid (vitamin C) (VITAMIN C) tablet 1,000 mg  1,000 mg Oral BID    zinc sulfate tablet 220 mg  220 mg Oral DAILY    albuterol-ipratropium (DUO-NEB) 2.5 MG-0.5 MG/3 ML  3 mL Nebulization Q4H PRN    nicotine (NICODERM CQ) 14 mg/24 hr patch 1 Patch  1 Patch TransDERmal Q24H       Review of Systems    Constitutional:  negative for fever, chills, sweats  Cardiovascular:  negative for chest pain, palpitations, syncope, edema  Gastrointestinal:  negative for dysphagia, reflux, vomiting, diarrhea, abdominal pain, or melena  Neurologic:  negative for focal weakness, numbness, headache      Objective:     Vitals:    07/08/20 0631 07/08/20 0702 07/08/20 0730 07/08/20 0731   BP: 168/68 (!) 177/98  158/74   Pulse: 71 90  85   Resp: 21 17  16   Temp:  97.5 °F (36.4 °C)     SpO2: 100% 96% 95%    Weight:       Height:             Intake/Output Summary (Last 24 hours) at 7/8/2020 0750  Last data filed at 7/8/2020 0510  Gross per 24 hour   Intake 360 ml   Output 925 ml   Net -565 ml       Physical Exam:          Constitutional:  the patient is well developed and in no acute distress  EENMT:  Sclera clear, pupils equal, oral mucosa moist  Respiratory: bilateral crackles  Cardiovascular:  RRR without M,G,R  Gastrointestinal: soft and non-tender; with positive bowel sounds. Musculoskeletal: warm without cyanosis. There is no lower extremity edema.   Skin:  no jaundice or rashes, no wounds   Neurologic: no gross neuro deficits     Psychiatric:  alert and oriented x 3    LINES:  picc    DRIPS:  none    CXR:       LAB  Recent Labs     07/08/20  0717 07/07/20  2147 07/07/20  1721 07/07/20  1145 07/07/20  0710   GLUCPOC 186* 169* 120* 271* 256*      Recent Labs     07/08/20  0332 07/07/20  0313 07/06/20  0511   WBC 17.7* 10.6 8.5   HGB 14.5 13.1 13.9   HCT 45.9 41.8 41.8    286 272     Recent Labs     07/08/20  0332 07/07/20  0313 07/06/20  0511    135* 136   K 4.1 3.4* 3.8   CL 99 96* 96*   CO2 34* 30 34*   * 290* 232*   BUN 21 15 14   CREA 0.59* 0.69 0.64   MG  --  1.7*  --    CA 7.8* 7.4* 7.7*   ALB 2.3*  --  2.2*   TBILI 0.6  --  0.5   ALT 54  --  55     Recent Labs     07/07/20  1520   PHI 7.54*   PCO2I 34.8*   PO2I 72*   HCO3I 29.8*     No results for input(s): LCAD, LAC in the last 72 hours.   Recent Labs     07/07/20  1520   PHI 7.54*   PCO2I 34.8*   PO2I 72*   HCO3I 29.8*       Patient Active Problem List   Diagnosis Code    CAP (community acquired pneumonia) J18.9    Leukocytosis D72.829    Acute hypoxemic respiratory failure (HCC) J96.01    Tobacco abuse Z72.0    Suspected COVID-19 virus infection Z20.828    Acute respiratory failure with hypoxemia (HCC) J96.01    Sepsis (HCC) A41.9    Pneumonia due to COVID-19 virus U07.1, J12.89    Chronic obstructive pulmonary disease with acute exacerbation (HCC)  -- unspecified type J44.1    Elevated diaphragm J98.6         Assessment:  (Medical Decision Making)     Hospital Problems  Never Reviewed          Codes Class Noted POA    Pneumonia due to COVID-19 virus ICD-10-CM: U07.1, J12.89  ICD-9-CM: 480.8  7/2/2020 Unknown        Chronic obstructive pulmonary disease with acute exacerbation ([facility]  -- unspecified type ICD-10-CM: J44.1  ICD-9-CM: 491.21  7/2/2020 Unknown        Elevated diaphragm ICD-10-CM: J98.6  ICD-9-CM: 519.4  7/2/2020 Unknown    cxr better    Acute respiratory failure with hypoxemia ([facility] ICD-10-CM: J96.01  ICD-9-CM: 518.81  7/1/2020 Unknown        Sepsis ([address] ICD-10-CM: A41.9  ICD-9-CM: 038.9, 995.91  7/1/2020 Unknown        CAP (community acquired pneumonia) ICD-10-CM: J18.9  ICD-9-CM: 513  9/26/2018 Yes        Leukocytosis ICD-10-CM: Q01.320  ICD-9-CM: 288.60  9/26/2018 Yes        * (Principal) Acute hypoxemic respiratory failure [facility]) ICD-10-CM: J96.01  ICD-9-CM: 518.81  9/26/2018 Yes        Tobacco abuse ICD-10-CM: Z72.0  ICD-9-CM: 305.1  9/26/2018 Yes              Plan:  (Medical Decision Making)   1    Wean O2 as tolerated  --dexamethasone D7/10  --Convalescent plasma 7/2  -- remdesivir- last dose yesterday  [facility]    More than 50% of the time documented was spent in face-to-face contact with the patient and in the care of the patient on the floor/unit where the patient is located.     Edith Sheth MD

## 2020-07-08 NOTE — PROGRESS NOTES
Pt is in [facility] ICU. Care transferred to Dr Muñiz Favorite. Hospitalist will sign off.

## 2020-07-08 NOTE — PROGRESS NOTES
07/07/20 2349   Oxygen Therapy   O2 Sat (%) 100 %   Pulse via Oximetry 78 beats per minute   O2 Device CPAP mask   FIO2 (%) 45 %   Respiratory   Respiratory (WDL) X   Respiratory Pattern Regular   Chest/Tracheal Assessment Chest expansion, symmetrical   Breath Sounds Bilateral Diminished   Cough Non-productive   CPAP/BIPAP   CPAP/BIPAP Start/Stop On   Device Mode CPAP   $$ CPAP Daily Yes   Mask Type and Size Full face; Medium   Skin Condition intact   PIP Observed 8 cm H20   EPAP (cm H2O) 5 cm H2O   Vt Spont (ml) 1020 ml   Ve Observed (l/min) 23.8 l/min   Total RR (Spontaneous) 23 breaths per minute   Insp Rise Time (sec) 3   Leak (Estimated) 13 L/min   Pt's Home Machine No   Biomedical Check Performed Yes   Settings Verified Yes   Alarm Settings   High Pressure 30   Low Pressure 2   Apnea 20   Low Ve 2   High Rate 50   Low Rate 5   Pulmonary Toilet   Pulmonary Toilet Cough and deep breath;H. O.B elevated

## 2020-07-08 NOTE — PROGRESS NOTES
Patient A&Ox4, follows commands, eyes focus and track. Breath sounds diminished, symmetrical chest expansion on CPAP 45%. NSR on monitor with occasional PVCs, S1/S2 auscultated. Bowel sounds active, abdomen semi-soft and intact. Skin intact. Lines: RUE PICC      Patient denies any pain at this time. Will continue to monitor.

## 2020-07-09 ENCOUNTER — APPOINTMENT (OUTPATIENT)
Dept: GENERAL RADIOLOGY | Age: 65
DRG: 871 | End: 2020-07-09
Attending: INTERNAL MEDICINE
Payer: MEDICARE

## 2020-07-09 LAB
ANION GAP SERPL CALC-SCNC: 6 MMOL/L (ref 7–16)
BASOPHILS # BLD: 0.1 K/UL (ref 0–0.2)
BASOPHILS NFR BLD: 0 % (ref 0–2)
BUN SERPL-MCNC: 17 MG/DL (ref 8–23)
CALCIUM SERPL-MCNC: 7.8 MG/DL (ref 8.3–10.4)
CHLORIDE SERPL-SCNC: 104 MMOL/L (ref 98–107)
CO2 SERPL-SCNC: 30 MMOL/L (ref 21–32)
CREAT SERPL-MCNC: 0.44 MG/DL (ref 0.6–1)
CRP SERPL-MCNC: 1.3 MG/DL (ref 0–0.9)
DIFFERENTIAL METHOD BLD: ABNORMAL
EOSINOPHIL # BLD: 0 K/UL (ref 0–0.8)
EOSINOPHIL NFR BLD: 0 % (ref 0.5–7.8)
ERYTHROCYTE [DISTWIDTH] IN BLOOD BY AUTOMATED COUNT: 13.6 % (ref 11.9–14.6)
FERRITIN SERPL-MCNC: 813 NG/ML (ref 8–388)
GLUCOSE BLD STRIP.AUTO-MCNC: 158 MG/DL (ref 65–100)
GLUCOSE BLD STRIP.AUTO-MCNC: 191 MG/DL (ref 65–100)
GLUCOSE BLD STRIP.AUTO-MCNC: 199 MG/DL (ref 65–100)
GLUCOSE BLD STRIP.AUTO-MCNC: 280 MG/DL (ref 65–100)
GLUCOSE SERPL-MCNC: 160 MG/DL (ref 65–100)
HCT VFR BLD AUTO: 43.1 % (ref 35.8–46.3)
HGB BLD-MCNC: 14.3 G/DL (ref 11.7–15.4)
IMM GRANULOCYTES # BLD AUTO: 0.8 K/UL (ref 0–0.5)
IMM GRANULOCYTES NFR BLD AUTO: 4 % (ref 0–5)
LYMPHOCYTES # BLD: 0.9 K/UL (ref 0.5–4.6)
LYMPHOCYTES NFR BLD: 5 % (ref 13–44)
MAGNESIUM SERPL-MCNC: 2.3 MG/DL (ref 1.8–2.4)
MCH RBC QN AUTO: 28.5 PG (ref 26.1–32.9)
MCHC RBC AUTO-ENTMCNC: 33.2 G/DL (ref 31.4–35)
MCV RBC AUTO: 86 FL (ref 79.6–97.8)
MONOCYTES # BLD: 0.8 K/UL (ref 0.1–1.3)
MONOCYTES NFR BLD: 4 % (ref 4–12)
NEUTS SEG # BLD: 16.8 K/UL (ref 1.7–8.2)
NEUTS SEG NFR BLD: 87 % (ref 43–78)
NRBC # BLD: 0 K/UL (ref 0–0.2)
PHOSPHATE SERPL-MCNC: 2.7 MG/DL (ref 2.3–3.7)
PLATELET # BLD AUTO: 318 K/UL (ref 150–450)
PMV BLD AUTO: 9.7 FL (ref 9.4–12.3)
POTASSIUM SERPL-SCNC: 4.3 MMOL/L (ref 3.5–5.1)
RBC # BLD AUTO: 5.01 M/UL (ref 4.05–5.2)
SODIUM SERPL-SCNC: 140 MMOL/L (ref 136–145)
WBC # BLD AUTO: 19.4 K/UL (ref 4.3–11.1)

## 2020-07-09 PROCEDURE — 71045 X-RAY EXAM CHEST 1 VIEW: CPT

## 2020-07-09 PROCEDURE — 94660 CPAP INITIATION&MGMT: CPT

## 2020-07-09 PROCEDURE — 74011636637 HC RX REV CODE- 636/637: Performed by: HOSPITALIST

## 2020-07-09 PROCEDURE — 82962 GLUCOSE BLOOD TEST: CPT

## 2020-07-09 PROCEDURE — 94640 AIRWAY INHALATION TREATMENT: CPT

## 2020-07-09 PROCEDURE — 83735 ASSAY OF MAGNESIUM: CPT

## 2020-07-09 PROCEDURE — 80048 BASIC METABOLIC PNL TOTAL CA: CPT

## 2020-07-09 PROCEDURE — 84100 ASSAY OF PHOSPHORUS: CPT

## 2020-07-09 PROCEDURE — 65610000006 HC RM INTENSIVE CARE

## 2020-07-09 PROCEDURE — 74011250636 HC RX REV CODE- 250/636: Performed by: INTERNAL MEDICINE

## 2020-07-09 PROCEDURE — 82728 ASSAY OF FERRITIN: CPT

## 2020-07-09 PROCEDURE — 74011636637 HC RX REV CODE- 636/637: Performed by: FAMILY MEDICINE

## 2020-07-09 PROCEDURE — 85025 COMPLETE CBC W/AUTO DIFF WBC: CPT

## 2020-07-09 PROCEDURE — 99233 SBSQ HOSP IP/OBS HIGH 50: CPT | Performed by: INTERNAL MEDICINE

## 2020-07-09 PROCEDURE — 74011250637 HC RX REV CODE- 250/637: Performed by: INTERNAL MEDICINE

## 2020-07-09 PROCEDURE — 86140 C-REACTIVE PROTEIN: CPT

## 2020-07-09 PROCEDURE — 36592 COLLECT BLOOD FROM PICC: CPT

## 2020-07-09 PROCEDURE — 74011250636 HC RX REV CODE- 250/636: Performed by: HOSPITALIST

## 2020-07-09 PROCEDURE — 77010033711 HC HIGH FLOW OXYGEN

## 2020-07-09 RX ADMIN — MORPHINE SULFATE 2 MG: 2 INJECTION, SOLUTION INTRAMUSCULAR; INTRAVENOUS at 21:05

## 2020-07-09 RX ADMIN — SODIUM CHLORIDE 20 ML: 9 INJECTION, SOLUTION INTRAMUSCULAR; INTRAVENOUS; SUBCUTANEOUS at 22:00

## 2020-07-09 RX ADMIN — DEXAMETHASONE 6 MG: 4 TABLET ORAL at 08:33

## 2020-07-09 RX ADMIN — Medication 1000 MG: at 08:33

## 2020-07-09 RX ADMIN — INSULIN LISPRO 2 UNITS: 100 INJECTION, SOLUTION INTRAVENOUS; SUBCUTANEOUS at 12:20

## 2020-07-09 RX ADMIN — DEXAMETHASONE 6 MG: 4 TABLET ORAL at 21:04

## 2020-07-09 RX ADMIN — INSULIN GLARGINE 10 UNITS: 100 INJECTION, SOLUTION SUBCUTANEOUS at 21:05

## 2020-07-09 RX ADMIN — INSULIN LISPRO 2 UNITS: 100 INJECTION, SOLUTION INTRAVENOUS; SUBCUTANEOUS at 21:07

## 2020-07-09 RX ADMIN — MORPHINE SULFATE 2 MG: 2 INJECTION, SOLUTION INTRAMUSCULAR; INTRAVENOUS at 12:47

## 2020-07-09 RX ADMIN — LORAZEPAM 1 MG: 2 INJECTION INTRAMUSCULAR; INTRAVENOUS at 13:59

## 2020-07-09 RX ADMIN — MORPHINE SULFATE 2 MG: 2 INJECTION, SOLUTION INTRAMUSCULAR; INTRAVENOUS at 16:29

## 2020-07-09 RX ADMIN — PANTOPRAZOLE SODIUM 40 MG: 40 TABLET, DELAYED RELEASE ORAL at 08:33

## 2020-07-09 RX ADMIN — Medication 1000 MG: at 18:15

## 2020-07-09 RX ADMIN — SODIUM CHLORIDE 10 ML: 9 INJECTION, SOLUTION INTRAMUSCULAR; INTRAVENOUS; SUBCUTANEOUS at 14:00

## 2020-07-09 RX ADMIN — ENOXAPARIN SODIUM 40 MG: 40 INJECTION SUBCUTANEOUS at 16:00

## 2020-07-09 RX ADMIN — SODIUM CHLORIDE 10 ML: 9 INJECTION, SOLUTION INTRAMUSCULAR; INTRAVENOUS; SUBCUTANEOUS at 06:00

## 2020-07-09 RX ADMIN — Medication 220 MG: at 08:33

## 2020-07-09 RX ADMIN — SODIUM CHLORIDE 20 ML: 9 INJECTION, SOLUTION INTRAMUSCULAR; INTRAVENOUS; SUBCUTANEOUS at 06:00

## 2020-07-09 RX ADMIN — SODIUM CHLORIDE 20 ML: 9 INJECTION, SOLUTION INTRAMUSCULAR; INTRAVENOUS; SUBCUTANEOUS at 14:00

## 2020-07-09 RX ADMIN — LORAZEPAM 1 MG: 2 INJECTION INTRAMUSCULAR; INTRAVENOUS at 23:00

## 2020-07-09 RX ADMIN — BUDESONIDE AND FORMOTEROL FUMARATE DIHYDRATE 2 PUFF: 160; 4.5 AEROSOL RESPIRATORY (INHALATION) at 08:00

## 2020-07-09 RX ADMIN — INSULIN LISPRO 2 UNITS: 100 INJECTION, SOLUTION INTRAVENOUS; SUBCUTANEOUS at 07:36

## 2020-07-09 RX ADMIN — INSULIN LISPRO 6 UNITS: 100 INJECTION, SOLUTION INTRAVENOUS; SUBCUTANEOUS at 16:23

## 2020-07-09 NOTE — PROGRESS NOTES
Comprehensive Nutrition Assessment    Type and Reason for Visit: RD nutrition re-screen/LOS, Initial    Nutrition Assessment:  Patient is a smoker who came in with worsening hypoxemia, fevers, diarrhea and dyspnea, and + for COVID-19. Has been on optiflow and bipap with desaturations. Attempted to speak to RN x2 and attempted to call pt with no answer to phone. There is limited data recorded regarding the pt's intake throughout admission. Diet was changed to DM CCHO due to -250. Estimated Daily Nutrient Needs:  Energy (kcal):  0652-2174 kcal/kg (25-30kcal/kg)  Protein (g):  61-74g/day (1-1.2g/kg)         Current Nutrition Therapies:   DIET DIABETIC CONSISTENT CARB Regular; 2 GM NA (House Low NA)    Anthropometric Measures:  · Height:  5' 3\" (160 cm)  · Current Body Wt:  61.8 kg (136 lb 3.9 oz)   · BMI Categories:  Normal weight (BMI 18.5-24. 9)       Nutrition Diagnosis:   · Predicted inadequate energy intake related to impaired respiratory function as evidenced by other (specify)(pt on bipap intermittently throughout admission)      Nutrition Interventions:   Food and/or Nutrient Delivery: Continue current diet, Start oral nutrition supplement- Perfect[employer] message to Lizbet Thomas MD recommending Glucerna TID. Goals:  Meet at least 75% of estimated needs within 7 days. Nutrition Monitoring and Evaluation:   Food/Nutrient Intake Outcomes: Food and nutrient intake    Discharge Planning:     Too soon to determine     Electronically signed by Alicia Brody RD, AGUILAR on 7/9/2020 at 4:01 PM    Contact: [phone]

## 2020-07-09 NOTE — PROGRESS NOTES
Physical Therapy Note:    Attempted to see patient this afternoon for physical therapy treatment session. Per RN, defer PT treatment this date due to pt medical status (just put pt back on BIPAP, pt in a lot of pain, Ativan just administered). Will follow and re-attempt as schedule permits pending pt availability and medical status.     Thank you,  Joselyn Olivo, PT, DPT

## 2020-07-09 NOTE — PROGRESS NOTES
Critical Care Daily Progress Note: 7/9/2020    Shalonda Ascencio   Admission Date: 7/1/2020         The patient's chart is reviewed and the patient is discussed with the staff. 65 y.o.  female seen and evaluated at the request of Dr. Beatty Officer.     Patient is a smoker who came in with worsening hypoxemia, fevers, diarrhea and dyspnea. She reports she has been staying home and non-one was sick but per chart indicates several family were sick. Temp was 102.2 and saturation was 82% ion admission. CXR with infiltrates. Started ABX and optiflow. Only uses albuterol and home and ?still smoking. Rapid COVID testing was positive. Has been coughing up yellow sputum.      In [facility] currently and oxygen needs up on 70% optiflow and 60 LPM. Patient states dyspnea is less. Subjective: On BIPAP overnight but weaned to 45%/60L with sat of 96% and now back on BIPAP. Still with some tachypnea.      Current Facility-Administered Medications   Medication Dose Route Frequency    dexAMETHasone (DECADRON) tablet 6 mg  6 mg Oral Q12H    hydrALAZINE (APRESOLINE) 20 mg/mL injection 20 mg  20 mg IntraVENous Q6H PRN    LORazepam (ATIVAN) injection 1 mg  1 mg IntraVENous Q4H PRN    morphine injection 2 mg  2 mg IntraVENous Q4H PRN    pantoprazole (PROTONIX) tablet 40 mg  40 mg Oral DAILY    insulin glargine (LANTUS) injection 10 Units  10 Units SubCUTAneous QHS    insulin lispro (HUMALOG) injection   SubCUTAneous AC&HS    NUTRITIONAL SUPPORT ELECTROLYTE PRN ORDERS   Does Not Apply PRN    budesonide-formoteroL (SYMBICORT) 160-4.5 mcg/actuation HFA inhaler 2 Puff  2 Puff Inhalation BID RT    albuterol (PROVENTIL HFA, VENTOLIN HFA, PROAIR HFA) inhaler 2 Puff  2 Puff Inhalation Q4H PRN    0.9% sodium chloride infusion 250 mL  250 mL IntraVENous PRN    sodium chloride (NS) flush 20 mL  20 mL InterCATHeter Q8H    sodium chloride (NS) flush 20 mL  20 mL InterCATHeter PRN    sodium chloride (NS) flush 5-40 mL  5-40 mL IntraVENous Q8H    sodium chloride (NS) flush 5-40 mL  5-40 mL IntraVENous PRN    acetaminophen (TYLENOL) tablet 650 mg  650 mg Oral Q4H PRN    diphenhydrAMINE (BENADRYL) injection 12.5 mg  12.5 mg IntraVENous Q4H PRN    bisacodyL (DULCOLAX) tablet 5 mg  5 mg Oral DAILY PRN    ondansetron (ZOFRAN) injection 4 mg  4 mg IntraVENous Q4H PRN    enoxaparin (LOVENOX) injection 40 mg  40 mg SubCUTAneous Q24H    ascorbic acid (vitamin C) (VITAMIN C) tablet 1,000 mg  1,000 mg Oral BID    zinc sulfate tablet 220 mg  220 mg Oral DAILY    albuterol-ipratropium (DUO-NEB) 2.5 MG-0.5 MG/3 ML  3 mL Nebulization Q4H PRN    nicotine (NICODERM CQ) 14 mg/24 hr patch 1 Patch  1 Patch TransDERmal Q24H       Review of Systems    Constitutional:  negative for fever, chills, sweats  Cardiovascular:  negative for chest pain, palpitations, syncope, edema  Gastrointestinal:  negative for dysphagia, reflux, vomiting, diarrhea, abdominal pain, or melena  Neurologic:  negative for focal weakness, numbness, headache      Objective:     Vitals:    07/09/20 0531 07/09/20 0601 07/09/20 0631 07/09/20 0701   BP: 116/76 118/71 119/81 151/80   Pulse: 93 95 94 92   Resp: (!) 31 25 28 13   Temp:    (!) 96.3 °F (35.7 °C)   SpO2: 93% 96% 92% 96%   Weight:       Height:             Intake/Output Summary (Last 24 hours) at 7/9/2020 0817  Last data filed at 7/9/2020 0515  Gross per 24 hour   Intake 240 ml   Output 1600 ml   Net -1360 ml       Physical Exam:          Constitutional:  the patient is well developed and in mild distress on BIPAP  EENMT:  Sclera clear, pupils equal, oral mucosa moist  Respiratory: bilateral crackles with diminished BS  Cardiovascular:  RRR without M,G,R  Gastrointestinal: soft and non-tender; with positive bowel sounds. Musculoskeletal: warm without cyanosis. There is no lower extremity edema.   Skin:  no jaundice or rashes, no wounds   Neurologic: no gross neuro deficits     Psychiatric:  alert and oriented x 3    LINES:  picc    DRIPS:  none    CXR:     7/9:          7/8:          LAB  Recent Labs     07/09/20  0735 07/08/20  2247 07/08/20  1752 07/08/20  1207 07/08/20  0717   GLUCPOC 158* 177* 216* 229* 186*      Recent Labs     07/09/20  0444 07/08/20  0332 07/07/20  0313   WBC 19.4* 17.7* 10.6   HGB 14.3 14.5 13.1   HCT 43.1 45.9 41.8    343 286     Recent Labs     07/09/20  0444 07/08/20  0332 07/07/20  0313    137 135*   K 4.3 4.1 3.4*    99 96*   CO2 30 34* 30   * 188* 290*   BUN 17 21 15   CREA 0.44* 0.59* 0.69   MG 2.3  --  1.7*   PHOS 2.7  --   --    CA 7.8* 7.8* 7.4*   ALB  --  2.3*  --    TBILI  --  0.6  --    ALT  --  54  --      Recent Labs     07/08/20  1936 07/07/20  1520   PHI 7.49* 7.54*   PCO2I 30.1* 34.8*   PO2I 98 72*   HCO3I 23.0 29.8*     No results for input(s): LCAD, LAC in the last 72 hours. 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     Elevated diaphragm ICD-10-CM: J98.6  ICD-9-CM: 519.4  7/2/2020 Unknown    CXR stable    Acute respiratory failure with hypoxemia [facility]) ICD-10-CM: J96.01  ICD-9-CM: 518.81  7/1/2020 Unknown    Weaned to 45%/60L    [facility] ICD-10-CM: A41.9  ICD-9-CM: 038.9, 995.91  7/1/2020 Unknown        CAP (community acquired pneumonia) ICD-10-CM: J18.9  ICD-9-CM: 537  9/26/2018 Yes        Leukocytosis ICD-10-CM: U83.485  ICD-9-CM: 288.60  9/26/2018 Yes    Ongoing    * (Principal) Acute hypoxemic respiratory failure ([facility] ICD-10-CM: J96.01  ICD-9-CM: 518.81  9/26/2018 Yes    Ongoing    Tobacco abuse ICD-10-CM: Z72.0  ICD-9-CM: 305.1  9/26/2018 Yes              Plan:  (Medical Decision Making)     --Wean O2 as tolerated  --Recheck ferritin and CRP. --Try to get back on Optiflow  --dexamethasone D8/10  --Convalescent plasma 7/2  --Remdesivir- last dose 7/7  --Attempted to call son but line busy x 2.   --[facility] once more stable    More than 50% of the time documented was spent in face-to-face contact with the patient and in the care of the patient on the floor/unit where the patient is located.     Madan White MD

## 2020-07-10 ENCOUNTER — APPOINTMENT (OUTPATIENT)
Dept: GENERAL RADIOLOGY | Age: 65
DRG: 871 | End: 2020-07-10
Attending: INTERNAL MEDICINE
Payer: MEDICARE

## 2020-07-10 LAB
ANION GAP SERPL CALC-SCNC: 7 MMOL/L (ref 7–16)
BASOPHILS # BLD: 0.1 K/UL (ref 0–0.2)
BASOPHILS NFR BLD: 0 % (ref 0–2)
BUN SERPL-MCNC: 19 MG/DL (ref 8–23)
CALCIUM SERPL-MCNC: 7.9 MG/DL (ref 8.3–10.4)
CHLORIDE SERPL-SCNC: 103 MMOL/L (ref 98–107)
CO2 SERPL-SCNC: 28 MMOL/L (ref 21–32)
CREAT SERPL-MCNC: 0.58 MG/DL (ref 0.6–1)
DIFFERENTIAL METHOD BLD: ABNORMAL
EOSINOPHIL # BLD: 0 K/UL (ref 0–0.8)
EOSINOPHIL NFR BLD: 0 % (ref 0.5–7.8)
ERYTHROCYTE [DISTWIDTH] IN BLOOD BY AUTOMATED COUNT: 13.9 % (ref 11.9–14.6)
GLUCOSE BLD STRIP.AUTO-MCNC: 146 MG/DL (ref 65–100)
GLUCOSE BLD STRIP.AUTO-MCNC: 151 MG/DL (ref 65–100)
GLUCOSE BLD STRIP.AUTO-MCNC: 195 MG/DL (ref 65–100)
GLUCOSE BLD STRIP.AUTO-MCNC: 242 MG/DL (ref 65–100)
GLUCOSE SERPL-MCNC: 190 MG/DL (ref 65–100)
HCT VFR BLD AUTO: 48.4 % (ref 35.8–46.3)
HGB BLD-MCNC: 15.4 G/DL (ref 11.7–15.4)
IMM GRANULOCYTES # BLD AUTO: 0.8 K/UL (ref 0–0.5)
IMM GRANULOCYTES NFR BLD AUTO: 3 % (ref 0–5)
LYMPHOCYTES # BLD: 0.9 K/UL (ref 0.5–4.6)
LYMPHOCYTES NFR BLD: 3 % (ref 13–44)
MAGNESIUM SERPL-MCNC: 2.3 MG/DL (ref 1.8–2.4)
MCH RBC QN AUTO: 27.6 PG (ref 26.1–32.9)
MCHC RBC AUTO-ENTMCNC: 31.8 G/DL (ref 31.4–35)
MCV RBC AUTO: 86.9 FL (ref 79.6–97.8)
MONOCYTES # BLD: 1.7 K/UL (ref 0.1–1.3)
MONOCYTES NFR BLD: 5 % (ref 4–12)
NEUTS SEG # BLD: 30.5 K/UL (ref 1.7–8.2)
NEUTS SEG NFR BLD: 90 % (ref 43–78)
NRBC # BLD: 0 K/UL (ref 0–0.2)
PLATELET # BLD AUTO: 346 K/UL (ref 150–450)
PMV BLD AUTO: 9.6 FL (ref 9.4–12.3)
POTASSIUM SERPL-SCNC: 4.4 MMOL/L (ref 3.5–5.1)
RBC # BLD AUTO: 5.57 M/UL (ref 4.05–5.2)
SODIUM SERPL-SCNC: 138 MMOL/L (ref 136–145)
WBC # BLD AUTO: 34 K/UL (ref 4.3–11.1)

## 2020-07-10 PROCEDURE — 99233 SBSQ HOSP IP/OBS HIGH 50: CPT | Performed by: INTERNAL MEDICINE

## 2020-07-10 PROCEDURE — 82962 GLUCOSE BLOOD TEST: CPT

## 2020-07-10 PROCEDURE — 94660 CPAP INITIATION&MGMT: CPT

## 2020-07-10 PROCEDURE — 94640 AIRWAY INHALATION TREATMENT: CPT

## 2020-07-10 PROCEDURE — 71045 X-RAY EXAM CHEST 1 VIEW: CPT

## 2020-07-10 PROCEDURE — 77010033711 HC HIGH FLOW OXYGEN

## 2020-07-10 PROCEDURE — 74011636637 HC RX REV CODE- 636/637: Performed by: HOSPITALIST

## 2020-07-10 PROCEDURE — 65610000006 HC RM INTENSIVE CARE

## 2020-07-10 PROCEDURE — 80048 BASIC METABOLIC PNL TOTAL CA: CPT

## 2020-07-10 PROCEDURE — 85025 COMPLETE CBC W/AUTO DIFF WBC: CPT

## 2020-07-10 PROCEDURE — 83735 ASSAY OF MAGNESIUM: CPT

## 2020-07-10 PROCEDURE — 36592 COLLECT BLOOD FROM PICC: CPT

## 2020-07-10 PROCEDURE — 74011250636 HC RX REV CODE- 250/636: Performed by: INTERNAL MEDICINE

## 2020-07-10 PROCEDURE — 74011250636 HC RX REV CODE- 250/636: Performed by: HOSPITALIST

## 2020-07-10 PROCEDURE — 74011636637 HC RX REV CODE- 636/637: Performed by: FAMILY MEDICINE

## 2020-07-10 PROCEDURE — 74011250637 HC RX REV CODE- 250/637: Performed by: INTERNAL MEDICINE

## 2020-07-10 RX ADMIN — ENOXAPARIN SODIUM 40 MG: 40 INJECTION SUBCUTANEOUS at 16:01

## 2020-07-10 RX ADMIN — MORPHINE SULFATE 2 MG: 2 INJECTION, SOLUTION INTRAMUSCULAR; INTRAVENOUS at 10:19

## 2020-07-10 RX ADMIN — MORPHINE SULFATE 2 MG: 2 INJECTION, SOLUTION INTRAMUSCULAR; INTRAVENOUS at 04:12

## 2020-07-10 RX ADMIN — PANTOPRAZOLE SODIUM 40 MG: 40 TABLET, DELAYED RELEASE ORAL at 08:01

## 2020-07-10 RX ADMIN — Medication 1000 MG: at 17:10

## 2020-07-10 RX ADMIN — Medication 220 MG: at 08:01

## 2020-07-10 RX ADMIN — INSULIN LISPRO 4 UNITS: 100 INJECTION, SOLUTION INTRAVENOUS; SUBCUTANEOUS at 21:36

## 2020-07-10 RX ADMIN — SODIUM CHLORIDE 20 ML: 9 INJECTION, SOLUTION INTRAMUSCULAR; INTRAVENOUS; SUBCUTANEOUS at 06:00

## 2020-07-10 RX ADMIN — INSULIN LISPRO 2 UNITS: 100 INJECTION, SOLUTION INTRAVENOUS; SUBCUTANEOUS at 07:30

## 2020-07-10 RX ADMIN — INSULIN LISPRO 2 UNITS: 100 INJECTION, SOLUTION INTRAVENOUS; SUBCUTANEOUS at 16:01

## 2020-07-10 RX ADMIN — DEXAMETHASONE 6 MG: 4 TABLET ORAL at 08:01

## 2020-07-10 RX ADMIN — INSULIN GLARGINE 10 UNITS: 100 INJECTION, SOLUTION SUBCUTANEOUS at 21:35

## 2020-07-10 RX ADMIN — DEXAMETHASONE 6 MG: 4 TABLET ORAL at 21:14

## 2020-07-10 RX ADMIN — BUDESONIDE AND FORMOTEROL FUMARATE DIHYDRATE 2 PUFF: 160; 4.5 AEROSOL RESPIRATORY (INHALATION) at 20:51

## 2020-07-10 RX ADMIN — SODIUM CHLORIDE 20 ML: 9 INJECTION, SOLUTION INTRAMUSCULAR; INTRAVENOUS; SUBCUTANEOUS at 13:03

## 2020-07-10 RX ADMIN — Medication 1000 MG: at 08:01

## 2020-07-10 RX ADMIN — MORPHINE SULFATE 2 MG: 2 INJECTION, SOLUTION INTRAMUSCULAR; INTRAVENOUS at 19:52

## 2020-07-10 RX ADMIN — SODIUM CHLORIDE 5 ML: 9 INJECTION, SOLUTION INTRAMUSCULAR; INTRAVENOUS; SUBCUTANEOUS at 13:03

## 2020-07-10 NOTE — PROGRESS NOTES
Critical Care Daily Progress Note: 7/10/2020    Bryon Wong   Admission Date: 7/1/2020         The patient's chart is reviewed and the patient is discussed with the staff. 65 y.o.  female seen and evaluated at the request of Dr. Laura Mcmanus.     Patient is a smoker who came in with worsening hypoxemia, fevers, diarrhea and dyspnea. She reports she has been staying home and no-one was sick but per chart indicates several family were sick. Temp was 102.2 and saturation was 82% on admission. CXR with infiltrates. Started ABX and optiflow. Only uses albuterol and home and ?still smoking. Rapid COVID testing was positive. Has been coughing up yellow sputum.      In [facility] currently and oxygen needs up on 70% optiflow and 60 LPM. Patient states dyspnea is less. Subjective: On BIPAP overnight but weaned to 50%/60L with sat of 96% and now back on BIPAP. Feels tired and dyspneic. Leukocytosis to 34K today with persistent lymphopenia. ABG yesterday with respiratory alkalosis. Hyperglycemia with FSBS 190  Creatinine stable    Inflammatory markers remain elevated but no signs of hemodynamic instability.           Current Facility-Administered Medications   Medication Dose Route Frequency    dexAMETHasone (DECADRON) tablet 6 mg  6 mg Oral Q12H    hydrALAZINE (APRESOLINE) 20 mg/mL injection 20 mg  20 mg IntraVENous Q6H PRN    LORazepam (ATIVAN) injection 1 mg  1 mg IntraVENous Q4H PRN    morphine injection 2 mg  2 mg IntraVENous Q4H PRN    pantoprazole (PROTONIX) tablet 40 mg  40 mg Oral DAILY    insulin glargine (LANTUS) injection 10 Units  10 Units SubCUTAneous QHS    insulin lispro (HUMALOG) injection   SubCUTAneous AC&HS    NUTRITIONAL SUPPORT ELECTROLYTE PRN ORDERS   Does Not Apply PRN    budesonide-formoteroL (SYMBICORT) 160-4.5 mcg/actuation HFA inhaler 2 Puff  2 Puff Inhalation BID RT    albuterol (PROVENTIL HFA, VENTOLIN HFA, PROAIR HFA) inhaler 2 Puff  2 Puff Inhalation Q4H PRN    0.9% sodium chloride infusion 250 mL  250 mL IntraVENous PRN    sodium chloride (NS) flush 20 mL  20 mL InterCATHeter Q8H    sodium chloride (NS) flush 20 mL  20 mL InterCATHeter PRN    sodium chloride (NS) flush 5-40 mL  5-40 mL IntraVENous Q8H    sodium chloride (NS) flush 5-40 mL  5-40 mL IntraVENous PRN    acetaminophen (TYLENOL) tablet 650 mg  650 mg Oral Q4H PRN    diphenhydrAMINE (BENADRYL) injection 12.5 mg  12.5 mg IntraVENous Q4H PRN    bisacodyL (DULCOLAX) tablet 5 mg  5 mg Oral DAILY PRN    ondansetron (ZOFRAN) injection 4 mg  4 mg IntraVENous Q4H PRN    enoxaparin (LOVENOX) injection 40 mg  40 mg SubCUTAneous Q24H    ascorbic acid (vitamin C) (VITAMIN C) tablet 1,000 mg  1,000 mg Oral BID    zinc sulfate tablet 220 mg  220 mg Oral DAILY    albuterol-ipratropium (DUO-NEB) 2.5 MG-0.5 MG/3 ML  3 mL Nebulization Q4H PRN    nicotine (NICODERM CQ) 14 mg/24 hr patch 1 Patch  1 Patch TransDERmal Q24H       Review of Systems    Constitutional:  negative for fever, chills, sweats  Cardiovascular:  negative for chest pain, palpitations, syncope, edema  Gastrointestinal:  negative for dysphagia, reflux, vomiting, diarrhea, abdominal pain, or melena  Neurologic:  negative for focal weakness, numbness, headache      Objective:     Vitals:    07/10/20 0502 07/10/20 0531 07/10/20 0657 07/10/20 0702   BP: 117/69 113/65  133/81   Pulse: (!) 108 100 99 (!) 105   Resp: 29 30 26 30   Temp:       SpO2: (!) 88% 92% 93% 94%   Weight:       Height:             Intake/Output Summary (Last 24 hours) at 7/10/2020 0572  Last data filed at 7/10/2020 0129  Gross per 24 hour   Intake 120 ml   Output 1525 ml   Net -1405 ml       Physical Exam:          Constitutional:  the patient is well developed and in mild distress on BIPAP  EENMT:  Sclera clear, pupils equal, oral mucosa moist  Respiratory: decreased bilaterally, no wheezing  Cardiovascular:  RRR without M,G,R  Gastrointestinal: soft and non-tender; with positive bowel sounds. Musculoskeletal: warm without cyanosis. There is no lower extremity edema, though mild cyanosis noted  Skin:  no jaundice or rashes, no wounds   Neurologic: no gross neuro deficits     Psychiatric:  alert and oriented x 3    LINES:  picc 7/2    DRIPS:  none    CXR:   7/9:          7/8:          LAB  Recent Labs     07/09/20  2103 07/09/20  1622 07/09/20  1219 07/09/20  0735 07/08/20  2247   GLUCPOC 199* 280* 191* 158* 177*      Recent Labs     07/10/20  0358 07/09/20  0444 07/08/20  0332   WBC 34.0* 19.4* 17.7*   HGB 15.4 14.3 14.5   HCT 48.4* 43.1 45.9    318 343     Recent Labs     07/10/20  0358 07/09/20  0444 07/08/20  0332    140 137   K 4.4 4.3 4.1    104 99   CO2 28 30 34*   * 160* 188*   BUN 19 17 21   CREA 0.58* 0.44* 0.59*   MG 2.3 2.3  --    PHOS  --  2.7  --    CA 7.9* 7.8* 7.8*   ALB  --   --  2.3*   TBILI  --   --  0.6   ALT  --   --  54     Recent Labs     07/08/20  1936 07/07/20  1520   PHI 7.49* 7.54*   PCO2I 30.1* 34.8*   PO2I 98 72*   HCO3I 23.0 29.8*     No results for input(s): LCAD, LAC in the last 72 hours.   Recent Labs     07/08/20  1936 07/07/20  1520   PHI 7.49* 7.54*   PCO2I 30.1* 34.8*   PO2I 98 72*   HCO3I 23.0 29.8*       Patient Active Problem List   Diagnosis Code    CAP (community acquired pneumonia) J18.9    Leukocytosis D72.829    Acute hypoxemic respiratory failure (HCC) J96.01    Tobacco abuse Z72.0    Suspected COVID-19 virus infection Z20.828    Acute respiratory failure with hypoxemia (HCC) J96.01    Sepsis ([facility]) A41.9    Pneumonia due to COVID-19 virus U07.1, J12.89    Chronic obstructive pulmonary disease with acute exacerbation (HCC)  -- unspecified type J44.1    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 Elevated diaphragm ICD-10-CM: J98.6  ICD-9-CM: 519.4  7/2/2020 Unknown    CXR stable    Acute respiratory failure with hypoxemia [facility]) ICD-10-CM: J96.01  ICD-9-CM: 518.81  7/1/2020 Unknown    Weaned to 45%/60L    [facility] ICD-10-CM: A41.9  ICD-9-CM: 038.9, 995.91  7/1/2020 Unknown        CAP (community acquired pneumonia) ICD-10-CM: J18.9  ICD-9-CM: 409  9/26/2018 Yes        Leukocytosis ICD-10-CM: K29.092  ICD-9-CM: 288.60  9/26/2018 Yes    Ongoing    * (Principal) Acute hypoxemic respiratory failure ([facility] ICD-10-CM: J96.01  ICD-9-CM: 518.81  9/26/2018 Yes    Ongoing    Tobacco abuse ICD-10-CM: Z72.0  ICD-9-CM: 305.1  9/26/2018 Yes              Plan:  (Medical Decision Making)     --Wean O2 as tolerated. If requiring BiPAP consistently today would consider elective intubation as appears to be tiring some. --Try to get back on Optiflow  --dexamethasone D9/10  --Convalescent plasma 7/2  --Remdesivir- last dose 7/7  --Consider [facility] once more stable    More than 50% of the time documented was spent in face-to-face contact with the patient and in the care of the patient on the floor/unit where the patient is located.     Quintin De Los Santos MD

## 2020-07-10 NOTE — PROGRESS NOTES
Chart screened by  for discharge planning. No needs identified at this time. Pt is in BMT ICU requiring  BiPap at 50%. Please consult  if any new issues arise  Case management will continue to follow.

## 2020-07-10 NOTE — PROGRESS NOTES
Bedside, Verbal and Written shift change report given to [address], RN (oncoming nurse) by Dylan Espinoza RN (offgoing nurse). Report included the following information SBAR, Kardex, Intake/Output, MAR, Recent Results, Cardiac Rhythm ST and Alarm Parameters .

## 2020-07-10 NOTE — PROGRESS NOTES
PT Note:  Treatment attempted this AM but RN request hold. Pt currently on BIPAP. Will re-attempt pending schedule and pt status.   Thanks,  Hal Krueger, PT, DPT

## 2020-07-11 ENCOUNTER — APPOINTMENT (OUTPATIENT)
Dept: GENERAL RADIOLOGY | Age: 65
DRG: 871 | End: 2020-07-11
Attending: INTERNAL MEDICINE
Payer: MEDICARE

## 2020-07-11 LAB
ANION GAP SERPL CALC-SCNC: 6 MMOL/L (ref 7–16)
BASOPHILS # BLD: 0 K/UL (ref 0–0.2)
BASOPHILS NFR BLD: 0 % (ref 0–2)
BUN SERPL-MCNC: 26 MG/DL (ref 8–23)
CALCIUM SERPL-MCNC: 7.9 MG/DL (ref 8.3–10.4)
CHLORIDE SERPL-SCNC: 103 MMOL/L (ref 98–107)
CO2 SERPL-SCNC: 29 MMOL/L (ref 21–32)
CREAT SERPL-MCNC: 0.49 MG/DL (ref 0.6–1)
DIFFERENTIAL METHOD BLD: ABNORMAL
EOSINOPHIL # BLD: 0 K/UL (ref 0–0.8)
EOSINOPHIL NFR BLD: 0 % (ref 0.5–7.8)
ERYTHROCYTE [DISTWIDTH] IN BLOOD BY AUTOMATED COUNT: 14 % (ref 11.9–14.6)
GLUCOSE BLD STRIP.AUTO-MCNC: 131 MG/DL (ref 65–100)
GLUCOSE BLD STRIP.AUTO-MCNC: 185 MG/DL (ref 65–100)
GLUCOSE BLD STRIP.AUTO-MCNC: 186 MG/DL (ref 65–100)
GLUCOSE BLD STRIP.AUTO-MCNC: 205 MG/DL (ref 65–100)
GLUCOSE SERPL-MCNC: 167 MG/DL (ref 65–100)
HCT VFR BLD AUTO: 41.1 % (ref 35.8–46.3)
HGB BLD-MCNC: 13.7 G/DL (ref 11.7–15.4)
IMM GRANULOCYTES # BLD AUTO: 0.4 K/UL (ref 0–0.5)
IMM GRANULOCYTES NFR BLD AUTO: 2 % (ref 0–5)
LYMPHOCYTES # BLD: 0.7 K/UL (ref 0.5–4.6)
LYMPHOCYTES NFR BLD: 4 % (ref 13–44)
MCH RBC QN AUTO: 28.7 PG (ref 26.1–32.9)
MCHC RBC AUTO-ENTMCNC: 33.3 G/DL (ref 31.4–35)
MCV RBC AUTO: 86.2 FL (ref 79.6–97.8)
MONOCYTES # BLD: 0.8 K/UL (ref 0.1–1.3)
MONOCYTES NFR BLD: 4 % (ref 4–12)
NEUTS SEG # BLD: 16.9 K/UL (ref 1.7–8.2)
NEUTS SEG NFR BLD: 90 % (ref 43–78)
NRBC # BLD: 0 K/UL (ref 0–0.2)
PLATELET # BLD AUTO: 269 K/UL (ref 150–450)
PMV BLD AUTO: 10.1 FL (ref 9.4–12.3)
POTASSIUM SERPL-SCNC: 4.1 MMOL/L (ref 3.5–5.1)
RBC # BLD AUTO: 4.77 M/UL (ref 4.05–5.2)
SODIUM SERPL-SCNC: 138 MMOL/L (ref 136–145)
WBC # BLD AUTO: 18.8 K/UL (ref 4.3–11.1)

## 2020-07-11 PROCEDURE — 74011250636 HC RX REV CODE- 250/636: Performed by: INTERNAL MEDICINE

## 2020-07-11 PROCEDURE — 74011250637 HC RX REV CODE- 250/637: Performed by: INTERNAL MEDICINE

## 2020-07-11 PROCEDURE — 65610000006 HC RM INTENSIVE CARE

## 2020-07-11 PROCEDURE — 74011636637 HC RX REV CODE- 636/637: Performed by: FAMILY MEDICINE

## 2020-07-11 PROCEDURE — 99233 SBSQ HOSP IP/OBS HIGH 50: CPT | Performed by: INTERNAL MEDICINE

## 2020-07-11 PROCEDURE — 74011636637 HC RX REV CODE- 636/637: Performed by: HOSPITALIST

## 2020-07-11 PROCEDURE — 74011250636 HC RX REV CODE- 250/636: Performed by: HOSPITALIST

## 2020-07-11 PROCEDURE — 94660 CPAP INITIATION&MGMT: CPT

## 2020-07-11 PROCEDURE — 77010033711 HC HIGH FLOW OXYGEN

## 2020-07-11 PROCEDURE — 80048 BASIC METABOLIC PNL TOTAL CA: CPT

## 2020-07-11 PROCEDURE — 85025 COMPLETE CBC W/AUTO DIFF WBC: CPT

## 2020-07-11 PROCEDURE — 94640 AIRWAY INHALATION TREATMENT: CPT

## 2020-07-11 PROCEDURE — 82962 GLUCOSE BLOOD TEST: CPT

## 2020-07-11 PROCEDURE — 71045 X-RAY EXAM CHEST 1 VIEW: CPT

## 2020-07-11 RX ADMIN — ALBUTEROL SULFATE 2 PUFF: 90 AEROSOL, METERED RESPIRATORY (INHALATION) at 20:08

## 2020-07-11 RX ADMIN — LORAZEPAM 1 MG: 2 INJECTION INTRAMUSCULAR; INTRAVENOUS at 22:05

## 2020-07-11 RX ADMIN — DEXAMETHASONE 6 MG: 4 TABLET ORAL at 21:00

## 2020-07-11 RX ADMIN — SODIUM CHLORIDE 20 ML: 9 INJECTION, SOLUTION INTRAMUSCULAR; INTRAVENOUS; SUBCUTANEOUS at 22:00

## 2020-07-11 RX ADMIN — INSULIN LISPRO 4 UNITS: 100 INJECTION, SOLUTION INTRAVENOUS; SUBCUTANEOUS at 11:44

## 2020-07-11 RX ADMIN — DEXAMETHASONE 6 MG: 4 TABLET ORAL at 09:00

## 2020-07-11 RX ADMIN — INSULIN LISPRO 2 UNITS: 100 INJECTION, SOLUTION INTRAVENOUS; SUBCUTANEOUS at 23:04

## 2020-07-11 RX ADMIN — INSULIN GLARGINE 10 UNITS: 100 INJECTION, SOLUTION SUBCUTANEOUS at 22:00

## 2020-07-11 RX ADMIN — SODIUM CHLORIDE 5 ML: 9 INJECTION, SOLUTION INTRAMUSCULAR; INTRAVENOUS; SUBCUTANEOUS at 13:16

## 2020-07-11 RX ADMIN — SODIUM CHLORIDE 10 ML: 9 INJECTION, SOLUTION INTRAMUSCULAR; INTRAVENOUS; SUBCUTANEOUS at 06:36

## 2020-07-11 RX ADMIN — MORPHINE SULFATE 2 MG: 2 INJECTION, SOLUTION INTRAMUSCULAR; INTRAVENOUS at 02:30

## 2020-07-11 RX ADMIN — SODIUM CHLORIDE 10 ML: 9 INJECTION, SOLUTION INTRAMUSCULAR; INTRAVENOUS; SUBCUTANEOUS at 01:54

## 2020-07-11 RX ADMIN — MORPHINE SULFATE 2 MG: 2 INJECTION, SOLUTION INTRAMUSCULAR; INTRAVENOUS at 15:35

## 2020-07-11 RX ADMIN — Medication 1000 MG: at 09:00

## 2020-07-11 RX ADMIN — MORPHINE SULFATE 2 MG: 2 INJECTION, SOLUTION INTRAMUSCULAR; INTRAVENOUS at 08:49

## 2020-07-11 RX ADMIN — SODIUM CHLORIDE 10 ML: 9 INJECTION, SOLUTION INTRAMUSCULAR; INTRAVENOUS; SUBCUTANEOUS at 22:00

## 2020-07-11 RX ADMIN — BUDESONIDE AND FORMOTEROL FUMARATE DIHYDRATE 2 PUFF: 160; 4.5 AEROSOL RESPIRATORY (INHALATION) at 20:08

## 2020-07-11 RX ADMIN — PANTOPRAZOLE SODIUM 40 MG: 40 TABLET, DELAYED RELEASE ORAL at 09:00

## 2020-07-11 RX ADMIN — Medication 220 MG: at 09:00

## 2020-07-11 RX ADMIN — BUDESONIDE AND FORMOTEROL FUMARATE DIHYDRATE 2 PUFF: 160; 4.5 AEROSOL RESPIRATORY (INHALATION) at 08:00

## 2020-07-11 RX ADMIN — ENOXAPARIN SODIUM 40 MG: 40 INJECTION SUBCUTANEOUS at 16:00

## 2020-07-11 RX ADMIN — SODIUM CHLORIDE 20 ML: 9 INJECTION, SOLUTION INTRAMUSCULAR; INTRAVENOUS; SUBCUTANEOUS at 13:16

## 2020-07-11 RX ADMIN — SODIUM CHLORIDE 20 ML: 9 INJECTION, SOLUTION INTRAMUSCULAR; INTRAVENOUS; SUBCUTANEOUS at 06:36

## 2020-07-11 RX ADMIN — INSULIN LISPRO 2 UNITS: 100 INJECTION, SOLUTION INTRAVENOUS; SUBCUTANEOUS at 15:34

## 2020-07-11 RX ADMIN — SODIUM CHLORIDE 20 ML: 9 INJECTION, SOLUTION INTRAMUSCULAR; INTRAVENOUS; SUBCUTANEOUS at 01:54

## 2020-07-11 NOTE — PROGRESS NOTES
Problem: Risk for Spread of Infection  Goal: Prevent transmission of infectious organism to others  Description: Prevent the transmission of infectious organisms to other patients, staff members, and visitors. Outcome: Progressing Towards Goal     Problem: Patient Education:  Go to Education Activity  Goal: Patient/Family Education  Outcome: Progressing Towards Goal     Problem: Falls - Risk of  Goal: *Absence of Falls  Description: Document Cristobal Conteh Fall Risk and appropriate interventions in the flowsheet. Outcome: Progressing Towards Goal  Note: Fall Risk Interventions:  Mobility Interventions: Assess mobility with egress test, Bed/chair exit alarm, Communicate number of staff needed for ambulation/transfer, OT consult for ADLs, Patient to call before getting OOB, PT Consult for mobility concerns, PT Consult for assist device competence, Strengthening exercises (ROM-active/passive), Utilize walker, cane, or other assistive device, Utilize gait belt for transfers/ambulation         Medication Interventions: Bed/chair exit alarm, Evaluate medications/consider consulting pharmacy, Patient to call before getting OOB, Teach patient to arise slowly, Utilize gait belt for transfers/ambulation    Elimination Interventions: Bed/chair exit alarm, Call light in reach, Patient to call for help with toileting needs, Toilet paper/wipes in reach, Toileting schedule/hourly rounds              Problem: Patient Education: Go to Patient Education Activity  Goal: Patient/Family Education  Outcome: Progressing Towards Goal     Problem: Pressure Injury - Risk of  Goal: *Prevention of pressure injury  Description: Document Dale Scale and appropriate interventions in the flowsheet.   Outcome: Progressing Towards Goal  Note: Pressure Injury Interventions:  Sensory Interventions: Assess changes in LOC, Assess need for specialty bed, Avoid rigorous massage over bony prominences, Chair cushion, Check visual cues for pain, Discuss PT/OT consult with provider, Float heels, Keep linens dry and wrinkle-free, Maintain/enhance activity level, Minimize linen layers, Monitor skin under medical devices, Pressure redistribution bed/mattress (bed type), Turn and reposition approx. every two hours (pillows and wedges if needed)    Moisture Interventions: Absorbent underpads, Apply protective barrier, creams and emollients, Assess need for specialty bed, Check for incontinence Q2 hours and as needed, Internal/External urinary devices, Minimize layers, Moisture barrier    Activity Interventions: Assess need for specialty bed, Chair cushion, Increase time out of bed, Pressure redistribution bed/mattress(bed type), PT/OT evaluation    Mobility Interventions: Assess need for specialty bed, Chair cushion, Float heels, HOB 30 degrees or less, Pressure redistribution bed/mattress (bed type), PT/OT evaluation    Nutrition Interventions: Document food/fluid/supplement intake, Discuss nutritional consult with provider    Friction and Shear Interventions: Apply protective barrier, creams and emollients, Foam dressings/transparent film/skin sealants, Feet elevated on foot rest, HOB 30 degrees or less, Minimize layers, Transfer aides:transfer board/Jessica lift/ceiling lift, Transferring/repositioning devices                Problem: Patient Education: Go to Patient Education Activity  Goal: Patient/Family Education  Outcome: Progressing Towards Goal     Problem: Breathing Pattern - Ineffective  Goal: *Absence of hypoxia  Outcome: Progressing Towards Goal     Problem: Patient Education: Go to Patient Education Activity  Goal: Patient/Family Education  Description: 1. Patient will complete lower body bathing and dressing with MOD I and adaptive equipment as needed. 2. Patient will complete toileting with MOD I.   3. Patient will tolerate 25 minutes of OT treatment with 2-3 rest breaks to increase activity tolerance for ADLs.    4. Patient will complete functional transfers with MOD I and adaptive equipment as needed. 5. Patient will complete functional mobility for household distances with MOD I and adaptive equipment as needed. 6. Patient will complete self-grooming tasks while standing edge of sink with MOD I and adaptive equipment as needed. Timeframe: 7 visits      Outcome: Progressing Towards Goal     Problem: Gas Exchange - Impaired  Goal: *Absence of hypoxia  Outcome: Progressing Towards Goal     Problem: Diabetes Self-Management  Goal: *Disease process and treatment process  Description: Define diabetes and identify own type of diabetes; list 3 options for treating diabetes. Outcome: Progressing Towards Goal  Goal: *Incorporating nutritional management into lifestyle  Description: Describe effect of type, amount and timing of food on blood glucose; list 3 methods for planning meals. Outcome: Progressing Towards Goal  Goal: *Incorporating physical activity into lifestyle  Description: State effect of exercise on blood glucose levels. Outcome: Progressing Towards Goal  Goal: *Developing strategies to promote health/change behavior  Description: Define the ABC's of diabetes; identify appropriate screenings, schedule and personal plan for screenings. Outcome: Progressing Towards Goal  Goal: *Using medications safely  Description: State effect of diabetes medications on diabetes; name diabetes medication taking, action and side effects. Outcome: Progressing Towards Goal  Goal: *Monitoring blood glucose, interpreting and using results  Description: Identify recommended blood glucose targets  and personal targets. Outcome: Progressing Towards Goal  Goal: *Prevention, detection, treatment of acute complications  Description: List symptoms of hyper- and hypoglycemia; describe how to treat low blood sugar and actions for lowering  high blood glucose level.   Outcome: Progressing Towards Goal  Goal: *Prevention, detection and treatment of chronic complications  Description: Define the natural course of diabetes and describe the relationship of blood glucose levels to long term complications of diabetes.   Outcome: Progressing Towards Goal  Goal: *Developing strategies to address psychosocial issues  Description: Describe feelings about living with diabetes; identify support needed and support network  Outcome: Progressing Towards Goal  Goal: *Insulin pump training  Outcome: Progressing Towards Goal  Goal: *Sick day guidelines  Outcome: Progressing Towards Goal  Goal: *Patient Specific Goal (EDIT GOAL, INSERT TEXT)  Outcome: Progressing Towards Goal     Problem: Patient Education: Go to Patient Education Activity  Goal: Patient/Family Education  Outcome: Progressing Towards Goal     Problem: Nutrition Deficit  Goal: *Optimize nutritional status  Outcome: Progressing Towards Goal

## 2020-07-11 NOTE — PROGRESS NOTES
Ronnie Woo  Admission Date: 7/1/2020             Daily Progress Note: 7/11/2020    The patient's chart is reviewed and the patient is discussed with the staff. 65 y.o.  female seen and evaluated at the request of Dr. Alexandro Gallego.     Patient is a smoker who came in with worsening hypoxemia, fevers, diarrhea and dyspnea. She reports she has been staying home and no-one was sick but per chart indicates several family were sick. Temp was 102.2 and saturation was 82% on admission. CXR with infiltrates. Started ABX and optiflow. Only uses albuterol and home and ?still smoking. Rapid COVID testing was positive. Has been coughing up yellow sputum.      In [facility] currently and oxygen needs up on 70% optiflow and 60 LPM. Patient states dyspnea is less. Subjective:     Patient currently on airvo 100% satting low 90's. Feels like she needs to go back on bipap. No new issues.      Current Facility-Administered Medications   Medication Dose Route Frequency    dexAMETHasone (DECADRON) tablet 6 mg  6 mg Oral Q12H    hydrALAZINE (APRESOLINE) 20 mg/mL injection 20 mg  20 mg IntraVENous Q6H PRN    LORazepam (ATIVAN) injection 1 mg  1 mg IntraVENous Q4H PRN    morphine injection 2 mg  2 mg IntraVENous Q4H PRN    pantoprazole (PROTONIX) tablet 40 mg  40 mg Oral DAILY    insulin glargine (LANTUS) injection 10 Units  10 Units SubCUTAneous QHS    insulin lispro (HUMALOG) injection   SubCUTAneous AC&HS    NUTRITIONAL SUPPORT ELECTROLYTE PRN ORDERS   Does Not Apply PRN    budesonide-formoteroL (SYMBICORT) 160-4.5 mcg/actuation HFA inhaler 2 Puff  2 Puff Inhalation BID RT    albuterol (PROVENTIL HFA, VENTOLIN HFA, PROAIR HFA) inhaler 2 Puff  2 Puff Inhalation Q4H PRN    0.9% sodium chloride infusion 250 mL  250 mL IntraVENous PRN    sodium chloride (NS) flush 20 mL  20 mL InterCATHeter Q8H    sodium chloride (NS) flush 20 mL  20 mL InterCATHeter PRN    sodium chloride (NS) flush 5-40 mL  5-40 mL IntraVENous Q8H    sodium chloride (NS) flush 5-40 mL  5-40 mL IntraVENous PRN    acetaminophen (TYLENOL) tablet 650 mg  650 mg Oral Q4H PRN    diphenhydrAMINE (BENADRYL) injection 12.5 mg  12.5 mg IntraVENous Q4H PRN    bisacodyL (DULCOLAX) tablet 5 mg  5 mg Oral DAILY PRN    ondansetron (ZOFRAN) injection 4 mg  4 mg IntraVENous Q4H PRN    enoxaparin (LOVENOX) injection 40 mg  40 mg SubCUTAneous Q24H    albuterol-ipratropium (DUO-NEB) 2.5 MG-0.5 MG/3 ML  3 mL Nebulization Q4H PRN    nicotine (NICODERM CQ) 14 mg/24 hr patch 1 Patch  1 Patch TransDERmal Q24H       Review of Systems  Constitutional: negative for fever, chills, sweats  Cardiovascular: negative for chest pain, palpitations, syncope, edema  Gastrointestinal: negative for dysphagia, reflux, vomiting, diarrhea, abdominal pain, or melena  Neurologic: negative for focal weakness, numbness, headache    Objective:     Vitals:    07/11/20 0930 07/11/20 0931 07/11/20 1000 07/11/20 1100   BP:  109/54 113/50 118/54   Pulse: (!) 102  92 (!) 103   Resp: 29  27 23   Temp:       SpO2:   95% 92%   Weight:       Height:         Intake and Output:   07/09 1901 - 07/11 0700  In: 270 [P.O.:270]  Out: 1675 [Urine:1675]  No intake/output data recorded. Physical Exam:   Constitution:  the patient is well developed and in no acute distress  EENMT:  Sclera clear, pupils equal, oral mucosa moist  Respiratory: Mild B inspiratory crackles  Cardiovascular:  RRR without M,G,R  Gastrointestinal: soft and non-tender; with positive bowel sounds. Musculoskeletal: warm without cyanosis. There is no lower leg edema. Skin:  no jaundice or rashes, no wounds   Neurologic: no gross neuro deficits     Psychiatric:  alert and oriented x ppt    CHEST XRAY:   7/11/20  IMPRESSION: Unchanged bibasilar lung infiltrates.       LAB  No lab exists for component: [place]   Recent Labs     07/11/20  0414 07/10/20  0358 07/09/20  0444   WBC 18.8* 34.0* 19.4*   HGB 13.7 15.4 14.3 HCT 41.1 48.4* 43.1    346 318     Recent Labs     07/11/20  0626 07/10/20  0358 07/09/20  0444    138 140   K 4.1 4.4 4.3    103 104   CO2 29 28 30   * 190* 160*   BUN 26* 19 17   CREA 0.49* 0.58* 0.44*   MG  --  2.3 2.3   CA 7.9* 7.9* 7.8*   PHOS  --   --  2.7     ABG:  No results found for: PH, PHI, PCO2, PCO2I, PO2, PO2I, HCO3, HCO3I, FIO2, FIO2I    Assessment:  (Medical Decision Making)     Hospital Problems  Never Reviewed          Codes Class Noted POA    Pneumonia due to COVID-19 virus ICD-10-CM: U07.1, J12.89  ICD-9-CM: 480.8  7/2/2020 Unknown        Chronic obstructive pulmonary disease with acute exacerbation (HCC)  -- unspecified type ICD-10-CM: J44.1  ICD-9-CM: 491.21  7/2/2020 Unknown        Elevated diaphragm ICD-10-CM: J98.6  ICD-9-CM: 519.4  7/2/2020 Unknown        Acute respiratory failure with hypoxemia (HCC) ICD-10-CM: J96.01  ICD-9-CM: 518.81  7/1/2020 Unknown        Sepsis ([address] ICD-10-CM: A41.9  ICD-9-CM: 038.9, 995.91  7/1/2020 Unknown        CAP (community acquired pneumonia) ICD-10-CM: J18.9  ICD-9-CM: 072  9/26/2018 Yes        Leukocytosis ICD-10-CM: I26.296  ICD-9-CM: 288.60  9/26/2018 Yes        * (Principal) Acute hypoxemic respiratory failure (HCC) ICD-10-CM: J96.01  ICD-9-CM: 518.81  9/26/2018 Yes        Tobacco abuse ICD-10-CM: Z72.0  ICD-9-CM: 305.1  9/26/2018 Yes              Pt with severe acute hypoxic respiratory failure due to COVID infection. Ongoing high o2 needs. Plan:  (Medical Decision Making)   -still close to needing intubation.  Rest back on bipap off and on throughout the day.   -dexa day 10/10  -s/p convalescent plasma 7/2  -completed remdesivir.   -symbicort  -lantus with ssi.   -ppi  -lovenox      Aleja Sullivan MD

## 2020-07-11 NOTE — PROGRESS NOTES
O2 saturation not showing on monitor. Pulse oximeter probe changed out. Continues to not show O2 saturation level. Respiratory therapist notified of need of O2 monitor and patient c/o air being too warm with water coming up into nose from tubing.

## 2020-07-12 ENCOUNTER — APPOINTMENT (OUTPATIENT)
Dept: GENERAL RADIOLOGY | Age: 65
DRG: 871 | End: 2020-07-12
Attending: INTERNAL MEDICINE
Payer: MEDICARE

## 2020-07-12 LAB
ANION GAP SERPL CALC-SCNC: 7 MMOL/L (ref 7–16)
BUN SERPL-MCNC: 26 MG/DL (ref 8–23)
CALCIUM SERPL-MCNC: 7.7 MG/DL (ref 8.3–10.4)
CHLORIDE SERPL-SCNC: 105 MMOL/L (ref 98–107)
CO2 SERPL-SCNC: 28 MMOL/L (ref 21–32)
CREAT SERPL-MCNC: 0.66 MG/DL (ref 0.6–1)
ERYTHROCYTE [DISTWIDTH] IN BLOOD BY AUTOMATED COUNT: 14.1 % (ref 11.9–14.6)
GLUCOSE BLD STRIP.AUTO-MCNC: 144 MG/DL (ref 65–100)
GLUCOSE BLD STRIP.AUTO-MCNC: 155 MG/DL (ref 65–100)
GLUCOSE BLD STRIP.AUTO-MCNC: 157 MG/DL (ref 65–100)
GLUCOSE BLD STRIP.AUTO-MCNC: 198 MG/DL (ref 65–100)
GLUCOSE SERPL-MCNC: 159 MG/DL (ref 65–100)
HCT VFR BLD AUTO: 40.1 % (ref 35.8–46.3)
HGB BLD-MCNC: 12.6 G/DL (ref 11.7–15.4)
MCH RBC QN AUTO: 27.5 PG (ref 26.1–32.9)
MCHC RBC AUTO-ENTMCNC: 31.4 G/DL (ref 31.4–35)
MCV RBC AUTO: 87.4 FL (ref 79.6–97.8)
NRBC # BLD: 0 K/UL (ref 0–0.2)
PLATELET # BLD AUTO: 269 K/UL (ref 150–450)
PMV BLD AUTO: 10.2 FL (ref 9.4–12.3)
POTASSIUM SERPL-SCNC: 4.6 MMOL/L (ref 3.5–5.1)
RBC # BLD AUTO: 4.59 M/UL (ref 4.05–5.2)
SODIUM SERPL-SCNC: 140 MMOL/L (ref 136–145)
WBC # BLD AUTO: 17.5 K/UL (ref 4.3–11.1)

## 2020-07-12 PROCEDURE — 74011636637 HC RX REV CODE- 636/637: Performed by: FAMILY MEDICINE

## 2020-07-12 PROCEDURE — 74011250637 HC RX REV CODE- 250/637: Performed by: INTERNAL MEDICINE

## 2020-07-12 PROCEDURE — 36592 COLLECT BLOOD FROM PICC: CPT

## 2020-07-12 PROCEDURE — 77010033711 HC HIGH FLOW OXYGEN

## 2020-07-12 PROCEDURE — 71045 X-RAY EXAM CHEST 1 VIEW: CPT

## 2020-07-12 PROCEDURE — 74011250636 HC RX REV CODE- 250/636: Performed by: INTERNAL MEDICINE

## 2020-07-12 PROCEDURE — 80048 BASIC METABOLIC PNL TOTAL CA: CPT

## 2020-07-12 PROCEDURE — 85027 COMPLETE CBC AUTOMATED: CPT

## 2020-07-12 PROCEDURE — 65610000006 HC RM INTENSIVE CARE

## 2020-07-12 PROCEDURE — 82962 GLUCOSE BLOOD TEST: CPT

## 2020-07-12 PROCEDURE — 74011636637 HC RX REV CODE- 636/637: Performed by: INTERNAL MEDICINE

## 2020-07-12 PROCEDURE — 74011250636 HC RX REV CODE- 250/636: Performed by: HOSPITALIST

## 2020-07-12 PROCEDURE — 94640 AIRWAY INHALATION TREATMENT: CPT

## 2020-07-12 PROCEDURE — 99232 SBSQ HOSP IP/OBS MODERATE 35: CPT | Performed by: INTERNAL MEDICINE

## 2020-07-12 RX ORDER — INSULIN GLARGINE 100 [IU]/ML
5 INJECTION, SOLUTION SUBCUTANEOUS
Status: DISCONTINUED | OUTPATIENT
Start: 2020-07-12 | End: 2020-07-20 | Stop reason: HOSPADM

## 2020-07-12 RX ADMIN — MORPHINE SULFATE 2 MG: 2 INJECTION, SOLUTION INTRAMUSCULAR; INTRAVENOUS at 22:55

## 2020-07-12 RX ADMIN — ACETAMINOPHEN 650 MG: 325 TABLET, FILM COATED ORAL at 01:25

## 2020-07-12 RX ADMIN — BUDESONIDE AND FORMOTEROL FUMARATE DIHYDRATE 2 PUFF: 160; 4.5 AEROSOL RESPIRATORY (INHALATION) at 19:11

## 2020-07-12 RX ADMIN — MORPHINE SULFATE 2 MG: 2 INJECTION, SOLUTION INTRAMUSCULAR; INTRAVENOUS at 10:07

## 2020-07-12 RX ADMIN — SODIUM CHLORIDE 20 ML: 9 INJECTION, SOLUTION INTRAMUSCULAR; INTRAVENOUS; SUBCUTANEOUS at 13:43

## 2020-07-12 RX ADMIN — MORPHINE SULFATE 2 MG: 2 INJECTION, SOLUTION INTRAMUSCULAR; INTRAVENOUS at 18:16

## 2020-07-12 RX ADMIN — DEXAMETHASONE 6 MG: 4 TABLET ORAL at 08:04

## 2020-07-12 RX ADMIN — PANTOPRAZOLE SODIUM 40 MG: 40 TABLET, DELAYED RELEASE ORAL at 08:04

## 2020-07-12 RX ADMIN — BUDESONIDE AND FORMOTEROL FUMARATE DIHYDRATE 2 PUFF: 160; 4.5 AEROSOL RESPIRATORY (INHALATION) at 08:21

## 2020-07-12 RX ADMIN — INSULIN LISPRO 2 UNITS: 100 INJECTION, SOLUTION INTRAVENOUS; SUBCUTANEOUS at 11:30

## 2020-07-12 RX ADMIN — SODIUM CHLORIDE 10 ML: 9 INJECTION, SOLUTION INTRAMUSCULAR; INTRAVENOUS; SUBCUTANEOUS at 13:43

## 2020-07-12 RX ADMIN — INSULIN LISPRO 2 UNITS: 100 INJECTION, SOLUTION INTRAVENOUS; SUBCUTANEOUS at 22:00

## 2020-07-12 RX ADMIN — MORPHINE SULFATE 2 MG: 2 INJECTION, SOLUTION INTRAMUSCULAR; INTRAVENOUS at 05:35

## 2020-07-12 RX ADMIN — SODIUM CHLORIDE 20 ML: 9 INJECTION, SOLUTION INTRAMUSCULAR; INTRAVENOUS; SUBCUTANEOUS at 22:00

## 2020-07-12 RX ADMIN — ALBUTEROL SULFATE 2 PUFF: 90 AEROSOL, METERED RESPIRATORY (INHALATION) at 19:11

## 2020-07-12 RX ADMIN — INSULIN GLARGINE 5 UNITS: 100 INJECTION, SOLUTION SUBCUTANEOUS at 22:00

## 2020-07-12 RX ADMIN — ENOXAPARIN SODIUM 40 MG: 40 INJECTION SUBCUTANEOUS at 16:48

## 2020-07-12 RX ADMIN — MORPHINE SULFATE 2 MG: 2 INJECTION, SOLUTION INTRAMUSCULAR; INTRAVENOUS at 01:25

## 2020-07-12 RX ADMIN — SODIUM CHLORIDE 10 ML: 9 INJECTION, SOLUTION INTRAMUSCULAR; INTRAVENOUS; SUBCUTANEOUS at 05:53

## 2020-07-12 RX ADMIN — INSULIN LISPRO 2 UNITS: 100 INJECTION, SOLUTION INTRAVENOUS; SUBCUTANEOUS at 16:50

## 2020-07-12 RX ADMIN — MORPHINE SULFATE 2 MG: 2 INJECTION, SOLUTION INTRAMUSCULAR; INTRAVENOUS at 14:35

## 2020-07-12 RX ADMIN — ACETAMINOPHEN 650 MG: 325 TABLET, FILM COATED ORAL at 10:07

## 2020-07-12 RX ADMIN — SODIUM CHLORIDE 20 ML: 9 INJECTION, SOLUTION INTRAMUSCULAR; INTRAVENOUS; SUBCUTANEOUS at 05:53

## 2020-07-12 NOTE — PROGRESS NOTES
Kim Foster I have a dose of morphine? My feet, legs, and head are hurting\". Morphine 2 mg IV and tylenol po administered for c/o pain.

## 2020-07-12 NOTE — PROGRESS NOTES
Bedside shift change report given to [address] (oncoming nurse) by Yasmin Ellsworth RN and Kameron RN (offgoing nurse). Report included the following information Kardex, Intake/Output, MAR, Recent Results, Med Rec Status and Cardiac Rhythm SR. Alert and oriented on airvo. Left arm PICC, capped. Tilley patent. No DTIs/No pressure ulcers observed.

## 2020-07-12 NOTE — PROGRESS NOTES
No change in assessment. Consumed 100% of turkey sandwich tray. Ativan 1 mg IV administered for anxiety per Mrs. Nugent's request. (see MAR). Jhonatan (son) and José Miguel Lyon called in to hospital for update on Mrs. Nugent (correct security code provided by family) Updated on plan of care.

## 2020-07-12 NOTE — PROGRESS NOTES
Bedside shift change report given to Rose Marie Rob (oncoming nurse) by Rin Carpio (offgoing nurse). Report included the following information Kardex, Intake/Output, MAR, Recent Results, Med Rec Status and Cardiac Rhythm SR 1 AVB. Opens eyes to verbal stimuli. Sleeping. BIPAP overnight. Right arm PICC with old bruising present. No DTIs/No pressure ulcers observed. Good appetite. Morphine prn for bilateral lower extremity pain. Ativan prn for anxiety.

## 2020-07-12 NOTE — PROGRESS NOTES
SW reviewed patient's chart on this date. DC plan undetermined at this time. Patient remains in [facility] ICU at this time. SW will continue to monitor this case for potential needs, pending further progress during this admission.

## 2020-07-12 NOTE — PROGRESS NOTES
Kari Lobato  Admission Date: 7/1/2020             Daily Progress Note: 7/12/2020    The patient's chart is reviewed and the patient is discussed with the staff. 65 y.o.  female seen and evaluated at the request of Dr. Earl Roach.     Patient is a smoker who came in with worsening hypoxemia, fevers, diarrhea and dyspnea. She reports she has been staying home and no-one was sick but per chart indicates several family were sick. Temp was 102.2 and saturation was 82% on admission. CXR with infiltrates. Started ABX and optiflow. Only uses albuterol and home and ?still smoking. Rapid COVID testing was positive. Has been coughing up yellow sputum.      In [facility] currently and oxygen needs up on 70% optiflow and 60 LPM. Patient states dyspnea is less. Subjective:     Patient currently on bipap 45% Satting well on this. When on airvo, however needing 100%. Taking breaks periodically off the bipap. Breathing overall seems stable.        Current Facility-Administered Medications   Medication Dose Route Frequency    dexAMETHasone (DECADRON) tablet 6 mg  6 mg Oral Q12H    hydrALAZINE (APRESOLINE) 20 mg/mL injection 20 mg  20 mg IntraVENous Q6H PRN    LORazepam (ATIVAN) injection 1 mg  1 mg IntraVENous Q4H PRN    morphine injection 2 mg  2 mg IntraVENous Q4H PRN    pantoprazole (PROTONIX) tablet 40 mg  40 mg Oral DAILY    insulin glargine (LANTUS) injection 10 Units  10 Units SubCUTAneous QHS    insulin lispro (HUMALOG) injection   SubCUTAneous AC&HS    NUTRITIONAL SUPPORT ELECTROLYTE PRN ORDERS   Does Not Apply PRN    budesonide-formoteroL (SYMBICORT) 160-4.5 mcg/actuation HFA inhaler 2 Puff  2 Puff Inhalation BID RT    albuterol (PROVENTIL HFA, VENTOLIN HFA, PROAIR HFA) inhaler 2 Puff  2 Puff Inhalation Q4H PRN    0.9% sodium chloride infusion 250 mL  250 mL IntraVENous PRN    sodium chloride (NS) flush 20 mL  20 mL InterCATHeter Q8H    sodium chloride (NS) flush 20 mL 20 mL InterCATHeter PRN    sodium chloride (NS) flush 5-40 mL  5-40 mL IntraVENous Q8H    sodium chloride (NS) flush 5-40 mL  5-40 mL IntraVENous PRN    acetaminophen (TYLENOL) tablet 650 mg  650 mg Oral Q4H PRN    diphenhydrAMINE (BENADRYL) injection 12.5 mg  12.5 mg IntraVENous Q4H PRN    bisacodyL (DULCOLAX) tablet 5 mg  5 mg Oral DAILY PRN    ondansetron (ZOFRAN) injection 4 mg  4 mg IntraVENous Q4H PRN    enoxaparin (LOVENOX) injection 40 mg  40 mg SubCUTAneous Q24H    albuterol-ipratropium (DUO-NEB) 2.5 MG-0.5 MG/3 ML  3 mL Nebulization Q4H PRN    nicotine (NICODERM CQ) 14 mg/24 hr patch 1 Patch  1 Patch TransDERmal Q24H       Review of Systems  Constitutional: negative for fever, chills, sweats  Cardiovascular: negative for chest pain, palpitations, syncope, edema  Gastrointestinal: negative for dysphagia, reflux, vomiting, diarrhea, abdominal pain, or melena  Neurologic: negative for focal weakness, numbness, headache    Objective:     Vitals:    07/12/20 0931 07/12/20 1000 07/12/20 1031 07/12/20 1100   BP: 131/53 158/63 130/65 141/46   Pulse: 87 94 82 78   Resp: 23 24 21 20   Temp:       SpO2:    96%   Weight:       Height:         Intake and Output:   07/10 1901 - 07/12 0700  In: 1941 [P.O.:1941]  Out: 1725 [Urine:1725]  07/12 0701 - 07/12 1900  In: 360 [P.O.:360]  Out: 475 [Urine:475]    Physical Exam:   Constitution:  the patient is well developed and in no acute distress  EENMT:  Sclera clear, pupils equal, oral mucosa moist  Respiratory: Mild B inspiratory crackles  Cardiovascular:  RRR without M,G,R  Gastrointestinal: soft and non-tender; with positive bowel sounds. Musculoskeletal: warm without cyanosis. There is no lower leg edema. Skin:  no jaundice or rashes, no wounds   Neurologic: no gross neuro deficits     Psychiatric:  alert and oriented x ppt    CHEST XRAY:   7/12/20  IMPRESSION: Stable bibasilar lung infiltrates, in keeping with coronavirus.         LAB  No lab exists for component: [place]   Recent Labs     07/12/20  0316 07/11/20  0414 07/10/20  0358   WBC 17.5* 18.8* 34.0*   HGB 12.6 13.7 15.4   HCT 40.1 41.1 48.4*    269 346     Recent Labs     07/12/20  0316 07/11/20  0626 07/10/20  0358    138 138   K 4.6 4.1 4.4    103 103   CO2 28 29 28   * 167* 190*   BUN 26* 26* 19   CREA 0.66 0.49* 0.58*   MG  --   --  2.3   CA 7.7* 7.9* 7.9*     ABG:  No results found for: PH, PHI, PCO2, PCO2I, PO2, PO2I, HCO3, HCO3I, FIO2, FIO2I    Assessment:  (Medical Decision Making)     Hospital Problems  Never Reviewed          Codes Class Noted POA    Pneumonia due to COVID-19 virus ICD-10-CM: U07.1, J12.89  ICD-9-CM: 480.8  7/2/2020 Unknown        Chronic obstructive pulmonary disease with acute exacerbation (HCC)  -- unspecified type ICD-10-CM: J44.1  ICD-9-CM: 491.21  7/2/2020 Unknown        Elevated diaphragm ICD-10-CM: J98.6  ICD-9-CM: 519.4  7/2/2020 Unknown        Acute respiratory failure with hypoxemia (HCC) ICD-10-CM: J96.01  ICD-9-CM: 518.81  7/1/2020 Unknown        Sepsis ([facility] [address] ICD-10-CM: A41.9  ICD-9-CM: 038.9, 995.91  7/1/2020 Unknown        CAP (community acquired pneumonia) ICD-10-CM: J18.9  ICD-9-CM: 131  9/26/2018 Yes        Leukocytosis ICD-10-CM: U83.499  ICD-9-CM: 288.60  9/26/2018 Yes        * (Principal) Acute hypoxemic respiratory failure (HCC) ICD-10-CM: J96.01  ICD-9-CM: 518.81  9/26/2018 Yes        Tobacco abuse ICD-10-CM: Z72.0  ICD-9-CM: 305.1  9/26/2018 Yes              Pt with severe acute hypoxic respiratory failure due to COVID infection. Ongoing high o2 needs. Sats much better when has bipap on. Plan:  (Medical Decision Making)   -still close to needing intubation. Continue periodic bipap use.   -completed dexa course.   -s/p convalescent plasma 7/2  -completed remdesivir.   -symbicort  -lantus with ssi. Decrease lantus to 5 units with stopping steroids.    -Aisha Love MD

## 2020-07-13 ENCOUNTER — APPOINTMENT (OUTPATIENT)
Dept: GENERAL RADIOLOGY | Age: 65
DRG: 871 | End: 2020-07-13
Attending: INTERNAL MEDICINE
Payer: MEDICARE

## 2020-07-13 LAB
ANION GAP SERPL CALC-SCNC: 6 MMOL/L (ref 7–16)
ARTERIAL PATENCY WRIST A: YES
BASE EXCESS BLD CALC-SCNC: 0 MMOL/L
BDY SITE: ABNORMAL
BUN SERPL-MCNC: 25 MG/DL (ref 8–23)
CALCIUM SERPL-MCNC: 7.6 MG/DL (ref 8.3–10.4)
CHLORIDE SERPL-SCNC: 107 MMOL/L (ref 98–107)
CO2 BLD-SCNC: 23 MMOL/L
CO2 SERPL-SCNC: 27 MMOL/L (ref 21–32)
COLLECT TIME,HTIME: 330
CREAT SERPL-MCNC: 0.67 MG/DL (ref 0.6–1)
ERYTHROCYTE [DISTWIDTH] IN BLOOD BY AUTOMATED COUNT: 14.1 % (ref 11.9–14.6)
FLOW RATE ISTAT,IFRATE: 60 L/MIN
GAS FLOW.O2 O2 DELIVERY SYS: ABNORMAL L/MIN
GLUCOSE BLD STRIP.AUTO-MCNC: 116 MG/DL (ref 65–100)
GLUCOSE BLD STRIP.AUTO-MCNC: 141 MG/DL (ref 65–100)
GLUCOSE BLD STRIP.AUTO-MCNC: 91 MG/DL (ref 65–100)
GLUCOSE BLD STRIP.AUTO-MCNC: 94 MG/DL (ref 65–100)
GLUCOSE SERPL-MCNC: 121 MG/DL (ref 65–100)
HCO3 BLD-SCNC: 22.3 MMOL/L (ref 22–26)
HCT VFR BLD AUTO: 40.9 % (ref 35.8–46.3)
HGB BLD-MCNC: 12.5 G/DL (ref 11.7–15.4)
MAGNESIUM SERPL-MCNC: 2.1 MG/DL (ref 1.8–2.4)
MCH RBC QN AUTO: 27.2 PG (ref 26.1–32.9)
MCHC RBC AUTO-ENTMCNC: 30.6 G/DL (ref 31.4–35)
MCV RBC AUTO: 88.9 FL (ref 79.6–97.8)
NRBC # BLD: 0 K/UL (ref 0–0.2)
O2/TOTAL GAS SETTING VFR VENT: 100 %
PCO2 BLD: 29.9 MMHG (ref 35–45)
PH BLD: 7.48 [PH] (ref 7.35–7.45)
PHOSPHATE SERPL-MCNC: 1.6 MG/DL (ref 2.3–3.7)
PLATELET # BLD AUTO: 244 K/UL (ref 150–450)
PMV BLD AUTO: 10.7 FL (ref 9.4–12.3)
PO2 BLD: 54 MMHG (ref 75–100)
POTASSIUM SERPL-SCNC: 4.3 MMOL/L (ref 3.5–5.1)
RBC # BLD AUTO: 4.6 M/UL (ref 4.05–5.2)
SAO2 % BLD: 90 % (ref 95–98)
SERVICE CMNT-IMP: ABNORMAL
SERVICE CMNT-IMP: ABNORMAL
SODIUM SERPL-SCNC: 140 MMOL/L (ref 136–145)
SPECIMEN TYPE: ABNORMAL
WBC # BLD AUTO: 18.7 K/UL (ref 4.3–11.1)

## 2020-07-13 PROCEDURE — 65610000006 HC RM INTENSIVE CARE

## 2020-07-13 PROCEDURE — 36592 COLLECT BLOOD FROM PICC: CPT

## 2020-07-13 PROCEDURE — 74011250636 HC RX REV CODE- 250/636: Performed by: INTERNAL MEDICINE

## 2020-07-13 PROCEDURE — 74011636637 HC RX REV CODE- 636/637: Performed by: INTERNAL MEDICINE

## 2020-07-13 PROCEDURE — 97112 NEUROMUSCULAR REEDUCATION: CPT

## 2020-07-13 PROCEDURE — 97530 THERAPEUTIC ACTIVITIES: CPT

## 2020-07-13 PROCEDURE — 85027 COMPLETE CBC AUTOMATED: CPT

## 2020-07-13 PROCEDURE — 83735 ASSAY OF MAGNESIUM: CPT

## 2020-07-13 PROCEDURE — 94640 AIRWAY INHALATION TREATMENT: CPT

## 2020-07-13 PROCEDURE — 80048 BASIC METABOLIC PNL TOTAL CA: CPT

## 2020-07-13 PROCEDURE — 82803 BLOOD GASES ANY COMBINATION: CPT

## 2020-07-13 PROCEDURE — 84100 ASSAY OF PHOSPHORUS: CPT

## 2020-07-13 PROCEDURE — 74011250637 HC RX REV CODE- 250/637: Performed by: INTERNAL MEDICINE

## 2020-07-13 PROCEDURE — 71045 X-RAY EXAM CHEST 1 VIEW: CPT

## 2020-07-13 PROCEDURE — 99233 SBSQ HOSP IP/OBS HIGH 50: CPT | Performed by: INTERNAL MEDICINE

## 2020-07-13 PROCEDURE — 82962 GLUCOSE BLOOD TEST: CPT

## 2020-07-13 PROCEDURE — 36600 WITHDRAWAL OF ARTERIAL BLOOD: CPT

## 2020-07-13 PROCEDURE — 77010033711 HC HIGH FLOW OXYGEN

## 2020-07-13 PROCEDURE — 74011000250 HC RX REV CODE- 250: Performed by: INTERNAL MEDICINE

## 2020-07-13 RX ADMIN — MORPHINE SULFATE 2 MG: 2 INJECTION, SOLUTION INTRAMUSCULAR; INTRAVENOUS at 20:12

## 2020-07-13 RX ADMIN — BUDESONIDE AND FORMOTEROL FUMARATE DIHYDRATE 2 PUFF: 160; 4.5 AEROSOL RESPIRATORY (INHALATION) at 21:20

## 2020-07-13 RX ADMIN — SODIUM CHLORIDE 10 ML: 9 INJECTION, SOLUTION INTRAMUSCULAR; INTRAVENOUS; SUBCUTANEOUS at 04:00

## 2020-07-13 RX ADMIN — SODIUM CHLORIDE 10 ML: 9 INJECTION, SOLUTION INTRAMUSCULAR; INTRAVENOUS; SUBCUTANEOUS at 22:00

## 2020-07-13 RX ADMIN — ALBUTEROL SULFATE 2 PUFF: 90 AEROSOL, METERED RESPIRATORY (INHALATION) at 12:37

## 2020-07-13 RX ADMIN — LORAZEPAM 1 MG: 2 INJECTION INTRAMUSCULAR; INTRAVENOUS at 13:20

## 2020-07-13 RX ADMIN — SODIUM CHLORIDE: 900 INJECTION, SOLUTION INTRAVENOUS at 07:35

## 2020-07-13 RX ADMIN — LORAZEPAM 1 MG: 2 INJECTION INTRAMUSCULAR; INTRAVENOUS at 22:58

## 2020-07-13 RX ADMIN — SODIUM CHLORIDE 20 ML: 9 INJECTION, SOLUTION INTRAMUSCULAR; INTRAVENOUS; SUBCUTANEOUS at 04:00

## 2020-07-13 RX ADMIN — PANTOPRAZOLE SODIUM 40 MG: 40 TABLET, DELAYED RELEASE ORAL at 08:28

## 2020-07-13 RX ADMIN — MORPHINE SULFATE 2 MG: 2 INJECTION, SOLUTION INTRAMUSCULAR; INTRAVENOUS at 04:00

## 2020-07-13 RX ADMIN — MORPHINE SULFATE 2 MG: 2 INJECTION, SOLUTION INTRAMUSCULAR; INTRAVENOUS at 10:01

## 2020-07-13 RX ADMIN — ALBUTEROL SULFATE 2 PUFF: 90 AEROSOL, METERED RESPIRATORY (INHALATION) at 08:48

## 2020-07-13 RX ADMIN — SODIUM CHLORIDE 10 ML: 9 INJECTION, SOLUTION INTRAMUSCULAR; INTRAVENOUS; SUBCUTANEOUS at 14:00

## 2020-07-13 RX ADMIN — SODIUM CHLORIDE 20 ML: 9 INJECTION, SOLUTION INTRAMUSCULAR; INTRAVENOUS; SUBCUTANEOUS at 22:00

## 2020-07-13 RX ADMIN — MORPHINE SULFATE 2 MG: 2 INJECTION, SOLUTION INTRAMUSCULAR; INTRAVENOUS at 13:21

## 2020-07-13 RX ADMIN — BUDESONIDE AND FORMOTEROL FUMARATE DIHYDRATE 2 PUFF: 160; 4.5 AEROSOL RESPIRATORY (INHALATION) at 08:50

## 2020-07-13 RX ADMIN — ENOXAPARIN SODIUM 40 MG: 40 INJECTION SUBCUTANEOUS at 16:20

## 2020-07-13 RX ADMIN — INSULIN GLARGINE 5 UNITS: 100 INJECTION, SOLUTION SUBCUTANEOUS at 22:02

## 2020-07-13 RX ADMIN — SODIUM CHLORIDE 20 ML: 9 INJECTION, SOLUTION INTRAMUSCULAR; INTRAVENOUS; SUBCUTANEOUS at 14:00

## 2020-07-13 NOTE — PROGRESS NOTES
Bedside shift change report given to Flaquito Lobo RN (oncoming nurse) by Mickey Marcos RN (offgoing nurse). Report included the following information Kardex, Intake/Output, MAR, Recent Results, Med Rec Status and Cardiac Rhythm SR. WBC remain elevated. CXR unchanged. Mrs. Magdy Melgoza states she has not been up in a chair and she has not sat on the side of the bed for any of her meals. Mrs. Magdy Melgoza has self \"proned\" herself all day yesterday and last night and maintained O2 saturations above 90%. Alert and oriented x4 on AIRVO. Right arm PICC with old bruise present, capped. Tilley patent. Appetite excellent. Eating 100% of meals. Turns self. No DTIs/No pressure ulcers observed. Phos level low this am. Phos replacement ordered per nutrition protocol.

## 2020-07-13 NOTE — PROGRESS NOTES
Problem: Patient Education: Go to Patient Education Activity  Goal: Patient/Family Education  Description: 1. Patient will complete lower body bathing and dressing with MOD I and adaptive equipment as needed. 2. Patient will complete toileting with MOD I.   3. Patient will tolerate 25 minutes of OT treatment with 2-3 rest breaks to increase activity tolerance for ADLs. 4. Patient will complete functional transfers with MOD I and adaptive equipment as needed. 5. Patient will complete functional mobility for household distances with MOD I and adaptive equipment as needed. 6. Patient will complete self-grooming tasks while standing edge of sink with MOD I and adaptive equipment as needed. Timeframe: 7 visits      Outcome: Progressing Towards Goal      OCCUPATIONAL THERAPY: Daily Note and AM 7/13/2020  INPATIENT: OT Visit Days: 2  [place] / [place] / Product Type: Medicare /      NAME/AGE/GENDER: Bacilio Albarran is a 60 y.o. female   PRIMARY DIAGNOSIS:  Acute respiratory failure with hypoxemia ([address] [J96.01] Acute hypoxemic respiratory failure ([address] Acute hypoxemic respiratory failure ([address]       ICD-10: Treatment Diagnosis:    · Generalized Muscle Weakness (M62.81)  · Other lack of cordination (R27.8)   Precautions/Allergies:     Patient has no known allergies. ASSESSMENT:     Ms. Jose Eduardo Dougherty presents in the ICU for the above diagnoses. Upon arrival, pt supine in bed and currently resting on Airvo at 85%. Pt is alert and oriented x 4; agreeable to OT evaluation. Pt reports living alone in a 1-story home. At baseline, pt notes independence with all ADLs and functional mobility with no DME required. Does not drive however has family who will assists. Endorses no hx of falls.   7/13/2020  Today, pt presents supine in bed, currently maxed out at 95% Airvo with sats in low 90s upon arrival. Co-treatment completed with PT today d/t pt's increased activity tolerance during functional mobility and ADL performance. Pt transitioned to sitting edge of bed with SBA and additional time with mod verbal cueing for pt to utilize pursed lip breathing strategies. Sp02 dropping to low 80s however extended rest-break provided with sats rising to low 90s. Pt then stood with SBA and ambulated to bedside chair with CGA. Verbal cueing once again provided for use of pursed lip breathing strategies. Pt placed sitting upright in bedside chair and left with needs met, call light within reach, and RN at bedside. Sp02 sats at 95% at end of session. Minimal progress towards goals will continue OT efforts as indicated. This section established at most recent assessment   PROBLEM LIST (Impairments causing functional limitations):  1. Decreased Strength  2. Decreased ADL/Functional Activities  3. Decreased Transfer Abilities  4. Decreased Ambulation Ability/Technique  5. Decreased Balance  6. Decreased Activity Tolerance  7. Decreased Pacing Skills  8. Decreased Work Simplification/Energy Conservation Techniques  9. Increased Shortness of Breath   INTERVENTIONS PLANNED: (Benefits and precautions of occupational therapy have been discussed with the patient.)  1. Activities of daily living training  2. Adaptive equipment training  3. Balance training  4. Clothing management  5. Community reintergration  6. Donning&doffing training  7. Neuromuscular re-eduation  8. Re-evaluation  9. Therapeutic activity  10. Therapeutic exercise     TREATMENT PLAN: Frequency/Duration: Follow patient 3x/week to address above goals. Rehabilitation Potential For Stated Goals: Good     REHAB RECOMMENDATIONS (at time of discharge pending progress):    Placement: It is my opinion, based on this patient's performance to date, that Ms. Tati Salcido may benefit from [address] after discharge due to the functional deficits listed above that are likely to improve with skilled rehabilitation because he/she has multiple medical issues that affect his/her functional mobility in the community. Equipment:    None at this time              OCCUPATIONAL PROFILE AND HISTORY:   History of Present Injury/Illness (Reason for Referral):  See H&P  Past Medical History/Comorbidities:   Ms. Christopher Mancilla  has no past medical history on file. Ms. Christopher Mancilla  has no past surgical history on file. Social History/Living Environment:   Home Environment: Private residence  # Steps to Enter: 0  One/Two Story Residence: One story  Living Alone: No  Support Systems: Family member(s)  Patient Expects to be Discharged to[de-identified] Unknown  Current DME Used/Available at Home: None  Prior Level of Function/Work/Activity:  Independent with ADls and functional mobility. Personal Factors:          Sex:  female        Age:  60 y.o. Other factors that influence how disability is experienced by the patient:  multiple co-morbidities    Number of Personal Factors/Comorbidities that affect the Plan of Care: Brief history (0):  LOW COMPLEXITY   ASSESSMENT OF OCCUPATIONAL PERFORMANCE[de-identified]   Activities of Daily Living:   Basic ADLs (From Assessment) Complex ADLs (From Assessment)   Feeding: Setup  Oral Facial Hygiene/Grooming: Setup  Bathing: Minimum assistance  Upper Body Dressing: Setup  Lower Body Dressing: Minimum assistance  Toileting: Minimum assistance     Grooming/Bathing/Dressing Activities of Daily Living     Cognitive Retraining  Safety/Judgement: Awareness of environment; Fall prevention                       Bed/Mat Mobility  Rolling: Stand-by assistance  Supine to Sit: Stand-by assistance  Sit to Supine: Stand-by assistance  Sit to Stand: Contact guard assistance  Stand to Sit: Contact guard assistance  Bed to Chair: Contact guard assistance  Scooting: Stand-by assistance     Most Recent Physical Functioning:   Gross Assessment:  AROM: Generally decreased, functional  PROM: Generally decreased, functional  Strength: Generally decreased, functional  Coordination: Generally decreased, functional  Tone: Normal  Sensation: Intact               Posture:  Posture (WDL): Exceptions to WDL  Posture Assessment: Cervical, Forward head  Balance:  Sitting: Intact  Standing: Impaired  Standing - Static: Good  Standing - Dynamic : Fair Bed Mobility:  Rolling: Stand-by assistance  Supine to Sit: Stand-by assistance  Sit to Supine: Stand-by assistance  Scooting: Stand-by assistance  Wheelchair Mobility:     Transfers:  Sit to Stand: Contact guard assistance  Stand to Sit: Contact guard assistance  Bed to Chair: Contact guard assistance            Patient Vitals for the past 6 hrs:   BP BP Patient Position SpO2 O2 Flow Rate (L/min) Pulse   20 1001 129/59    95   20 1031 135/72    85   20 1101 158/82  (!) 89 %  90   20 1120   (!) 87 %     20 1131 183/81 Sitting;Head of bed elevated (Comment degrees) 93 % 60 l/min 95   20 1147    60 l/min    20 1200 150/70    95   20 1202 148/60    94   20 1231 141/63    96   20 1237   92 % 60 l/min    20 1301 173/72    (!) 105   20 1331 145/61    (!) 103   20 1401 135/60    (!) 103   20 1431 126/61  93 %  100       Mental Status  Neurologic State: Alert  Orientation Level: Oriented X4  Cognition: Appropriate decision making, Follows commands  Perception: Appears intact  Perseveration: No perseveration noted  Safety/Judgement: Awareness of environment, Fall prevention                          Physical Skills Involved:  1. Balance  2. Strength  3. Activity Tolerance  4. Gross Motor Control Cognitive Skills Affected (resulting in the inability to perform in a timely and safe manner): 1. none  Psychosocial Skills Affected:  1. Habits/Routines  2.  Environmental Adaptation   Number of elements that affect the Plan of Care: 5+:  HIGH COMPLEXITY   CLINICAL DECISION MAKIN [facility] [address] [id number] AM-PAC 6 Clicks   Daily Activity Inpatient Short Form  How much help from another person does the patient currently need. .. Total A Lot A Little None   1. Putting on and taking off regular lower body clothing? [] 1   [] 2   [x] 3   [] 4   2. Bathing (including washing, rinsing, drying)? [] 1   [] 2   [x] 3   [] 4   3. Toileting, which includes using toilet, bedpan or urinal?   [] 1   [] 2   [x] 3   [] 4   4. Putting on and taking off regular upper body clothing? [] 1   [] 2   [x] 3   [] 4   5. Taking care of personal grooming such as brushing teeth? [] 1   [] 2   [x] 3   [] 4   6. Eating meals? [] 1   [] 2   [x] 3   [] 4   © 2007, Trustees of [address] [place] [address] [address] [id number], under license to [employer]. All rights reserved      Score:  Initial: 18 Most Recent: X (Date: -- )    Interpretation of Tool:  Represents activities that are increasingly more difficult (i.e. Bed mobility, Transfers, Gait). Medical Necessity:     · Patient demonstrates   · good  ·  rehab potential due to higher previous functional level. Reason for Services/Other Comments:  · Patient continues to require skilled intervention due to   · medical complications and patient unable to attend/participate in therapy as expected  · . Use of outcome tool(s) and clinical judgement create a POC that gives a: LOW COMPLEXITY         TREATMENT:   (In addition to Assessment/Re-Assessment sessions the following treatments were rendered)     Pre-treatment Symptoms/Complaints:    Pain: Initial:   Pain Intensity 1: 0  Post Session:  same     Neuromuscular Re-education: ( 23 minutes):  Exercise/activities per grid below to improve balance, coordination and posture. Required minimal verbal cues to promote static and dynamic balance in sitting and standing. Braces/Orthotics/Lines/Etc:   · O2 Device: Heated, Hi flow nasal cannula  Treatment/Session Assessment:    · Response to Treatment:  tolerated well just de-sats with mobility.    · Interdisciplinary Collaboration:   o Physical Therapist  o Occupational Therapist  o Registered Nurse  · After treatment position/precautions:   o Up in chair  o Bed/Chair-wheels locked  o Call light within reach  o Nurse at bedside   · Compliance with Program/Exercises: Compliant all of the time, Will assess as treatment progresses. · Recommendations/Intent for next treatment session: \"Next visit will focus on advancements to more challenging activities and reduction in assistance provided\".   Total Treatment Duration:  OT Patient Time In/Time Out  Time In: 1140  Time Out: [facility]7 [facility],

## 2020-07-13 NOTE — PROGRESS NOTES
No change in assessment. AIRVO all night. Prone position several times overnight with stand-by assistance. Mrs. Toby Jarrett states,\"I feel better today but I still get a little short of breath\".
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
## 2020-07-13 NOTE — PROGRESS NOTES
Juvencio Sanchez  Admission Date: 7/1/2020             Daily Progress Note: 7/13/2020    The patient's chart is reviewed and the patient is discussed with the staff. 65 y.o.  female seen and evaluated at the request of Dr. Tiffanie Lemon.     Patient is a smoker who came in with worsening hypoxemia, fevers, diarrhea and dyspnea. She reports she has been staying home and no-one was sick but per chart indicates several family were sick. Temp was 102.2 and saturation was 82% on admission. CXR with infiltrates. Started ABX and optiflow. Only uses albuterol and home and ?still smoking. Rapid COVID testing was positive. Has been coughing up yellow sputum.      In [facility] currently and oxygen needs up on 70% optiflow and 60 LPM. Patient states dyspnea is less. Subjective: On airvo, self proning - no major overnight events. Comfortable- O2 sats 96%        Current Facility-Administered Medications   Medication Dose Route Frequency    sodium phosphate 30 mmol in 0.9% sodium chloride 250 mL infusion   IntraVENous ONCE    insulin glargine (LANTUS) injection 5 Units  5 Units SubCUTAneous QHS    hydrALAZINE (APRESOLINE) 20 mg/mL injection 20 mg  20 mg IntraVENous Q6H PRN    LORazepam (ATIVAN) injection 1 mg  1 mg IntraVENous Q4H PRN    morphine injection 2 mg  2 mg IntraVENous Q4H PRN    pantoprazole (PROTONIX) tablet 40 mg  40 mg Oral DAILY    insulin lispro (HUMALOG) injection   SubCUTAneous AC&HS    NUTRITIONAL SUPPORT ELECTROLYTE PRN ORDERS   Does Not Apply PRN    budesonide-formoteroL (SYMBICORT) 160-4.5 mcg/actuation HFA inhaler 2 Puff  2 Puff Inhalation BID RT    albuterol (PROVENTIL HFA, VENTOLIN HFA, PROAIR HFA) inhaler 2 Puff  2 Puff Inhalation Q4H PRN    sodium chloride (NS) flush 20 mL  20 mL InterCATHeter Q8H    sodium chloride (NS) flush 20 mL  20 mL InterCATHeter PRN    sodium chloride (NS) flush 5-40 mL  5-40 mL IntraVENous Q8H    sodium chloride (NS) flush 5-40 mL  5-40 mL IntraVENous PRN    acetaminophen (TYLENOL) tablet 650 mg  650 mg Oral Q4H PRN    diphenhydrAMINE (BENADRYL) injection 12.5 mg  12.5 mg IntraVENous Q4H PRN    bisacodyL (DULCOLAX) tablet 5 mg  5 mg Oral DAILY PRN    ondansetron (ZOFRAN) injection 4 mg  4 mg IntraVENous Q4H PRN    enoxaparin (LOVENOX) injection 40 mg  40 mg SubCUTAneous Q24H    albuterol-ipratropium (DUO-NEB) 2.5 MG-0.5 MG/3 ML  3 mL Nebulization Q4H PRN    nicotine (NICODERM CQ) 14 mg/24 hr patch 1 Patch  1 Patch TransDERmal Q24H       Review of Systems  Constitutional: negative for fever, chills, sweats  Cardiovascular: negative for chest pain, palpitations, syncope, edema  Gastrointestinal: negative for dysphagia, reflux, vomiting, diarrhea, abdominal pain, or melena  Neurologic: negative for focal weakness, numbness, headache    Objective:     Vitals:    07/13/20 0801 07/13/20 0832 07/13/20 0858 07/13/20 0901   BP: 126/64 (!) 165/101  138/60   Pulse: 83 91  91   Resp: 22 (!) 40  16   Temp:       SpO2:   92%    Weight:       Height:         Intake and Output:   07/11 1901 - 07/13 0700  In: 1740 [P.O.:1740]  Out: 2350 [Urine:2350]  07/13 0701 - 07/13 1900  In: -   Out: 275 [Urine:275]    Physical Exam:   Constitution:  the patient is well developed and in no acute distress  EENMT:  Sclera clear, pupils equal, oral mucosa moist  Respiratory: Mild B inspiratory crackles  Cardiovascular:  RRR without M,G,R  Gastrointestinal: soft and non-tender; with positive bowel sounds. Musculoskeletal: warm without cyanosis. There is no lower leg edema. Skin:  no jaundice or rashes, no wounds   Neurologic: no gross neuro deficits     Psychiatric:  alert and oriented x ppt    CHEST XRAY:   7/12/20  IMPRESSION: Stable bibasilar lung infiltrates, in keeping with coronavirus.         LAB  No lab exists for component: [place]   Recent Labs     07/13/20  0420 07/12/20  0316 07/11/20  0414   WBC 18.7* 17.5* 18.8*   HGB 12.5 12.6 13.7   HCT 40.9 40.1 41.1    269 269     Recent Labs     07/13/20  0420 07/12/20  0316 07/11/20  0626    140 138   K 4.3 4.6 4.1    105 103   CO2 27 28 29   * 159* 167*   BUN 25* 26* 26*   CREA 0.67 0.66 0.49*   MG 2.1  --   --    CA 7.6* 7.7* 7.9*   PHOS 1.6*  --   --      ABG:    Lab Results   Component Value Date/Time    PHI 7.48 (H) 07/13/2020 03:32 AM    PCO2I 29.9 (L) 07/13/2020 03:32 AM    PO2I 54 (L) 07/13/2020 03:32 AM    HCO3I 22.3 07/13/2020 03:32 AM    FIO2I 100 07/13/2020 03:32 AM       Assessment:  (Medical Decision Making)     Hospital Problems  Never Reviewed          Codes Class Noted POA    Pneumonia due to COVID-19 virus ICD-10-CM: U07.1, J12.89  ICD-9-CM: 480.8  7/2/2020 Unknown        Chronic obstructive pulmonary disease with acute exacerbation ([facility]  -- unspecified type ICD-10-CM: J44.1  ICD-9-CM: 491.21  7/2/2020 Unknown        Elevated diaphragm ICD-10-CM: J98.6  ICD-9-CM: 519.4  7/2/2020 Unknown        Acute respiratory failure with hypoxemia ([facility] ICD-10-CM: J96.01  ICD-9-CM: 518.81  7/1/2020 Unknown        Sepsis ([facility] ICD-10-CM: A41.9  ICD-9-CM: 038.9, 995.91  7/1/2020 Unknown        CAP (community acquired pneumonia) ICD-10-CM: J18.9  ICD-9-CM: 395  9/26/2018 Yes        Leukocytosis ICD-10-CM: L56.187  ICD-9-CM: 288.60  9/26/2018 Yes        * (Principal) Acute hypoxemic respiratory failure ([facility]) ICD-10-CM: J96.01  ICD-9-CM: 518.81  9/26/2018 Yes        Tobacco abuse ICD-10-CM: Z72.0  ICD-9-CM: 305.1  9/26/2018 Yes              Pt with severe acute hypoxic respiratory failure due to COVID infection. Ongoing high o2 needs. Sats much better when has bipap on. Plan:  (Medical Decision Making)   -still at risk of  needing intubation. Continue periodic bipap use. Try using surgical mask with Airvo  -completed dexa course.   -s/p convalescent plasma 7/2  -completed remdesivir.   -symbicort  -lantus with ssi. Decrease lantus to 5 units with stopping steroids. -ppi  -lovenox    Continue supportive care    Isaac Kc MD

## 2020-07-13 NOTE — PROGRESS NOTES
Problem: Mobility Impaired (Adult and Pediatric)  Goal: *Therapy Goal  Outcome: Progressing Towards Goal  Note: STG:  (1.)Ms. Mariya Spears will move from supine to sit and sit to supine , scoot up and down, and roll side to side with CONTACT GUARD ASSIST within 4 treatment day(s). (2.)Ms. Mariya Spears will transfer from bed to chair and chair to bed with CONTACT GUARD ASSIST using the least restrictive device within 4 treatment day(s). (3.)Ms. Mariya Spears will ambulate with CONTACT GUARD ASSIST for 50 feet with the least restrictive device within 4 treatment day(s). LTG:  (1.)Ms. Mariya Spears will move from supine to sit and sit to supine , scoot up and down, and roll side to side in bed with STAND BY ASSIST within 7 treatment day(s). (2.)Ms. Mariya Spears will transfer from bed to chair and chair to bed with STAND BY ASSIST using the least restrictive device within 7 treatment day(s). (3.)Ms. Nugent will ambulate with STAND BY ASSIST for 100 feet with the least restrictive device within 7 treatment day(s). ________________________________________________________________________________________________    PHYSICAL THERAPY: Daily Note and AM 7/13/2020  INPATIENT: PT Visit Days : 3  [place] / Product Type: Medicare /       NAME/AGE/GENDER: Mame Galeas is a 60 y.o. female   PRIMARY DIAGNOSIS: Acute respiratory failure with hypoxemia ([address] [J96.01] Acute hypoxemic respiratory failure ([facility] Acute hypoxemic respiratory failure (HCC)       ICD-10: Treatment Diagnosis:    · Generalized Muscle Weakness (M62.81)  · Other lack of cordination (R27.8)  · Difficulty in walking, Not elsewhere classified (R26.2)  · Other abnormalities of gait and mobility (R26.89)   Precaution/Allergies:  Patient has no known allergies. ASSESSMENT:     Ms. Mariya Spears presents supine in bed upon arrival and agreeable to PT. On Airvo 60L 100%. RN reported pt good to mobilize.   Pt came to sitting on EOB with SBA and intact sitting balance. Cues for pacing and proper breathing technique as pt de-saturates with limited mobility. Pt stood with CGA/Min A and ambulated short distance to chair hand held assist.  She has good-fair standing balance and ambulated short distances. At end of session pt sitting up in chair with all needs within reach, alarm activated for safety, RN notified. Pt presents as functioning below her baseline, with deficits in mobility including transfers, gait, balance, and activity tolerance. Pt will benefit from skilled therapy services to address stated deficits to promote return to highest level of function, independence, and safety. Will continue therapy efforts. Overall slow progress with mobility given pt's limited activity tolerance. At this time, patient is appropriate for Co-treatment with occupational therapy due to patient's decreased overall endurance/tolerance levels, as well as need for high level skilled assistance to complete functional transfers/mobility and functional tasks. Caroline Romeo is appropriate for a multidisciplinary co-treatment of PT and OT to address goals of both disciplines. This section established at most recent assessment   PROBLEM LIST (Impairments causing functional limitations):  1. Decreased Strength  2. Decreased ADL/Functional Activities  3. Decreased Transfer Abilities  4. Decreased Ambulation Ability/Technique  5. Decreased Balance  6. Decreased Activity Tolerance   INTERVENTIONS PLANNED: (Benefits and precautions of physical therapy have been discussed with the patient.)  1. Balance Exercise  2. Bed Mobility  3. Gait Training  4. Therapeutic Activites  5. Therapeutic Exercise/Strengthening     TREATMENT PLAN: Frequency/Duration: 4 times a week for duration of hospital stay  Rehabilitation Potential For Stated Goals: Good     REHAB RECOMMENDATIONS (at time of discharge pending progress):    Placement:   It is my opinion, based on this patient's performance to date, that Ms. Jose Eduardo Dougherty may benefit from intensive therapy at a [address] [place] [address] after discharge due to the functional deficits listed above that are likely to improve with skilled rehabilitation and concerns that he/she may be unsafe to be unsupervised at home due to medical complications and mobility deficits which put her at increase risk of functional decline and/or falling. LTAC. Equipment:    None at this time            HISTORY:   History of Present Injury/Illness (Reason for Referral):  Patient is a 60 y.o.  female seen and evaluated at the request of Dr. Caro Olivo. Patient is a smoker who came in with worsening hypoxemia, fevers, diarrhea and dyspnea. She reports she has been staying home and non-one was sick but per chart indicates several family were sick. Temp was 102.2 and saturation was 82% ion admission. CXR with infiltrates. Started ABX and optiflow. Only uses albuterol and home and ?still smoking. Rapid COVID testing was positive. Has been coughing up yellow sputum. In [facility] currently and oxygen needs up on 70% optiflow and 60 LPM. Patient states dyspnea is less  Subjective:   Remains afebrile. Remains on Optiflow up to 85% currently. Was up to 90% yesterday. States feels ill, but no new symptoms. Not coughing or having sputum. Past Medical History/Comorbidities:   Ms. Jose Eduardo Dougherty  has no past medical history on file. Ms. Jose Eduardo Dougherty  has no past surgical history on file. Social History/Living Environment:   Home Environment: Private residence  # Steps to Enter: 0  One/Two Story Residence: One story  Living Alone: No  Support Systems: Family member(s)  Patient Expects to be Discharged to[de-identified] Unknown  Current DME Used/Available at Home: None  Prior Level of Function/Work/Activity:  Per patient had been independent with all functional mobility . Personal Factors:          Sex:  female        Age:  60 y.o.    Number of Personal Factors/Comorbidities that affect the Plan of Care: 0: LOW COMPLEXITY   EXAMINATION:   Most Recent Physical Functioning:   Gross Assessment:                  Posture:     Balance:    Bed Mobility:     Wheelchair Mobility:     Transfers:     Gait:            Body Structures Involved:  1. Bones  2. Joints  3. Muscles  4. Ligaments Body Functions Affected:  1. Neuromusculoskeletal  2. Movement Related  3. Skin Related  4. Metobolic/Endocrine Activities and Participation Affected:  1. General Tasks and Demands  2. Communication  3. Mobility  4. Self Care  5. Community, Social and [place]   Number of elements that affect the Plan of Care: 1-2: LOW COMPLEXITY   CLINICAL PRESENTATION:   Presentation: Stable and uncomplicated: LOW COMPLEXITY   CLINICAL DECISION MAKIN [facility] Mobility Inpatient Short Form  How much difficulty does the patient currently have. .. Unable A Lot A Little None   1. Turning over in bed (including adjusting bedclothes, sheets and blankets)? [] 1   [] 2   [x] 3   [] 4   2. Sitting down on and standing up from a chair with arms ( e.g., wheelchair, bedside commode, etc.)   [] 1   [] 2   [x] 3   [] 4   3. Moving from lying on back to sitting on the side of the bed? [] 1   [] 2   [x] 3   [] 4   How much help from another person does the patient currently need. .. Total A Lot A Little None   4. Moving to and from a bed to a chair (including a wheelchair)? [] 1   [] 2   [x] 3   [] 4   5. Need to walk in hospital room? [] 1   [] 2   [x] 3   [] 4   6. Climbing 3-5 steps with a railing? [] 1   [] 2   [x] 3   [] 4   © , Trustees of [address] [place] [address] [address] [id number], under license to [employer]. All rights reserved      Score:  Initial: 18 Most Recent: X (Date: -- )    Interpretation of Tool:  Represents activities that are increasingly more difficult (i.e. Bed mobility, Transfers, Gait). Medical Necessity:     · Patient demonstrates   · good  ·  rehab potential due to higher previous functional level.   Reason for Services/Other Comments:  · Patient continues to require skilled intervention due to   · medical complications, patient unable to attend/participate in therapy as expected, and decreased transfers and mobility and AIRVO max. · .   Use of outcome tool(s) and clinical judgement create a POC that gives a: Clear prediction of patient's progress: LOW COMPLEXITY        TREATMENT:   (In addition to Assessment/Re-Assessment sessions the following treatments were rendered)   Pre-treatment Symptoms/Complaints:  0/10 no increased pain reported. Pain: Initial:   Pain Intensity 1: 0  Post Session:  0/10 no increased pain reported. Today's treatment session addressed Decreased Strength, Decreased ADL/Functional Activities, Decreased Transfer Abilities, Decreased Ambulation Ability/Technique, Decreased Balance, Decreased Activity Tolerance, Increased Fatigue and Increased Shortness of Breath to progress towards achieving stated therapy goals. During this session, Occupational Therapy addressed ADLs to progress towards their discipline specific goal(s). Co-treatment was necessary to improve patient's ability to increase activity demands and ability to return to normal functional activity. Therapeutic Activity: (    16 Minutes): Therapeutic activities including Bed transfers, STS transfers, Ambulation on level ground, and standing and seated dynamic mobility to improve mobility, strength, balance, coordination and activity tolerance. Required minimal   to promote static and dynamic balance in standing, promote coordination of bilateral, lower extremity(s) and proper activity pacing and breathing technique.       Date:  7/7/20 Date:   Date:     ACTIVITY/EXERCISE AM PM AM PM AM PM   Seated LAQ  1 x 10 B  1 x 15 B       Seated marching  1 x 10 B  1 x 15 B       Seated AP  1 x 20 B       Seated hip abd/add  1 x 10 B  1 x 15 B                                  B = bilateral; AA = active assistive; A = active; P = passive    Braces/Orthotics/Lines/Etc:   · O2 Device: Heated, Hi flow nasal cannula  Treatment/Session Assessment:    · Response to Treatment:  See above. · Interdisciplinary Collaboration:   o Physical Therapist  o Occupational Therapist  o Registered Nurse  · After treatment position/precautions:   o Up in chair  o Bed alarm/tab alert on  o Bed/Chair-wheels locked  o Bed in low position  o Call light within reach  o RN notified   · Compliance with Program/Exercises: Will assess as treatment progresses  · Recommendations/Intent for next treatment session: \"Next visit will focus on advancements to more challenging activities, reduction in assistance provided, and transfers, ambulation and mobility.    \"    Total Treatment Duration:  PT Patient Time In/Time Out  Time In: 6357  Time Out: LEÓN Ham

## 2020-07-13 NOTE — PROGRESS NOTES
Bedside shift change report given to [address] (oncoming nurse) by Jese Torres RN (offgoing nurse). Report included the following information Kardex, Intake/Output, MAR, Recent Results, Med Rec Status and Cardiac Rhythm SR. Alert and oriented x4 on airvo. Dyspnea on exertion per Mrs. Nugent. Per report Mrs. Mariya Spears has not been on BIPAP since noon today. BIPAP used from breakfast to noon today. PICC, capped. (old large bruise at PICC site)  Treva,zuleyma. Incentive spirometer at bedside. No DTIs/No pressure ulcers observed. Per report family brought her a  for her cell phone so that she can talk with her family.

## 2020-07-14 ENCOUNTER — APPOINTMENT (OUTPATIENT)
Dept: GENERAL RADIOLOGY | Age: 65
DRG: 871 | End: 2020-07-14
Attending: INTERNAL MEDICINE
Payer: MEDICARE

## 2020-07-14 LAB
ANION GAP SERPL CALC-SCNC: 7 MMOL/L (ref 7–16)
BUN SERPL-MCNC: 21 MG/DL (ref 8–23)
CALCIUM SERPL-MCNC: 7.6 MG/DL (ref 8.3–10.4)
CHLORIDE SERPL-SCNC: 107 MMOL/L (ref 98–107)
CO2 SERPL-SCNC: 28 MMOL/L (ref 21–32)
CREAT SERPL-MCNC: 0.53 MG/DL (ref 0.6–1)
ERYTHROCYTE [DISTWIDTH] IN BLOOD BY AUTOMATED COUNT: 14 % (ref 11.9–14.6)
GLUCOSE BLD STRIP.AUTO-MCNC: 108 MG/DL (ref 65–100)
GLUCOSE BLD STRIP.AUTO-MCNC: 148 MG/DL (ref 65–100)
GLUCOSE BLD STRIP.AUTO-MCNC: 154 MG/DL (ref 65–100)
GLUCOSE BLD STRIP.AUTO-MCNC: 297 MG/DL (ref 65–100)
GLUCOSE SERPL-MCNC: 149 MG/DL (ref 65–100)
HCT VFR BLD AUTO: 40.6 % (ref 35.8–46.3)
HGB BLD-MCNC: 12.5 G/DL (ref 11.7–15.4)
MCH RBC QN AUTO: 27.6 PG (ref 26.1–32.9)
MCHC RBC AUTO-ENTMCNC: 30.8 G/DL (ref 31.4–35)
MCV RBC AUTO: 89.6 FL (ref 79.6–97.8)
NRBC # BLD: 0 K/UL (ref 0–0.2)
PLATELET # BLD AUTO: 210 K/UL (ref 150–450)
PMV BLD AUTO: 10.6 FL (ref 9.4–12.3)
POTASSIUM SERPL-SCNC: 3.9 MMOL/L (ref 3.5–5.1)
RBC # BLD AUTO: 4.53 M/UL (ref 4.05–5.2)
SODIUM SERPL-SCNC: 142 MMOL/L (ref 136–145)
WBC # BLD AUTO: 13.6 K/UL (ref 4.3–11.1)

## 2020-07-14 PROCEDURE — 82962 GLUCOSE BLOOD TEST: CPT

## 2020-07-14 PROCEDURE — 74011250637 HC RX REV CODE- 250/637: Performed by: INTERNAL MEDICINE

## 2020-07-14 PROCEDURE — 97530 THERAPEUTIC ACTIVITIES: CPT

## 2020-07-14 PROCEDURE — 99233 SBSQ HOSP IP/OBS HIGH 50: CPT | Performed by: INTERNAL MEDICINE

## 2020-07-14 PROCEDURE — 94640 AIRWAY INHALATION TREATMENT: CPT

## 2020-07-14 PROCEDURE — 71045 X-RAY EXAM CHEST 1 VIEW: CPT

## 2020-07-14 PROCEDURE — 74011250636 HC RX REV CODE- 250/636: Performed by: INTERNAL MEDICINE

## 2020-07-14 PROCEDURE — 74011636637 HC RX REV CODE- 636/637: Performed by: FAMILY MEDICINE

## 2020-07-14 PROCEDURE — 97535 SELF CARE MNGMENT TRAINING: CPT

## 2020-07-14 PROCEDURE — 80048 BASIC METABOLIC PNL TOTAL CA: CPT

## 2020-07-14 PROCEDURE — 97112 NEUROMUSCULAR REEDUCATION: CPT

## 2020-07-14 PROCEDURE — 74011636637 HC RX REV CODE- 636/637: Performed by: INTERNAL MEDICINE

## 2020-07-14 PROCEDURE — 65610000006 HC RM INTENSIVE CARE

## 2020-07-14 PROCEDURE — 85027 COMPLETE CBC AUTOMATED: CPT

## 2020-07-14 RX ADMIN — BUDESONIDE AND FORMOTEROL FUMARATE DIHYDRATE 2 PUFF: 160; 4.5 AEROSOL RESPIRATORY (INHALATION) at 20:04

## 2020-07-14 RX ADMIN — SODIUM CHLORIDE 20 ML: 9 INJECTION, SOLUTION INTRAMUSCULAR; INTRAVENOUS; SUBCUTANEOUS at 23:13

## 2020-07-14 RX ADMIN — SODIUM CHLORIDE 10 ML: 9 INJECTION, SOLUTION INTRAMUSCULAR; INTRAVENOUS; SUBCUTANEOUS at 06:00

## 2020-07-14 RX ADMIN — INSULIN GLARGINE 5 UNITS: 100 INJECTION, SOLUTION SUBCUTANEOUS at 23:09

## 2020-07-14 RX ADMIN — LORAZEPAM 1 MG: 2 INJECTION INTRAMUSCULAR; INTRAVENOUS at 05:21

## 2020-07-14 RX ADMIN — ACETAMINOPHEN 650 MG: 325 TABLET, FILM COATED ORAL at 09:03

## 2020-07-14 RX ADMIN — ENOXAPARIN SODIUM 40 MG: 40 INJECTION SUBCUTANEOUS at 15:15

## 2020-07-14 RX ADMIN — INSULIN LISPRO 2 UNITS: 100 INJECTION, SOLUTION INTRAVENOUS; SUBCUTANEOUS at 23:09

## 2020-07-14 RX ADMIN — PANTOPRAZOLE SODIUM 40 MG: 40 TABLET, DELAYED RELEASE ORAL at 09:03

## 2020-07-14 RX ADMIN — MORPHINE SULFATE 2 MG: 2 INJECTION, SOLUTION INTRAMUSCULAR; INTRAVENOUS at 00:20

## 2020-07-14 RX ADMIN — SODIUM CHLORIDE 10 ML: 9 INJECTION, SOLUTION INTRAMUSCULAR; INTRAVENOUS; SUBCUTANEOUS at 14:50

## 2020-07-14 RX ADMIN — SODIUM CHLORIDE 20 ML: 9 INJECTION, SOLUTION INTRAMUSCULAR; INTRAVENOUS; SUBCUTANEOUS at 06:00

## 2020-07-14 RX ADMIN — MORPHINE SULFATE 2 MG: 2 INJECTION, SOLUTION INTRAMUSCULAR; INTRAVENOUS at 23:00

## 2020-07-14 RX ADMIN — BUDESONIDE AND FORMOTEROL FUMARATE DIHYDRATE 2 PUFF: 160; 4.5 AEROSOL RESPIRATORY (INHALATION) at 08:00

## 2020-07-14 RX ADMIN — MORPHINE SULFATE 2 MG: 2 INJECTION, SOLUTION INTRAMUSCULAR; INTRAVENOUS at 15:15

## 2020-07-14 RX ADMIN — INSULIN LISPRO 6 UNITS: 100 INJECTION, SOLUTION INTRAVENOUS; SUBCUTANEOUS at 17:54

## 2020-07-14 RX ADMIN — MORPHINE SULFATE 2 MG: 2 INJECTION, SOLUTION INTRAMUSCULAR; INTRAVENOUS at 05:25

## 2020-07-14 RX ADMIN — SODIUM CHLORIDE 10 ML: 9 INJECTION, SOLUTION INTRAMUSCULAR; INTRAVENOUS; SUBCUTANEOUS at 23:13

## 2020-07-14 NOTE — PROGRESS NOTES
Nutrition Assessment     Type and Reason for Visit: Reassess    Nutrition Recommendations/Plan: Discontinue Glucerna shake. If pt continues to not require steroids and SSI, could liberalize diet to regular. May not need continued Lantus either. (No hx of DM  Noted PTA)    Nutrition Assessment:  Pt does not answer room phone. RN noting pt with good appetite and intake. No noted hx of DM. Decadron stopped 7-12. Lantus decreased from 10 units daily to 5 units daily. POC glucoses  mg/dl for the past 24 hrs and has not required any SSI. Remains on CCHO diet.     Estimated Daily Nutrient Needs:  Energy (kcal):  3135-1002 kcal/kg (25-30kcal/kg)  Protein (g):  61-74g/day (1-1.2g/kg)         Current Nutrition Therapies:  DIET DIABETIC CONSISTENT CARB Regular; 2 GM NA (House Low NA)  DIET NUTRITIONAL SUPPLEMENTS All Meals; Glucerna Shake ( )    Nutrition Diagnosis:   No nutritional diagnosis at this time   Nutrition Intervention:  Food and/or Nutrient Delivery: Continue current diet, Discontinue oral nutrition supplement    Nutrition Monitoring and Evaluation:     Food/Nutrient Intake Outcomes: Food and nutrient intake    Discharge Planning:    No discharge needs at this time     Electronically signed by Marv Mora RD on 7/14/2020 at 11:37 AM    Contact Number: [phone]

## 2020-07-14 NOTE — PROGRESS NOTES
Problem: Mobility Impaired (Adult and Pediatric)  Goal: *Therapy Goal  Outcome: Progressing Towards Goal  Note: STG:  (1.)Ms. Rip Oliver will move from supine to sit and sit to supine , scoot up and down, and roll side to side with CONTACT GUARD ASSIST within 4 treatment day(s). (2.)Ms. Rip Oliver will transfer from bed to chair and chair to bed with CONTACT GUARD ASSIST using the least restrictive device within 4 treatment day(s). (3.)Ms. Rip Oliver will ambulate with CONTACT GUARD ASSIST for 50 feet with the least restrictive device within 4 treatment day(s). LTG:  (1.)Ms. Rip Oliver will move from supine to sit and sit to supine , scoot up and down, and roll side to side in bed with STAND BY ASSIST within 7 treatment day(s). (2.)Ms. Rip Oliver will transfer from bed to chair and chair to bed with STAND BY ASSIST using the least restrictive device within 7 treatment day(s). (3.)Ms. Nugent will ambulate with STAND BY ASSIST for 100 feet with the least restrictive device within 7 treatment day(s). ________________________________________________________________________________________________    PHYSICAL THERAPY: Daily Note and AM 7/14/2020  INPATIENT: PT Visit Days : 4  [place] / Product Type: Medicare /       NAME/AGE/GENDER: Barry Thompson is a 60 y.o. female   PRIMARY DIAGNOSIS: Acute respiratory failure with hypoxemia ([address] [J96.01] Acute hypoxemic respiratory failure ([address] Acute hypoxemic respiratory failure ([facility] [address]       ICD-10: Treatment Diagnosis:    · Generalized Muscle Weakness (M62.81)  · Other lack of cordination (R27.8)  · Difficulty in walking, Not elsewhere classified (R26.2)  · Other abnormalities of gait and mobility (R26.89)   Precaution/Allergies:  Patient has no known allergies. ASSESSMENT:     Ms. Rip Oliver was supine in bed upon arrival and agreeable to treatment.   Pt on Airvo with SpO2 in 90's at rest.  She performed bed mobility with SBA and good sitting balance. Pt stood several times today with SBA-CGA and good-fair standing balance. She ambulated short distance to chair with CGa and decreased gait speed. Pt appeared fatigued after transfer. She performed sitting and standing activities with rest breaks. No significant progress today. At this time, patient is appropriate for Co-treatment with occupational therapy due to patient's decreased overall endurance/tolerance levels, as well as need for high level skilled assistance to complete functional transfers/mobility and functional tasks. Brooklyn Tabares is appropriate for a multidisciplinary co-treatment of PT and OT to address goals of both disciplines. This section established at most recent assessment   PROBLEM LIST (Impairments causing functional limitations):  1. Decreased Strength  2. Decreased ADL/Functional Activities  3. Decreased Transfer Abilities  4. Decreased Ambulation Ability/Technique  5. Decreased Balance  6. Decreased Activity Tolerance   INTERVENTIONS PLANNED: (Benefits and precautions of physical therapy have been discussed with the patient.)  1. Balance Exercise  2. Bed Mobility  3. Gait Training  4. Therapeutic Activites  5. Therapeutic Exercise/Strengthening     TREATMENT PLAN: Frequency/Duration: 4 times a week for duration of hospital stay  Rehabilitation Potential For Stated Goals: Good     REHAB RECOMMENDATIONS (at time of discharge pending progress):    Placement: It is my opinion, based on this patient's performance to date, that Ms. Bobby Patel may benefit from intensive therapy at a [address] [place] [address] after discharge due to the functional deficits listed above that are likely to improve with skilled rehabilitation and concerns that he/she may be unsafe to be unsupervised at home due to medical complications and mobility deficits which put her at increase risk of functional decline and/or falling. LTAC.   Equipment:    None at this time            HISTORY:   History of Present Injury/Illness (Reason for Referral):  Patient is a 60 y.o.  female seen and evaluated at the request of Dr. Jaron Mclean. Patient is a smoker who came in with worsening hypoxemia, fevers, diarrhea and dyspnea. She reports she has been staying home and non-one was sick but per chart indicates several family were sick. Temp was 102.2 and saturation was 82% ion admission. CXR with infiltrates. Started ABX and optiflow. Only uses albuterol and home and ?still smoking. Rapid COVID testing was positive. Has been coughing up yellow sputum. In [facility] currently and oxygen needs up on 70% optiflow and 60 LPM. Patient states dyspnea is less  Subjective:   Remains afebrile. Remains on Optiflow up to 85% currently. Was up to 90% yesterday. States feels ill, but no new symptoms. Not coughing or having sputum. Past Medical History/Comorbidities:   Ms. Frederik Brittle  has no past medical history on file. Ms. Frederik Brittle  has no past surgical history on file. Social History/Living Environment:   Home Environment: Private residence  # Steps to Enter: 0  One/Two Story Residence: One story  Living Alone: No  Support Systems: Family member(s)  Patient Expects to be Discharged to[de-identified] Unknown  Current DME Used/Available at Home: None  Prior Level of Function/Work/Activity:  Per patient had been independent with all functional mobility . Personal Factors:          Sex:  female        Age:  60 y.o.    Number of Personal Factors/Comorbidities that affect the Plan of Care: 0: LOW COMPLEXITY   EXAMINATION:   Most Recent Physical Functioning:   Gross Assessment:                  Posture:     Balance:  Sitting: Intact  Standing: Impaired  Standing - Static: Good  Standing - Dynamic : Fair Bed Mobility:  Rolling: Stand-by assistance  Supine to Sit: Stand-by assistance  Scooting: Stand-by assistance  Interventions: Verbal cues  Wheelchair Mobility:     Transfers:  Sit to Stand: Contact guard assistance  Stand to Sit: Contact guard assistance  Bed to Chair: Contact guard assistance  Interventions: Verbal cues; Safety awareness training  Gait:     Base of Support: Center of gravity altered  Speed/Katt: Slow;Shuffled  Step Length: Right shortened;Left shortened  Gait Abnormalities: Decreased step clearance;Trunk sway increased  Distance (ft): 3 Feet (ft)  Assistive Device: Other (comment)(HHA)  Ambulation - Level of Assistance: Contact guard assistance  Interventions: Verbal cues; Safety awareness training      Body Structures Involved:  1. Bones  2. Joints  3. Muscles  4. Ligaments Body Functions Affected:  1. Neuromusculoskeletal  2. Movement Related  3. Skin Related  4. Metobolic/Endocrine Activities and Participation Affected:  1. General Tasks and Demands  2. Communication  3. Mobility  4. Self Care  5. Community, Social and [place]   Number of elements that affect the Plan of Care: 1-2: LOW COMPLEXITY   CLINICAL PRESENTATION:   Presentation: Stable and uncomplicated: LOW COMPLEXITY   CLINICAL DECISION MAKIN [facility] Mobility Inpatient Short Form  How much difficulty does the patient currently have. .. Unable A Lot A Little None   1. Turning over in bed (including adjusting bedclothes, sheets and blankets)? [] 1   [] 2   [x] 3   [] 4   2. Sitting down on and standing up from a chair with arms ( e.g., wheelchair, bedside commode, etc.)   [] 1   [] 2   [x] 3   [] 4   3. Moving from lying on back to sitting on the side of the bed? [] 1   [] 2   [x] 3   [] 4   How much help from another person does the patient currently need. .. Total A Lot A Little None   4. Moving to and from a bed to a chair (including a wheelchair)? [] 1   [] 2   [x] 3   [] 4   5. Need to walk in hospital room? [] 1   [] 2   [x] 3   [] 4   6. Climbing 3-5 steps with a railing? [] 1   [] 2   [x] 3   [] 4   © , Trustees of [address] [place] [address] [address] [address], under license to [employer].  All rights reserved      Score:  Initial: 18 Most Recent: X (Date: -- )    Interpretation of Tool:  Represents activities that are increasingly more difficult (i.e. Bed mobility, Transfers, Gait). Medical Necessity:     · Patient demonstrates   · good  ·  rehab potential due to higher previous functional level. Reason for Services/Other Comments:  · Patient continues to require skilled intervention due to   · medical complications, patient unable to attend/participate in therapy as expected, and decreased transfers and mobility and AIRVO max. · .   Use of outcome tool(s) and clinical judgement create a POC that gives a: Clear prediction of patient's progress: LOW COMPLEXITY        TREATMENT:   (In addition to Assessment/Re-Assessment sessions the following treatments were rendered)   Pre-treatment Symptoms/Complaints:  0/10 no increased pain reported. Pain: Initial:   Pain Intensity 1: 0  Post Session:  0/10 no increased pain reported. Today's treatment session addressed Decreased Strength, Decreased ADL/Functional Activities, Decreased Transfer Abilities, Decreased Ambulation Ability/Technique, Decreased Balance, Decreased Activity Tolerance, Increased Fatigue and Increased Shortness of Breath to progress towards achieving stated therapy goals. During this session, Occupational Therapy addressed ADLs to progress towards their discipline specific goal(s). Co-treatment was necessary to improve patient's ability to increase activity demands and ability to return to normal functional activity. Therapeutic Activity: (    23 minutes): Therapeutic activities including Bed transfers, STS transfers, Ambulation on level ground, and standing and seated dynamic mobility to improve mobility, strength, balance, coordination and activity tolerance. Required minimal Verbal cues; Safety awareness training to promote static and dynamic balance in standing, promote coordination of bilateral, lower extremity(s) and proper activity pacing and breathing technique. Date:  7/7/20 Date:   Date:     ACTIVITY/EXERCISE AM PM AM PM AM PM   Seated LAQ  1 x 10 B  1 x 15 B       Seated marching  1 x 10 B  1 x 15 B       Seated AP  1 x 20 B       Seated hip abd/add  1 x 10 B  1 x 15 B                                  B = bilateral; AA = active assistive; A = active; P = passive    Braces/Orthotics/Lines/Etc:   · O2 Device: Heated, Hi flow nasal cannula  Treatment/Session Assessment:    · Response to Treatment:  See above. · Interdisciplinary Collaboration:   o Physical Therapist  o Occupational Therapist  o Registered Nurse  · After treatment position/precautions:   o Up in chair  o Bed/Chair-wheels locked  o Bed in low position  o Call light within reach  o RN notified   · Compliance with Program/Exercises: Will assess as treatment progresses  · Recommendations/Intent for next treatment session: \"Next visit will focus on advancements to more challenging activities, reduction in assistance provided, and transfers, ambulation and mobility.    \"    Total Treatment Duration:  PT Patient Time In/Time Out  Time In: 1049  Time Out: Silverio Peck 430, PT, DPT

## 2020-07-14 NOTE — PROGRESS NOTES
Problem: Patient Education: Go to Patient Education Activity  Goal: Patient/Family Education  Description: 1. Patient will complete lower body bathing and dressing with MOD I and adaptive equipment as needed. 2. Patient will complete toileting with MOD I.   3. Patient will tolerate 25 minutes of OT treatment with 2-3 rest breaks to increase activity tolerance for ADLs. 4. Patient will complete functional transfers with MOD I and adaptive equipment as needed. 5. Patient will complete functional mobility for household distances with MOD I and adaptive equipment as needed. 6. Patient will complete self-grooming tasks while standing edge of sink with MOD I and adaptive equipment as needed. Timeframe: 7 visits      Outcome: Progressing Towards Goal      OCCUPATIONAL THERAPY: Daily Note and AM 7/14/2020  INPATIENT: OT Visit Days: 3  [place] / [place] / Product Type: Medicare /      NAME/AGE/GENDER: Brendan Simon is a 60 y.o. female   PRIMARY DIAGNOSIS:  Acute respiratory failure with hypoxemia ([address] [J96.01] Acute hypoxemic respiratory failure ([address] Acute hypoxemic respiratory failure ([address]       ICD-10: Treatment Diagnosis:    · Generalized Muscle Weakness (M62.81)  · Other lack of cordination (R27.8)   Precautions/Allergies:     Patient has no known allergies. ASSESSMENT:     Ms. Mateo Giron presents in the ICU for the above diagnoses. Upon arrival, pt supine in bed and currently resting on Airvo at 85%. Pt is alert and oriented x 4; agreeable to OT evaluation. Pt reports living alone in a 1-story home. At baseline, pt notes independence with all ADLs and functional mobility with no DME required. Does not drive however has family who will assists. Endorses no hx of falls. 7/14/2020  Today, pt presents supine in bed, currently maxed out on Airvo with Sp02 sats remaining at 90% and above during entire session and mobility.  Co-treatment completed with PT today d/t pt's decreased activity tolerance. While PT focused on overall mobility/transfers, OT focused on ADL performance and overall activity tolerance. Pt transitioned to sitting with SBA. Short rest-break provided while seated edge of bed. Pt proceeded to stand and ambulate to bedside chair with SBA. Good dynamic standing balance with no DME required. Pt then participated in total body bathing with supervision/set-up provided; new gown donned. Short restbreak provided before completing sit to stand to facilitate pete-care. Pt returned to sitting upright in bedside chair and left with needs met, call light within reach, and RN notified. Good progress towards goals. Will continue OT efforts as indicated. This section established at most recent assessment   PROBLEM LIST (Impairments causing functional limitations):  1. Decreased Strength  2. Decreased ADL/Functional Activities  3. Decreased Transfer Abilities  4. Decreased Ambulation Ability/Technique  5. Decreased Balance  6. Decreased Activity Tolerance  7. Decreased Pacing Skills  8. Decreased Work Simplification/Energy Conservation Techniques  9. Increased Shortness of Breath   INTERVENTIONS PLANNED: (Benefits and precautions of occupational therapy have been discussed with the patient.)  1. Activities of daily living training  2. Adaptive equipment training  3. Balance training  4. Clothing management  5. Community reintergration  6. Donning&doffing training  7. Neuromuscular re-eduation  8. Re-evaluation  9. Therapeutic activity  10. Therapeutic exercise     TREATMENT PLAN: Frequency/Duration: Follow patient 3x/week to address above goals. Rehabilitation Potential For Stated Goals: Good     REHAB RECOMMENDATIONS (at time of discharge pending progress):    Placement: It is my opinion, based on this patient's performance to date, that Ms. Toby Jarrett may benefit from [address] after discharge due to the functional deficits listed above that are likely to improve with skilled rehabilitation because he/she has multiple medical issues that affect his/her functional mobility in the community. Equipment:    None at this time              OCCUPATIONAL PROFILE AND HISTORY:   History of Present Injury/Illness (Reason for Referral):  See H&P  Past Medical History/Comorbidities:   Ms. Bobby Patel  has no past medical history on file. Ms. Bobby Patel  has no past surgical history on file. Social History/Living Environment:   Home Environment: Private residence  # Steps to Enter: 0  One/Two Story Residence: One story  Living Alone: No  Support Systems: Family member(s)  Patient Expects to be Discharged to[de-identified] Unknown  Current DME Used/Available at Home: None  Prior Level of Function/Work/Activity:  Independent with ADls and functional mobility. Personal Factors:          Sex:  female        Age:  60 y.o.         Other factors that influence how disability is experienced by the patient:  multiple co-morbidities    Number of Personal Factors/Comorbidities that affect the Plan of Care: Brief history (0):  LOW COMPLEXITY   ASSESSMENT OF OCCUPATIONAL PERFORMANCE[de-identified]   Activities of Daily Living:   Basic ADLs (From Assessment) Complex ADLs (From Assessment)   Feeding: Setup  Oral Facial Hygiene/Grooming: Setup  Bathing: Minimum assistance  Upper Body Dressing: Setup  Lower Body Dressing: Minimum assistance  Toileting: Minimum assistance     Grooming/Bathing/Dressing Activities of Daily Living     Cognitive Retraining  Safety/Judgement: Awareness of environment                       Bed/Mat Mobility  Rolling: Stand-by assistance  Supine to Sit: Stand-by assistance  Sit to Supine: Stand-by assistance  Sit to Stand: Stand-by assistance  Stand to Sit: Supervision  Bed to Chair: Stand-by assistance  Scooting: Stand-by assistance     Most Recent Physical Functioning:   Gross Assessment:  AROM: Generally decreased, functional  PROM: Generally decreased, functional  Strength: Generally decreased, functional  Coordination: Generally decreased, functional  Tone: Normal  Sensation: Intact               Posture:  Posture (WDL): Exceptions to WDL  Posture Assessment: Cervical, Forward head  Balance:  Sitting: Intact  Standing: Impaired  Standing - Static: Good  Standing - Dynamic : Good;Fair Bed Mobility:  Rolling: Stand-by assistance  Supine to Sit: Stand-by assistance  Sit to Supine: Stand-by assistance  Scooting: Stand-by assistance  Wheelchair Mobility:     Transfers:  Sit to Stand: Stand-by assistance  Stand to Sit: Supervision  Bed to Chair: Stand-by assistance            Patient Vitals for the past 6 hrs:   BP SpO2 O2 Flow Rate (L/min) Pulse   07/14/20 0830 134/79   (!) 123   07/14/20 0855  90 % 60 l/min    07/14/20 0900 102/57   (!) 134   07/14/20 0930 120/64 91 %  (!) 129   07/14/20 1000 151/70   (!) 126   07/14/20 1030 112/58 90 %  (!) 112   07/14/20 1108 127/58 91 %  (!) 117   07/14/20 1209  92 % 60 l/min        Mental Status  Neurologic State: Alert  Orientation Level: Oriented X4  Cognition: Follows commands, Appropriate decision making  Perception: Appears intact  Perseveration: No perseveration noted  Safety/Judgement: Awareness of environment                          Physical Skills Involved:  1. Balance  2. Strength  3. Activity Tolerance  4. Gross Motor Control Cognitive Skills Affected (resulting in the inability to perform in a timely and safe manner): 1. none  Psychosocial Skills Affected:  1. Habits/Routines  2. Environmental Adaptation   Number of elements that affect the Plan of Care: 5+:  HIGH COMPLEXITY   CLINICAL DECISION MAKING:   [employer] AM-PAC 6 Clicks   Daily Activity Inpatient Short Form  How much help from another person does the patient currently need. .. Total A Lot A Little None   1. Putting on and taking off regular lower body clothing? [] 1   [] 2   [x] 3   [] 4   2. Bathing (including washing, rinsing, drying)? [] 1   [] 2   [x] 3   [] 4   3. Toileting, which includes using toilet, bedpan or urinal?   [] 1   [] 2   [x] 3   [] 4   4. Putting on and taking off regular upper body clothing? [] 1   [] 2   [x] 3   [] 4   5. Taking care of personal grooming such as brushing teeth? [] 1   [] 2   [x] 3   [] 4   6. Eating meals? [] 1   [] 2   [x] 3   [] 4   © 2007, Trustees of [address] [place] [address] [address] [id number], under license to [employer]. All rights reserved      Score:  Initial: 18 Most Recent: X (Date: -- )    Interpretation of Tool:  Represents activities that are increasingly more difficult (i.e. Bed mobility, Transfers, Gait). Medical Necessity:     · Patient demonstrates   · good  ·  rehab potential due to higher previous functional level. Reason for Services/Other Comments:  · Patient continues to require skilled intervention due to   · medical complications and patient unable to attend/participate in therapy as expected  · . Use of outcome tool(s) and clinical judgement create a POC that gives a: LOW COMPLEXITY         TREATMENT:   (In addition to Assessment/Re-Assessment sessions the following treatments were rendered)     Pre-treatment Symptoms/Complaints:    Pain: Initial:   Pain Intensity 1: 0  Post Session:  same     Neuromuscular Re-education: ( 10 minutes):  Exercise/activities per grid below to improve balance, coordination and posture. Required minimal verbal cues to promote static and dynamic balance in sitting and standing. Today's treatment session addressed Decreased Strength, Decreased ADL/Functional Activities and Decreased Activity Tolerance to progress towards achieving goal(s) 1, 4, 5 and 6. During this session,  Physical Therapy addressed  Ambulation to progress towards their discipline specific goal(s). Co-treatment was necessary to improve patient's ability to follow higher level commands, ability to increase activity demands and ability to return to normal functional activity.     Self Care: (13 minutes): Procedure(s) (per grid) utilized to improve and/or restore self-care/home management as related to dressing, bathing and grooming. Required no   cueing to facilitate activities of daily living skills. Braces/Orthotics/Lines/Etc:   · O2 Device: Heated, Hi flow nasal cannula  Treatment/Session Assessment:    · Response to Treatment:  tolerated well just de-sats with mobility. · Interdisciplinary Collaboration:   o Physical Therapist  o Occupational Therapist  o Registered Nurse  · After treatment position/precautions:   o Up in chair  o Bed/Chair-wheels locked  o Call light within reach  o RN notified   · Compliance with Program/Exercises: Compliant all of the time, Will assess as treatment progresses. · Recommendations/Intent for next treatment session: \"Next visit will focus on advancements to more challenging activities and reduction in assistance provided\".   Total Treatment Duration:  OT Patient Time In/Time Out  Time In: 1049  Time Out: Aimee 27, OT

## 2020-07-14 NOTE — PROGRESS NOTES
Sang Adames  Admission Date: 7/1/2020             Daily Progress Note: 7/14/2020    The patient's chart is reviewed and the patient is discussed with the staff. 65 y.o.  female seen and evaluated at the request of Dr. David Amaral.     Patient is a smoker who came in with worsening hypoxemia, fevers, diarrhea and dyspnea. She reports she has been staying home and no-one was sick but per chart indicates several family were sick. Temp was 102.2 and saturation was 82% on admission. CXR with infiltrates. Started ABX and optiflow. Only uses albuterol and home and ?still smoking. Rapid COVID testing was positive. Has been coughing up yellow sputum.      In [facility] currently and oxygen needs up on 70% optiflow and 60 LPM. Patient states dyspnea is less. Subjective: On airvo, self proning - no major overnight events. Comfortable- O2 sats 96%- sitting up in chair.         Current Facility-Administered Medications   Medication Dose Route Frequency    insulin glargine (LANTUS) injection 5 Units  5 Units SubCUTAneous QHS    hydrALAZINE (APRESOLINE) 20 mg/mL injection 20 mg  20 mg IntraVENous Q6H PRN    LORazepam (ATIVAN) injection 1 mg  1 mg IntraVENous Q4H PRN    morphine injection 2 mg  2 mg IntraVENous Q4H PRN    pantoprazole (PROTONIX) tablet 40 mg  40 mg Oral DAILY    insulin lispro (HUMALOG) injection   SubCUTAneous AC&HS    NUTRITIONAL SUPPORT ELECTROLYTE PRN ORDERS   Does Not Apply PRN    budesonide-formoteroL (SYMBICORT) 160-4.5 mcg/actuation HFA inhaler 2 Puff  2 Puff Inhalation BID RT    albuterol (PROVENTIL HFA, VENTOLIN HFA, PROAIR HFA) inhaler 2 Puff  2 Puff Inhalation Q4H PRN    sodium chloride (NS) flush 20 mL  20 mL InterCATHeter Q8H    sodium chloride (NS) flush 20 mL  20 mL InterCATHeter PRN    sodium chloride (NS) flush 5-40 mL  5-40 mL IntraVENous Q8H    sodium chloride (NS) flush 5-40 mL  5-40 mL IntraVENous PRN    acetaminophen (TYLENOL) tablet 650 mg  650 mg Oral Q4H PRN    diphenhydrAMINE (BENADRYL) injection 12.5 mg  12.5 mg IntraVENous Q4H PRN    bisacodyL (DULCOLAX) tablet 5 mg  5 mg Oral DAILY PRN    ondansetron (ZOFRAN) injection 4 mg  4 mg IntraVENous Q4H PRN    enoxaparin (LOVENOX) injection 40 mg  40 mg SubCUTAneous Q24H    albuterol-ipratropium (DUO-NEB) 2.5 MG-0.5 MG/3 ML  3 mL Nebulization Q4H PRN    nicotine (NICODERM CQ) 14 mg/24 hr patch 1 Patch  1 Patch TransDERmal Q24H       Review of Systems  Constitutional: negative for fever, chills, sweats  Cardiovascular: negative for chest pain, palpitations, syncope, edema  Gastrointestinal: negative for dysphagia, reflux, vomiting, diarrhea, abdominal pain, or melena  Neurologic: negative for focal weakness, numbness, headache    Objective:     Vitals:    07/14/20 0501 07/14/20 0507 07/14/20 0601 07/14/20 0855   BP: 142/71  120/65    Pulse:  (!) 119 (!) 117    Resp:  17 21    Temp:       SpO2:   93% 90%   Weight:       Height:         Intake and Output:   07/12 1901 - 07/14 0700  In: 1990 [P.O.:1740; I.V.:250]  Out: 3300 [Urine:3300]  No intake/output data recorded. Physical Exam:   Constitution:  the patient is well developed and in no acute distress  EENMT:  Sclera clear, pupils equal, oral mucosa moist  Respiratory: Mild B inspiratory crackles  Cardiovascular:  RRR without M,G,R  Gastrointestinal: soft and non-tender; with positive bowel sounds. Musculoskeletal: warm without cyanosis. There is no lower leg edema. Skin:  no jaundice or rashes, no wounds   Neurologic: no gross neuro deficits     Psychiatric:  alert and oriented x ppt    CHEST XRAY:   7/12/20  IMPRESSION: Stable bibasilar lung infiltrates, in keeping with coronavirus. Chest X-ray     INDICATION: Respiratory failure     A portable AP view of the chest was obtained.     FINDINGS: There is focal infiltrate in the right lung base. Left lung is clear. The heart size is normal.  The bony thorax is intact.    IMPRESSION  IMPRESSION: Right lower lobe infiltrate       LAB  No lab exists for component: [place]   Recent Labs     07/14/20  0351 07/13/20  0420 07/12/20  0316   WBC 13.6* 18.7* 17.5*   HGB 12.5 12.5 12.6   HCT 40.6 40.9 40.1    244 269     Recent Labs     07/14/20  0351 07/13/20  0420 07/12/20  0316    140 140   K 3.9 4.3 4.6    107 105   CO2 28 27 28   * 121* 159*   BUN 21 25* 26*   CREA 0.53* 0.67 0.66   MG  --  2.1  --    CA 7.6* 7.6* 7.7*   PHOS  --  1.6*  --      ABG:    No results found for: PH, PHI, PCO2, PCO2I, PO2, PO2I, HCO3, HCO3I, FIO2, FIO2I    Assessment:  (Medical Decision Making)     Hospital Problems  Never Reviewed          Codes Class Noted POA    Pneumonia due to COVID-19 virus ICD-10-CM: U07.1, J12.89  ICD-9-CM: 480.8  7/2/2020 Unknown        Chronic obstructive pulmonary disease with acute exacerbation ([facility]  -- unspecified type ICD-10-CM: J44.1  ICD-9-CM: 491.21  7/2/2020 Unknown        Elevated diaphragm ICD-10-CM: J98.6  ICD-9-CM: 519.4  7/2/2020 Unknown        Acute respiratory failure with hypoxemia ([facility]) ICD-10-CM: J96.01  ICD-9-CM: 518.81  7/1/2020 Unknown        Sepsis ([facility] ICD-10-CM: A41.9  ICD-9-CM: 038.9, 995.91  7/1/2020 Unknown        CAP (community acquired pneumonia) ICD-10-CM: J18.9  ICD-9-CM: 091  9/26/2018 Yes        Leukocytosis ICD-10-CM: H62.270  ICD-9-CM: 288.60  9/26/2018 Yes        * (Principal) Acute hypoxemic respiratory failure (HCC) ICD-10-CM: J96.01  ICD-9-CM: 518.81  9/26/2018 Yes        Tobacco abuse ICD-10-CM: Z72.0  ICD-9-CM: 305.1  9/26/2018 Yes              Pt with severe acute hypoxic respiratory failure due to COVID infection. Ongoing high o2 needs. Sats much better when has bipap on. Plan:  (Medical Decision Making)   -still at risk of  needing intubation. Continue periodic bipap use. Try using surgical mask with Airvo if needed  -completed dexa course.   -s/p convalescent plasma 7/2  -completed remdesivir. -symbicort  -lantus with ssi. Decrease lantus to 5 units with stopping steroids.    -ppi  -lovenox    Continue supportive care    Windy Mcdowell MD

## 2020-07-15 ENCOUNTER — APPOINTMENT (OUTPATIENT)
Dept: GENERAL RADIOLOGY | Age: 65
DRG: 871 | End: 2020-07-15
Attending: INTERNAL MEDICINE
Payer: MEDICARE

## 2020-07-15 LAB
ANION GAP SERPL CALC-SCNC: 4 MMOL/L (ref 7–16)
BUN SERPL-MCNC: 17 MG/DL (ref 8–23)
CALCIUM SERPL-MCNC: 8.3 MG/DL (ref 8.3–10.4)
CHLORIDE SERPL-SCNC: 107 MMOL/L (ref 98–107)
CO2 SERPL-SCNC: 28 MMOL/L (ref 21–32)
CREAT SERPL-MCNC: 0.49 MG/DL (ref 0.6–1)
ERYTHROCYTE [DISTWIDTH] IN BLOOD BY AUTOMATED COUNT: 14 % (ref 11.9–14.6)
GLUCOSE BLD STRIP.AUTO-MCNC: 100 MG/DL (ref 65–100)
GLUCOSE BLD STRIP.AUTO-MCNC: 112 MG/DL (ref 65–100)
GLUCOSE BLD STRIP.AUTO-MCNC: 120 MG/DL (ref 65–100)
GLUCOSE BLD STRIP.AUTO-MCNC: 164 MG/DL (ref 65–100)
GLUCOSE SERPL-MCNC: 101 MG/DL (ref 65–100)
HCT VFR BLD AUTO: 42.1 % (ref 35.8–46.3)
HGB BLD-MCNC: 12.9 G/DL (ref 11.7–15.4)
MCH RBC QN AUTO: 27.1 PG (ref 26.1–32.9)
MCHC RBC AUTO-ENTMCNC: 30.6 G/DL (ref 31.4–35)
MCV RBC AUTO: 88.4 FL (ref 79.6–97.8)
NRBC # BLD: 0 K/UL (ref 0–0.2)
PLATELET # BLD AUTO: 202 K/UL (ref 150–450)
PMV BLD AUTO: 10.6 FL (ref 9.4–12.3)
POTASSIUM SERPL-SCNC: 4.3 MMOL/L (ref 3.5–5.1)
RBC # BLD AUTO: 4.76 M/UL (ref 4.05–5.2)
SODIUM SERPL-SCNC: 139 MMOL/L (ref 136–145)
WBC # BLD AUTO: 14.6 K/UL (ref 4.3–11.1)

## 2020-07-15 PROCEDURE — 36592 COLLECT BLOOD FROM PICC: CPT

## 2020-07-15 PROCEDURE — 97530 THERAPEUTIC ACTIVITIES: CPT

## 2020-07-15 PROCEDURE — 77010033711 HC HIGH FLOW OXYGEN

## 2020-07-15 PROCEDURE — 74011250637 HC RX REV CODE- 250/637: Performed by: INTERNAL MEDICINE

## 2020-07-15 PROCEDURE — 99233 SBSQ HOSP IP/OBS HIGH 50: CPT | Performed by: INTERNAL MEDICINE

## 2020-07-15 PROCEDURE — 85027 COMPLETE CBC AUTOMATED: CPT

## 2020-07-15 PROCEDURE — 74011250636 HC RX REV CODE- 250/636: Performed by: INTERNAL MEDICINE

## 2020-07-15 PROCEDURE — 94640 AIRWAY INHALATION TREATMENT: CPT

## 2020-07-15 PROCEDURE — 71045 X-RAY EXAM CHEST 1 VIEW: CPT

## 2020-07-15 PROCEDURE — 74011636637 HC RX REV CODE- 636/637: Performed by: FAMILY MEDICINE

## 2020-07-15 PROCEDURE — 82962 GLUCOSE BLOOD TEST: CPT

## 2020-07-15 PROCEDURE — 94760 N-INVAS EAR/PLS OXIMETRY 1: CPT

## 2020-07-15 PROCEDURE — 74011636637 HC RX REV CODE- 636/637: Performed by: INTERNAL MEDICINE

## 2020-07-15 PROCEDURE — 65270000029 HC RM PRIVATE

## 2020-07-15 PROCEDURE — 80048 BASIC METABOLIC PNL TOTAL CA: CPT

## 2020-07-15 PROCEDURE — 97110 THERAPEUTIC EXERCISES: CPT

## 2020-07-15 RX ADMIN — ACETAMINOPHEN 650 MG: 325 TABLET, FILM COATED ORAL at 08:15

## 2020-07-15 RX ADMIN — ALBUTEROL SULFATE 2 PUFF: 90 AEROSOL, METERED RESPIRATORY (INHALATION) at 19:42

## 2020-07-15 RX ADMIN — MORPHINE SULFATE 2 MG: 2 INJECTION, SOLUTION INTRAMUSCULAR; INTRAVENOUS at 10:46

## 2020-07-15 RX ADMIN — SODIUM CHLORIDE 20 ML: 9 INJECTION, SOLUTION INTRAMUSCULAR; INTRAVENOUS; SUBCUTANEOUS at 14:13

## 2020-07-15 RX ADMIN — SODIUM CHLORIDE 10 ML: 9 INJECTION, SOLUTION INTRAMUSCULAR; INTRAVENOUS; SUBCUTANEOUS at 05:08

## 2020-07-15 RX ADMIN — ENOXAPARIN SODIUM 40 MG: 40 INJECTION SUBCUTANEOUS at 16:20

## 2020-07-15 RX ADMIN — SODIUM CHLORIDE 10 ML: 9 INJECTION, SOLUTION INTRAMUSCULAR; INTRAVENOUS; SUBCUTANEOUS at 14:13

## 2020-07-15 RX ADMIN — SODIUM CHLORIDE 20 ML: 9 INJECTION, SOLUTION INTRAMUSCULAR; INTRAVENOUS; SUBCUTANEOUS at 05:08

## 2020-07-15 RX ADMIN — SODIUM CHLORIDE 20 ML: 9 INJECTION, SOLUTION INTRAMUSCULAR; INTRAVENOUS; SUBCUTANEOUS at 21:36

## 2020-07-15 RX ADMIN — PANTOPRAZOLE SODIUM 40 MG: 40 TABLET, DELAYED RELEASE ORAL at 08:18

## 2020-07-15 RX ADMIN — BUDESONIDE AND FORMOTEROL FUMARATE DIHYDRATE 2 PUFF: 160; 4.5 AEROSOL RESPIRATORY (INHALATION) at 08:18

## 2020-07-15 RX ADMIN — SODIUM CHLORIDE 10 ML: 9 INJECTION, SOLUTION INTRAMUSCULAR; INTRAVENOUS; SUBCUTANEOUS at 21:36

## 2020-07-15 RX ADMIN — LORAZEPAM 1 MG: 2 INJECTION INTRAMUSCULAR; INTRAVENOUS at 05:08

## 2020-07-15 RX ADMIN — INSULIN GLARGINE 5 UNITS: 100 INJECTION, SOLUTION SUBCUTANEOUS at 21:35

## 2020-07-15 RX ADMIN — INSULIN LISPRO 2 UNITS: 100 INJECTION, SOLUTION INTRAVENOUS; SUBCUTANEOUS at 16:19

## 2020-07-15 RX ADMIN — LORAZEPAM 1 MG: 2 INJECTION INTRAMUSCULAR; INTRAVENOUS at 21:34

## 2020-07-15 NOTE — PROGRESS NOTES
Problem: Mobility Impaired (Adult and Pediatric)  Goal: *Therapy Goal  Outcome: Progressing Towards Goal  Note: STG:  (1.)Ms. Yenny Yanez will move from supine to sit and sit to supine , scoot up and down, and roll side to side with CONTACT GUARD ASSIST within 4 treatment day(s). GOAL MET 7/15/2020  (2.)Ms. Yenny Yanez will transfer from bed to chair and chair to bed with CONTACT GUARD ASSIST using the least restrictive device within 4 treatment day(s). (3.)Ms. Yenny Yanez will ambulate with CONTACT GUARD ASSIST for 50 feet with the least restrictive device within 4 treatment day(s). LTG:  (1.)Ms. Yenny Yanez will move from supine to sit and sit to supine , scoot up and down, and roll side to side in bed with STAND BY ASSIST within 7 treatment day(s). (2.)Ms. Yenny Yanez will transfer from bed to chair and chair to bed with STAND BY ASSIST using the least restrictive device within 7 treatment day(s). (3.)Ms. Nugent will ambulate with STAND BY ASSIST for 100 feet with the least restrictive device within 7 treatment day(s). ________________________________________________________________________________________________    PHYSICAL THERAPY: Daily Note and PM 7/15/2020  INPATIENT: PT Visit Days : 5  [place] / [place] / Product Type: Medicare /       NAME/AGE/GENDER: Ubaldo Flores is a 60 y.o. female   PRIMARY DIAGNOSIS: Acute respiratory failure with hypoxemia ([address] [J96.01] Acute hypoxemic respiratory failure ([address] Acute hypoxemic respiratory failure ([facility]       ICD-10: Treatment Diagnosis:    · Generalized Muscle Weakness (M62.81)  · Other lack of cordination (R27.8)  · Difficulty in walking, Not elsewhere classified (R26.2)  · Other abnormalities of gait and mobility (R26.89)   Precaution/Allergies:  Patient has no known allergies. ASSESSMENT:     Ms. Yenny Yanez was supine upon contact and agreeable to PT.  Patient performs supine to sit with mod I and performs therapeutic strengthening exercises to improve functional strength for transfers, gait and overall mobility. Patient requires cues to perform exercises correctly. Rest breaks provided throughout. Patient transfers to standing with CGA and ambulates 30' x 2 without use of rolling walker, CGA-min assist needed for balance, and cues for sequencing/pursed lipped breathing. Patient needed seated rest break in between ambulation bouts. Would benefit from use of rolling walker to improve balance. Patient returns to supine with mod I. Overall good progress towards physical therapy goals. Goals in red have been met and all other goals listed above are still appropriate. Will continue efforts as patient is still below functional baseline. At this time, patient is appropriate for Co-treatment with occupational therapy due to patient's decreased overall endurance/tolerance levels, as well as need for high level skilled assistance to complete functional transfers/mobility and functional tasks. Shalonda Ascencio is appropriate for a multidisciplinary co-treatment of PT and OT to address goals of both disciplines. This section established at most recent assessment   PROBLEM LIST (Impairments causing functional limitations):  1. Decreased Strength  2. Decreased ADL/Functional Activities  3. Decreased Transfer Abilities  4. Decreased Ambulation Ability/Technique  5. Decreased Balance  6. Decreased Activity Tolerance   INTERVENTIONS PLANNED: (Benefits and precautions of physical therapy have been discussed with the patient.)  1. Balance Exercise  2. Bed Mobility  3. Gait Training  4. Therapeutic Activites  5. Therapeutic Exercise/Strengthening     TREATMENT PLAN: Frequency/Duration: 4 times a week for duration of hospital stay  Rehabilitation Potential For Stated Goals: Good     REHAB RECOMMENDATIONS (at time of discharge pending progress):    Placement: It is my opinion, based on this patient's performance to date, that Ms. Isabella Green may benefit from intensive therapy at [address] after discharge due to the functional deficits listed above that are likely to improve with skilled rehabilitation and concerns that he/she may be unsafe to be unsupervised at home due to medical complications and mobility deficits which put her at increase risk of functional decline and/or falling. LTAC. Equipment:    None at this time            HISTORY:   History of Present Injury/Illness (Reason for Referral):  Patient is a 60 y.o.  female seen and evaluated at the request of Dr. Rony French. Patient is a smoker who came in with worsening hypoxemia, fevers, diarrhea and dyspnea. She reports she has been staying home and non-one was sick but per chart indicates several family were sick. Temp was 102.2 and saturation was 82% ion admission. CXR with infiltrates. Started ABX and optiflow. Only uses albuterol and home and ?still smoking. Rapid COVID testing was positive. Has been coughing up yellow sputum. In [facility] currently and oxygen needs up on 70% optiflow and 60 LPM. Patient states dyspnea is less  Subjective:   Remains afebrile. Remains on Optiflow up to 85% currently. Was up to 90% yesterday. States feels ill, but no new symptoms. Not coughing or having sputum. Past Medical History/Comorbidities:   Ms. Mateo Giron  has no past medical history on file. Ms. Mateo Giron  has no past surgical history on file. Social History/Living Environment:   Home Environment: Private residence  # Steps to Enter: 0  One/Two Story Residence: One story  Living Alone: No  Support Systems: Family member(s)  Patient Expects to be Discharged to[de-identified] Unknown  Current DME Used/Available at Home: None  Prior Level of Function/Work/Activity:  Per patient had been independent with all functional mobility . Personal Factors:          Sex:  female        Age:  60 y.o.    Number of Personal Factors/Comorbidities that affect the Plan of Care: 0: LOW COMPLEXITY   EXAMINATION:   Most Recent Physical Functioning:   Gross Assessment:                  Posture:     Balance:  Sitting: Intact  Standing: Impaired; With support  Standing - Static: Good  Standing - Dynamic : Fair Bed Mobility:  Rolling: Modified independent  Supine to Sit: Modified independent  Sit to Supine: Modified independent  Wheelchair Mobility:     Transfers:  Sit to Stand: Contact guard assistance  Stand to Sit: Contact guard assistance  Gait:     Base of Support: Center of gravity altered  Speed/Katt: Slow;Shuffled  Step Length: Right shortened;Left shortened  Gait Abnormalities: Decreased step clearance;Trunk sway increased; Path deviations  Distance (ft): (30' x 2)  Assistive Device: (None)  Ambulation - Level of Assistance: Minimal assistance;Contact guard assistance  Interventions: Safety awareness training;Verbal cues; Tactile cues      Body Structures Involved:  1. Bones  2. Joints  3. Muscles  4. Ligaments Body Functions Affected:  1. Neuromusculoskeletal  2. Movement Related  3. Skin Related  4. Metobolic/Endocrine Activities and Participation Affected:  1. General Tasks and Demands  2. Communication  3. Mobility  4. Self Care  5. Community, Social and [place]   Number of elements that affect the Plan of Care: 1-2: LOW COMPLEXITY   CLINICAL PRESENTATION:   Presentation: Stable and uncomplicated: LOW COMPLEXITY   CLINICAL DECISION MAKIN [facility] Inpatient Short Form  How much difficulty does the patient currently have. .. Unable A Lot A Little None   1. Turning over in bed (including adjusting bedclothes, sheets and blankets)? [] 1   [] 2   [x] 3   [] 4   2. Sitting down on and standing up from a chair with arms ( e.g., wheelchair, bedside commode, etc.)   [] 1   [] 2   [x] 3   [] 4   3. Moving from lying on back to sitting on the side of the bed? [] 1   [] 2   [x] 3   [] 4   How much help from another person does the patient currently need. ..  Total A Lot A Little None   4. Moving to and from a bed to a chair (including a wheelchair)? [] 1   [] 2   [x] 3   [] 4   5. Need to walk in hospital room? [] 1   [] 2   [x] 3   [] 4   6. Climbing 3-5 steps with a railing? [] 1   [] 2   [x] 3   [] 4   © 2007, Trustees of [address] [place] [address] [address] [id number], under license to [employer]. All rights reserved      Score:  Initial: 18 Most Recent: X (Date: -- )    Interpretation of Tool:  Represents activities that are increasingly more difficult (i.e. Bed mobility, Transfers, Gait). Medical Necessity:     · Patient demonstrates   · good  ·  rehab potential due to higher previous functional level. Reason for Services/Other Comments:  · Patient continues to require skilled intervention due to   · medical complications, patient unable to attend/participate in therapy as expected, and decreased transfers and mobility and AIRVO max. · .   Use of outcome tool(s) and clinical judgement create a POC that gives a: Clear prediction of patient's progress: LOW COMPLEXITY        TREATMENT:   (In addition to Assessment/Re-Assessment sessions the following treatments were rendered)   Pre-treatment Symptoms/Complaints:  0/10 no increased pain reported. Pain: Initial:   Pain Intensity 1: 0  Post Session:  0/10 no increased pain reported. Therapeutic Activity: (    13 minutes): Therapeutic activities including bed mobility training, transfer training, static/dynamic sitting/stanidng balance, ambulation on level ground, instruction in pursed lipped breathing, scooting, posture training, and patient education to improve mobility, strength, balance, coordination and activity tolerance. Required minimal Safety awareness training;Verbal cues; Tactile cues to promote static and dynamic balance in standing, promote coordination of bilateral, lower extremity(s) and proper activity pacing and breathing technique.        Therapeutic Exercise: (  10 MInutes):  Exercises per grid below to improve mobility, strength and balance. Required moderate visual, verbal and tactile cues to promote proper body posture, promote proper body mechanics and promote proper body breathing techniques. Progressed range, repetitions and complexity of movement as indicated. Date:  7/7/20 Date:  7/15/20 Date:     ACTIVITY/EXERCISE AM PM AM PM AM PM   Seated LAQ  1 x 10 B  1 x 15 B  3x10B A     Seated marching  1 x 10 B  1 x 15 B  3x10B A     Seated AP  1 x 20 B  3x10B A     Seated hip abd/add  1 x 10 B  1 x 15 B  2x10B A                                B = bilateral; AA = active assistive; A = active; P = passive    Braces/Orthotics/Lines/Etc:   · O2 Device: Heated, Hi flow nasal cannula  Treatment/Session Assessment:    · Response to Treatment:  See above. · Interdisciplinary Collaboration:   o Physical Therapy Assistant  o Registered Nurse  · After treatment position/precautions:   o Supine in bed  o Bed/Chair-wheels locked  o Bed in low position  o Call light within reach  o RN notified   · Compliance with Program/Exercises: Will assess as treatment progresses  · Recommendations/Intent for next treatment session: \"Next visit will focus on advancements to more challenging activities, reduction in assistance provided, and transfers, ambulation and mobility.    \"    Total Treatment Duration:  PT Patient Time In/Time Out  Time In: 1322  Time Out: Allegra Sanon PTA

## 2020-07-15 NOTE — PROGRESS NOTES
No acute events overnight. Pt has ravenous appetite and has consumed several [employer] dinners and frequent snacks and beverages throughout the night. States she is \"starving. \" Up to bedside commode with minimal assistance - very large, formed stool. SpO2 maintained > 92% on 7L HFNC during activity. Pt voices very few needs apart from dietary/nutrition standpoint. States her breathing feels much better and she is ready to go home. Report given to Maddie Fontenot RN.

## 2020-07-15 NOTE — PROGRESS NOTES
Pt sitting up in bed watching tv. Pt on 7L HFNC. No s/sx of distress noted. Denies pain. Tilley catheter still in place and draining. Encouraged to call for assistance as needed. Call light within reach. Will continue to monitor.

## 2020-07-15 NOTE — PROGRESS NOTES
Pt arrived to room 503 via bed. When entering room pt was resting with eyes closed. Awakened when spoken to. Pt alert and oriented times 3. Pt on 7L. No s/sx of distress noted. Pain reported 10/10 in legs and back. Pain meds to be given. Dual skin assessment performed with Florence Irving RN. Bruising on right arm around PICC placement and scattered bruising found on left arm. No pressure wounds/injuries noted. Pt oriented to room and surroundings. Call light explained and put in reach of patient. Encouraged to call for assistance as needed. Will monitor.

## 2020-07-15 NOTE — PROGRESS NOTES
Grant Dobbs  Admission Date: 7/1/2020             Daily Progress Note: 7/15/2020    The patient's chart is reviewed and the patient is discussed with the staff. 65 y.o.  female seen and evaluated at the request of Dr. Richmond Aburto.     Patient is a smoker who came in with worsening hypoxemia, fevers, diarrhea and dyspnea. She reports she has been staying home and no-one was sick but per chart indicates several family were sick. Temp was 102.2 and saturation was 82% on admission. CXR with infiltrates. Started ABX and optiflow. Only uses albuterol and home and ?still smoking. Rapid COVID testing was positive. Has been coughing up yellow sputum.      In [facility] currently and oxygen needs up on 70% optiflow and 60 LPM. Patient states dyspnea is less. Subjective: Weaned to 7L HFNC now with sat of 95%. Did not require BIPAP overnight. Afebrile. Eating breakfast with no distress.          Current Facility-Administered Medications   Medication Dose Route Frequency    insulin glargine (LANTUS) injection 5 Units  5 Units SubCUTAneous QHS    hydrALAZINE (APRESOLINE) 20 mg/mL injection 20 mg  20 mg IntraVENous Q6H PRN    LORazepam (ATIVAN) injection 1 mg  1 mg IntraVENous Q4H PRN    morphine injection 2 mg  2 mg IntraVENous Q4H PRN    pantoprazole (PROTONIX) tablet 40 mg  40 mg Oral DAILY    insulin lispro (HUMALOG) injection   SubCUTAneous AC&HS    NUTRITIONAL SUPPORT ELECTROLYTE PRN ORDERS   Does Not Apply PRN    budesonide-formoteroL (SYMBICORT) 160-4.5 mcg/actuation HFA inhaler 2 Puff  2 Puff Inhalation BID RT    albuterol (PROVENTIL HFA, VENTOLIN HFA, PROAIR HFA) inhaler 2 Puff  2 Puff Inhalation Q4H PRN    sodium chloride (NS) flush 20 mL  20 mL InterCATHeter Q8H    sodium chloride (NS) flush 20 mL  20 mL InterCATHeter PRN    sodium chloride (NS) flush 5-40 mL  5-40 mL IntraVENous Q8H    sodium chloride (NS) flush 5-40 mL  5-40 mL IntraVENous PRN    acetaminophen (TYLENOL) tablet 650 mg  650 mg Oral Q4H PRN    diphenhydrAMINE (BENADRYL) injection 12.5 mg  12.5 mg IntraVENous Q4H PRN    bisacodyL (DULCOLAX) tablet 5 mg  5 mg Oral DAILY PRN    ondansetron (ZOFRAN) injection 4 mg  4 mg IntraVENous Q4H PRN    enoxaparin (LOVENOX) injection 40 mg  40 mg SubCUTAneous Q24H    albuterol-ipratropium (DUO-NEB) 2.5 MG-0.5 MG/3 ML  3 mL Nebulization Q4H PRN    nicotine (NICODERM CQ) 14 mg/24 hr patch 1 Patch  1 Patch TransDERmal Q24H       Review of Systems  Constitutional: negative for fever, chills, sweats  Cardiovascular: negative for chest pain, palpitations, syncope, edema  Gastrointestinal: negative for dysphagia, reflux, vomiting, diarrhea, abdominal pain, or melena  Neurologic: negative for focal weakness, numbness, headache    Objective:     Vitals:    07/15/20 0408 07/15/20 0510 07/15/20 0525 07/15/20 0646   BP:   151/58 137/63   Pulse:   (!) 116 (!) 109   Resp:       Temp:       SpO2: 95%  (!) 88% 95%   Weight:  142 lb 13.7 oz (64.8 kg)     Height:         Intake and Output:   07/13 1901 - 07/15 0700  In: 480 [P.O.:480]  Out: 4675 [Urine:4675]  No intake/output data recorded. Physical Exam:   Constitution:  the patient is well developed and in no acute distress on 7L  EENMT:  Sclera clear, pupils equal, oral mucosa moist  Respiratory: Mild B inspiratory crackles; no wheezes or accessory use. Cardiovascular:  RRR without M,G,R  Gastrointestinal: soft and non-tender; with positive bowel sounds. Musculoskeletal: warm without cyanosis. There is no lower leg edema. Skin:  no jaundice or rashes, no wounds   Neurologic: no gross neuro deficits     Psychiatric:  alert and oriented x ppt    CHEST XRAY:     7/15/20:    FINDINGS: Previous right lung base infiltrate has mildly improved. There is  progressed mild left lung base infiltrate. The cardiac size is within normal  limits. No pneumothorax or pleural effusion is seen.  A right arm PICC line  remains in place, with its tip just above the cavoatrial junction.     IMPRESSION:     Improved right and progressed left lung base infiltrates. Unable to view or copy images. 7/12/20  IMPRESSION: Stable bibasilar lung infiltrates, in keeping with coronavirus. Chest X-ray     INDICATION: Respiratory failure     A portable AP view of the chest was obtained.     FINDINGS: There is focal infiltrate in the right lung base. Left lung is clear.    The heart size is normal.  The bony thorax is intact.       IMPRESSION  IMPRESSION: Right lower lobe infiltrate       LAB  No lab exists for component: [place]   Recent Labs     07/15/20  0318 07/14/20  0351 07/13/20  0420   WBC 14.6* 13.6* 18.7*   HGB 12.9 12.5 12.5   HCT 42.1 40.6 40.9    210 244     Recent Labs     07/15/20  0318 07/14/20  0351 07/13/20  0420    142 140   K 4.3 3.9 4.3    107 107   CO2 28 28 27   * 149* 121*   BUN 17 21 25*   CREA 0.49* 0.53* 0.67   MG  --   --  2.1   CA 8.3 7.6* 7.6*   PHOS  --   --  1.6*     ABG:    No results found for: PH, PHI, PCO2, PCO2I, PO2, PO2I, HCO3, HCO3I, FIO2, FIO2I    Assessment:  (Medical Decision Making)     Hospital Problems  Never Reviewed          Codes Class Noted POA    Pneumonia due to COVID-19 virus ICD-10-CM: U07.1, J12.89  ICD-9-CM: 480.8  7/2/2020 Unknown    Severe    Chronic obstructive pulmonary disease with acute exacerbation (HCC)  -- unspecified type ICD-10-CM: J44.1  ICD-9-CM: 491.21  7/2/2020 Unknown    No wheezing    Elevated diaphragm ICD-10-CM: J98.6  ICD-9-CM: 519.4  7/2/2020 Unknown        Acute respiratory failure with hypoxemia (HCC) ICD-10-CM: J96.01  ICD-9-CM: 518.81  7/1/2020 Unknown    Weaned to 7L     Sepsis ([facility] [address] ICD-10-CM: A41.9  ICD-9-CM: 038.9, 995.91  7/1/2020 Unknown        CAP (community acquired pneumonia) ICD-10-CM: J18.9  ICD-9-CM: 284  9/26/2018 Yes        Leukocytosis ICD-10-CM: L06.901  ICD-9-CM: 288.60  9/26/2018 Yes    Ongoing    * (Principal) Acute hypoxemic respiratory failure ([facility] [address] ICD-10-CM: J96.01  ICD-9-CM: 518.81  9/26/2018 Yes        Tobacco abuse ICD-10-CM: Z72.0  ICD-9-CM: 305.1  9/26/2018 Yes              Pt with severe acute hypoxic respiratory failure due to COVID infection. Improved FIO2 needs. Can move out of BMT. Consult hospitalists for ongoing care,     Plan:  (Medical Decision Making)     -wean FIO2 as able. -completed dexa course.   -s/p convalescent plasma 7/2  -completed remdesivir.   -symbicort  -lantus with ssi. Decrease lantus to 5 units with stopping steroids. -ppi  -lovenox  -Can move out of BMT; consult hospitalists. -Tried to update son. Phone either busy or not able to be completed as dialed.      Continue supportive care    Eric Farias MD

## 2020-07-15 NOTE — PROGRESS NOTES
Chart reviewed by case management pt was transferred from BMT ICU  To  503. Pt is improving no currently on 7l HFNC. Pt is still febrile 99.0F  Recommendation from PT notes are for [facility]. CM will discuss with pt .   Please consult  if any new issues arise

## 2020-07-15 NOTE — PROGRESS NOTES
TRANSFER - IN REPORT:    Verbal report received from [address] on [facility]  being received from room [id number] for routine progression of care      Report consisted of patients Situation, Background, Assessment and   Recommendations(SBAR). Information from the following report(s) SBAR, Kardex and ED Summary was reviewed with the receiving nurse. Opportunity for questions and clarification was provided. Assessment completed upon patients arrival to unit and care assumed.

## 2020-07-15 NOTE — PROGRESS NOTES
Pt resting in bed with eyes closed. Awakens when spoken to. No s/sx of distress noted. Denies pain. Call light within reach. Will monitor.

## 2020-07-15 NOTE — PROGRESS NOTES
Bedside, Verbal and Written shift change report given to [facility], RN (oncoming nurse) by Mima Mariee RN (offgoing nurse). Report included the following information SBAR, Kardex, ED Summary, Procedure Summary, Intake/Output, MAR, Recent Results, Med Rec Status, Cardiac Rhythm Sinus tachycardia and Alarm Parameters .

## 2020-07-16 LAB
ANION GAP SERPL CALC-SCNC: 6 MMOL/L (ref 7–16)
BASOPHILS # BLD: 0.1 K/UL (ref 0–0.2)
BASOPHILS NFR BLD: 0 % (ref 0–2)
BUN SERPL-MCNC: 16 MG/DL (ref 8–23)
CALCIUM SERPL-MCNC: 8.1 MG/DL (ref 8.3–10.4)
CHLORIDE SERPL-SCNC: 106 MMOL/L (ref 98–107)
CO2 SERPL-SCNC: 28 MMOL/L (ref 21–32)
CREAT SERPL-MCNC: 0.42 MG/DL (ref 0.6–1)
DIFFERENTIAL METHOD BLD: ABNORMAL
EOSINOPHIL # BLD: 0.2 K/UL (ref 0–0.8)
EOSINOPHIL NFR BLD: 2 % (ref 0.5–7.8)
ERYTHROCYTE [DISTWIDTH] IN BLOOD BY AUTOMATED COUNT: 14 % (ref 11.9–14.6)
GLUCOSE BLD STRIP.AUTO-MCNC: 111 MG/DL (ref 65–100)
GLUCOSE BLD STRIP.AUTO-MCNC: 113 MG/DL (ref 65–100)
GLUCOSE BLD STRIP.AUTO-MCNC: 138 MG/DL (ref 65–100)
GLUCOSE BLD STRIP.AUTO-MCNC: 153 MG/DL (ref 65–100)
GLUCOSE SERPL-MCNC: 87 MG/DL (ref 65–100)
HCT VFR BLD AUTO: 38.9 % (ref 35.8–46.3)
HGB BLD-MCNC: 12.5 G/DL (ref 11.7–15.4)
IMM GRANULOCYTES # BLD AUTO: 0.4 K/UL (ref 0–0.5)
IMM GRANULOCYTES NFR BLD AUTO: 3 % (ref 0–5)
LYMPHOCYTES # BLD: 1.4 K/UL (ref 0.5–4.6)
LYMPHOCYTES NFR BLD: 10 % (ref 13–44)
MCH RBC QN AUTO: 28.4 PG (ref 26.1–32.9)
MCHC RBC AUTO-ENTMCNC: 32.1 G/DL (ref 31.4–35)
MCV RBC AUTO: 88.4 FL (ref 79.6–97.8)
MONOCYTES # BLD: 1.3 K/UL (ref 0.1–1.3)
MONOCYTES NFR BLD: 10 % (ref 4–12)
NEUTS SEG # BLD: 10.1 K/UL (ref 1.7–8.2)
NEUTS SEG NFR BLD: 75 % (ref 43–78)
NRBC # BLD: 0 K/UL (ref 0–0.2)
PLATELET # BLD AUTO: 166 K/UL (ref 150–450)
PMV BLD AUTO: 11.4 FL (ref 9.4–12.3)
POTASSIUM SERPL-SCNC: 4 MMOL/L (ref 3.5–5.1)
RBC # BLD AUTO: 4.4 M/UL (ref 4.05–5.2)
SODIUM SERPL-SCNC: 140 MMOL/L (ref 136–145)
WBC # BLD AUTO: 13.4 K/UL (ref 4.3–11.1)

## 2020-07-16 PROCEDURE — 94640 AIRWAY INHALATION TREATMENT: CPT

## 2020-07-16 PROCEDURE — 80048 BASIC METABOLIC PNL TOTAL CA: CPT

## 2020-07-16 PROCEDURE — 74011636637 HC RX REV CODE- 636/637: Performed by: INTERNAL MEDICINE

## 2020-07-16 PROCEDURE — 74011636637 HC RX REV CODE- 636/637: Performed by: FAMILY MEDICINE

## 2020-07-16 PROCEDURE — 94760 N-INVAS EAR/PLS OXIMETRY 1: CPT

## 2020-07-16 PROCEDURE — 36592 COLLECT BLOOD FROM PICC: CPT

## 2020-07-16 PROCEDURE — 65270000029 HC RM PRIVATE

## 2020-07-16 PROCEDURE — 74011250636 HC RX REV CODE- 250/636: Performed by: INTERNAL MEDICINE

## 2020-07-16 PROCEDURE — 82962 GLUCOSE BLOOD TEST: CPT

## 2020-07-16 PROCEDURE — 85025 COMPLETE CBC W/AUTO DIFF WBC: CPT

## 2020-07-16 PROCEDURE — 97530 THERAPEUTIC ACTIVITIES: CPT

## 2020-07-16 PROCEDURE — 97164 PT RE-EVAL EST PLAN CARE: CPT

## 2020-07-16 PROCEDURE — 74011250637 HC RX REV CODE- 250/637: Performed by: INTERNAL MEDICINE

## 2020-07-16 RX ADMIN — PANTOPRAZOLE SODIUM 40 MG: 40 TABLET, DELAYED RELEASE ORAL at 08:18

## 2020-07-16 RX ADMIN — BUDESONIDE AND FORMOTEROL FUMARATE DIHYDRATE 2 PUFF: 160; 4.5 AEROSOL RESPIRATORY (INHALATION) at 21:50

## 2020-07-16 RX ADMIN — MORPHINE SULFATE 2 MG: 2 INJECTION, SOLUTION INTRAMUSCULAR; INTRAVENOUS at 13:43

## 2020-07-16 RX ADMIN — MORPHINE SULFATE 2 MG: 2 INJECTION, SOLUTION INTRAMUSCULAR; INTRAVENOUS at 00:02

## 2020-07-16 RX ADMIN — INSULIN LISPRO 2 UNITS: 100 INJECTION, SOLUTION INTRAVENOUS; SUBCUTANEOUS at 16:50

## 2020-07-16 RX ADMIN — SODIUM CHLORIDE 20 ML: 9 INJECTION, SOLUTION INTRAMUSCULAR; INTRAVENOUS; SUBCUTANEOUS at 13:16

## 2020-07-16 RX ADMIN — MORPHINE SULFATE 2 MG: 2 INJECTION, SOLUTION INTRAMUSCULAR; INTRAVENOUS at 17:24

## 2020-07-16 RX ADMIN — MORPHINE SULFATE 2 MG: 2 INJECTION, SOLUTION INTRAMUSCULAR; INTRAVENOUS at 09:41

## 2020-07-16 RX ADMIN — ACETAMINOPHEN 650 MG: 325 TABLET, FILM COATED ORAL at 00:02

## 2020-07-16 RX ADMIN — SODIUM CHLORIDE 20 ML: 9 INJECTION, SOLUTION INTRAMUSCULAR; INTRAVENOUS; SUBCUTANEOUS at 21:36

## 2020-07-16 RX ADMIN — ENOXAPARIN SODIUM 40 MG: 40 INJECTION SUBCUTANEOUS at 16:51

## 2020-07-16 RX ADMIN — Medication 20 ML: at 06:01

## 2020-07-16 RX ADMIN — INSULIN GLARGINE 5 UNITS: 100 INJECTION, SOLUTION SUBCUTANEOUS at 22:19

## 2020-07-16 RX ADMIN — ALBUTEROL SULFATE 2 PUFF: 90 AEROSOL, METERED RESPIRATORY (INHALATION) at 07:51

## 2020-07-16 RX ADMIN — MORPHINE SULFATE 2 MG: 2 INJECTION, SOLUTION INTRAMUSCULAR; INTRAVENOUS at 21:36

## 2020-07-16 RX ADMIN — BUDESONIDE AND FORMOTEROL FUMARATE DIHYDRATE 2 PUFF: 160; 4.5 AEROSOL RESPIRATORY (INHALATION) at 07:52

## 2020-07-16 NOTE — PROGRESS NOTES
Pt sitting up in bed watching tv. No s/sx of distress noted. Denies pain at this time. Encouraged to call for assistance as needed. Will continue to monitor.

## 2020-07-16 NOTE — PROGRESS NOTES
Problem: Mobility Impaired (Adult and Pediatric)  Goal: *Therapy Goal  Outcome: Progressing Towards Goal  UPDATED 7/16/20    LTG:  (1.)Ms. Isabella Green will move from supine to sit and sit to supine , scoot up and down and roll side to side in bed with INDEPENDENCE within 7 treatment day(s). (2.)Ms. Isabella Green will transfer from bed to chair and chair to bed with MODIFIED INDEPENDENCE using the least restrictive device within 7 treatment day(s). (3.)Ms. Isabella Green will ambulate with MODIFIED INDEPENDENCE for 250 feet with the least restrictive device within 7 treatment day(s). ________________________________________________________________________________________________    PHYSICAL THERAPY: Daily Note and PM 7/16/2020  INPATIENT: PT Visit Days : 1  [place] / [place] / Product Type: Medicare /       NAME/AGE/GENDER: Shalonda Ascencio is a 60 y.o. female   PRIMARY DIAGNOSIS: Acute respiratory failure with hypoxemia ([address] [J96.01] Acute hypoxemic respiratory failure ([facility] [address] Acute hypoxemic respiratory failure ([facility] [address]       ICD-10: Treatment Diagnosis:    · Generalized Muscle Weakness (M62.81)  · Other lack of cordination (R27.8)  · Difficulty in walking, Not elsewhere classified (R26.2)  · Other abnormalities of gait and mobility (R26.89)   Precaution/Allergies:  Patient has no known allergies. ASSESSMENT:     Ms. Isabella Green was supine in bed upon arrival and agreeable to PT. Pt performed bed mobility with modified independence and good sitting balance. She did numerous STS transfers as well with supervision. Pt ambulated in room several times with SBA-supervision and cuing for activity pacing. Pt's SpO2 recorded after bouts of ambulation and above 90% each time. She also performed seated/standing exercises. Pt noted to demonstrate SpO2 of 88% after one exercise but recovered quickly. Pt re-evaluated today given she has progressed and goals updated.   She could continue to benefit from skilled PT to address balance and activity tolerance deficits. This section established at most recent assessment   PROBLEM LIST (Impairments causing functional limitations):  1. Decreased Strength  2. Decreased ADL/Functional Activities  3. Decreased Transfer Abilities  4. Decreased Ambulation Ability/Technique  5. Decreased Balance  6. Decreased Activity Tolerance   INTERVENTIONS PLANNED: (Benefits and precautions of physical therapy have been discussed with the patient.)  1. Balance Exercise  2. Bed Mobility  3. Gait Training  4. Therapeutic Activites  5. Therapeutic Exercise/Strengthening     TREATMENT PLAN: Frequency/Duration: 4 times a week for duration of hospital stay  Rehabilitation Potential For Stated Goals: Good     REHAB RECOMMENDATIONS (at time of discharge pending progress):    Placement: It is my opinion, based on this patient's performance to date, that Ms. Vishnu Brewer may benefit from intensive therapy at a [facility] after discharge due to the functional deficits listed above that are likely to improve with skilled rehabilitation and concerns that he/she may be unsafe to be unsupervised at home due to medical complications and mobility deficits which put her at increase risk of functional decline and/or falling. LTAC. Equipment:    None at this time            HISTORY:   History of Present Injury/Illness (Reason for Referral):  Patient is a 60 y.o.  female seen and evaluated at the request of Dr. Bneja Francois. Patient is a smoker who came in with worsening hypoxemia, fevers, diarrhea and dyspnea. She reports she has been staying home and non-one was sick but per chart indicates several family were sick. Temp was 102.2 and saturation was 82% ion admission. CXR with infiltrates. Started ABX and optiflow. Only uses albuterol and home and ?still smoking. Rapid COVID testing was positive. Has been coughing up yellow sputum.   In [facility] currently and oxygen needs up on 70% optiflow and 60 LPM. Patient states dyspnea is less  Subjective:   Remains afebrile. Remains on Optiflow up to 85% currently. Was up to 90% yesterday. States feels ill, but no new symptoms. Not coughing or having sputum. Past Medical History/Comorbidities:   Ms. Frederik Brittle  has no past medical history on file. Ms. Frederik Brittle  has no past surgical history on file. Social History/Living Environment:   Home Environment: Private residence  # Steps to Enter: 0  One/Two Story Residence: One story  Living Alone: No  Support Systems: Family member(s)  Patient Expects to be Discharged to[de-identified] Unknown  Current DME Used/Available at Home: None  Prior Level of Function/Work/Activity:  Per patient had been independent with all functional mobility . Personal Factors:          Sex:  female        Age:  60 y.o. Number of Personal Factors/Comorbidities that affect the Plan of Care: 0: LOW COMPLEXITY   EXAMINATION:   Most Recent Physical Functioning:   Gross Assessment:                  Posture:     Balance:  Sitting: Intact  Standing: Impaired  Standing - Static: Good  Standing - Dynamic : Fair Bed Mobility:  Supine to Sit: Modified independent  Scooting: Modified independent  Wheelchair Mobility:     Transfers:  Sit to Stand: Supervision;Stand-by assistance  Stand to Sit: Supervision;Stand-by assistance  Bed to Chair: Supervision;Stand-by assistance  Interventions: Safety awareness training;Verbal cues; Visual cues  Gait:     Base of Support: Center of gravity altered  Speed/Katt: Slow  Step Length: Left shortened;Right shortened  Gait Abnormalities: Trunk sway increased  Distance (ft): 40 Feet (ft)  Assistive Device: Other (comment)(none)  Ambulation - Level of Assistance: Supervision;Stand-by assistance  Interventions: Verbal cues; Safety awareness training      Body Structures Involved:  1. Bones  2. Joints  3. Muscles  4. Ligaments Body Functions Affected:  1. Neuromusculoskeletal  2. Movement Related  3.  Skin Related  4. Metobolic/Endocrine Activities and Participation Affected:  1. General Tasks and Demands  2. Communication  3. Mobility  4. Self Care  5. Community, Social and [place]   Number of elements that affect the Plan of Care: 1-2: LOW COMPLEXITY   CLINICAL PRESENTATION:   Presentation: Stable and uncomplicated: LOW COMPLEXITY   CLINICAL DECISION MAKIN [facility] Mobility Inpatient Short Form  How much difficulty does the patient currently have. .. Unable A Lot A Little None   1. Turning over in bed (including adjusting bedclothes, sheets and blankets)? [] 1   [] 2   [x] 3   [] 4   2. Sitting down on and standing up from a chair with arms ( e.g., wheelchair, bedside commode, etc.)   [] 1   [] 2   [x] 3   [] 4   3. Moving from lying on back to sitting on the side of the bed? [] 1   [] 2   [x] 3   [] 4   How much help from another person does the patient currently need. .. Total A Lot A Little None   4. Moving to and from a bed to a chair (including a wheelchair)? [] 1   [] 2   [x] 3   [] 4   5. Need to walk in hospital room? [] 1   [] 2   [x] 3   [] 4   6. Climbing 3-5 steps with a railing? [] 1   [] 2   [x] 3   [] 4   © , Trustees of [address] [place] [address], under license to [employer]. All rights reserved      Score:  Initial: 18 Most Recent: X (Date: -- )    Interpretation of Tool:  Represents activities that are increasingly more difficult (i.e. Bed mobility, Transfers, Gait). Medical Necessity:     · Patient demonstrates   · good  ·  rehab potential due to higher previous functional level. Reason for Services/Other Comments:  · Patient continues to require skilled intervention due to   · medical complications, patient unable to attend/participate in therapy as expected, and decreased transfers and mobility and AIRVO max.    · .   Use of outcome tool(s) and clinical judgement create a POC that gives a: Clear prediction of patient's progress: LOW COMPLEXITY        TREATMENT:   (In addition to Assessment/Re-Assessment sessions the following treatments were rendered)   Pre-treatment Symptoms/Complaints:  No complaints     Pain: Initial:   Pain Intensity 1: 0  Post Session:  0/10 no increased pain reported. Re-eval    Therapeutic Activity: (    23 minutes): Therapeutic activities including bed mobility, transfer training, static/dynamic sitting/stanidng balance, ambulation on level ground, instruction in pursed lipped breathing, seated/standing exercises to improve mobility, strength, balance, coordination and activity tolerance. Required minimal Verbal cues; Safety awareness training to promote static and dynamic balance in standing and proper activity pacing and breathing technique. Date:  7/16/20 Date:   Date:     ACTIVITY/EXERCISE AM PM AM PM AM PM   Seated LAQ 1 x 20 B        Seated marching 1 x 15 B        Seated AP 1 x 20 B        Seated hip abd/add 1 x 15 B        Sit to stand 1 x 10                          B = bilateral; AA = active assistive; A = active; P = passive          Braces/Orthotics/Lines/Etc:   · O2 Device: Heated, Hi flow nasal cannula  Treatment/Session Assessment:    · Response to Treatment:  See above. · Interdisciplinary Collaboration:   o Physical Therapist  o Registered Nurse  · After treatment position/precautions:   o Up in chair  o Bed/Chair-wheels locked  o Bed in low position  o Call light within reach  o RN notified   · Compliance with Program/Exercises: Will assess as treatment progresses  · Recommendations/Intent for next treatment session: \"Next visit will focus on advancements to more challenging activities, reduction in assistance provided, and transfers, ambulation and mobility.    \"    Total Treatment Duration:  PT Patient Time In/Time Out  Time In: 1049  Time Out: 3928 LEÓN Hickman, DPT

## 2020-07-16 NOTE — PROGRESS NOTES
No urine output since suazo removed. Pt encouraged to get out of bed and try. Will monitor and will inform next shift nurse to monitor.

## 2020-07-16 NOTE — PROGRESS NOTES
Pt sitting up in bed watching tv. Alert and oriented times 3 at this time. Pt on 7L HFNC. No s/sx of distress noted. Denies pain. Encouraged to call for assistance as needed. Call light within reach. Will monitor.

## 2020-07-16 NOTE — DISCHARGE INSTRUCTIONS
Advance Care Planning  People with COVID-19 may have no symptoms, mild symptoms, such as fever, cough, and shortness of breath or they may have more severe illness, developing severe and fatal pneumonia. As a result, Advance Care Planning with attention to naming a health care decision maker (someone you trust to make healthcare decisions for you if you could not speak for yourself) and sharing other health care preferences is important BEFORE a possible health crisis. Please contact your Primary Care Provider to discuss Advance Care Planning. Preventing the Spread of Coronavirus Disease 2019 in Homes and Residential Communities  For the most recent information go to [email or web address]    Prevention steps for People with confirmed or suspected COVID-19 (including persons under investigation) who do not need to be hospitalized  and   People with confirmed COVID-19 who were hospitalized and determined to be medically stable to go home    Your healthcare provider and public health staff will evaluate whether you can be cared for at home. If it is determined that you do not need to be hospitalized and can be isolated at home, you will be monitored by staff from your local or state health department. You should follow the prevention steps below until a healthcare provider or local or state health department says you can return to your normal activities. Stay home except to get medical care  People who are mildly ill with COVID-19 are able to isolate at home during their illness. You should restrict activities outside your home, except for getting medical care. Do not go to work, school, or public areas. Avoid using public transportation, ride-sharing, or taxis. Separate yourself from other people and animals in your home  People: As much as possible, you should stay in a specific room and away from other people in your home.  Also, you should use a separate bathroom, if available. Animals: You should restrict contact with pets and other animals while you are sick with COVID-19, just like you would around other people. Although there have not been reports of pets or other animals becoming sick with COVID-19, it is still recommended that people sick with COVID-19 limit contact with animals until more information is known about the virus. When possible, have another member of your household care for your animals while you are sick. If you are sick with COVID-19, avoid contact with your pet, including petting, snuggling, being kissed or licked, and sharing food. If you must care for your pet or be around animals while you are sick, wash your hands before and after you interact with pets and wear a facemask. Call ahead before visiting your doctor  If you have a medical appointment, call the healthcare provider and tell them that you have or may have COVID-19. This will help the healthcare providers office take steps to keep other people from getting infected or exposed. Wear a facemask  You should wear a facemask when you are around other people (e.g., sharing a room or vehicle) or pets and before you enter a healthcare providers office. If you are not able to wear a facemask (for example, because it causes trouble breathing), then people who live with you should not stay in the same room with you, or they should wear a facemask if they enter your room. Cover your coughs and sneezes  Cover your mouth and nose with a tissue when you cough or sneeze. Throw used tissues in a lined trash can. Immediately wash your hands with soap and water for at least 20 seconds or, if soap and water are not available, clean your hands with an alcohol-based hand  that contains at least 60% alcohol.   Clean your hands often  Wash your hands often with soap and water for at least 20 seconds, especially after blowing your nose, coughing, or sneezing; going to the bathroom; and before eating or preparing food. If soap and water are not readily available, use an alcohol-based hand  with at least 60% alcohol, covering all surfaces of your hands and rubbing them together until they feel dry. Soap and water are the best option if hands are visibly dirty. Avoid touching your eyes, nose, and mouth with unwashed hands. Avoid sharing personal household items  You should not share dishes, drinking glasses, cups, eating utensils, towels, or bedding with other people or pets in your home. After using these items, they should be washed thoroughly with soap and water. Clean all high-touch surfaces everyday  High touch surfaces include counters, tabletops, doorknobs, bathroom fixtures, toilets, phones, keyboards, tablets, and bedside tables. Also, clean any surfaces that may have blood, stool, or body fluids on them. Use a household cleaning spray or wipe, according to the label instructions. Labels contain instructions for safe and effective use of the cleaning product including precautions you should take when applying the product, such as wearing gloves and making sure you have good ventilation during use of the product. Monitor your symptoms  Seek prompt medical attention if your illness is worsening (e.g., difficulty breathing). Before seeking care, call your healthcare provider and tell them that you have, or are being evaluated for, COVID-19. Put on a facemask before you enter the facility. These steps will help the healthcare providers office to keep other people in the office or waiting room from getting infected or exposed. Ask your healthcare provider to call the local or state health department. Persons who are placed under active monitoring or facilitated self-monitoring should follow instructions provided by their local health department or occupational health professionals, as appropriate. When working with your local health department check their available hours.   If you have a medical emergency and need to call 911, notify the dispatch personnel that you have, or are being evaluated for COVID-19. If possible, put on a facemask before emergency medical services arrive. Discontinuing home isolation  Patients with confirmed COVID-19 should remain under home isolation precautions until the risk of secondary transmission to others is thought to be low. The decision to discontinue home isolation precautions should be made on a case-by-case basis, in consultation with healthcare providers and state and local health departments. Patient Education   Learning About Coronavirus [phone])  Coronavirus [phone]): Overview  What is coronavirus (AGTTQ-67)? The coronavirus disease (COVID-19) is caused by a virus. It is an illness that was first found in [place], [place], in December 2019. It has since spread worldwide. The virus can cause fever, cough, and trouble breathing. In severe cases, it can cause pneumonia and make it hard to breathe without help. It can cause death. Coronaviruses are a large group of viruses. They cause the common cold. They also cause more serious illnesses like Middle East respiratory syndrome (MERS) and severe acute respiratory syndrome (SARS). COVID-19 is caused by a novel coronavirus. That means it's a new type that has not been seen in people before. This virus spreads person-to-person through droplets from coughing and sneezing. It can also spread when you are close to someone who is infected. And it can spread when you touch something that has the virus on it, such as a doorknob or a tabletop. What can you do to protect yourself from coronavirus (COVID-19)? The best way to protect yourself from getting sick is to:  · Avoid areas where there is an outbreak. · Avoid contact with people who may be infected. · Wash your hands often with soap or alcohol-based hand sanitizers. · Avoid crowds and try to stay at least 6 feet away from other people.   · Wash your hands often, especially after you cough or sneeze. Use soap and water, and scrub for at least 20 seconds. If soap and water aren't available, use an alcohol-based hand . · Avoid touching your mouth, nose, and eyes. What can you do to avoid spreading the virus to others? To help avoid spreading the virus to others:  · Cover your mouth with a tissue when you cough or sneeze. Then throw the tissue in the trash. · Use a disinfectant to clean things that you touch often. · Stay home if you are sick or have been exposed to the virus. Don't go to school, work, or public areas. And don't use public transportation. · If you are sick:  ? Leave your home only if you need to get medical care. But call the doctor's office first so they know you're coming. And wear a face mask, if you have one.  ? If you have a face mask, wear it whenever you're around other people. It can help stop the spread of the virus when you cough or sneeze. ? Clean and disinfect your home every day. Use household  and disinfectant wipes or sprays. Take special care to clean things that you grab with your hands. These include doorknobs, remote controls, phones, and handles on your refrigerator and microwave. And don't forget countertops, tabletops, bathrooms, and computer keyboards. When to call for help  Call 911 anytime you think you may need emergency care. For example, call if:  · You have severe trouble breathing. (You can't talk at all.)  · You have constant chest pain or pressure. · You are severely dizzy or lightheaded. · You are confused or can't think clearly. · Your face and lips have a blue color. · You pass out (lose consciousness) or are very hard to wake up. Call your doctor now if you develop symptoms such as:  · Shortness of breath. · Fever. · Cough. If you need to get care, call ahead to the doctor's office for instructions before you go.  Make sure you wear a face mask, if you have one, to prevent exposing other people to the virus. Where can you get the latest information? The following health organizations are tracking and studying this virus. Their websites contain the most up-to-date information. Rayshawn Lam also learn what to do if you think you may have been exposed to the virus. · U.S. Centers for Disease Control and Prevention (CDC): The CDC provides updated news about the disease and travel advice. The website also tells you how to prevent the spread of infection. www.cdc.gov  · World Health Organization [facility]): WHO offers information about the virus outbreaks. WHO also has travel advice. www.who.int  Current as of: April 1, 2020               Content Version: 12.4  © 2006-2020 [employer]. Care instructions adapted under license by your healthcare professional. If you have questions about a medical condition or this instruction, always ask your healthcare professional. [address] any warranty or liability for your use of this information.

## 2020-07-16 NOTE — PROGRESS NOTES
Bedside report received from Libia Wen RN. Pt in bed watching television. No distress on 7 L via HF. No complaints at present. Instructed pt to call should needs arise. Pt voiced understanding. Call light within reach.

## 2020-07-16 NOTE — PROGRESS NOTES
Respirations 40 and SpO2 94 upon midnight VS. Patient noticeable anxious. Dr So Felipe notified and approved to give PRN Morphine for tachypnea. PRN dose given along with PRN tylenol for general pain.  Will reevaluate VS.

## 2020-07-16 NOTE — PROGRESS NOTES
Now on 6lpm.  Transferred to floor and hospitalist has assumed care. Will be available if needed.     Kishore Layton MD

## 2020-07-16 NOTE — PROGRESS NOTES
Problem: Falls - Risk of  Goal: *Absence of Falls  Description: Document Cristobal Conteh Fall Risk and appropriate interventions in the flowsheet.   Outcome: Progressing Towards Goal  Note: Fall Risk Interventions:  Mobility Interventions: Communicate number of staff needed for ambulation/transfer, OT consult for ADLs, Patient to call before getting OOB, PT Consult for mobility concerns         Medication Interventions: Evaluate medications/consider consulting pharmacy    Elimination Interventions: Call light in reach, Elevated toilet seat, Patient to call for help with toileting needs, Stay With Me (per policy)              Problem: Patient Education: Go to Patient Education Activity  Goal: Patient/Family Education  Outcome: Progressing Towards Goal

## 2020-07-16 NOTE — PROGRESS NOTES
07/16/20 0057   Vital Signs   Temp 99.3 °F (37.4 °C)   Temp Source Oral   Pulse (Heart Rate) (!) 117   Heart Rate Source Monitor   Resp Rate (!) 36   O2 Sat (%) 95 %   Level of Consciousness Alert   /67   MAP (Calculated) 83   BP 1 Method Automatic   BP 1 Location Left arm   BP Patient Position At rest       Patient continues to be tachypnic at rest/sleep following PRN Morphine dose at approx 0015. Dr Amanda Drake notified via [employer]. No new orders at this time.

## 2020-07-16 NOTE — PROGRESS NOTES
Hospitalist Note     Admit Date:  2020  1:01 PM   Name:  Tana Bowers   Age:  60 y.o.  :  1955   MRN:  [id number]   PCP:  None  Treatment Team: Attending Provider: Alyce Morelos MD; Consulting Provider: Margarita Joy MD; Care Manager: Nadya Becerril RN; Care Manager: Fito Cortez Consulting Provider: Alyce Morelos MD; Primary Nurse: Raymond Kaur RN; Primary Nurse: Yue Cameron RN; Physical Therapist: Soo Nieto DPT; Occupational Therapist: Dusty Nguyen OT    HPI/Subjective: The patient is a 36-year-old female with a past medical history of tobacco abuse who was admitted with hypoxia, fevers, diarrhea, and dyspnea. She was noted to be febrile with a fever of 102.2. She had a rapid COVID test that was positive. She was admitted to the [facility] ICU on OptiFlow . She completed course of dexamethasone. She received convalescent plasma . She completed course of remdesevir. She was titrated down to high flow nasal cannula. Hospitalist asked to resume care .      Patient seen and examined at bedside in no acute distress. Patient reports she feels chills. She reports improvement in her respiratory status. She reports cough. She denies any chest pain.       Objective:     Patient Vitals for the past 24 hrs:   Temp Pulse Resp BP SpO2   20 0752     94 %   20 0738 97.7 °F (36.5 °C) 100 21 128/75 92 %   20 0334 97.9 °F (36.6 °C) (!) 103 24 96/54 91 %   20 0057 99.3 °F (37.4 °C) (!) 117 (!) 36 115/67 95 %   07/15/20 2342 (!) 100.5 °F (38.1 °C) (!) 116 (!) 40 147/72 94 %   07/15/20 1942  100   94 %   07/15/20 1927 98.6 °F (37 °C) (!) 122 24 151/77    07/15/20 1522 98.7 °F (37.1 °C) 100 18 114/60 91 %   07/15/20 1107 98.8 °F (37.1 °C) 96 21 139/72 92 %     Oxygen Therapy  O2 Sat (%): 94 % (20)  Pulse via Oximetry: 84 beats per minute (20)  O2 Device: Hi flow nasal cannula (20)  O2 Flow Rate (L/min): 6 l/min (07/16/20 0752)  O2 Temperature: 87.8 °F (31 °C) (07/14/20 0855)  FIO2 (%): 100 % (07/14/20 1209)  ETCO2 (mmHg): 90 mmHg (07/05/20 2301)    Estimated body mass index is 25.31 kg/m² as calculated from the following:    Height as of this encounter: 5' 3\" (1.6 m). Weight as of this encounter: 64.8 kg (142 lb 13.7 oz). Intake/Output Summary (Last 24 hours) at 7/16/2020 1012  Last data filed at 7/16/2020 0958  Gross per 24 hour   Intake 960 ml   Output 2500 ml   Net -1540 ml       *Note that automatically entered I/Os may not be accurate; dependent on patient compliance with collection and accurate  by techs. General:    Well nourished. Alert. CV:   RRR. No murmur, rub, or gallop. Lungs:   Reduced breath sounds bilaterally  Abdomen:   Soft, nontender, nondistended. Extremities: Warm and dry. No cyanosis or edema. Skin:     No rashes or jaundice.    Neuro:  No gross focal deficits    Data Review:  I have reviewed all labs, meds, and studies from the last 24 hours:    Recent Results (from the past 24 hour(s))   GLUCOSE, POC    Collection Time: 07/15/20 11:48 AM   Result Value Ref Range    Glucose (POC) 120 (H) 65 - 100 mg/dL   GLUCOSE, POC    Collection Time: 07/15/20  3:57 PM   Result Value Ref Range    Glucose (POC) 164 (H) 65 - 100 mg/dL   GLUCOSE, POC    Collection Time: 07/15/20  8:32 PM   Result Value Ref Range    Glucose (POC) 100 65 - 929 mg/dL   METABOLIC PANEL, BASIC    Collection Time: 07/16/20  3:56 AM   Result Value Ref Range    Sodium 140 136 - 145 mmol/L    Potassium 4.0 3.5 - 5.1 mmol/L    Chloride 106 98 - 107 mmol/L    CO2 28 21 - 32 mmol/L    Anion gap 6 (L) 7 - 16 mmol/L    Glucose 87 65 - 100 mg/dL    BUN 16 8 - 23 MG/DL    Creatinine 0.42 (L) 0.6 - 1.0 MG/DL    GFR est AA >60 >60 ml/min/1.73m2    GFR est non-AA >60 >60 ml/min/1.73m2    Calcium 8.1 (L) 8.3 - 10.4 MG/DL   CBC WITH AUTOMATED DIFF    Collection Time: 07/16/20  4:30 AM   Result Value Ref Range    WBC 13.4 (H) 4.3 - 11.1 K/uL    RBC 4.40 4.05 - 5.2 M/uL    HGB 12.5 11.7 - 15.4 g/dL    HCT 38.9 35.8 - 46.3 %    MCV 88.4 79.6 - 97.8 FL    MCH 28.4 26.1 - 32.9 PG    MCHC 32.1 31.4 - 35.0 g/dL    RDW 14.0 11.9 - 14.6 %    PLATELET 996 734 - 711 K/uL    MPV 11.4 9.4 - 12.3 FL    ABSOLUTE NRBC 0.00 0.0 - 0.2 K/uL    DF AUTOMATED      NEUTROPHILS 75 43 - 78 %    LYMPHOCYTES 10 (L) 13 - 44 %    MONOCYTES 10 4.0 - 12.0 %    EOSINOPHILS 2 0.5 - 7.8 %    BASOPHILS 0 0.0 - 2.0 %    IMMATURE GRANULOCYTES 3 0.0 - 5.0 %    ABS. NEUTROPHILS 10.1 (H) 1.7 - 8.2 K/UL    ABS. LYMPHOCYTES 1.4 0.5 - 4.6 K/UL    ABS. MONOCYTES 1.3 0.1 - 1.3 K/UL    ABS. EOSINOPHILS 0.2 0.0 - 0.8 K/UL    ABS. BASOPHILS 0.1 0.0 - 0.2 K/UL    ABS. IMM. GRANS.  0.4 0.0 - 0.5 K/UL   GLUCOSE, POC    Collection Time: 07/16/20  5:52 AM   Result Value Ref Range    Glucose (POC) 138 (H) 65 - 100 mg/dL        All Micro Results     Procedure Component Value Units Date/Time    CULTURE, BLOOD [[id number]] Collected:  07/01/20 1341    Order Status:  Completed Specimen:  Blood Updated:  07/06/20 0651     Special Requests: LEFT ANTECUBITAL        Culture result: NO GROWTH 5 DAYS       CULTURE, BLOOD [[id number]] Collected:  07/01/20 1341    Order Status:  Completed Specimen:  Blood Updated:  07/06/20 0651     Special Requests: RIGHT ANTECUBITAL        Culture result: NO GROWTH 5 DAYS       CULTURE, URINE [[id number]] Collected:  07/01/20 1417    Order Status:  Completed Specimen:  Cath Urine Updated:  07/04/20 0830     Special Requests: NO SPECIAL REQUESTS        Culture result: NO GROWTH 2 DAYS       SARS-COV-2 [[id number]]  (Abnormal) Collected:  07/02/20 1000    Order Status:  Completed Specimen:  Nasopharyngeal Updated:  07/03/20 0812     COVID-19 Detected        Comment: PERFORMED AT [facility]       CULTURE, RESPIRATORY/SPUTUM/BRONCH Zaki Leon [[id number]] Collected:  07/02/20 1100    Order Status:  Canceled Specimen:  Sputum     COVID-19 RAPID TEST [[id number]]  (Abnormal) Collected:  07/01/20 1527    Order Status:  Completed Specimen:  Nasopharyngeal Updated:  07/01/20 1714     Specimen source SWAB        COVID-19 rapid test Detected        Comment:      The specimen is POSITIVE for SARS-CoV-2, the novel coronavirus associated with COVID-19. This test has been authorized by the FDA under an Emergency Use Authorization (EUA) for use by authorized laboratories. Fact sheet for Healthcare Providers: [employer]  Fact sheet for Patients: [employer]       Methodology: Isothermal Nucleic Acid Amplification  RESULTS VERIFIED, PHONED TO AND READ BACK BY  DR Linde Hodgkins 1513  RESULTS VERIFIED, PHONED TO AND READ BACK BY  DR Linde Hodgkins 1513 07/01/20 VELASQUEZ         CULTURE, URINE [[id number]] Collected:  07/01/20 1545    Order Status:  Canceled Specimen:  Cath Urine           No results found for this visit on 07/01/20.     Current Meds:  Current Facility-Administered Medications   Medication Dose Route Frequency    insulin glargine (LANTUS) injection 5 Units  5 Units SubCUTAneous QHS    hydrALAZINE (APRESOLINE) 20 mg/mL injection 20 mg  20 mg IntraVENous Q6H PRN    LORazepam (ATIVAN) injection 1 mg  1 mg IntraVENous Q4H PRN    morphine injection 2 mg  2 mg IntraVENous Q4H PRN    pantoprazole (PROTONIX) tablet 40 mg  40 mg Oral DAILY    insulin lispro (HUMALOG) injection   SubCUTAneous AC&HS    NUTRITIONAL SUPPORT ELECTROLYTE PRN ORDERS   Does Not Apply PRN    budesonide-formoteroL (SYMBICORT) 160-4.5 mcg/actuation HFA inhaler 2 Puff  2 Puff Inhalation BID RT    albuterol (PROVENTIL HFA, VENTOLIN HFA, PROAIR HFA) inhaler 2 Puff  2 Puff Inhalation Q4H PRN    sodium chloride (NS) flush 20 mL  20 mL InterCATHeter Q8H    sodium chloride (NS) flush 20 mL  20 mL InterCATHeter PRN    acetaminophen (TYLENOL) tablet 650 mg  650 mg Oral Q4H PRN    diphenhydrAMINE (BENADRYL) injection 12.5 mg  12.5 mg IntraVENous Q4H PRN    bisacodyL (DULCOLAX) tablet 5 mg  5 mg Oral DAILY PRN    ondansetron (ZOFRAN) injection 4 mg  4 mg IntraVENous Q4H PRN    enoxaparin (LOVENOX) injection 40 mg  40 mg SubCUTAneous Q24H    albuterol-ipratropium (DUO-NEB) 2.5 MG-0.5 MG/3 ML  3 mL Nebulization Q4H PRN    nicotine (NICODERM CQ) 14 mg/24 hr patch 1 Patch  1 Patch TransDERmal Q24H       Other Studies (last 24 hours):  No results found.     Assessment and Plan:     Hospital Problems as of 7/16/2020 Never Reviewed          Codes Class Noted - Resolved POA    Pneumonia due to COVID-19 virus ICD-10-CM: U07.1, J12.89  ICD-9-CM: 480.8  7/2/2020 - Present Unknown        Chronic obstructive pulmonary disease with acute exacerbation ([facility]  -- unspecified type ICD-10-CM: J44.1  ICD-9-CM: 491.21  7/2/2020 - Present Unknown        Elevated diaphragm ICD-10-CM: J98.6  ICD-9-CM: 519.4  7/2/2020 - Present Unknown        Suspected COVID-19 virus infection ICD-10-CM: Z20.828  ICD-9-CM: V01.79  7/1/2020 - Present         Acute respiratory failure with hypoxemia (HCC) ICD-10-CM: J96.01  ICD-9-CM: 518.81  7/1/2020 - Present Unknown        Sepsis ([facility] ICD-10-CM: A41.9  ICD-9-CM: 038.9, 995.91  7/1/2020 - Present Unknown        CAP (community acquired pneumonia) ICD-10-CM: J18.9  ICD-9-CM: 078  9/26/2018 - Present Yes        Leukocytosis ICD-10-CM: S86.520  ICD-9-CM: 288.60  9/26/2018 - Present Yes        * (Principal) Acute hypoxemic respiratory failure (HCC) ICD-10-CM: J96.01  ICD-9-CM: 518.81  9/26/2018 - Present Yes        Tobacco abuse ICD-10-CM: Z72.0  ICD-9-CM: 305.1  9/26/2018 - Present Yes              Acute hypoxemic respiratory failure/acute on chronic hypoxemic respiratory failure/COVID-19/sepsis/viral pneumonia  COVID test positive on 7/2  Has completed course of dexamethasone  Convalescent plasma transfused 7/2  Has completed course of Remdesevir  7/15 CXR: Improved right and progressed left lung base infiltrate    Currently on 6 L high flow nasal cannula    Plan:  -Supportive measures  -Wean Oxygen as Tolerated     Tobacco abuse  Patient has been counseled on smoking cessation    Plan:  -Nicotine patch    DC planning/Dispo: SNF      Diet:  DIET DIABETIC CONSISTENT CARB  DVT ppx: Lovenox    Signed:  Chapo Quiros MD

## 2020-07-16 NOTE — PROGRESS NOTES
Pt resting in bed with eyes closed. Awakens when spoken to. On 7L HFNC. No s/sx of distress noted. Denies any pain at this time. Encouraged to call for assistance as needed. Call light within reach. Will monitor.

## 2020-07-17 LAB
ANION GAP SERPL CALC-SCNC: 8 MMOL/L (ref 7–16)
ATRIAL RATE: 126 BPM
BASOPHILS # BLD: 0.1 K/UL (ref 0–0.2)
BASOPHILS NFR BLD: 0 % (ref 0–2)
BUN SERPL-MCNC: 20 MG/DL (ref 8–23)
CALCIUM SERPL-MCNC: 8.1 MG/DL (ref 8.3–10.4)
CALCULATED P AXIS, ECG09: 60 DEGREES
CALCULATED R AXIS, ECG10: 10 DEGREES
CALCULATED T AXIS, ECG11: 148 DEGREES
CHLORIDE SERPL-SCNC: 106 MMOL/L (ref 98–107)
CO2 SERPL-SCNC: 24 MMOL/L (ref 21–32)
CREAT SERPL-MCNC: 0.43 MG/DL (ref 0.6–1)
DIAGNOSIS, 93000: NORMAL
DIFFERENTIAL METHOD BLD: ABNORMAL
EOSINOPHIL # BLD: 0.2 K/UL (ref 0–0.8)
EOSINOPHIL NFR BLD: 1 % (ref 0.5–7.8)
ERYTHROCYTE [DISTWIDTH] IN BLOOD BY AUTOMATED COUNT: 14.2 % (ref 11.9–14.6)
GLUCOSE BLD STRIP.AUTO-MCNC: 102 MG/DL (ref 65–100)
GLUCOSE BLD STRIP.AUTO-MCNC: 125 MG/DL (ref 65–100)
GLUCOSE BLD STRIP.AUTO-MCNC: 171 MG/DL (ref 65–100)
GLUCOSE BLD STRIP.AUTO-MCNC: 83 MG/DL (ref 65–100)
GLUCOSE SERPL-MCNC: 114 MG/DL (ref 65–100)
HCT VFR BLD AUTO: 36.9 % (ref 35.8–46.3)
HGB BLD-MCNC: 11.9 G/DL (ref 11.7–15.4)
IMM GRANULOCYTES # BLD AUTO: 0.3 K/UL (ref 0–0.5)
IMM GRANULOCYTES NFR BLD AUTO: 2 % (ref 0–5)
LYMPHOCYTES # BLD: 1.4 K/UL (ref 0.5–4.6)
LYMPHOCYTES NFR BLD: 10 % (ref 13–44)
MCH RBC QN AUTO: 28.7 PG (ref 26.1–32.9)
MCHC RBC AUTO-ENTMCNC: 32.2 G/DL (ref 31.4–35)
MCV RBC AUTO: 89.1 FL (ref 79.6–97.8)
MONOCYTES # BLD: 1.2 K/UL (ref 0.1–1.3)
MONOCYTES NFR BLD: 9 % (ref 4–12)
NEUTS SEG # BLD: 10.2 K/UL (ref 1.7–8.2)
NEUTS SEG NFR BLD: 77 % (ref 43–78)
NRBC # BLD: 0 K/UL (ref 0–0.2)
P-R INTERVAL, ECG05: 198 MS
PLATELET # BLD AUTO: 175 K/UL (ref 150–450)
PMV BLD AUTO: 11.4 FL (ref 9.4–12.3)
POTASSIUM SERPL-SCNC: 3.8 MMOL/L (ref 3.5–5.1)
Q-T INTERVAL, ECG07: 334 MS
QRS DURATION, ECG06: 86 MS
QTC CALCULATION (BEZET), ECG08: 483 MS
RBC # BLD AUTO: 4.14 M/UL (ref 4.05–5.2)
SODIUM SERPL-SCNC: 138 MMOL/L (ref 136–145)
VENTRICULAR RATE, ECG03: 126 BPM
WBC # BLD AUTO: 13.4 K/UL (ref 4.3–11.1)

## 2020-07-17 PROCEDURE — 74011250636 HC RX REV CODE- 250/636: Performed by: INTERNAL MEDICINE

## 2020-07-17 PROCEDURE — 85025 COMPLETE CBC W/AUTO DIFF WBC: CPT

## 2020-07-17 PROCEDURE — 94760 N-INVAS EAR/PLS OXIMETRY 1: CPT

## 2020-07-17 PROCEDURE — 74011636637 HC RX REV CODE- 636/637: Performed by: INTERNAL MEDICINE

## 2020-07-17 PROCEDURE — 93005 ELECTROCARDIOGRAM TRACING: CPT | Performed by: INTERNAL MEDICINE

## 2020-07-17 PROCEDURE — 94640 AIRWAY INHALATION TREATMENT: CPT

## 2020-07-17 PROCEDURE — 80048 BASIC METABOLIC PNL TOTAL CA: CPT

## 2020-07-17 PROCEDURE — 74011250637 HC RX REV CODE- 250/637: Performed by: INTERNAL MEDICINE

## 2020-07-17 PROCEDURE — 77010033678 HC OXYGEN DAILY

## 2020-07-17 PROCEDURE — 74011636637 HC RX REV CODE- 636/637: Performed by: FAMILY MEDICINE

## 2020-07-17 PROCEDURE — 65660000000 HC RM CCU STEPDOWN

## 2020-07-17 PROCEDURE — 82962 GLUCOSE BLOOD TEST: CPT

## 2020-07-17 RX ORDER — HYDROXYZINE 25 MG/1
25 TABLET, FILM COATED ORAL ONCE
Status: COMPLETED | OUTPATIENT
Start: 2020-07-17 | End: 2020-07-17

## 2020-07-17 RX ORDER — PREDNISONE 5 MG/1
10 TABLET ORAL ONCE
Status: COMPLETED | OUTPATIENT
Start: 2020-07-17 | End: 2020-07-17

## 2020-07-17 RX ORDER — FAMOTIDINE 20 MG/1
40 TABLET, FILM COATED ORAL ONCE
Status: COMPLETED | OUTPATIENT
Start: 2020-07-17 | End: 2020-07-17

## 2020-07-17 RX ORDER — OXYCODONE AND ACETAMINOPHEN 5; 325 MG/1; MG/1
1 TABLET ORAL
Status: DISCONTINUED | OUTPATIENT
Start: 2020-07-17 | End: 2020-07-18

## 2020-07-17 RX ORDER — SODIUM CHLORIDE 9 MG/ML
100 INJECTION, SOLUTION INTRAVENOUS CONTINUOUS
Status: DISCONTINUED | OUTPATIENT
Start: 2020-07-17 | End: 2020-07-17

## 2020-07-17 RX ORDER — METOPROLOL TARTRATE 25 MG/1
12.5 TABLET, FILM COATED ORAL EVERY 12 HOURS
Status: DISCONTINUED | OUTPATIENT
Start: 2020-07-17 | End: 2020-07-18

## 2020-07-17 RX ORDER — SODIUM CHLORIDE 9 MG/ML
100 INJECTION, SOLUTION INTRAVENOUS CONTINUOUS
Status: DISPENSED | OUTPATIENT
Start: 2020-07-17 | End: 2020-07-18

## 2020-07-17 RX ADMIN — BUDESONIDE AND FORMOTEROL FUMARATE DIHYDRATE 2 PUFF: 160; 4.5 AEROSOL RESPIRATORY (INHALATION) at 20:25

## 2020-07-17 RX ADMIN — LORAZEPAM 1 MG: 2 INJECTION INTRAMUSCULAR; INTRAVENOUS at 21:52

## 2020-07-17 RX ADMIN — Medication 20 ML: at 09:16

## 2020-07-17 RX ADMIN — ENOXAPARIN SODIUM 40 MG: 40 INJECTION SUBCUTANEOUS at 16:21

## 2020-07-17 RX ADMIN — BUDESONIDE AND FORMOTEROL FUMARATE DIHYDRATE 2 PUFF: 160; 4.5 AEROSOL RESPIRATORY (INHALATION) at 07:20

## 2020-07-17 RX ADMIN — SODIUM CHLORIDE 20 ML: 9 INJECTION, SOLUTION INTRAMUSCULAR; INTRAVENOUS; SUBCUTANEOUS at 05:22

## 2020-07-17 RX ADMIN — OXYCODONE HYDROCHLORIDE AND ACETAMINOPHEN 1 TABLET: 5; 325 TABLET ORAL at 21:51

## 2020-07-17 RX ADMIN — HYDROXYZINE HYDROCHLORIDE 25 MG: 25 TABLET, FILM COATED ORAL at 23:24

## 2020-07-17 RX ADMIN — INSULIN GLARGINE 5 UNITS: 100 INJECTION, SOLUTION SUBCUTANEOUS at 21:48

## 2020-07-17 RX ADMIN — PREDNISONE 10 MG: 5 TABLET ORAL at 23:24

## 2020-07-17 RX ADMIN — MORPHINE SULFATE 2 MG: 2 INJECTION, SOLUTION INTRAMUSCULAR; INTRAVENOUS at 03:09

## 2020-07-17 RX ADMIN — OXYCODONE HYDROCHLORIDE AND ACETAMINOPHEN 1 TABLET: 5; 325 TABLET ORAL at 18:24

## 2020-07-17 RX ADMIN — OXYCODONE HYDROCHLORIDE AND ACETAMINOPHEN 1 TABLET: 5; 325 TABLET ORAL at 11:22

## 2020-07-17 RX ADMIN — PANTOPRAZOLE SODIUM 40 MG: 40 TABLET, DELAYED RELEASE ORAL at 09:14

## 2020-07-17 RX ADMIN — METOPROLOL TARTRATE 12.5 MG: 25 TABLET, FILM COATED ORAL at 11:21

## 2020-07-17 RX ADMIN — METOPROLOL TARTRATE 12.5 MG: 25 TABLET, FILM COATED ORAL at 21:50

## 2020-07-17 RX ADMIN — SODIUM CHLORIDE 100 ML/HR: 9 INJECTION, SOLUTION INTRAVENOUS at 18:26

## 2020-07-17 RX ADMIN — FAMOTIDINE 40 MG: 20 TABLET, FILM COATED ORAL at 23:24

## 2020-07-17 RX ADMIN — SODIUM CHLORIDE 20 ML: 9 INJECTION, SOLUTION INTRAMUSCULAR; INTRAVENOUS; SUBCUTANEOUS at 21:52

## 2020-07-17 RX ADMIN — SODIUM CHLORIDE 20 ML: 9 INJECTION, SOLUTION INTRAMUSCULAR; INTRAVENOUS; SUBCUTANEOUS at 16:22

## 2020-07-17 RX ADMIN — INSULIN LISPRO 2 UNITS: 100 INJECTION, SOLUTION INTRAVENOUS; SUBCUTANEOUS at 16:21

## 2020-07-17 RX ADMIN — LORAZEPAM 1 MG: 2 INJECTION INTRAMUSCULAR; INTRAVENOUS at 00:50

## 2020-07-17 NOTE — PROGRESS NOTES
Talked with family member Brigitte Mckeon) about patient condition and discussed up-dates. Family wants MD to contact them Elsa Sales and Constantino Flannery) during the day around 10 or 10:30 if possible to discuss about patient's discharge.

## 2020-07-17 NOTE — PROGRESS NOTES
Patient having anxiety and this gave ativan 1 mg IV at this time. Will assess medication effectiveness.

## 2020-07-17 NOTE — PROGRESS NOTES
SW reviewed patient's chart on this date. Patient's discharge plan is for LTAC at [facility] pending afebrile for 3 days without use of tylenol. Last documented temperature on 7/15/20 at 23:30. Last documented tylenol on 7/16/20 at 00:30 Earliest possible DC to LTAC is 7/19/20 Sunday if medically stable for transfer and afebrile without tylenol - confirmed with Rashad Saavedra, liaison. Patient remains on 6L O2 per chart review. PT continuing to recommend SNF or LTAC for post-discharge therapy services. SW will remain available to assist with DC needs during this admission.

## 2020-07-17 NOTE — PROGRESS NOTES
[facility] is unable to visit patient presently due to covid restrictions    Reviewed chart for spiritual concerns    Attempted to call patient by phone  Did not answer      Theresa Rehman, staff

## 2020-07-17 NOTE — PROGRESS NOTES
Hospitalist Note     Admit Date:  2020  1:01 PM   Name:  Shalonda Ascencio   Age:  60 y.o.  :  1955   MRN:  [id number]   PCP:  None  Treatment Team: Attending Provider: Mya Dowell MD; Care Manager: Connie Burks RN; Care Manager: Deonte Steiner Consulting Provider: Mya Dowell MD; Primary Nurse: Angy Vidal; Physical Therapist: Lela Zacarias PT, DPT; Occupational Therapist: Guerline Calvillo    HPI/Subjective: The patient is a 70-year-old female with a past medical history of tobacco abuse who was admitted with hypoxia, fevers, diarrhea, and dyspnea. She was noted to be febrile with a fever of 102.2. She had a rapid COVID test that was positive. She was admitted to the [facility] ICU on OptiFlow . She completed course of dexamethasone. She received convalescent plasma . She completed course of remdesevir. She was titrated down to high flow nasal cannula. Hospitalist asked to resume care .      Patient seen and examined at bedside in no acute distress. Patient reportedly complaining of myalgias and arthralgias to nursing. During examination patient denied any pain. She denied any palpitations or chest pain. She reported cough. She denied any diarrhea.       Objective:     Patient Vitals for the past 24 hrs:   Temp Pulse Resp BP SpO2   20 0732 98.4 °F (36.9 °C) (!) 112 22 125/68 92 %   20 0259 98.7 °F (37.1 °C) (!) 131 (!) 32 151/76 93 %   20 2337 99.5 °F (37.5 °C) (!) 133 (!) 36 104/67 94 %   20 2150     93 %   20 1947 98.9 °F (37.2 °C) (!) 133 (!) 36 152/63 94 %   20 1532 98.1 °F (36.7 °C) (!) 119 21 115/63 96 %   20 1141 98 °F (36.7 °C) (!) 110 22 106/77 95 %     Oxygen Therapy  O2 Sat (%): 92 % (20)  Pulse via Oximetry: 135 beats per minute (20)  O2 Device: Hi flow nasal cannula (20)  O2 Flow Rate (L/min): 6 l/min (20)  O2 Temperature: 87.8 °F (31 °C) (20 0855)  FIO2 (%): 100 % (07/14/20 1209)  ETCO2 (mmHg): 90 mmHg (07/05/20 2301)    Estimated body mass index is 25.31 kg/m² as calculated from the following:    Height as of this encounter: 5' 3\" (1.6 m). Weight as of this encounter: 64.8 kg (142 lb 13.7 oz). Intake/Output Summary (Last 24 hours) at 7/17/2020 0958  Last data filed at 7/17/2020 0910  Gross per 24 hour   Intake 960 ml   Output 200 ml   Net 760 ml       *Note that automatically entered I/Os may not be accurate; dependent on patient compliance with collection and accurate  by [employer]. General:    Well nourished. Alert. CV:   RRR. No murmur, rub, or gallop. Lungs:   Reduced breath sounds bilaterally  Abdomen:   Soft, nontender, nondistended. Extremities: Warm and dry. No cyanosis or edema. Skin:     No rashes or jaundice.    Neuro:  No gross focal deficits    Data Review:  I have reviewed all labs, meds, and studies from the last 24 hours:    Recent Results (from the past 24 hour(s))   GLUCOSE, POC    Collection Time: 07/16/20 11:30 AM   Result Value Ref Range    Glucose (POC) 111 (H) 65 - 100 mg/dL   GLUCOSE, POC    Collection Time: 07/16/20  4:09 PM   Result Value Ref Range    Glucose (POC) 153 (H) 65 - 100 mg/dL   GLUCOSE, POC    Collection Time: 07/16/20  9:10 PM   Result Value Ref Range    Glucose (POC) 113 (H) 65 - 100 mg/dL   CBC WITH AUTOMATED DIFF    Collection Time: 07/17/20  4:30 AM   Result Value Ref Range    WBC 13.4 (H) 4.3 - 11.1 K/uL    RBC 4.14 4.05 - 5.2 M/uL    HGB 11.9 11.7 - 15.4 g/dL    HCT 36.9 35.8 - 46.3 %    MCV 89.1 79.6 - 97.8 FL    MCH 28.7 26.1 - 32.9 PG    MCHC 32.2 31.4 - 35.0 g/dL    RDW 14.2 11.9 - 14.6 %    PLATELET 802 550 - 889 K/uL    MPV 11.4 9.4 - 12.3 FL    ABSOLUTE NRBC 0.00 0.0 - 0.2 K/uL    DF AUTOMATED      NEUTROPHILS 77 43 - 78 %    LYMPHOCYTES 10 (L) 13 - 44 %    MONOCYTES 9 4.0 - 12.0 %    EOSINOPHILS 1 0.5 - 7.8 %    BASOPHILS 0 0.0 - 2.0 %    IMMATURE GRANULOCYTES 2 0.0 - 5.0 % ABS. NEUTROPHILS 10.2 (H) 1.7 - 8.2 K/UL    ABS. LYMPHOCYTES 1.4 0.5 - 4.6 K/UL    ABS. MONOCYTES 1.2 0.1 - 1.3 K/UL    ABS. EOSINOPHILS 0.2 0.0 - 0.8 K/UL    ABS. BASOPHILS 0.1 0.0 - 0.2 K/UL    ABS. IMM.  GRANS. 0.3 0.0 - 0.5 K/UL   METABOLIC PANEL, BASIC    Collection Time: 07/17/20  4:30 AM   Result Value Ref Range    Sodium 138 136 - 145 mmol/L    Potassium 3.8 3.5 - 5.1 mmol/L    Chloride 106 98 - 107 mmol/L    CO2 24 21 - 32 mmol/L    Anion gap 8 7 - 16 mmol/L    Glucose 114 (H) 65 - 100 mg/dL    BUN 20 8 - 23 MG/DL    Creatinine 0.43 (L) 0.6 - 1.0 MG/DL    GFR est AA >60 >60 ml/min/1.73m2    GFR est non-AA >60 >60 ml/min/1.73m2    Calcium 8.1 (L) 8.3 - 10.4 MG/DL   GLUCOSE, POC    Collection Time: 07/17/20  5:54 AM   Result Value Ref Range    Glucose (POC) 83 65 - 100 mg/dL   EKG, 12 LEAD, SUBSEQUENT    Collection Time: 07/17/20  8:46 AM   Result Value Ref Range    Ventricular Rate 126 BPM    Atrial Rate 126 BPM    P-R Interval 198 ms    QRS Duration 86 ms    Q-T Interval 334 ms    QTC Calculation (Bezet) 483 ms    Calculated P Axis 60 degrees    Calculated R Axis 10 degrees    Calculated T Axis 148 degrees    Diagnosis       Sinus tachycardia  Anteroseptal infarct (cited on or before 01-JUL-2020)  T wave abnormality, consider inferolateral ischemia  Abnormal ECG  When compared with ECG of 01-JUL-2020 13:43,  Serial changes of Anteroseptal infarct Present  Confirmed by ZACH CHAIREZ (), Keyana Russell ([id number]) on 7/17/2020 9:18:42 AM          All Micro Results     Procedure Component Value Units Date/Time    CULTURE, BLOOD [[id number]] Collected:  07/01/20 1341    Order Status:  Completed Specimen:  Blood Updated:  07/06/20 0688     Special Requests: LEFT ANTECUBITAL        Culture result: NO GROWTH 5 DAYS       CULTURE, BLOOD [[id number]] Collected:  07/01/20 1341    Order Status:  Completed Specimen:  Blood Updated:  07/06/20 0651     Special Requests: RIGHT ANTECUBITAL        Culture result: NO GROWTH 5 DAYS CULTURE, URINE [[id number]] Collected:  07/01/20 1417    Order Status:  Completed Specimen:  Cath Urine Updated:  07/04/20 0830     Special Requests: NO SPECIAL REQUESTS        Culture result: NO GROWTH 2 DAYS       SARS-COV-2 [[id number]]  (Abnormal) Collected:  07/02/20 1000    Order Status:  Completed Specimen:  Nasopharyngeal Updated:  07/03/20 0812     COVID-19 Detected        Comment: PERFORMED AT [facility]       CULTURE, RESPIRATORY/SPUTUM/BRONCH Louise Cranker [[id number]] Collected:  07/02/20 1100    Order Status:  Canceled Specimen:  Sputum     COVID-19 RAPID TEST [[id number]]  (Abnormal) Collected:  07/01/20 1527    Order Status:  Completed Specimen:  Nasopharyngeal Updated:  07/01/20 1714     Specimen source SWAB        COVID-19 rapid test Detected        Comment:      The specimen is POSITIVE for SARS-CoV-2, the novel coronavirus associated with COVID-19. This test has been authorized by the FDA under an Emergency Use Authorization (EUA) for use by authorized laboratories. Fact sheet for Healthcare Providers: [employer]com  Fact sheet for Patients: [employer].com       Methodology: Isothermal Nucleic Acid Amplification  RESULTS VERIFIED, PHONED TO AND READ BACK BY  DR Kim Limon 1513  RESULTS VERIFIED, PHONED TO AND READ BACK BY  DR Kim Limon 1513 07/01/20 VELASQUEZ         CULTURE, URINE [[id number]] Collected:  07/01/20 1545    Order Status:  Canceled Specimen:  Cath Urine           No results found for this visit on 07/01/20.     Current Meds:  Current Facility-Administered Medications   Medication Dose Route Frequency    oxyCODONE-acetaminophen (PERCOCET) 5-325 mg per tablet 1 Tab  1 Tab Oral Q4H PRN    insulin glargine (LANTUS) injection 5 Units  5 Units SubCUTAneous QHS    hydrALAZINE (APRESOLINE) 20 mg/mL injection 20 mg  20 mg IntraVENous Q6H PRN    LORazepam (ATIVAN) injection 1 mg  1 mg IntraVENous Q4H PRN    pantoprazole (PROTONIX) tablet 40 mg  40 mg Oral DAILY    insulin lispro (HUMALOG) injection   SubCUTAneous AC&HS    NUTRITIONAL SUPPORT ELECTROLYTE PRN ORDERS   Does Not Apply PRN    budesonide-formoteroL (SYMBICORT) 160-4.5 mcg/actuation HFA inhaler 2 Puff  2 Puff Inhalation BID RT    albuterol (PROVENTIL HFA, VENTOLIN HFA, PROAIR HFA) inhaler 2 Puff  2 Puff Inhalation Q4H PRN    sodium chloride (NS) flush 20 mL  20 mL InterCATHeter Q8H    sodium chloride (NS) flush 20 mL  20 mL InterCATHeter PRN    acetaminophen (TYLENOL) tablet 650 mg  650 mg Oral Q4H PRN    diphenhydrAMINE (BENADRYL) injection 12.5 mg  12.5 mg IntraVENous Q4H PRN    bisacodyL (DULCOLAX) tablet 5 mg  5 mg Oral DAILY PRN    ondansetron (ZOFRAN) injection 4 mg  4 mg IntraVENous Q4H PRN    enoxaparin (LOVENOX) injection 40 mg  40 mg SubCUTAneous Q24H    albuterol-ipratropium (DUO-NEB) 2.5 MG-0.5 MG/3 ML  3 mL Nebulization Q4H PRN    nicotine (NICODERM CQ) 14 mg/24 hr patch 1 Patch  1 Patch TransDERmal Q24H       Other Studies (last 24 hours):  No results found.     Assessment and Plan:     Hospital Problems as of 7/17/2020 Never Reviewed          Codes Class Noted - Resolved POA    Pneumonia due to COVID-19 virus ICD-10-CM: U07.1, J12.89  ICD-9-CM: 480.8  7/2/2020 - Present Unknown        Chronic obstructive pulmonary disease with acute exacerbation ([facility]  -- unspecified type ICD-10-CM: J44.1  ICD-9-CM: 491.21  7/2/2020 - Present Unknown        Elevated diaphragm ICD-10-CM: J98.6  ICD-9-CM: 519.4  7/2/2020 - Present Unknown        Suspected COVID-19 virus infection ICD-10-CM: Z20.828  ICD-9-CM: V01.79  7/1/2020 - Present         Acute respiratory failure with hypoxemia (HCC) ICD-10-CM: J96.01  ICD-9-CM: 518.81  7/1/2020 - Present Unknown        Sepsis ([facility] ICD-10-CM: A41.9  ICD-9-CM: 038.9, 995.91  7/1/2020 - Present Unknown        CAP (community acquired pneumonia) ICD-10-CM: J18.9  ICD-9-CM: 332  9/26/2018 - Present Yes        Leukocytosis ICD-10-CM: D72.829  ICD-9-CM: 288.60  9/26/2018 - Present Yes        * (Principal) Acute hypoxemic respiratory failure (HCC) ICD-10-CM: J96.01  ICD-9-CM: 518.81  9/26/2018 - Present Yes        Tobacco abuse ICD-10-CM: Z72.0  ICD-9-CM: 305.1  9/26/2018 - Present Yes              Acute hypoxemic respiratory failure/acute on chronic hypoxemic respiratory failure/COVID-19/sepsis/viral pneumonia  COVID test positive on 7/2  Has completed course of dexamethasone  Convalescent plasma transfused 7/2  Has completed course of Remdesevir  7/15 CXR: Improved right and progressed left lung base infiltrate    Currently on 6 L high flow nasal cannula    Plan:  -Supportive measures  -Wean Oxygen as Tolerated     Tachycardia/Elevated BP  Patient documented to have atrial flutter night of 7/16 by nursing?   Called telemetry room and they report no episodes of atrial flutter recorded overnight   7/17 EKG reflective of sinus tachycardia  Etiology of tachycardia likely multifactorial related to myalgias/arthralgias or dehydration or fevers  Patient is net -13 L since admission and not currently on diuretics    Plan:  -Continue Telemetry Monitoring   -Will start on low-dose metoprolol 12.5mg BID as patient with intermittently elevated BP  -Will try some IV hydration to see if it helps with tachycardia  -Monitor p.o. intake  -Trend BMP    Tobacco abuse  Patient has been counseled on smoking cessation    Plan:  -Nicotine patch    DC planning/Dispo: Patient planned for LTAC; cannot go until 48 hours fever free without Tylenol usage    7/17 attempted to reach patient's family; no answer on phone number listed on face sheet      Diet:  DIET DIABETIC CONSISTENT CARB  DVT ppx: Lovenox    Signed:  August Aldrich MD

## 2020-07-17 NOTE — PROGRESS NOTES
Patient having pain all over and pain level is 10 and this nurse gave morphine 2 mg Iv at this time.  Will assess for pain medication effectiveness

## 2020-07-17 NOTE — PROGRESS NOTES
Patient's . Patient is asymptomatic at this time, per her reports. Patient is not currently on remote telemetry. [facility] and he gave verbal orders at 0020 to place the patient on remote telemetry for 24 hours. Primary RN, Sravani Barrett, aware of all of the above stated.

## 2020-07-17 NOTE — PROGRESS NOTES
This nurse notified of patient converting to A-flutter and 130 at this time.  Will continue to assess

## 2020-07-18 LAB
ALBUMIN SERPL-MCNC: 1.6 G/DL (ref 3.2–4.6)
ALBUMIN/GLOB SERPL: 0.3 {RATIO} (ref 1.2–3.5)
ALP SERPL-CCNC: 69 U/L (ref 50–136)
ALT SERPL-CCNC: 23 U/L (ref 12–65)
ANION GAP SERPL CALC-SCNC: 6 MMOL/L (ref 7–16)
AST SERPL-CCNC: 14 U/L (ref 15–37)
BASOPHILS # BLD: 0 K/UL (ref 0–0.2)
BASOPHILS NFR BLD: 0 % (ref 0–2)
BILIRUB SERPL-MCNC: 0.3 MG/DL (ref 0.2–1.1)
BUN SERPL-MCNC: 18 MG/DL (ref 8–23)
CALCIUM SERPL-MCNC: 7.9 MG/DL (ref 8.3–10.4)
CHLORIDE SERPL-SCNC: 108 MMOL/L (ref 98–107)
CO2 SERPL-SCNC: 24 MMOL/L (ref 21–32)
CREAT SERPL-MCNC: 0.43 MG/DL (ref 0.6–1)
DIFFERENTIAL METHOD BLD: ABNORMAL
EOSINOPHIL # BLD: 0.2 K/UL (ref 0–0.8)
EOSINOPHIL NFR BLD: 2 % (ref 0.5–7.8)
ERYTHROCYTE [DISTWIDTH] IN BLOOD BY AUTOMATED COUNT: 14.2 % (ref 11.9–14.6)
GLOBULIN SER CALC-MCNC: 4.7 G/DL (ref 2.3–3.5)
GLUCOSE BLD STRIP.AUTO-MCNC: 111 MG/DL (ref 65–100)
GLUCOSE BLD STRIP.AUTO-MCNC: 137 MG/DL (ref 65–100)
GLUCOSE BLD STRIP.AUTO-MCNC: 147 MG/DL (ref 65–100)
GLUCOSE BLD STRIP.AUTO-MCNC: 195 MG/DL (ref 65–100)
GLUCOSE SERPL-MCNC: 170 MG/DL (ref 65–100)
HCT VFR BLD AUTO: 34 % (ref 35.8–46.3)
HGB BLD-MCNC: 10.6 G/DL (ref 11.7–15.4)
IMM GRANULOCYTES # BLD AUTO: 0.2 K/UL (ref 0–0.5)
IMM GRANULOCYTES NFR BLD AUTO: 2 % (ref 0–5)
LYMPHOCYTES # BLD: 0.7 K/UL (ref 0.5–4.6)
LYMPHOCYTES NFR BLD: 6 % (ref 13–44)
MCH RBC QN AUTO: 27.9 PG (ref 26.1–32.9)
MCHC RBC AUTO-ENTMCNC: 31.2 G/DL (ref 31.4–35)
MCV RBC AUTO: 89.5 FL (ref 79.6–97.8)
MONOCYTES # BLD: 0.6 K/UL (ref 0.1–1.3)
MONOCYTES NFR BLD: 5 % (ref 4–12)
NEUTS SEG # BLD: 10.1 K/UL (ref 1.7–8.2)
NEUTS SEG NFR BLD: 85 % (ref 43–78)
NRBC # BLD: 0 K/UL (ref 0–0.2)
PLATELET # BLD AUTO: 154 K/UL (ref 150–450)
PMV BLD AUTO: 10.6 FL (ref 9.4–12.3)
POTASSIUM SERPL-SCNC: 4.4 MMOL/L (ref 3.5–5.1)
PROT SERPL-MCNC: 6.3 G/DL (ref 6.3–8.2)
RBC # BLD AUTO: 3.8 M/UL (ref 4.05–5.2)
SODIUM SERPL-SCNC: 138 MMOL/L (ref 136–145)
WBC # BLD AUTO: 11.9 K/UL (ref 4.3–11.1)

## 2020-07-18 PROCEDURE — 94640 AIRWAY INHALATION TREATMENT: CPT

## 2020-07-18 PROCEDURE — 74011636637 HC RX REV CODE- 636/637: Performed by: FAMILY MEDICINE

## 2020-07-18 PROCEDURE — 77010033678 HC OXYGEN DAILY

## 2020-07-18 PROCEDURE — 82962 GLUCOSE BLOOD TEST: CPT

## 2020-07-18 PROCEDURE — 74011636637 HC RX REV CODE- 636/637: Performed by: INTERNAL MEDICINE

## 2020-07-18 PROCEDURE — 80053 COMPREHEN METABOLIC PANEL: CPT

## 2020-07-18 PROCEDURE — 74011250637 HC RX REV CODE- 250/637: Performed by: INTERNAL MEDICINE

## 2020-07-18 PROCEDURE — 74011250636 HC RX REV CODE- 250/636: Performed by: INTERNAL MEDICINE

## 2020-07-18 PROCEDURE — 94760 N-INVAS EAR/PLS OXIMETRY 1: CPT

## 2020-07-18 PROCEDURE — 85025 COMPLETE CBC W/AUTO DIFF WBC: CPT

## 2020-07-18 PROCEDURE — 65660000000 HC RM CCU STEPDOWN

## 2020-07-18 PROCEDURE — 77030040393 HC DRSG OPTIFOAM GENT MDII -B

## 2020-07-18 RX ORDER — PREDNISONE 20 MG/1
20 TABLET ORAL
Status: COMPLETED | OUTPATIENT
Start: 2020-07-18 | End: 2020-07-18

## 2020-07-18 RX ORDER — OXYCODONE HYDROCHLORIDE 5 MG/1
5 TABLET ORAL
Status: DISCONTINUED | OUTPATIENT
Start: 2020-07-18 | End: 2020-07-18

## 2020-07-18 RX ADMIN — OXYCODONE HYDROCHLORIDE AND ACETAMINOPHEN 1 TABLET: 5; 325 TABLET ORAL at 06:16

## 2020-07-18 RX ADMIN — LORAZEPAM 1 MG: 2 INJECTION INTRAMUSCULAR; INTRAVENOUS at 21:59

## 2020-07-18 RX ADMIN — OXYCODONE HYDROCHLORIDE 5 MG: 5 TABLET ORAL at 10:57

## 2020-07-18 RX ADMIN — BUDESONIDE AND FORMOTEROL FUMARATE DIHYDRATE 2 PUFF: 160; 4.5 AEROSOL RESPIRATORY (INHALATION) at 07:27

## 2020-07-18 RX ADMIN — BUDESONIDE AND FORMOTEROL FUMARATE DIHYDRATE 2 PUFF: 160; 4.5 AEROSOL RESPIRATORY (INHALATION) at 19:22

## 2020-07-18 RX ADMIN — SODIUM CHLORIDE 20 ML: 9 INJECTION, SOLUTION INTRAMUSCULAR; INTRAVENOUS; SUBCUTANEOUS at 06:02

## 2020-07-18 RX ADMIN — DIPHENHYDRAMINE HYDROCHLORIDE 12.5 MG: 50 INJECTION, SOLUTION INTRAMUSCULAR; INTRAVENOUS at 21:58

## 2020-07-18 RX ADMIN — INSULIN LISPRO 2 UNITS: 100 INJECTION, SOLUTION INTRAVENOUS; SUBCUTANEOUS at 12:24

## 2020-07-18 RX ADMIN — METOPROLOL TARTRATE 12.5 MG: 25 TABLET, FILM COATED ORAL at 09:11

## 2020-07-18 RX ADMIN — PREDNISONE 20 MG: 20 TABLET ORAL at 17:26

## 2020-07-18 RX ADMIN — SODIUM CHLORIDE 20 ML: 9 INJECTION, SOLUTION INTRAMUSCULAR; INTRAVENOUS; SUBCUTANEOUS at 09:30

## 2020-07-18 RX ADMIN — SODIUM CHLORIDE 20 ML: 9 INJECTION, SOLUTION INTRAMUSCULAR; INTRAVENOUS; SUBCUTANEOUS at 21:57

## 2020-07-18 RX ADMIN — OXYCODONE HYDROCHLORIDE 5 MG: 5 TABLET ORAL at 15:21

## 2020-07-18 RX ADMIN — ENOXAPARIN SODIUM 40 MG: 40 INJECTION SUBCUTANEOUS at 16:31

## 2020-07-18 RX ADMIN — DIPHENHYDRAMINE HYDROCHLORIDE 12.5 MG: 50 INJECTION, SOLUTION INTRAMUSCULAR; INTRAVENOUS at 16:55

## 2020-07-18 RX ADMIN — PANTOPRAZOLE SODIUM 40 MG: 40 TABLET, DELAYED RELEASE ORAL at 09:13

## 2020-07-18 RX ADMIN — INSULIN GLARGINE 5 UNITS: 100 INJECTION, SOLUTION SUBCUTANEOUS at 21:56

## 2020-07-18 NOTE — PROGRESS NOTES
Patient alert. States she is not as tired as yesterday. Raised pink rash on upper extremities and anterior and posterior trunk. Patient denies pain, burning, or itching at this time.

## 2020-07-18 NOTE — PROGRESS NOTES
Patient removed nicotine patch at 1822. States that she believes this may be contributing to rash. At this time rash is a lighter pink color. Patient sitting up in bed at 45 degrees. High flow cannula in nares at 6L.

## 2020-07-18 NOTE — PROGRESS NOTES
Hospitalist Note     Admit Date:  2020  1:01 PM   Name:  Ubaldo Flores   Age:  60 y.o.  :  1955   MRN:  [id number]   PCP:  None  Treatment Team: Attending Provider: Vivek Ramos MD; Care Manager: Kay Gardner, RN; Care Manager: Lilian Napier Consulting Provider: Vivek Ramos MD; Primary Nurse: Nabeel Lind    HPI/Subjective: The patient is a 60-year-old female with a past medical history of tobacco abuse who was admitted with hypoxia, fevers, diarrhea, and dyspnea. She was noted to be febrile with a fever of 102.2. She had a rapid COVID test that was positive. She was admitted to the [facility] ICU on OptiFlow . She completed course of dexamethasone. She received convalescent plasma . She completed course of remdesevir. She was titrated down to high flow nasal cannula. Hospitalist asked to resume care . Patient developed rash night of .      Patient seen and examined at bedside in no acute distress. Patient reports myalgias and arthralgias. She reports rash appearing last night starting near blood pressure cuff. She reports rash is itchy but currently improving. She reports improvement in energy levels. She reports she has been feeling anxious. No other complaints at this time. She denies any chest pain. She reports minimal cough.   Objective:     Patient Vitals for the past 24 hrs:   Temp Pulse Resp BP SpO2   20 0731 97.8 °F (36.6 °C) 94 18 118/67 94 %   20 0727     93 %   20 0623  96   96 %   20 0327  (!) 54      20 0316 98.4 °F (36.9 °C) (!) 54 19 121/62 93 %   20 0010 97.7 °F (36.5 °C) (!) 116 19 110/58 94 %   20 2111 97.9 °F (36.6 °C) (!) 123 21 114/60 93 %   20 2025     94 %   20 1521 98.2 °F (36.8 °C) (!) 119 22 119/68 92 %   20 1132 98.2 °F (36.8 °C) (!) 106 20 109/78 93 %     Oxygen Therapy  O2 Sat (%): 94 % (20 0731)  Pulse via Oximetry: 68 beats per minute (07/18/20 0727)  O2 Device: Nasal cannula (07/18/20 0727)  O2 Flow Rate (L/min): 6 l/min (07/18/20 0727)  O2 Temperature: 87.8 °F (31 °C) (07/14/20 0855)  FIO2 (%): 100 % (07/14/20 1209)  ETCO2 (mmHg): 90 mmHg (07/05/20 2301)    Estimated body mass index is 25.31 kg/m² as calculated from the following:    Height as of this encounter: 5' 3\" (1.6 m). Weight as of this encounter: 64.8 kg (142 lb 13.7 oz). Intake/Output Summary (Last 24 hours) at 7/18/2020 1022  Last data filed at 7/18/2020 3808  Gross per 24 hour   Intake 480 ml   Output 300 ml   Net 180 ml       *Note that automatically entered I/Os may not be accurate; dependent on patient compliance with collection and accurate  by techs. General:    Well nourished. Alert. CV:   RRR. No murmur, rub, or gallop. Lungs:   Reduced breath sounds bilaterally  Abdomen:   Soft, nontender, nondistended. Extremities: Warm and dry. No cyanosis or edema. Skin:     Maculopapular rash on left upper extremity, chest, and back;    Neuro:  No gross focal deficits    Data Review:  I have reviewed all labs, meds, and studies from the last 24 hours:    Recent Results (from the past 24 hour(s))   GLUCOSE, POC    Collection Time: 07/17/20 11:18 AM   Result Value Ref Range    Glucose (POC) 102 (H) 65 - 100 mg/dL   GLUCOSE, POC    Collection Time: 07/17/20  4:07 PM   Result Value Ref Range    Glucose (POC) 171 (H) 65 - 100 mg/dL   GLUCOSE, POC    Collection Time: 07/17/20  9:22 PM   Result Value Ref Range    Glucose (POC) 125 (H) 65 - 100 mg/dL   CBC WITH AUTOMATED DIFF    Collection Time: 07/18/20  4:35 AM   Result Value Ref Range    WBC 11.9 (H) 4.3 - 11.1 K/uL    RBC 3.80 (L) 4.05 - 5.2 M/uL    HGB 10.6 (L) 11.7 - 15.4 g/dL    HCT 34.0 (L) 35.8 - 46.3 %    MCV 89.5 79.6 - 97.8 FL    MCH 27.9 26.1 - 32.9 PG    MCHC 31.2 (L) 31.4 - 35.0 g/dL    RDW 14.2 11.9 - 14.6 %    PLATELET 941 546 - 202 K/uL    MPV 10.6 9.4 - 12.3 FL    ABSOLUTE NRBC 0.00 0.0 - 0.2 K/uL    DF AUTOMATED      NEUTROPHILS 85 (H) 43 - 78 %    LYMPHOCYTES 6 (L) 13 - 44 %    MONOCYTES 5 4.0 - 12.0 %    EOSINOPHILS 2 0.5 - 7.8 %    BASOPHILS 0 0.0 - 2.0 %    IMMATURE GRANULOCYTES 2 0.0 - 5.0 %    ABS. NEUTROPHILS 10.1 (H) 1.7 - 8.2 K/UL    ABS. LYMPHOCYTES 0.7 0.5 - 4.6 K/UL    ABS. MONOCYTES 0.6 0.1 - 1.3 K/UL    ABS. EOSINOPHILS 0.2 0.0 - 0.8 K/UL    ABS. BASOPHILS 0.0 0.0 - 0.2 K/UL    ABS. IMM. GRANS. 0.2 0.0 - 0.5 K/UL   METABOLIC PANEL, COMPREHENSIVE    Collection Time: 07/18/20  4:35 AM   Result Value Ref Range    Sodium 138 136 - 145 mmol/L    Potassium 4.4 3.5 - 5.1 mmol/L    Chloride 108 (H) 98 - 107 mmol/L    CO2 24 21 - 32 mmol/L    Anion gap 6 (L) 7 - 16 mmol/L    Glucose 170 (H) 65 - 100 mg/dL    BUN 18 8 - 23 MG/DL    Creatinine 0.43 (L) 0.6 - 1.0 MG/DL    GFR est AA >60 >60 ml/min/1.73m2    GFR est non-AA >60 >60 ml/min/1.73m2    Calcium 7.9 (L) 8.3 - 10.4 MG/DL    Bilirubin, total 0.3 0.2 - 1.1 MG/DL    ALT (SGPT) 23 12 - 65 U/L    AST (SGOT) 14 (L) 15 - 37 U/L    Alk.  phosphatase 69 50 - 136 U/L    Protein, total 6.3 6.3 - 8.2 g/dL    Albumin 1.6 (L) 3.2 - 4.6 g/dL    Globulin 4.7 (H) 2.3 - 3.5 g/dL    A-G Ratio 0.3 (L) 1.2 - 3.5     GLUCOSE, POC    Collection Time: 07/18/20  5:50 AM   Result Value Ref Range    Glucose (POC) 147 (H) 65 - 100 mg/dL        All Micro Results     Procedure Component Value Units Date/Time    CULTURE, BLOOD [[id number]] Collected:  07/01/20 1341    Order Status:  Completed Specimen:  Blood Updated:  07/06/20 0651     Special Requests: LEFT ANTECUBITAL        Culture result: NO GROWTH 5 DAYS       CULTURE, BLOOD [[id number]] Collected:  07/01/20 1341    Order Status:  Completed Specimen:  Blood Updated:  07/06/20 0651     Special Requests: RIGHT ANTECUBITAL        Culture result: NO GROWTH 5 DAYS       CULTURE, URINE [[id number]] Collected:  07/01/20 1417    Order Status:  Completed Specimen:  Cath Urine Updated:  07/04/20 0830     Special Requests: NO SPECIAL REQUESTS        Culture result: NO GROWTH 2 DAYS       SARS-COV-2 [[id number]]  (Abnormal) Collected:  07/02/20 1000    Order Status:  Completed Specimen:  Nasopharyngeal Updated:  07/03/20 0812     COVID-19 Detected        Comment: PERFORMED AT [facility]       CULTURE, RESPIRATORY/SPUTUM/BRONCH Thereasa Gravely STAIN [[id number]] Collected:  07/02/20 1100    Order Status:  Canceled Specimen:  Sputum     COVID-19 RAPID TEST [[id number]]  (Abnormal) Collected:  07/01/20 1527    Order Status:  Completed Specimen:  Nasopharyngeal Updated:  07/01/20 1714     Specimen source SWAB        COVID-19 rapid test Detected        Comment:      The specimen is POSITIVE for SARS-CoV-2, the novel coronavirus associated with COVID-19. This test has been authorized by the FDA under an Emergency Use Authorization (EUA) for use by authorized laboratories. Fact sheet for Healthcare Providers: [employer]stattoo[employer]com  Fact sheet for Patients: kstattoo.com       Methodology: Isothermal Nucleic Acid Amplification  RESULTS VERIFIED, PHONED TO AND READ BACK BY  DR Maria Lundborg 1513  RESULTS VERIFIED, PHONED TO AND READ BACK BY  DR Maria Lundborg 1513 07/01/20 VELASQUEZ         CULTURE, URINE [[id number]] Collected:  07/01/20 1545    Order Status:  Canceled Specimen:  Cath Urine           No results found for this visit on 07/01/20.     Current Meds:  Current Facility-Administered Medications   Medication Dose Route Frequency    oxyCODONE IR (ROXICODONE) tablet 5 mg  5 mg Oral Q4H PRN    insulin glargine (LANTUS) injection 5 Units  5 Units SubCUTAneous QHS    hydrALAZINE (APRESOLINE) 20 mg/mL injection 20 mg  20 mg IntraVENous Q6H PRN    LORazepam (ATIVAN) injection 1 mg  1 mg IntraVENous Q4H PRN    pantoprazole (PROTONIX) tablet 40 mg  40 mg Oral DAILY    insulin lispro (HUMALOG) injection   SubCUTAneous AC&HS    NUTRITIONAL SUPPORT ELECTROLYTE PRN ORDERS   Does Not Apply PRN    budesonide-formoteroL (SYMBICORT) 160-4.5 mcg/actuation HFA inhaler 2 Puff  2 Puff Inhalation BID RT    albuterol (PROVENTIL HFA, VENTOLIN HFA, PROAIR HFA) inhaler 2 Puff  2 Puff Inhalation Q4H PRN    sodium chloride (NS) flush 20 mL  20 mL InterCATHeter Q8H    sodium chloride (NS) flush 20 mL  20 mL InterCATHeter PRN    acetaminophen (TYLENOL) tablet 650 mg  650 mg Oral Q4H PRN    diphenhydrAMINE (BENADRYL) injection 12.5 mg  12.5 mg IntraVENous Q4H PRN    bisacodyL (DULCOLAX) tablet 5 mg  5 mg Oral DAILY PRN    ondansetron (ZOFRAN) injection 4 mg  4 mg IntraVENous Q4H PRN    enoxaparin (LOVENOX) injection 40 mg  40 mg SubCUTAneous Q24H    albuterol-ipratropium (DUO-NEB) 2.5 MG-0.5 MG/3 ML  3 mL Nebulization Q4H PRN    nicotine (NICODERM CQ) 14 mg/24 hr patch 1 Patch  1 Patch TransDERmal Q24H       Other Studies (last 24 hours):  No results found.     Assessment and Plan:     Hospital Problems as of 7/18/2020 Never Reviewed          Codes Class Noted - Resolved POA    Pneumonia due to COVID-19 virus ICD-10-CM: U07.1, J12.89  ICD-9-CM: 480.8  7/2/2020 - Present Unknown        Chronic obstructive pulmonary disease with acute exacerbation ([facility]  -- unspecified type ICD-10-CM: J44.1  ICD-9-CM: 491.21  7/2/2020 - Present Unknown        Elevated diaphragm ICD-10-CM: J98.6  ICD-9-CM: 519.4  7/2/2020 - Present Unknown        Suspected COVID-19 virus infection ICD-10-CM: Z20.828  ICD-9-CM: V01.79  7/1/2020 - Present         Acute respiratory failure with hypoxemia ([facility] ICD-10-CM: J96.01  ICD-9-CM: 518.81  7/1/2020 - Present Unknown        Sepsis ([facility] ICD-10-CM: A41.9  ICD-9-CM: 038.9, 995.91  7/1/2020 - Present Unknown        CAP (community acquired pneumonia) ICD-10-CM: J18.9  ICD-9-CM: 280  9/26/2018 - Present Yes        Leukocytosis ICD-10-CM: V18.177  ICD-9-CM: 288.60  9/26/2018 - Present Yes        * (Principal) Acute hypoxemic respiratory failure ([facility] ICD-10-CM: J96.01  ICD-9-CM: 518.81  9/26/2018 - Present Yes        Tobacco abuse ICD-10-CM: Z72.0  ICD-9-CM: 305.1  9/26/2018 - Present Yes              Acute hypoxemic respiratory failure/acute on chronic hypoxemic respiratory failure/COVID-19/sepsis/viral pneumonia  COVID test positive on 7/2  Has completed course of dexamethasone  Convalescent plasma transfused 7/2  Has completed course of Remdesevir  7/15 CXR: Improved right and progressed left lung base infiltrate    Currently on 6 L high flow nasal cannula    Plan:  -Supportive measures  -Wean Oxygen as Tolerated     Tachycardia/Elevated BP  Patient documented to have atrial flutter night of 7/16 by nursing?   Called telemetry room and they report no episodes of atrial flutter recorded overnight   7/17 EKG reflective of sinus tachycardia  Etiology of tachycardia likely multifactorial related to myalgias/arthralgias + dehydration +  fevers  Patient is net -13 L since admission and not currently on diuretics    Improved after IVF    Plan:  -Continue Telemetry Monitoring   -Encourage p.o. intake of water  -Trend BMP    Tobacco abuse  Patient has been counseled on smoking cessation    Plan:  -Nicotine patch    Rash/allergic reaction  Patient developed a maculopapular rash night of 7/17  Etiology unclear: Heat rash vs. drug rash vs viral exanthem  She had been started on metoprolol and Percocet 7/17  Rash improving after prednisone+ Atarax    Plan:  -D/C metoprolol  -D/C Percocet   -Monitor for recurrence    COPD  No wheezing on examination    Plan:  -Continue home medications      DC planning/Dispo: Patient planned for LTAC; cannot go until 48 hours fever free without Tylenol usage    7/17 attempted to reach patient's family; no answer on phone number listed on face sheet      Diet:  DIET DIABETIC CONSISTENT CARB  DVT ppx: Lovenox    Signed:  Christian Dickens MD

## 2020-07-18 NOTE — PROGRESS NOTES
Oxycodone 5 mg given at 1057 for patient reported pain score of 10 on 0-10 scale. Pain reassessed at 1150 and patient stated pain score of 6 on 0-10 scale. Patient repositioned.

## 2020-07-18 NOTE — PROGRESS NOTES
Percocet effective in relieving pain.      07/17/20 2243   Pain 1   Pain Scale 1 Numeric (0 - 10)   Pain Intensity 1 0

## 2020-07-18 NOTE — PROGRESS NOTES
Rash noted. Dr. Giorgi Kent notified. He came to see patient and placed new orders. Pepcid 40mg,  Prednisone 10mg, and Atarax 25mg given for rash to torso, shoulders, neck, and arms.

## 2020-07-18 NOTE — PROGRESS NOTES
Percocet 5 mg po given.      07/18/20 0616   Pain 1   Pain Scale 1 Numeric (0 - 10)   Pain Intensity 1 7   Patient Stated Pain Goal 0   Pain Onset 1 chronic   Pain Location 1 Generalized   Pain Orientation 1 Left;Right   Pain Description 1 Aching;Constant   Pain Intervention(s) 1 Medication (see MAR)

## 2020-07-18 NOTE — PROGRESS NOTES
PROGRESS NOTE    I was paged due to the patient's developing a new rash on her abdomen to shoulders. Upon seeing the patient, she didn't know the rash was present and denied itching, pain, or burning. The rash is worse where her BP cuff and tele stickers were present. Appears to be a heat rash vs drug rash vs viral exanthem. Labs stable. Vital signs stable.     At the point I will give:  Pepcid 40mg  Prednisone 10mg  Atarax 25mg    Will follow up in the AM.    Safia Harrison, DO

## 2020-07-18 NOTE — PROGRESS NOTES
Benadryl 12.5 mg given for rash and itching over upper extremities and anterior and posterior trunk. MD notified by Ale Lopez by Yaa Delgado RN.

## 2020-07-18 NOTE — PROGRESS NOTES
Percocet 5 mg po given.      07/17/20 1405   Pain 1   Pain Scale 1 Numeric (0 - 10)   Pain Intensity 1 8   Patient Stated Pain Goal 0   Pain Onset 1 chronic   Pain Location 1 Generalized   Pain Orientation 1 Left;Right   Pain Description 1 Aching;Constant   Pain Intervention(s) 1 Medication (see MAR)

## 2020-07-18 NOTE — PROGRESS NOTES
Patient with worsening of rash. Correlation appears to be with Oxycodone.     Plan:  -Discontinue oxycodone  -Will give dose of prednisone

## 2020-07-18 NOTE — PROGRESS NOTES
Rash has diminished, not as red. Respirations even and unlabored. No s/sx distress. Resting with eyes closed. Appears to be sleeping.

## 2020-07-19 LAB
ARTERIAL PATENCY WRIST A: YES
BASE EXCESS BLD CALC-SCNC: 2 MMOL/L
BDY SITE: NORMAL
CO2 BLD-SCNC: 27 MMOL/L
COLLECT TIME,HTIME: 1120
FLOW RATE ISTAT,IFRATE: 6 L/MIN
GAS FLOW.O2 O2 DELIVERY SYS: NORMAL L/MIN
GLUCOSE BLD STRIP.AUTO-MCNC: 102 MG/DL (ref 65–100)
GLUCOSE BLD STRIP.AUTO-MCNC: 124 MG/DL (ref 65–100)
GLUCOSE BLD STRIP.AUTO-MCNC: 95 MG/DL (ref 65–100)
GLUCOSE BLD STRIP.AUTO-MCNC: 95 MG/DL (ref 65–100)
HCO3 BLD-SCNC: 26 MMOL/L (ref 22–26)
PCO2 BLD: 38.6 MMHG (ref 35–45)
PH BLD: 7.44 [PH] (ref 7.35–7.45)
PO2 BLD: 76 MMHG (ref 75–100)
SAO2 % BLD: 96 % (ref 95–98)
SERVICE CMNT-IMP: NORMAL
SERVICE CMNT-IMP: NORMAL
SPECIMEN TYPE: NORMAL

## 2020-07-19 PROCEDURE — 36600 WITHDRAWAL OF ARTERIAL BLOOD: CPT

## 2020-07-19 PROCEDURE — 74011250637 HC RX REV CODE- 250/637: Performed by: INTERNAL MEDICINE

## 2020-07-19 PROCEDURE — 65660000000 HC RM CCU STEPDOWN

## 2020-07-19 PROCEDURE — 94760 N-INVAS EAR/PLS OXIMETRY 1: CPT

## 2020-07-19 PROCEDURE — 82803 BLOOD GASES ANY COMBINATION: CPT

## 2020-07-19 PROCEDURE — 74011250636 HC RX REV CODE- 250/636: Performed by: INTERNAL MEDICINE

## 2020-07-19 PROCEDURE — 94640 AIRWAY INHALATION TREATMENT: CPT

## 2020-07-19 PROCEDURE — 74011636637 HC RX REV CODE- 636/637: Performed by: INTERNAL MEDICINE

## 2020-07-19 PROCEDURE — 82962 GLUCOSE BLOOD TEST: CPT

## 2020-07-19 RX ORDER — IBUPROFEN 600 MG/1
600 TABLET ORAL
Status: DISCONTINUED | OUTPATIENT
Start: 2020-07-19 | End: 2020-07-20 | Stop reason: HOSPADM

## 2020-07-19 RX ORDER — PREDNISONE 20 MG/1
20 TABLET ORAL
Status: DISCONTINUED | OUTPATIENT
Start: 2020-07-20 | End: 2020-07-20 | Stop reason: HOSPADM

## 2020-07-19 RX ORDER — LORAZEPAM 2 MG/ML
1 INJECTION INTRAMUSCULAR
Status: DISCONTINUED | OUTPATIENT
Start: 2020-07-19 | End: 2020-07-20 | Stop reason: HOSPADM

## 2020-07-19 RX ORDER — TRAMADOL HYDROCHLORIDE 50 MG/1
50 TABLET ORAL
Status: DISCONTINUED | OUTPATIENT
Start: 2020-07-19 | End: 2020-07-19

## 2020-07-19 RX ADMIN — DIPHENHYDRAMINE HYDROCHLORIDE 12.5 MG: 50 INJECTION, SOLUTION INTRAMUSCULAR; INTRAVENOUS at 06:24

## 2020-07-19 RX ADMIN — SODIUM CHLORIDE 20 ML: 9 INJECTION, SOLUTION INTRAMUSCULAR; INTRAVENOUS; SUBCUTANEOUS at 20:43

## 2020-07-19 RX ADMIN — PANTOPRAZOLE SODIUM 40 MG: 40 TABLET, DELAYED RELEASE ORAL at 08:16

## 2020-07-19 RX ADMIN — ALBUTEROL SULFATE 2 PUFF: 90 AEROSOL, METERED RESPIRATORY (INHALATION) at 08:22

## 2020-07-19 RX ADMIN — BUDESONIDE AND FORMOTEROL FUMARATE DIHYDRATE 2 PUFF: 160; 4.5 AEROSOL RESPIRATORY (INHALATION) at 08:23

## 2020-07-19 RX ADMIN — LORAZEPAM 1 MG: 2 INJECTION INTRAMUSCULAR; INTRAVENOUS at 06:04

## 2020-07-19 RX ADMIN — ACETAMINOPHEN 650 MG: 325 TABLET, FILM COATED ORAL at 01:56

## 2020-07-19 RX ADMIN — LORAZEPAM 1 MG: 2 INJECTION INTRAMUSCULAR; INTRAVENOUS at 01:58

## 2020-07-19 RX ADMIN — DIPHENHYDRAMINE HYDROCHLORIDE 12.5 MG: 50 INJECTION, SOLUTION INTRAMUSCULAR; INTRAVENOUS at 01:57

## 2020-07-19 RX ADMIN — INSULIN GLARGINE 5 UNITS: 100 INJECTION, SOLUTION SUBCUTANEOUS at 20:43

## 2020-07-19 RX ADMIN — SODIUM CHLORIDE 20 ML: 9 INJECTION, SOLUTION INTRAMUSCULAR; INTRAVENOUS; SUBCUTANEOUS at 06:05

## 2020-07-19 RX ADMIN — ENOXAPARIN SODIUM 40 MG: 40 INJECTION SUBCUTANEOUS at 15:20

## 2020-07-19 RX ADMIN — IBUPROFEN 600 MG: 600 TABLET, FILM COATED ORAL at 15:21

## 2020-07-19 RX ADMIN — ACETAMINOPHEN 650 MG: 325 TABLET, FILM COATED ORAL at 06:04

## 2020-07-19 RX ADMIN — IBUPROFEN 600 MG: 600 TABLET, FILM COATED ORAL at 21:38

## 2020-07-19 RX ADMIN — Medication 10 ML: at 01:58

## 2020-07-19 RX ADMIN — SODIUM CHLORIDE 20 ML: 9 INJECTION, SOLUTION INTRAMUSCULAR; INTRAVENOUS; SUBCUTANEOUS at 15:20

## 2020-07-19 NOTE — PROGRESS NOTES
PT Note:  Treatment attempted this AM but pt refused due to lethargy. Will re-attempt pending schedule and pt status.   Thanks,  Curry Gooden, PT, DPT

## 2020-07-19 NOTE — PROGRESS NOTES
Patient remains in stable condition with respirations even/unlabored. No acute distress noted. No needs noted or voiced at this time. Safety measures in place. Patient on oxygen to high flow nasal cannula. Call light remains within reach. Preparing to give report to oncoming shift.

## 2020-07-19 NOTE — PROGRESS NOTES
Afshan Farrar CNA reported that patient had a debit/bank card sitting out on her tray. CNA and this RN entered patient room and placed card in sealed bag with her ID sticker. Patient asked that the card be placed in her \"pocket book. \" CNA placed card inside patient's purse, this nurse witnessed. Patient verbalized understanding of where card was placed.

## 2020-07-19 NOTE — PROGRESS NOTES
Tylenol 650 mg po given.      07/19/20 0156   Pain 1   Pain Scale 1 Numeric (0 - 10)   Pain Intensity 1 6   Pain Onset 1 chronic   Pain Location 1 Generalized   Pain Orientation 1 Left;Right   Pain Description 1 Constant   Pain Intervention(s) 1 Medication (see MAR)

## 2020-07-19 NOTE — PROGRESS NOTES
Received bedside shift report from Sravanthi Munguia RN. Pt lying in bed. No apparent distress. Respirations even and unlabored on 6L HiFlo NC. Instructed to call for assistance with needs, as they arise. Pt voiced understanding.

## 2020-07-19 NOTE — PROGRESS NOTES
Bedside report received from Marely Jauregui RN. No distress on 6L via HF. Pt watching television. Pt remains on remote telemetry running NSR 92 confirmed with monitor tech.

## 2020-07-19 NOTE — PROGRESS NOTES
Hospitalist Note     Admit Date:  2020  1:01 PM   Name:  Bacilio Albarran   Age:  60 y.o.  :  1955   MRN:  [id number]   PCP:  None  Treatment Team: Attending Provider: Angelic Brito MD; Care Manager: Adele Silvestre, RN; Care Manager: Sofya Harper Consulting Provider: Angelic Brito MD; Physical Therapist: Cynthia Fallon, PT, DPT    HPI/Subjective: The patient is a 70-year-old female with a past medical history of tobacco abuse who was admitted with hypoxia, fevers, diarrhea, and dyspnea. She was noted to be febrile with a fever of 102.2. She had a rapid COVID test that was positive. She was admitted to the [facility] ICU on OptiFlow . She completed course of dexamethasone. She received convalescent plasma . She completed course of remdesevir. She was titrated down to high flow nasal cannula. Hospitalist asked to resume care . Patient developed rash night of .      Patient seen and examined at bedside in no acute distress. Patient lethargic on examination. Nursing reports possible lip swelling. Rash appears similar in appearance.   Objective:     Patient Vitals for the past 24 hrs:   Temp Pulse Resp BP SpO2   20 0824     94 %   20 0725 97.8 °F (36.6 °C) 68 20 143/67 93 %   20 0327 98.8 °F (37.1 °C) (!) 106 18 130/71 95 %   20 2251 98.2 °F (36.8 °C) (!) 107 18 116/73 93 %   20 2040 98.6 °F (37 °C) (!) 111 18 164/83 98 %   20 1922     94 %   20 1543 98.6 °F (37 °C) 98 18 148/68 97 %   20 1129 98.3 °F (36.8 °C) 88 18 118/55 98 %     Oxygen Therapy  O2 Sat (%): 94 % (20)  Pulse via Oximetry: 69 beats per minute (20)  O2 Device: Nasal cannula (20)  O2 Flow Rate (L/min): 6 l/min (20)  O2 Temperature: 87.8 °F (31 °C) (20)  FIO2 (%): 100 % (20 1209)  ETCO2 (mmHg): 90 mmHg (20)    Estimated body mass index is 25.31 kg/m² as calculated from the following:    Height as of this encounter: 5' 3\" (1.6 m). Weight as of this encounter: 64.8 kg (142 lb 13.7 oz). Intake/Output Summary (Last 24 hours) at 7/19/2020 1011  Last data filed at 7/19/2020 0934  Gross per 24 hour   Intake 720 ml   Output    Net 720 ml       *Note that automatically entered I/Os may not be accurate; dependent on patient compliance with collection and accurate  by techs. General:    Well nourished. Alert. CV:   RRR. No murmur, rub, or gallop. Lungs:   Reduced breath sounds bilaterally  Abdomen:   Soft, nontender, nondistended. Extremities: Warm and dry. No cyanosis or edema.    Skin:     Maculopapular rash on left upper extremity, chest, and back; increased erythema  Neuro:  No gross focal deficits    Data Review:  I have reviewed all labs, meds, and studies from the last 24 hours:    Recent Results (from the past 24 hour(s))   GLUCOSE, POC    Collection Time: 07/18/20 11:11 AM   Result Value Ref Range    Glucose (POC) 195 (H) 65 - 100 mg/dL   GLUCOSE, POC    Collection Time: 07/18/20  4:07 PM   Result Value Ref Range    Glucose (POC) 111 (H) 65 - 100 mg/dL   GLUCOSE, POC    Collection Time: 07/18/20  8:49 PM   Result Value Ref Range    Glucose (POC) 137 (H) 65 - 100 mg/dL   GLUCOSE, POC    Collection Time: 07/19/20  6:47 AM   Result Value Ref Range    Glucose (POC) 95 65 - 100 mg/dL        All Micro Results     Procedure Component Value Units Date/Time    CULTURE, BLOOD [[id number]] Collected:  07/01/20 1341    Order Status:  Completed Specimen:  Blood Updated:  07/06/20 0651     Special Requests: LEFT ANTECUBITAL        Culture result: NO GROWTH 5 DAYS       CULTURE, BLOOD [[id number]] Collected:  07/01/20 1341    Order Status:  Completed Specimen:  Blood Updated:  07/06/20 0651     Special Requests: RIGHT ANTECUBITAL        Culture result: NO GROWTH 5 DAYS       CULTURE, URINE [[id number]] Collected:  07/01/20 1417    Order Status:  Completed Specimen:  Cath Urine Updated:  07/04/20 0830     Special Requests: NO SPECIAL REQUESTS        Culture result: NO GROWTH 2 DAYS       SARS-COV-2 [[id number]]  (Abnormal) Collected:  07/02/20 1000    Order Status:  Completed Specimen:  Nasopharyngeal Updated:  07/03/20 0812     COVID-19 Detected        Comment: PERFORMED AT [facility]       CULTURE, RESPIRATORY/SPUTUM/BRONCH Jeannine Knock STAIN [[id number]] Collected:  07/02/20 1100    Order Status:  Canceled Specimen:  Sputum     COVID-19 RAPID TEST [[id number]]  (Abnormal) Collected:  07/01/20 1527    Order Status:  Completed Specimen:  Nasopharyngeal Updated:  07/01/20 1714     Specimen source SWAB        COVID-19 rapid test Detected        Comment:      The specimen is POSITIVE for SARS-CoV-2, the novel coronavirus associated with COVID-19. This test has been authorized by the FDA under an Emergency Use Authorization (EUA) for use by authorized laboratories. Fact sheet for Healthcare Providers: [employer]  Fact sheet for Patients: [employer]       Methodology: Isothermal Nucleic Acid Amplification  RESULTS VERIFIED, PHONED TO AND READ BACK BY  DR Linde Hodgkins 1513  RESULTS VERIFIED, PHONED TO AND READ BACK BY  DR Linde Hodgkins 1513 07/01/20 VELASQUEZ         CULTURE, URINE [[id number]] Collected:  07/01/20 1545    Order Status:  Canceled Specimen:  Cath Urine           No results found for this visit on 07/01/20.     Current Meds:  Current Facility-Administered Medications   Medication Dose Route Frequency    ibuprofen (MOTRIN) tablet 600 mg  600 mg Oral Q6H PRN    [START ON 7/20/2020] predniSONE (DELTASONE) tablet 20 mg  20 mg Oral DAILY WITH BREAKFAST    insulin glargine (LANTUS) injection 5 Units  5 Units SubCUTAneous QHS    hydrALAZINE (APRESOLINE) 20 mg/mL injection 20 mg  20 mg IntraVENous Q6H PRN    LORazepam (ATIVAN) injection 1 mg  1 mg IntraVENous Q4H PRN    insulin lispro (HUMALOG) injection SubCUTAneous AC&HS    NUTRITIONAL SUPPORT ELECTROLYTE PRN ORDERS   Does Not Apply PRN    budesonide-formoteroL (SYMBICORT) 160-4.5 mcg/actuation HFA inhaler 2 Puff  2 Puff Inhalation BID RT    albuterol (PROVENTIL HFA, VENTOLIN HFA, PROAIR HFA) inhaler 2 Puff  2 Puff Inhalation Q4H PRN    sodium chloride (NS) flush 20 mL  20 mL InterCATHeter Q8H    sodium chloride (NS) flush 20 mL  20 mL InterCATHeter PRN    diphenhydrAMINE (BENADRYL) injection 12.5 mg  12.5 mg IntraVENous Q4H PRN    bisacodyL (DULCOLAX) tablet 5 mg  5 mg Oral DAILY PRN    ondansetron (ZOFRAN) injection 4 mg  4 mg IntraVENous Q4H PRN    enoxaparin (LOVENOX) injection 40 mg  40 mg SubCUTAneous Q24H    albuterol-ipratropium (DUO-NEB) 2.5 MG-0.5 MG/3 ML  3 mL Nebulization Q4H PRN       Other Studies (last 24 hours):  No results found.     Assessment and Plan:     Hospital Problems as of 7/19/2020 Never Reviewed          Codes Class Noted - Resolved POA    Pneumonia due to COVID-19 virus ICD-10-CM: U07.1, J12.89  ICD-9-CM: 480.8  7/2/2020 - Present Unknown        Chronic obstructive pulmonary disease with acute exacerbation ([facility]  -- unspecified type ICD-10-CM: J44.1  ICD-9-CM: 491.21  7/2/2020 - Present Unknown        Elevated diaphragm ICD-10-CM: J98.6  ICD-9-CM: 519.4  7/2/2020 - Present Unknown        Suspected COVID-19 virus infection ICD-10-CM: Z20.828  ICD-9-CM: V01.79  7/1/2020 - Present         Acute respiratory failure with hypoxemia ([facility] ICD-10-CM: J96.01  ICD-9-CM: 518.81  7/1/2020 - Present Unknown        Sepsis ([facility] ICD-10-CM: A41.9  ICD-9-CM: 038.9, 995.91  7/1/2020 - Present Unknown        CAP (community acquired pneumonia) ICD-10-CM: J18.9  ICD-9-CM: 689  9/26/2018 - Present Yes        Leukocytosis ICD-10-CM: L20.091  ICD-9-CM: 288.60  9/26/2018 - Present Yes        * (Principal) Acute hypoxemic respiratory failure ([facility] ICD-10-CM: J96.01  ICD-9-CM: 518.81  9/26/2018 - Present Yes        Tobacco abuse ICD-10-CM: Z72.0  ICD-9-CM: 305.1  9/26/2018 - Present Yes              Acute hypoxemic respiratory failure/acute on chronic hypoxemic respiratory failure/COVID-19/sepsis/viral pneumonia  COVID test positive on 7/2  Has completed course of dexamethasone  Convalescent plasma transfused 7/2  Has completed course of Remdesevir  7/15 CXR: Improved right and progressed left lung base infiltrate    Currently on 4 L high flow nasal cannula    Plan:  -Supportive measures  -Wean Oxygen as Tolerated     Tachycardia/Elevated BP------ resolved  Patient documented to have atrial flutter night of 7/16 by nursing? Called telemetry room and they report no episodes of atrial flutter recorded overnight   7/17 EKG reflective of sinus tachycardia  Etiology of tachycardia likely multifactorial related to myalgias/arthralgias + dehydration +  fevers  Patient is net -13 L since admission and not currently on diuretics    Improved after IVF    Plan:  -Continue Telemetry Monitoring   -Encourage p.o. intake of water  -Trend BMP    Tobacco abuse  Patient has been counseled on smoking cessation    Plan:  -Nicotine patch    Rash/allergic reaction  Patient developed a maculopapular rash night of 7/17  Etiology unclear: Heat rash vs. drug rash vs viral exanthem  She had been started on metoprolol and Percocet 7/17  Rash improving after prednisone+ Atarax  7/19 increased erythema possible mild lip swelling; no sign of airway compromise    Plan:  -Will discontinue all nonessential medications  -Prednisone 20 mg daily for 5 days    Lethargy/altered mental status  Patient lethargic during examination  Nursing reports patient fidgety and agitated earlier this morning  Urine drug screen not be beneficial at this time as patient has received opioids and benzos    Plan:  -ABG    COPD  No wheezing on examination    Plan:  -Continue home medications    DC planning/Dispo:  Will DC to LTAC ; approval pending    Patient planned for LTAC; cannot go until 48 hours fever free without Tylenol usage    7/17 attempted to reach patient's family; no answer on phone number listed on face sheet      Diet:  DIET DIABETIC CONSISTENT CARB  DVT ppx: Lovenox    Signed:  August Aldrich MD

## 2020-07-19 NOTE — PROGRESS NOTES
Received reports from night nurse, CNA and RT that patient is twitchy and writhing in the bed. This nurse notices patient being twitchy and delayed reactions when rousing for medication administrations. Patient also noted to have swollen/puffy lips.  MD notified of above, states will continue to monitor and adjust medications

## 2020-07-19 NOTE — PROGRESS NOTES
TAMMY from [facility]. Patient in stable condition with resps even/unlabored. NAD noted. Patient on oxygen to high flow nasal cannula. Call light within reach, patient encouraged to call nurse prn assist. Will continue to monitor per policy. Droplet plus precautions intact. Appropriate PPE available and in use.

## 2020-07-19 NOTE — PROGRESS NOTES
[employer] is unable to visit patient presently due to covid restrictions    Reviewed chart for spiritual concerns    Per notes she is lethargic      Dandy Wolfe, staff

## 2020-07-20 ENCOUNTER — HOSPITAL ENCOUNTER (OUTPATIENT)
Age: 65
Discharge: HOME | End: 2020-07-28
Attending: INTERNAL MEDICINE | Admitting: INTERNAL MEDICINE

## 2020-07-20 VITALS
SYSTOLIC BLOOD PRESSURE: 116 MMHG | DIASTOLIC BLOOD PRESSURE: 60 MMHG | OXYGEN SATURATION: 93 % | TEMPERATURE: 98.1 F | HEART RATE: 94 BPM | WEIGHT: 142.86 LBS | RESPIRATION RATE: 21 BRPM | HEIGHT: 63 IN | BODY MASS INDEX: 25.31 KG/M2

## 2020-07-20 LAB
ALBUMIN SERPL-MCNC: 2 G/DL (ref 3.2–4.6)
ALBUMIN/GLOB SERPL: 0.5 {RATIO} (ref 1.2–3.5)
ALP SERPL-CCNC: 70 U/L (ref 50–136)
ALT SERPL-CCNC: 26 U/L (ref 12–65)
ANION GAP SERPL CALC-SCNC: 7 MMOL/L (ref 7–16)
AST SERPL-CCNC: 13 U/L (ref 15–37)
BASOPHILS # BLD: 0 K/UL (ref 0–0.2)
BASOPHILS NFR BLD: 0 % (ref 0–2)
BILIRUB SERPL-MCNC: 0.2 MG/DL (ref 0.2–1.1)
BUN SERPL-MCNC: 28 MG/DL (ref 8–23)
CALCIUM SERPL-MCNC: 8.3 MG/DL (ref 8.3–10.4)
CHLORIDE SERPL-SCNC: 106 MMOL/L (ref 98–107)
CO2 SERPL-SCNC: 28 MMOL/L (ref 21–32)
CREAT SERPL-MCNC: 0.57 MG/DL (ref 0.6–1)
DIFFERENTIAL METHOD BLD: ABNORMAL
EOSINOPHIL # BLD: 0.5 K/UL (ref 0–0.8)
EOSINOPHIL NFR BLD: 5 % (ref 0.5–7.8)
ERYTHROCYTE [DISTWIDTH] IN BLOOD BY AUTOMATED COUNT: 14.2 % (ref 11.9–14.6)
GLOBULIN SER CALC-MCNC: 4.4 G/DL (ref 2.3–3.5)
GLUCOSE BLD STRIP.AUTO-MCNC: 101 MG/DL (ref 65–100)
GLUCOSE BLD STRIP.AUTO-MCNC: 127 MG/DL (ref 65–100)
GLUCOSE SERPL-MCNC: 115 MG/DL (ref 65–100)
HCT VFR BLD AUTO: 34 % (ref 35.8–46.3)
HGB BLD-MCNC: 10.1 G/DL (ref 11.7–15.4)
IMM GRANULOCYTES # BLD AUTO: 0.1 K/UL (ref 0–0.5)
IMM GRANULOCYTES NFR BLD AUTO: 1 % (ref 0–5)
LYMPHOCYTES # BLD: 1.2 K/UL (ref 0.5–4.6)
LYMPHOCYTES NFR BLD: 12 % (ref 13–44)
MCH RBC QN AUTO: 27.5 PG (ref 26.1–32.9)
MCHC RBC AUTO-ENTMCNC: 29.7 G/DL (ref 31.4–35)
MCV RBC AUTO: 92.6 FL (ref 79.6–97.8)
MONOCYTES # BLD: 0.5 K/UL (ref 0.1–1.3)
MONOCYTES NFR BLD: 5 % (ref 4–12)
NEUTS SEG # BLD: 7.8 K/UL (ref 1.7–8.2)
NEUTS SEG NFR BLD: 77 % (ref 43–78)
NRBC # BLD: 0 K/UL (ref 0–0.2)
PLATELET # BLD AUTO: 154 K/UL (ref 150–450)
PMV BLD AUTO: 10.4 FL (ref 9.4–12.3)
POTASSIUM SERPL-SCNC: 4 MMOL/L (ref 3.5–5.1)
PROT SERPL-MCNC: 6.4 G/DL (ref 6.3–8.2)
RBC # BLD AUTO: 3.67 M/UL (ref 4.05–5.2)
SODIUM SERPL-SCNC: 141 MMOL/L (ref 136–145)
WBC # BLD AUTO: 10.1 K/UL (ref 4.3–11.1)

## 2020-07-20 PROCEDURE — 94640 AIRWAY INHALATION TREATMENT: CPT

## 2020-07-20 PROCEDURE — 80053 COMPREHEN METABOLIC PANEL: CPT

## 2020-07-20 PROCEDURE — 82962 GLUCOSE BLOOD TEST: CPT

## 2020-07-20 PROCEDURE — 36592 COLLECT BLOOD FROM PICC: CPT

## 2020-07-20 PROCEDURE — 74011250637 HC RX REV CODE- 250/637: Performed by: INTERNAL MEDICINE

## 2020-07-20 PROCEDURE — 85025 COMPLETE CBC W/AUTO DIFF WBC: CPT

## 2020-07-20 PROCEDURE — 74011636637 HC RX REV CODE- 636/637: Performed by: INTERNAL MEDICINE

## 2020-07-20 RX ORDER — IBUPROFEN 600 MG/1
600 TABLET ORAL
Qty: 1 TAB | Refills: 0 | Status: SHIPPED
Start: 2020-07-20 | End: 2020-09-02 | Stop reason: ALTCHOICE

## 2020-07-20 RX ORDER — BUDESONIDE AND FORMOTEROL FUMARATE DIHYDRATE 160; 4.5 UG/1; UG/1
2 AEROSOL RESPIRATORY (INHALATION) 2 TIMES DAILY
Qty: 1 INHALER | Refills: 0 | Status: ON HOLD | OUTPATIENT
Start: 2020-07-20 | End: 2020-08-26 | Stop reason: SDUPTHER

## 2020-07-20 RX ORDER — METFORMIN HYDROCHLORIDE 500 MG/1
500 TABLET ORAL 2 TIMES DAILY WITH MEALS
Qty: 60 TAB | Refills: 0 | Status: ON HOLD | OUTPATIENT
Start: 2020-07-20 | End: 2020-08-23

## 2020-07-20 RX ORDER — PREDNISONE 20 MG/1
20 TABLET ORAL
Qty: 4 TAB | Refills: 0 | Status: SHIPPED | OUTPATIENT
Start: 2020-07-21 | End: 2020-07-25

## 2020-07-20 RX ADMIN — PREDNISONE 20 MG: 20 TABLET ORAL at 08:10

## 2020-07-20 RX ADMIN — BUDESONIDE AND FORMOTEROL FUMARATE DIHYDRATE 2 PUFF: 160; 4.5 AEROSOL RESPIRATORY (INHALATION) at 07:26

## 2020-07-20 NOTE — PROGRESS NOTES
TAMMY received from [facility]. Pt in stable condition. Call light in reach. Will continue to monitor. Droplet plus precautions in place, proper PPE available and in use.

## 2020-07-20 NOTE — DISCHARGE SUMMARY
Hospitalist Discharge Summary     Admit Date:  2020  1:01 PM   Name:  Bryn Issa   Age:  60 y.o.  :  1955   MRN:  [id number]   PCP:  None  Treatment Team: Attending Provider: Magdy Naranjo MD; Care Manager: Bessy Arita RN; Care Manager: Alicia Littlejohn Consulting Provider: Magdy Naranjo MD; Primary Nurse: Terell Odell; Occupational Therapist: Mari Alejandra OT    Problem List for this Hospitalization:  Hospital Problems as of 2020 Never Reviewed          Codes Class Noted - Resolved POA    Pneumonia due to COVID-19 virus ICD-10-CM: U07.1, J12.89  ICD-9-CM: 480.8  2020 - Present Unknown        Chronic obstructive pulmonary disease with acute exacerbation ([facility]  -- unspecified type ICD-10-CM: J44.1  ICD-9-CM: 491.21  2020 - Present Unknown        Elevated diaphragm ICD-10-CM: J98.6  ICD-9-CM: 519.4  2020 - Present Unknown        Suspected COVID-19 virus infection ICD-10-CM: Z20.828  ICD-9-CM: V01.79  2020 - Present         Acute respiratory failure with hypoxemia ([facility] ICD-10-CM: J96.01  ICD-9-CM: 518.81  2020 - Present Unknown        Sepsis ([facility] ICD-10-CM: A41.9  ICD-9-CM: 038.9, 995.91  2020 - Present Unknown        CAP (community acquired pneumonia) ICD-10-CM: J18.9  ICD-9-CM: 860  2018 - Present Yes        Leukocytosis ICD-10-CM: D72.829  ICD-9-CM: 288.60  2018 - Present Yes        * (Principal) Acute hypoxemic respiratory failure ([facility] ICD-10-CM: J96.01  ICD-9-CM: 518.81  2018 - Present Yes        Tobacco abuse ICD-10-CM: Z72.0  ICD-9-CM: 305.1  2018 - Present Yes                Admission HPI from 2020:    Edie Rhoades is a 60 y.o. female who was brought to ER due to being found with fever, diarrhea and feeling very sick.      Patient has several family members being sick with fever and diarrhea. Yesterday, patient started with nausea, vomiting and fever.      This morning, patient is somnolent and with fever.    EMS was called and checked her temperature showing 102.2 F with O2 saturation 82% on room air. Her skin was mottled.      She received Rocephin on the field and she was given IV fluid and brought to ER.      Hospitalist service is requested to admit the patient. \"    Hospital Course:  Patient admitted for acute respiratory failure with hypoxia with a positive COVID test. Patient was admitted initially to [facility] ICU on OptiFlow. She completed a course of dexamethasone. She was transfused convalescent plasma 7/2. She completed a course of Remdesevir. Patient's oxygen was gradually titrated down until she was tolerating high flow nasal cannula. She is on 4 to 6 L prior to discharge. Previous chest x-ray prior to discharge shows improvement and right-sided infiltrate wall progression of left-sided infiltrate. Patient denies any chest pain or shortness of breath prior to discharge. Hospital course complicated by rash that appeared on patient 7/17. Rash was of unclear etiology drug rash versus viral exanthem versus allergic reaction. Patient had her medications adjusted and was started on oral prednisone. No progression of rash noted prior to discharge. Patient denied any or lip swelling. She is to complete a 5-day course of oral prednisone. Patient was evaluated by physical therapy and found to be a candidate for acute rehab. Patient to be discharged to [facility]. She is in agreement with this plan. Patient seen and examined on day of discharge. All questions and concerns addressed. Discharge plan discussed in detail. No new complaints at time of discharge. After discharge from acute rehab patient should establish care with a primary care provider. Information for free clinic provided.     Disposition: Rehab Facility  Activity: Ambulate with assistance  Diet: DIET DIABETIC CONSISTENT CARB Regular; 2 GM NA (House Low NA)  Code Status: Full Code      Follow up instructions, discharge meds at bottom of this note. Plan was discussed with patient. All questions answered. Patient was stable at time of discharge. Patient will call a physician or return if any concerns. Diagnostic Imaging/Tests:   Xr Abd (ap And Erect Or Decub)    Result Date: 7/1/2020  ABDOMINAL SERIES 7/1/2020 HISTORY: Abnormal chest x-ray. Abdominal distention. FINDINGS: Supine and a lateral decubitus view the abdomen were obtained. Gas is scattered throughout the colon. Consolidation is present in the right lower lobe and the right hemidiaphragm is elevated. On the lateral decubitus view, there is no free intraperitoneal air. IMPRESSION: No free intraperitoneal air. Right lower lobe consolidation. Xr Chest Port    Result Date: 7/1/2020  PORTABLE CHEST 1 VIEW HISTORY: Hypoxia and respiratory distress. COMPARISON: 9/25/2018 FINDINGS: The right hemidiaphragm is elevated. Consolidation is present in the right lung base. There is likely very minimal early consolidation in the left lung base. There is no large pleural effusion. There is a lucency along the right hemidiaphragm that may be aerated lung, however, pneumoperitoneum could cause a similar appearance. Consider an additional lateral decubitus view of the abdomen for further assessment. IMPRESSION: 1. Consolidation in the right lung base. 2. Lucency along the right hemidiaphragm. Recommend a lateral decubitus view of the abdomen to assess for free intraperitoneal air. Findings were discussed with the physician caring for the patient in the emergency department. Echocardiogram results:  No results found for this visit on 07/01/20.     Procedures done this admission:  * No surgery found *    All Micro Results     Procedure Component Value Units Date/Time    CULTURE, BLOOD [[id number]] Collected:  07/01/20 1341    Order Status:  Completed Specimen:  Blood Updated:  07/06/20 0651     Special Requests: LEFT ANTECUBITAL        Culture result: NO GROWTH 5 DAYS CULTURE, BLOOD [[id number]] Collected:  07/01/20 1341    Order Status:  Completed Specimen:  Blood Updated:  07/06/20 0651     Special Requests: RIGHT ANTECUBITAL        Culture result: NO GROWTH 5 DAYS       CULTURE, URINE [[id number]] Collected:  07/01/20 1417    Order Status:  Completed Specimen:  Cath Urine Updated:  07/04/20 0830     Special Requests: NO SPECIAL REQUESTS        Culture result: NO GROWTH 2 DAYS       SARS-COV-2 [[id number]]  (Abnormal) Collected:  07/02/20 1000    Order Status:  Completed Specimen:  Nasopharyngeal Updated:  07/03/20 0812     COVID-19 Detected        Comment: PERFORMED AT [facility]       CULTURE, RESPIRATORY/SPUTUM/BRONCH Wilkeskarolina Rodriguez [[id number]] Collected:  07/02/20 1100    Order Status:  Canceled Specimen:  Sputum     COVID-19 RAPID TEST [[id number]]  (Abnormal) Collected:  07/01/20 1527    Order Status:  Completed Specimen:  Nasopharyngeal Updated:  07/01/20 1714     Specimen source SWAB        COVID-19 rapid test Detected        Comment:      The specimen is POSITIVE for SARS-CoV-2, the novel coronavirus associated with COVID-19. This test has been authorized by the FDA under an Emergency Use Authorization (EUA) for use by authorized laboratories.         Fact sheet for Healthcare Providers: ConventionUpdate.co.nz  Fact sheet for Patients: ConventionUpdate.co.nz       Methodology: Isothermal Nucleic Acid Amplification  RESULTS VERIFIED, PHONED TO AND READ BACK BY  DR Freida Rodriguez 1513  RESULTS VERIFIED, PHONED TO AND READ BACK BY  DR Freida Rodriguez 1513 07/01/20 VELASQUEZ         CULTURE, URINE [[id number]] Collected:  07/01/20 1545    Order Status:  Canceled Specimen:  Cath Urine           Labs: Results:       BMP, Mg, Phos Recent Labs     07/20/20  0357 07/18/20  0435    138   K 4.0 4.4    108*   CO2 28 24   AGAP 7 6*   BUN 28* 18   CREA 0.57* 0.43*   CA 8.3 7.9*   * 170*      CBC Recent Labs     07/20/20  0357 07/18/20  0435   WBC 10.1 11.9*   RBC 3.67* 3.80*   HGB 10.1* 10.6*   HCT 34.0* 34.0*    154   GRANS 77 85*   LYMPH 12* 6*   EOS 5 2   MONOS 5 5   BASOS 0 0   IG 1 2   ANEU 7.8 10.1*   ABL 1.2 0.7   ALFONZO 0.5 0.2   ABM 0.5 0.6   ABB 0.0 0.0   AIG 0.1 0.2      LFT Recent Labs     07/20/20  0357 07/18/20  0435   ALT 26 23   AP 70 69   TP 6.4 6.3   ALB 2.0* 1.6*   GLOB 4.4* 4.7*   AGRAT 0.5* 0.3*      Cardiac Testing No results found for: BNPP, BNP, CPK, RCK1, RCK2, RCK3, RCK4, CKMB, CKNDX, CKND1, TROPT, TROIQ   Coagulation Tests No results found for: PTP, INR, APTT, INREXT, INREXT   A1c Lab Results   Component Value Date/Time    Hemoglobin A1c 7.1 (H) 07/08/2020 03:32 AM      Lipid Panel No results found for: CHOL, CHOLPOCT, CHOLX, CHLST, CHOLV, 159453, HDL, HDLP, LDL, LDLC, DLDLP, 195916, VLDLC, VLDL, TGLX, TRIGL, TRIGP, TGLPOCT, CHHD, CHHDX   Thyroid Panel No results found for: TSH, T4, FT4, TT3, T3U, TSHEXT, TSHEXT     Most Recent UA Lab Results   Component Value Date/Time    WBC 10-20 09/26/2018 12:02 AM    RBC 3-5 09/26/2018 12:02 AM    Epithelial cells 0-3 09/26/2018 12:02 AM    Bacteria 4+ (H) 09/26/2018 12:02 AM    Casts HYALINE 09/26/2018 12:02 AM    Crystals, urine 0 09/26/2018 12:02 AM    Mucus 0 09/26/2018 12:02 AM        No Known Allergies  Immunization History   Administered Date(s) Administered    TB Skin Test (PPD) Intradermal 09/27/2018       All Labs from Last 24 Hrs:  Recent Results (from the past 24 hour(s))   GLUCOSE, POC    Collection Time: 07/19/20  4:09 PM   Result Value Ref Range    Glucose (POC) 95 65 - 100 mg/dL   GLUCOSE, POC    Collection Time: 07/19/20  8:22 PM   Result Value Ref Range    Glucose (POC) 124 (H) 65 - 100 mg/dL   CBC WITH AUTOMATED DIFF    Collection Time: 07/20/20  3:57 AM   Result Value Ref Range    WBC 10.1 4.3 - 11.1 K/uL    RBC 3.67 (L) 4.05 - 5.2 M/uL    HGB 10.1 (L) 11.7 - 15.4 g/dL    HCT 34.0 (L) 35.8 - 46.3 %    MCV 92.6 79.6 - 97.8 FL    MCH 27.5 26.1 - 32.9 PG    MCHC 29.7 (L) 31.4 - 35.0 g/dL    RDW 14.2 11.9 - 14.6 %    PLATELET 069 495 - 779 K/uL    MPV 10.4 9.4 - 12.3 FL    ABSOLUTE NRBC 0.00 0.0 - 0.2 K/uL    DF AUTOMATED      NEUTROPHILS 77 43 - 78 %    LYMPHOCYTES 12 (L) 13 - 44 %    MONOCYTES 5 4.0 - 12.0 %    EOSINOPHILS 5 0.5 - 7.8 %    BASOPHILS 0 0.0 - 2.0 %    IMMATURE GRANULOCYTES 1 0.0 - 5.0 %    ABS. NEUTROPHILS 7.8 1.7 - 8.2 K/UL    ABS. LYMPHOCYTES 1.2 0.5 - 4.6 K/UL    ABS. MONOCYTES 0.5 0.1 - 1.3 K/UL    ABS. EOSINOPHILS 0.5 0.0 - 0.8 K/UL    ABS. BASOPHILS 0.0 0.0 - 0.2 K/UL    ABS. IMM. GRANS. 0.1 0.0 - 0.5 K/UL   METABOLIC PANEL, COMPREHENSIVE    Collection Time: 07/20/20  3:57 AM   Result Value Ref Range    Sodium 141 136 - 145 mmol/L    Potassium 4.0 3.5 - 5.1 mmol/L    Chloride 106 98 - 107 mmol/L    CO2 28 21 - 32 mmol/L    Anion gap 7 7 - 16 mmol/L    Glucose 115 (H) 65 - 100 mg/dL    BUN 28 (H) 8 - 23 MG/DL    Creatinine 0.57 (L) 0.6 - 1.0 MG/DL    GFR est AA >60 >60 ml/min/1.73m2    GFR est non-AA >60 >60 ml/min/1.73m2    Calcium 8.3 8.3 - 10.4 MG/DL    Bilirubin, total 0.2 0.2 - 1.1 MG/DL    ALT (SGPT) 26 12 - 65 U/L    AST (SGOT) 13 (L) 15 - 37 U/L    Alk.  phosphatase 70 50 - 136 U/L    Protein, total 6.4 6.3 - 8.2 g/dL    Albumin 2.0 (L) 3.2 - 4.6 g/dL    Globulin 4.4 (H) 2.3 - 3.5 g/dL    A-G Ratio 0.5 (L) 1.2 - 3.5     GLUCOSE, POC    Collection Time: 07/20/20  5:38 AM   Result Value Ref Range    Glucose (POC) 101 (H) 65 - 100 mg/dL   GLUCOSE, POC    Collection Time: 07/20/20 11:31 AM   Result Value Ref Range    Glucose (POC) 127 (H) 65 - 100 mg/dL       Current Med List in Hospital:   Current Facility-Administered Medications   Medication Dose Route Frequency    ibuprofen (MOTRIN) tablet 600 mg  600 mg Oral Q6H PRN    predniSONE (DELTASONE) tablet 20 mg  20 mg Oral DAILY WITH BREAKFAST    LORazepam (ATIVAN) injection 1 mg  1 mg IntraVENous Q6H PRN    insulin glargine (LANTUS) injection 5 Units  5 Units SubCUTAneous QHS    hydrALAZINE (APRESOLINE) 20 mg/mL injection 20 mg  20 mg IntraVENous Q6H PRN    insulin lispro (HUMALOG) injection   SubCUTAneous AC&HS    NUTRITIONAL SUPPORT ELECTROLYTE PRN ORDERS   Does Not Apply PRN    budesonide-formoteroL (SYMBICORT) 160-4.5 mcg/actuation HFA inhaler 2 Puff  2 Puff Inhalation BID RT    albuterol (PROVENTIL HFA, VENTOLIN HFA, PROAIR HFA) inhaler 2 Puff  2 Puff Inhalation Q4H PRN    sodium chloride (NS) flush 20 mL  20 mL InterCATHeter Q8H    sodium chloride (NS) flush 20 mL  20 mL InterCATHeter PRN    diphenhydrAMINE (BENADRYL) injection 12.5 mg  12.5 mg IntraVENous Q4H PRN    bisacodyL (DULCOLAX) tablet 5 mg  5 mg Oral DAILY PRN    ondansetron (ZOFRAN) injection 4 mg  4 mg IntraVENous Q4H PRN    enoxaparin (LOVENOX) injection 40 mg  40 mg SubCUTAneous Q24H    albuterol-ipratropium (DUO-NEB) 2.5 MG-0.5 MG/3 ML  3 mL Nebulization Q4H PRN       Discharge Exam:  Patient Vitals for the past 24 hrs:   Temp Pulse Resp BP SpO2   07/20/20 1150 98.1 °F (36.7 °C) 80 21 116/60 93 %   07/20/20 0732 98.4 °F (36.9 °C) 89 19 114/67 94 %   07/20/20 0726     93 %   07/20/20 0722  89      07/20/20 0335 97.8 °F (36.6 °C) 94 20 112/61 92 %   07/19/20 2334 97.7 °F (36.5 °C) (!) 105 20 116/66 94 %   07/19/20 1935     92 %   07/19/20 1921 97.8 °F (36.6 °C) 91 20 106/70 90 %   07/19/20 1908  92      07/19/20 1840  98      07/19/20 1642  99      07/19/20 1526 97.8 °F (36.6 °C) 82 18 136/72 97 %     Oxygen Therapy  O2 Sat (%): 93 % (07/20/20 1150)  Pulse via Oximetry: 78 beats per minute (07/20/20 0726)  O2 Device: Nasal cannula (07/20/20 0845)  O2 Flow Rate (L/min): 4 l/min (07/20/20 0845)  O2 Temperature: 87.8 °F (31 °C) (07/14/20 0855)  FIO2 (%): 100 % (07/14/20 1209)  ETCO2 (mmHg): 90 mmHg (07/05/20 2301)    Estimated body mass index is 25.31 kg/m² as calculated from the following:    Height as of this encounter: 5' 3\" (1.6 m).     Weight as of this encounter: 64.8 kg (142 lb 13.7 oz).      Intake/Output Summary (Last 24 hours) at 7/20/2020 1226  Last data filed at 7/20/2020 0943  Gross per 24 hour   Intake 1140 ml   Output 500 ml   Net 640 ml       *Note that automatically entered I/Os may not be accurate; dependent on patient compliance with collection and accurate  by assistants. General:    Female no acute distress  Eyes:   Normal sclerae. Extraocular movements intact. ENT:  Normocephalic, atraumatic. Moist mucous membranes  CV:   Regular rate and rhythm. No murmur, rub, or gallop. Lungs:  Clear to auscultation bilaterally. No wheezing, rhonchi, or rales. Abdomen: Soft, nontender, nondistended. Bowel sounds normal.   Extremities: Warm and dry. No cyanosis or edema. Neurologic: No gross focal deficits  Skin:     Maculopapular rash on bilateral upper extremities, chest wall, and back  Psych:  Normal mood and affect. Discharge Info:   Current Discharge Medication List      START taking these medications    Details   budesonide-formoteroL (SYMBICORT) 160-4.5 mcg/actuation HFAA Take 2 Puffs by inhalation two (2) times a day. Qty: 1 Inhaler, Refills: 0      ibuprofen (MOTRIN) 600 mg tablet Take 1 Tab by mouth every six (6) hours as needed for Pain. Qty: 1 Tab, Refills: 0      metFORMIN (GLUCOPHAGE) 500 mg tablet Take 1 Tab by mouth two (2) times daily (with meals). Qty: 60 Tab, Refills: 0      predniSONE (DELTASONE) 20 mg tablet Take 20 mg by mouth daily (with breakfast) for 4 days. Qty: 4 Tab, Refills: 0         CONTINUE these medications which have NOT CHANGED    Details   albuterol (PROAIR HFA) 90 mcg/actuation inhaler Take 2 Puffs by inhalation every four (4) hours as needed for Wheezing.   Qty: 1 Inhaler, Refills: 0         STOP taking these medications       albuterol-ipratropium (DUO-NEB) 2.5 mg-0.5 mg/3 ml nebu Comments:   Reason for Stopping:         azithromycin (ZITHROMAX) 500 mg tab Comments:   Reason for Stopping:         Nebulizer & Compressor machine Comments:   Reason for Stopping: Follow Up Orders: Follow-up Appointments   Procedures    FOLLOW UP VISIT Appointment in: One Week Follow-up with PCP within 1 week     Follow-up with PCP within 1 week     Standing Status:   Standing     Number of Occurrences:   1     Order Specific Question:   Appointment in     Answer: One Week       Follow-up Information     Follow up With Specialties Details Why Contact Info    None    None ([phone]) Patient stated that they have no PCP      [address] [place] [address]  In 1 week [facility] and hospital follow-up [place] [address]  [phone]          Time spent in patient discharge planning and coordination 35 minutes.     Signed:  Thurman Angelucci, MD

## 2020-07-20 NOTE — PROGRESS NOTES
Room 408 ready for patient.  CNA notified to remove telemetry and transport patient to [facility].

## 2020-07-20 NOTE — PROGRESS NOTES
Quiet shift. No distress on 6L via HF. Rash improving per patient. SQBS 101. Instructed pt to call should needs arise. Pt voiced understanding. Call light within reach. Will update oncoming nurse.

## 2020-07-20 NOTE — PROGRESS NOTES
Spoke to [facility], update given. Will be contacted later by RN for SBAR handoff.

## 2020-07-20 NOTE — PROGRESS NOTES
Charisma ortizison, Jessy Jordan, [facility] patient approved for admission today. Hospitalist and RN Cherrie Alexandra notified of [employer]. Patient notified via telephone and in agreement with this plan. Patient to DC to [facility] today. Care Management Interventions  Mode of Transport at Discharge:  Other (see comment)  Transition of Care Consult (CM Consult): Discharge Planning(Not consulted)  Physical Therapy Consult: Yes  Occupational Therapy Consult: Yes  Speech Therapy Consult: No  Discharge Location  Discharge Placement: [address] [place] [address] ([facility])

## 2020-07-20 NOTE — PROGRESS NOTES
[facility]. Patient in stable condition with resps even/unlabored. NAD noted. Patient on high flow nasal cannula. Call light within reach, patient encouraged to call nurse prn assist. Will continue to monitor per policy. Droplet plus precautions intact. Appropriate PPE available and in use.

## 2020-07-20 NOTE — PROGRESS NOTES
TRANSFER - OUT REPORT:    Verbal report given to [place] on Jorge Changn  being transferred to [facility] for routine progression of care       Report consisted of patients Situation, Background, Assessment and   Recommendations(SBAR). Information from the following report(s) SBAR, Kardex, ED Summary, [facility] and Recent Results was reviewed with the receiving nurse. Lines:   PICC Double Lumen 59/36/71 Right;Basilic (Active)   Central Line Being Utilized Yes 07/20/20 0845   Criteria for Appropriate Use Limited/no vessel suitable for conventional peripheral access 07/20/20 0845   Site Assessment Ecchymotic (bruised); Clean, dry, & intact 07/20/20 0845   Phlebitis Assessment 0 07/20/20 0845   Infiltration Assessment 0 07/20/20 0845   Arm Circumference (cm) 29 cm 07/13/20 1625   Date of Last Dressing Change 07/16/20 07/19/20 0326   Dressing Status Clean, dry, & intact 07/20/20 0845   Action Taken Blood drawn 07/20/20 0442   External Catheter Length (cm) 1 centimeters 07/14/20 0701   Dressing Type Disk with Chlorhexadine gluconate (CHG); Transparent 07/20/20 0845   Hub Color/Line Status Patent 07/20/20 0845   Positive Blood Return (Site #1) Yes 07/20/20 0845   Hub Color/Line Status Patent 07/20/20 0845   Positive Blood Return (Site #2) Yes 07/20/20 0845   Alcohol Cap Used No 07/19/20 1521        Opportunity for questions and clarification was provided.       Patient transported with:   O2 @ 5 liters

## 2020-07-21 ENCOUNTER — PATIENT OUTREACH (OUTPATIENT)
Dept: CASE MANAGEMENT | Age: 65
End: 2020-07-21

## 2020-07-22 PROCEDURE — 87635 SARS-COV-2 COVID-19 AMP PRB: CPT

## 2020-07-24 LAB
SARS COV-2, XPGCVT: NEGATIVE
SOURCE, COVRS: NORMAL

## 2020-07-25 ENCOUNTER — APPOINTMENT (OUTPATIENT)
Dept: GENERAL RADIOLOGY | Age: 65
End: 2020-07-25
Attending: INTERNAL MEDICINE

## 2020-07-25 LAB
AMPHET UR QL SCN: NEGATIVE
BARBITURATES UR QL SCN: NEGATIVE
BENZODIAZ UR QL: NEGATIVE
CANNABINOIDS UR QL SCN: NEGATIVE
COCAINE UR QL SCN: NEGATIVE
METHADONE UR QL: NEGATIVE
OPIATES UR QL: NEGATIVE
PCP UR QL: NEGATIVE

## 2020-07-25 PROCEDURE — 74018 RADEX ABDOMEN 1 VIEW: CPT

## 2020-07-25 PROCEDURE — 80307 DRUG TEST PRSMV CHEM ANLYZR: CPT

## 2020-07-27 LAB
ALBUMIN SERPL-MCNC: 2.6 G/DL (ref 3.2–4.6)
ALBUMIN/GLOB SERPL: 0.6 {RATIO} (ref 1.2–3.5)
ALP SERPL-CCNC: 75 U/L (ref 50–136)
ALT SERPL-CCNC: 24 U/L (ref 12–65)
ANION GAP SERPL CALC-SCNC: 7 MMOL/L (ref 7–16)
AST SERPL-CCNC: 16 U/L (ref 15–37)
BASOPHILS # BLD: 0.1 K/UL (ref 0–0.2)
BASOPHILS NFR BLD: 1 % (ref 0–2)
BILIRUB SERPL-MCNC: 0.5 MG/DL (ref 0.2–1.1)
BUN SERPL-MCNC: 15 MG/DL (ref 8–23)
CALCIUM SERPL-MCNC: 8.6 MG/DL (ref 8.3–10.4)
CHLORIDE SERPL-SCNC: 99 MMOL/L (ref 98–107)
CO2 SERPL-SCNC: 27 MMOL/L (ref 21–32)
CREAT SERPL-MCNC: 0.59 MG/DL (ref 0.6–1)
DIFFERENTIAL METHOD BLD: ABNORMAL
EOSINOPHIL # BLD: 1.2 K/UL (ref 0–0.8)
EOSINOPHIL NFR BLD: 14 % (ref 0.5–7.8)
ERYTHROCYTE [DISTWIDTH] IN BLOOD BY AUTOMATED COUNT: 14.6 % (ref 11.9–14.6)
GLOBULIN SER CALC-MCNC: 4.5 G/DL (ref 2.3–3.5)
GLUCOSE SERPL-MCNC: 121 MG/DL (ref 65–100)
HCT VFR BLD AUTO: 33.5 % (ref 35.8–46.3)
HGB BLD-MCNC: 10.5 G/DL (ref 11.7–15.4)
IMM GRANULOCYTES # BLD AUTO: 0.3 K/UL (ref 0–0.5)
IMM GRANULOCYTES NFR BLD AUTO: 4 % (ref 0–5)
LYMPHOCYTES # BLD: 1.8 K/UL (ref 0.5–4.6)
LYMPHOCYTES NFR BLD: 21 % (ref 13–44)
MCH RBC QN AUTO: 27.9 PG (ref 26.1–32.9)
MCHC RBC AUTO-ENTMCNC: 31.3 G/DL (ref 31.4–35)
MCV RBC AUTO: 89.1 FL (ref 79.6–97.8)
MONOCYTES # BLD: 1.2 K/UL (ref 0.1–1.3)
MONOCYTES NFR BLD: 14 % (ref 4–12)
NEUTS SEG # BLD: 4 K/UL (ref 1.7–8.2)
NEUTS SEG NFR BLD: 46 % (ref 43–78)
NRBC # BLD: 0 K/UL (ref 0–0.2)
PLATELET # BLD AUTO: 357 K/UL (ref 150–450)
PMV BLD AUTO: 9.9 FL (ref 9.4–12.3)
POTASSIUM SERPL-SCNC: 4.1 MMOL/L (ref 3.5–5.1)
PROT SERPL-MCNC: 7.1 G/DL (ref 6.3–8.2)
RBC # BLD AUTO: 3.76 M/UL (ref 4.05–5.2)
SODIUM SERPL-SCNC: 133 MMOL/L (ref 136–145)
WBC # BLD AUTO: 8.6 K/UL (ref 4.3–11.1)

## 2020-07-27 PROCEDURE — 80053 COMPREHEN METABOLIC PANEL: CPT

## 2020-07-27 PROCEDURE — 85025 COMPLETE CBC W/AUTO DIFF WBC: CPT

## 2020-07-27 PROCEDURE — 36415 COLL VENOUS BLD VENIPUNCTURE: CPT

## 2020-07-28 LAB
APPEARANCE UR: ABNORMAL
BACTERIA URNS QL MICRO: 0 /HPF
BILIRUB UR QL: NEGATIVE
CASTS URNS QL MICRO: ABNORMAL /LPF
COLOR UR: YELLOW
D DIMER PPP FEU-MCNC: 1.67 UG/ML(FEU)
EPI CELLS #/AREA URNS HPF: ABNORMAL /HPF
GLUCOSE UR STRIP.AUTO-MCNC: NEGATIVE MG/DL
HGB UR QL STRIP: ABNORMAL
KETONES UR QL STRIP.AUTO: NEGATIVE MG/DL
LEUKOCYTE ESTERASE UR QL STRIP.AUTO: NEGATIVE
NITRITE UR QL STRIP.AUTO: NEGATIVE
PH UR STRIP: 5 [PH] (ref 5–9)
PROT UR STRIP-MCNC: 30 MG/DL
RBC #/AREA URNS HPF: ABNORMAL /HPF
SP GR UR REFRACTOMETRY: 1.02 (ref 1–1.02)
UROBILINOGEN UR QL STRIP.AUTO: 0.2 EU/DL (ref 0.2–1)
WBC URNS QL MICRO: ABNORMAL /HPF

## 2020-07-28 PROCEDURE — 36415 COLL VENOUS BLD VENIPUNCTURE: CPT

## 2020-07-28 PROCEDURE — 85379 FIBRIN DEGRADATION QUANT: CPT

## 2020-07-28 PROCEDURE — 81001 URINALYSIS AUTO W/SCOPE: CPT

## 2020-08-05 ENCOUNTER — HOSPITAL ENCOUNTER (INPATIENT)
Age: 65
LOS: 7 days | Discharge: HOME HEALTH | DRG: 871 | End: 2020-08-13
Attending: EMERGENCY MEDICINE | Admitting: INTERNAL MEDICINE
Payer: MEDICARE

## 2020-08-05 DIAGNOSIS — J18.9 PNEUMONIA OF RIGHT MIDDLE LOBE DUE TO INFECTIOUS ORGANISM: ICD-10-CM

## 2020-08-05 DIAGNOSIS — J96.01 ACUTE HYPOXEMIC RESPIRATORY FAILURE (HCC): ICD-10-CM

## 2020-08-05 DIAGNOSIS — A41.9 SEPSIS, DUE TO UNSPECIFIED ORGANISM, UNSPECIFIED WHETHER ACUTE ORGAN DYSFUNCTION PRESENT (HCC): Primary | ICD-10-CM
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
## 2020-08-05 DIAGNOSIS — J18.9 HAP (HOSPITAL-ACQUIRED PNEUMONIA): ICD-10-CM

## 2020-08-05 DIAGNOSIS — J44.1 COPD EXACERBATION (HCC): ICD-10-CM

## 2020-08-05 DIAGNOSIS — J96.11 CHRONIC RESPIRATORY FAILURE WITH HYPOXIA (HCC): ICD-10-CM

## 2020-08-05 DIAGNOSIS — F41.9 ANXIETY: ICD-10-CM

## 2020-08-05 DIAGNOSIS — J69.0 ASPIRATION PNEUMONIA OF RIGHT UPPER LOBE DUE TO GASTRIC SECRETIONS (HCC): ICD-10-CM

## 2020-08-05 DIAGNOSIS — J98.6 ELEVATED DIAPHRAGM: ICD-10-CM

## 2020-08-05 PROCEDURE — 96372 THER/PROPH/DIAG INJ SC/IM: CPT

## 2020-08-05 PROCEDURE — 96367 TX/PROPH/DG ADDL SEQ IV INF: CPT

## 2020-08-05 PROCEDURE — 96365 THER/PROPH/DIAG IV INF INIT: CPT

## 2020-08-05 PROCEDURE — 96366 THER/PROPH/DIAG IV INF ADDON: CPT

## 2020-08-05 PROCEDURE — 99285 EMERGENCY DEPT VISIT HI MDM: CPT

## 2020-08-05 PROCEDURE — 96375 TX/PRO/DX INJ NEW DRUG ADDON: CPT

## 2020-08-05 RX ORDER — DIPHENHYDRAMINE HYDROCHLORIDE 50 MG/ML
25 INJECTION, SOLUTION INTRAMUSCULAR; INTRAVENOUS
Status: COMPLETED | OUTPATIENT
Start: 2020-08-05 | End: 2020-08-06

## 2020-08-05 RX ORDER — HALOPERIDOL 5 MG/ML
10 INJECTION INTRAMUSCULAR
Status: COMPLETED | OUTPATIENT
Start: 2020-08-05 | End: 2020-08-06

## 2020-08-06 ENCOUNTER — APPOINTMENT (OUTPATIENT)
Dept: CT IMAGING | Age: 65
DRG: 871 | End: 2020-08-06
Payer: MEDICARE

## 2020-08-06 ENCOUNTER — APPOINTMENT (OUTPATIENT)
Dept: GENERAL RADIOLOGY | Age: 65
DRG: 871 | End: 2020-08-06
Payer: MEDICARE

## 2020-08-06 PROBLEM — J18.9 HAP (HOSPITAL-ACQUIRED PNEUMONIA): Status: ACTIVE | Noted: 2020-08-06

## 2020-08-06 PROBLEM — R74.01 ELEVATED ALT MEASUREMENT: Status: ACTIVE | Noted: 2020-08-06

## 2020-08-06 PROBLEM — R79.89 ELEVATED LACTIC ACID LEVEL: Status: ACTIVE | Noted: 2020-08-06
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
## 2020-08-06 PROBLEM — E83.42 HYPOMAGNESEMIA: Status: ACTIVE | Noted: 2020-08-06

## 2020-08-06 LAB
ALBUMIN SERPL-MCNC: 2.8 G/DL (ref 3.2–4.6)
ALBUMIN/GLOB SERPL: 0.7 {RATIO} (ref 1.2–3.5)
ALP SERPL-CCNC: 199 U/L (ref 50–136)
ALT SERPL-CCNC: 1247 U/L (ref 12–65)
AMPHET UR QL SCN: POSITIVE
ANION GAP SERPL CALC-SCNC: 10 MMOL/L (ref 7–16)
APPEARANCE UR: ABNORMAL
ARTERIAL PATENCY WRIST A: YES
ARTERIAL PATENCY WRIST A: YES
AST SERPL-CCNC: 288 U/L (ref 15–37)
ATRIAL RATE: 127 BPM
BACTERIA URNS QL MICRO: ABNORMAL /HPF
BARBITURATES UR QL SCN: NEGATIVE
BASE DEFICIT BLD-SCNC: 1 MMOL/L
BASE DEFICIT BLD-SCNC: 5 MMOL/L
BASOPHILS # BLD: 0.3 K/UL (ref 0–0.2)
BASOPHILS NFR BLD: 1 % (ref 0–2)
BDY SITE: ABNORMAL
BDY SITE: ABNORMAL
BENZODIAZ UR QL: NEGATIVE
BILIRUB SERPL-MCNC: 1.2 MG/DL (ref 0.2–1.1)
BILIRUB UR QL: ABNORMAL
BUN SERPL-MCNC: 21 MG/DL (ref 8–23)
CA-I BLD-MCNC: 1.12 MMOL/L (ref 1.12–1.32)
CALCIUM SERPL-MCNC: 8 MG/DL (ref 8.3–10.4)
CALCULATED P AXIS, ECG09: 88 DEGREES
CALCULATED R AXIS, ECG10: -38 DEGREES
CALCULATED T AXIS, ECG11: 99 DEGREES
CANNABINOIDS UR QL SCN: NEGATIVE
CASTS URNS QL MICRO: ABNORMAL /LPF
CHLORIDE SERPL-SCNC: 100 MMOL/L (ref 98–107)
CK MB CFR SERPL CALC: 1.9 %
CK MB SERPL-MCNC: 20.1 NG/ML (ref 0.5–3.6)
CK SERPL-CCNC: 1079 U/L (ref 21–215)
CO2 BLD-SCNC: 21 MMOL/L
CO2 SERPL-SCNC: 24 MMOL/L (ref 21–32)
COCAINE UR QL SCN: NEGATIVE
COLLECT TIME,HTIME: 10
COLLECT TIME,HTIME: 315
COLOR UR: ABNORMAL
CREAT SERPL-MCNC: 0.84 MG/DL (ref 0.6–1)
D DIMER PPP FEU-MCNC: 19.69 UG/ML(FEU)
DIAGNOSIS, 93000: NORMAL
DIFFERENTIAL METHOD BLD: ABNORMAL
EOSINOPHIL # BLD: 0.3 K/UL (ref 0–0.8)
EOSINOPHIL NFR BLD: 1 % (ref 0.5–7.8)
EPI CELLS #/AREA URNS HPF: ABNORMAL /HPF
ERYTHROCYTE [DISTWIDTH] IN BLOOD BY AUTOMATED COUNT: 18.1 % (ref 11.9–14.6)
FLOW RATE ISTAT,IFRATE: 10 L/MIN
FLOW RATE ISTAT,IFRATE: 9 L/MIN
GAS FLOW.O2 O2 DELIVERY SYS: ABNORMAL L/MIN
GAS FLOW.O2 O2 DELIVERY SYS: ABNORMAL L/MIN
GLOBULIN SER CALC-MCNC: 4 G/DL (ref 2.3–3.5)
GLUCOSE BLD STRIP.AUTO-MCNC: 153 MG/DL (ref 65–100)
GLUCOSE BLD STRIP.AUTO-MCNC: 156 MG/DL (ref 65–100)
GLUCOSE BLD STRIP.AUTO-MCNC: 157 MG/DL (ref 65–100)
GLUCOSE BLD STRIP.AUTO-MCNC: 264 MG/DL (ref 65–100)
GLUCOSE BLD STRIP.AUTO-MCNC: 330 MG/DL (ref 65–100)
GLUCOSE SERPL-MCNC: 143 MG/DL (ref 65–100)
GLUCOSE UR STRIP.AUTO-MCNC: NEGATIVE MG/DL
HCO3 BLD-SCNC: 19.7 MMOL/L (ref 22–26)
HCO3 BLD-SCNC: 23 MMOL/L (ref 22–26)
HCT VFR BLD AUTO: 35 % (ref 35.8–46.3)
HGB BLD-MCNC: 10.6 G/DL (ref 11.7–15.4)
HGB UR QL STRIP: ABNORMAL
IMM GRANULOCYTES # BLD AUTO: 1.6 K/UL (ref 0–0.5)
IMM GRANULOCYTES NFR BLD AUTO: 5 % (ref 0–5)
KETONES UR QL STRIP.AUTO: ABNORMAL MG/DL
LACTATE SERPL-SCNC: 1.3 MMOL/L (ref 0.4–2)
LACTATE SERPL-SCNC: 2.4 MMOL/L (ref 0.4–2)
LEUKOCYTE ESTERASE UR QL STRIP.AUTO: NEGATIVE
LYMPHOCYTES # BLD: 5 K/UL (ref 0.5–4.6)
LYMPHOCYTES NFR BLD: 16 % (ref 13–44)
MAGNESIUM SERPL-MCNC: 1.7 MG/DL (ref 1.8–2.4)
MCH RBC QN AUTO: 27.5 PG (ref 26.1–32.9)
MCHC RBC AUTO-ENTMCNC: 30.3 G/DL (ref 31.4–35)
MCV RBC AUTO: 90.7 FL (ref 79.6–97.8)
METHADONE UR QL: NEGATIVE
MONOCYTES # BLD: 3.1 K/UL (ref 0.1–1.3)
MONOCYTES NFR BLD: 10 % (ref 4–12)
NEUTS SEG # BLD: 21.1 K/UL (ref 1.7–8.2)
NEUTS SEG NFR BLD: 67 % (ref 43–78)
NITRITE UR QL STRIP.AUTO: NEGATIVE
NRBC # BLD: 0.96 K/UL (ref 0–0.2)
OPIATES UR QL: NEGATIVE
P-R INTERVAL, ECG05: 148 MS
PCO2 BLD: 33.8 MMHG (ref 35–45)
PCO2 BLD: 35.4 MMHG (ref 35–45)
PCP UR QL: NEGATIVE
PH BLD: 7.35 [PH] (ref 7.35–7.45)
PH BLD: 7.44 [PH] (ref 7.35–7.45)
PH UR STRIP: 5.5 [PH] (ref 5–9)
PLATELET # BLD AUTO: 303 K/UL (ref 150–450)
PLATELET COMMENTS,PCOM: ADEQUATE
PMV BLD AUTO: 10.3 FL (ref 9.4–12.3)
PO2 BLD: 105 MMHG (ref 75–100)
PO2 BLD: 72 MMHG (ref 75–100)
POTASSIUM BLD-SCNC: 3.9 MMOL/L (ref 3.5–5.1)
POTASSIUM SERPL-SCNC: 4.2 MMOL/L (ref 3.5–5.1)
PROCALCITONIN SERPL-MCNC: 0.54 NG/ML
PROT SERPL-MCNC: 6.8 G/DL (ref 6.3–8.2)
PROT UR STRIP-MCNC: 100 MG/DL
Q-T INTERVAL, ECG07: 288 MS
QRS DURATION, ECG06: 90 MS
QTC CALCULATION (BEZET), ECG08: 418 MS
RBC # BLD AUTO: 3.86 M/UL (ref 4.05–5.2)
RBC #/AREA URNS HPF: ABNORMAL /HPF
RBC MORPH BLD: ABNORMAL
RBC MORPH BLD: ABNORMAL
SAO2 % BLD: 95 % (ref 95–98)
SAO2 % BLD: 98 % (ref 95–98)
SERVICE CMNT-IMP: ABNORMAL
SODIUM BLD-SCNC: 133 MMOL/L (ref 136–145)
SODIUM SERPL-SCNC: 134 MMOL/L (ref 136–145)
SP GR UR REFRACTOMETRY: 1.02 (ref 1–1.02)
SPECIMEN TYPE: ABNORMAL
SPECIMEN TYPE: ABNORMAL
TROPONIN-HIGH SENSITIVITY: 18.9 PG/ML (ref 0–14)
UROBILINOGEN UR QL STRIP.AUTO: 1 EU/DL (ref 0.2–1)
VENTRICULAR RATE, ECG03: 127 BPM
WBC # BLD AUTO: 31.4 K/UL (ref 4.3–11.1)
WBC MORPH BLD: ABNORMAL
WBC URNS QL MICRO: ABNORMAL /HPF

## 2020-08-06 PROCEDURE — 74011250636 HC RX REV CODE- 250/636

## 2020-08-06 PROCEDURE — 87635 SARS-COV-2 COVID-19 AMP PRB: CPT

## 2020-08-06 PROCEDURE — 71045 X-RAY EXAM CHEST 1 VIEW: CPT

## 2020-08-06 PROCEDURE — 82550 ASSAY OF CK (CPK): CPT

## 2020-08-06 PROCEDURE — 85379 FIBRIN DEGRADATION QUANT: CPT

## 2020-08-06 PROCEDURE — 87040 BLOOD CULTURE FOR BACTERIA: CPT

## 2020-08-06 PROCEDURE — 97530 THERAPEUTIC ACTIVITIES: CPT

## 2020-08-06 PROCEDURE — 74011000258 HC RX REV CODE- 258: Performed by: INTERNAL MEDICINE

## 2020-08-06 PROCEDURE — 84145 PROCALCITONIN (PCT): CPT

## 2020-08-06 PROCEDURE — 93005 ELECTROCARDIOGRAM TRACING: CPT

## 2020-08-06 PROCEDURE — 83605 ASSAY OF LACTIC ACID: CPT

## 2020-08-06 PROCEDURE — 81001 URINALYSIS AUTO W/SCOPE: CPT

## 2020-08-06 PROCEDURE — 65270000029 HC RM PRIVATE

## 2020-08-06 PROCEDURE — 74011250637 HC RX REV CODE- 250/637

## 2020-08-06 PROCEDURE — 97165 OT EVAL LOW COMPLEX 30 MIN: CPT

## 2020-08-06 PROCEDURE — 82962 GLUCOSE BLOOD TEST: CPT

## 2020-08-06 PROCEDURE — 94760 N-INVAS EAR/PLS OXIMETRY 1: CPT

## 2020-08-06 PROCEDURE — 71260 CT THORAX DX C+: CPT

## 2020-08-06 PROCEDURE — 80053 COMPREHEN METABOLIC PANEL: CPT

## 2020-08-06 PROCEDURE — 77010033711 HC HIGH FLOW OXYGEN

## 2020-08-06 PROCEDURE — 85025 COMPLETE CBC W/AUTO DIFF WBC: CPT

## 2020-08-06 PROCEDURE — 80307 DRUG TEST PRSMV CHEM ANLYZR: CPT

## 2020-08-06 PROCEDURE — 74011000258 HC RX REV CODE- 258

## 2020-08-06 PROCEDURE — 74011250637 HC RX REV CODE- 250/637: Performed by: INTERNAL MEDICINE

## 2020-08-06 PROCEDURE — 36415 COLL VENOUS BLD VENIPUNCTURE: CPT

## 2020-08-06 PROCEDURE — 74011250636 HC RX REV CODE- 250/636: Performed by: INTERNAL MEDICINE

## 2020-08-06 PROCEDURE — 84484 ASSAY OF TROPONIN QUANT: CPT

## 2020-08-06 PROCEDURE — 74011636637 HC RX REV CODE- 636/637: Performed by: INTERNAL MEDICINE

## 2020-08-06 PROCEDURE — 74011636320 HC RX REV CODE- 636/320

## 2020-08-06 PROCEDURE — 97110 THERAPEUTIC EXERCISES: CPT

## 2020-08-06 PROCEDURE — 83735 ASSAY OF MAGNESIUM: CPT

## 2020-08-06 PROCEDURE — 97161 PT EVAL LOW COMPLEX 20 MIN: CPT

## 2020-08-06 PROCEDURE — 74011000250 HC RX REV CODE- 250

## 2020-08-06 PROCEDURE — 94640 AIRWAY INHALATION TREATMENT: CPT

## 2020-08-06 PROCEDURE — 36600 WITHDRAWAL OF ARTERIAL BLOOD: CPT

## 2020-08-06 PROCEDURE — 82947 ASSAY GLUCOSE BLOOD QUANT: CPT

## 2020-08-06 PROCEDURE — 82803 BLOOD GASES ANY COMBINATION: CPT

## 2020-08-06 RX ORDER — NOREPINEPHRINE BITARTRATE/D5W 4MG/250ML
.5-16 PLASTIC BAG, INJECTION (ML) INTRAVENOUS
Status: DISCONTINUED | OUTPATIENT
Start: 2020-08-06 | End: 2020-08-06

## 2020-08-06 RX ORDER — POLYETHYLENE GLYCOL 3350 17 G/17G
17 POWDER, FOR SOLUTION ORAL DAILY PRN
Status: DISCONTINUED | OUTPATIENT
Start: 2020-08-06 | End: 2020-08-13 | Stop reason: HOSPADM

## 2020-08-06 RX ORDER — SODIUM CHLORIDE 0.9 % (FLUSH) 0.9 %
5-40 SYRINGE (ML) INJECTION AS NEEDED
Status: DISCONTINUED | OUTPATIENT
Start: 2020-08-06 | End: 2020-08-13 | Stop reason: HOSPADM

## 2020-08-06 RX ORDER — MAGNESIUM SULFATE HEPTAHYDRATE 40 MG/ML
2 INJECTION, SOLUTION INTRAVENOUS ONCE
Status: COMPLETED | OUTPATIENT
Start: 2020-08-06 | End: 2020-08-06

## 2020-08-06 RX ORDER — VANCOMYCIN 1.75 GRAM/500 ML IN 0.9 % SODIUM CHLORIDE INTRAVENOUS
1750 ONCE
Status: DISCONTINUED | OUTPATIENT
Start: 2020-08-06 | End: 2020-08-06

## 2020-08-06 RX ORDER — ACETAMINOPHEN 325 MG/1
650 TABLET ORAL
Status: DISCONTINUED | OUTPATIENT
Start: 2020-08-06 | End: 2020-08-13 | Stop reason: HOSPADM

## 2020-08-06 RX ORDER — HEPARIN SODIUM 5000 [USP'U]/ML
5000 INJECTION, SOLUTION INTRAVENOUS; SUBCUTANEOUS ONCE
Status: COMPLETED | OUTPATIENT
Start: 2020-08-06 | End: 2020-08-06

## 2020-08-06 RX ORDER — ONDANSETRON 2 MG/ML
4 INJECTION INTRAMUSCULAR; INTRAVENOUS
Status: DISCONTINUED | OUTPATIENT
Start: 2020-08-06 | End: 2020-08-13 | Stop reason: HOSPADM

## 2020-08-06 RX ORDER — SODIUM CHLORIDE 0.9 % (FLUSH) 0.9 %
10 SYRINGE (ML) INJECTION
Status: COMPLETED | OUTPATIENT
Start: 2020-08-06 | End: 2020-08-06

## 2020-08-06 RX ORDER — SODIUM CHLORIDE 0.9 % (FLUSH) 0.9 %
5-10 SYRINGE (ML) INJECTION AS NEEDED
Status: DISCONTINUED | OUTPATIENT
Start: 2020-08-06 | End: 2020-08-13 | Stop reason: HOSPADM

## 2020-08-06 RX ORDER — ACETAMINOPHEN 650 MG/1
650 SUPPOSITORY RECTAL
Status: COMPLETED | OUTPATIENT
Start: 2020-08-06 | End: 2020-08-06

## 2020-08-06 RX ORDER — VANCOMYCIN HYDROCHLORIDE
1250 ONCE
Status: COMPLETED | OUTPATIENT
Start: 2020-08-06 | End: 2020-08-06

## 2020-08-06 RX ORDER — SODIUM CHLORIDE 0.9 % (FLUSH) 0.9 %
5-40 SYRINGE (ML) INJECTION EVERY 8 HOURS
Status: DISCONTINUED | OUTPATIENT
Start: 2020-08-06 | End: 2020-08-13 | Stop reason: HOSPADM

## 2020-08-06 RX ORDER — PROMETHAZINE HYDROCHLORIDE 25 MG/1
12.5 TABLET ORAL
Status: DISCONTINUED | OUTPATIENT
Start: 2020-08-06 | End: 2020-08-13 | Stop reason: HOSPADM

## 2020-08-06 RX ORDER — SODIUM CHLORIDE 9 MG/ML
1000 INJECTION, SOLUTION INTRAVENOUS ONCE
Status: COMPLETED | OUTPATIENT
Start: 2020-08-06 | End: 2020-08-06

## 2020-08-06 RX ORDER — ALBUTEROL SULFATE 90 UG/1
2 AEROSOL, METERED RESPIRATORY (INHALATION)
Status: DISCONTINUED | OUTPATIENT
Start: 2020-08-06 | End: 2020-08-07

## 2020-08-06 RX ORDER — ACETAMINOPHEN 650 MG/1
650 SUPPOSITORY RECTAL
Status: DISCONTINUED | OUTPATIENT
Start: 2020-08-06 | End: 2020-08-13 | Stop reason: HOSPADM

## 2020-08-06 RX ORDER — PANTOPRAZOLE SODIUM 40 MG/1
40 TABLET, DELAYED RELEASE ORAL
Status: DISCONTINUED | OUTPATIENT
Start: 2020-08-06 | End: 2020-08-11

## 2020-08-06 RX ORDER — ENOXAPARIN SODIUM 100 MG/ML
40 INJECTION SUBCUTANEOUS DAILY
Status: DISCONTINUED | OUTPATIENT
Start: 2020-08-06 | End: 2020-08-13 | Stop reason: HOSPADM

## 2020-08-06 RX ORDER — INSULIN LISPRO 100 [IU]/ML
INJECTION, SOLUTION INTRAVENOUS; SUBCUTANEOUS
Status: DISCONTINUED | OUTPATIENT
Start: 2020-08-06 | End: 2020-08-07

## 2020-08-06 RX ORDER — HEPARIN SODIUM 5000 [USP'U]/ML
4000 INJECTION, SOLUTION INTRAVENOUS; SUBCUTANEOUS
Status: DISCONTINUED | OUTPATIENT
Start: 2020-08-06 | End: 2020-08-06

## 2020-08-06 RX ORDER — HEPARIN SODIUM 5000 [USP'U]/100ML
18-36 INJECTION, SOLUTION INTRAVENOUS
Status: DISCONTINUED | OUTPATIENT
Start: 2020-08-06 | End: 2020-08-06

## 2020-08-06 RX ADMIN — PIPERACILLIN AND TAZOBACTAM 4.5 G: 4; .5 INJECTION, POWDER, FOR SOLUTION INTRAVENOUS at 00:32

## 2020-08-06 RX ADMIN — DIPHENHYDRAMINE HYDROCHLORIDE 25 MG: 50 INJECTION, SOLUTION INTRAMUSCULAR; INTRAVENOUS at 00:05

## 2020-08-06 RX ADMIN — HEPARIN SODIUM 18 UNITS/KG/HR: 5000 INJECTION, SOLUTION INTRAVENOUS at 02:13

## 2020-08-06 RX ADMIN — MAGNESIUM SULFATE IN WATER 2 G: 40 INJECTION, SOLUTION INTRAVENOUS at 06:20

## 2020-08-06 RX ADMIN — ALBUTEROL SULFATE 2 PUFF: 108 INHALANT RESPIRATORY (INHALATION) at 19:55

## 2020-08-06 RX ADMIN — SODIUM CHLORIDE 1000 ML: 9 INJECTION, SOLUTION INTRAVENOUS at 00:47

## 2020-08-06 RX ADMIN — ALBUTEROL SULFATE 2 PUFF: 108 INHALANT RESPIRATORY (INHALATION) at 12:00

## 2020-08-06 RX ADMIN — Medication 10 ML: at 13:32

## 2020-08-06 RX ADMIN — ACETAMINOPHEN 650 MG: 650 SUPPOSITORY RECTAL at 00:23

## 2020-08-06 RX ADMIN — Medication 10 ML: at 08:10

## 2020-08-06 RX ADMIN — Medication 10 ML: at 21:19

## 2020-08-06 RX ADMIN — METHYLPREDNISOLONE SODIUM SUCCINATE 40 MG: 40 INJECTION, POWDER, FOR SOLUTION INTRAMUSCULAR; INTRAVENOUS at 21:19

## 2020-08-06 RX ADMIN — PANTOPRAZOLE SODIUM 40 MG: 40 TABLET, DELAYED RELEASE ORAL at 08:09

## 2020-08-06 RX ADMIN — METHYLPREDNISOLONE SODIUM SUCCINATE 40 MG: 40 INJECTION, POWDER, FOR SOLUTION INTRAMUSCULAR; INTRAVENOUS at 08:09

## 2020-08-06 RX ADMIN — INSULIN LISPRO 8 UNITS: 100 INJECTION, SOLUTION INTRAVENOUS; SUBCUTANEOUS at 22:00

## 2020-08-06 RX ADMIN — INSULIN LISPRO 6 UNITS: 100 INJECTION, SOLUTION INTRAVENOUS; SUBCUTANEOUS at 17:18

## 2020-08-06 RX ADMIN — Medication 10 ML: at 01:47

## 2020-08-06 RX ADMIN — HEPARIN SODIUM 5000 UNITS: 5000 INJECTION INTRAVENOUS; SUBCUTANEOUS at 02:12

## 2020-08-06 RX ADMIN — VANCOMYCIN HYDROCHLORIDE 1000 MG: 1 INJECTION, POWDER, LYOPHILIZED, FOR SOLUTION INTRAVENOUS at 16:10

## 2020-08-06 RX ADMIN — INSULIN LISPRO 2 UNITS: 100 INJECTION, SOLUTION INTRAVENOUS; SUBCUTANEOUS at 11:50

## 2020-08-06 RX ADMIN — Medication 4 MCG/MIN: at 00:58

## 2020-08-06 RX ADMIN — HALOPERIDOL LACTATE 10 MG: 5 INJECTION, SOLUTION INTRAMUSCULAR at 00:05

## 2020-08-06 RX ADMIN — ENOXAPARIN SODIUM 40 MG: 40 INJECTION SUBCUTANEOUS at 08:09

## 2020-08-06 RX ADMIN — PIPERACILLIN AND TAZOBACTAM 3.38 G: 3; .375 INJECTION, POWDER, FOR SOLUTION INTRAVENOUS at 10:16

## 2020-08-06 RX ADMIN — ALBUTEROL SULFATE 2 PUFF: 108 INHALANT RESPIRATORY (INHALATION) at 16:00

## 2020-08-06 RX ADMIN — SODIUM CHLORIDE 1000 ML: 9 INJECTION, SOLUTION INTRAVENOUS at 01:02

## 2020-08-06 RX ADMIN — SODIUM CHLORIDE 100 ML: 900 INJECTION, SOLUTION INTRAVENOUS at 01:47

## 2020-08-06 RX ADMIN — VANCOMYCIN HYDROCHLORIDE 1250 MG: 10 INJECTION, POWDER, LYOPHILIZED, FOR SOLUTION INTRAVENOUS at 08:19

## 2020-08-06 RX ADMIN — PIPERACILLIN AND TAZOBACTAM 3.38 G: 3; .375 INJECTION, POWDER, FOR SOLUTION INTRAVENOUS at 16:01

## 2020-08-06 RX ADMIN — INSULIN LISPRO 2 UNITS: 100 INJECTION, SOLUTION INTRAVENOUS; SUBCUTANEOUS at 08:09

## 2020-08-06 RX ADMIN — SODIUM CHLORIDE 1000 ML: 9 INJECTION, SOLUTION INTRAVENOUS at 00:33

## 2020-08-06 RX ADMIN — SODIUM CHLORIDE 950 ML: 9 INJECTION, SOLUTION INTRAVENOUS at 01:05

## 2020-08-06 RX ADMIN — IOPAMIDOL 75 ML: 755 INJECTION, SOLUTION INTRAVENOUS at 01:47

## 2020-08-06 NOTE — PROGRESS NOTES
Name: Tana Bowers MRN: [id number]  : 1955  Age:65 y.o.  female  Admit Date:  2020 LOS: 0      Hospitalist Progress Note    Tana Bowers is a 60 y.o. female with medical history significant for COPD , DM2, recent [address] Infection( positive on  and neg on ) who presented from home due to shortness of breath. She was admitted here at [facility] for  -  for acute hypoxemic respiratory failure and pneumonia secondary to COVID-19 and received convalescent plasma, dexamethasone and Remdesivir. She was discharged home with 4-6L NC O2 and was doing well but developed shortness of breath a few days ago. On EMS arrival, she was found to hypoxic at 76% on 5L O2 NC. She was hypotensive (73/33) in the ED and initially required pressor support. CXR showed improved b/l infiltrates. D-dimer elevated at 19 so she was empirically started on a heparin drip. CT chest negative for PE, also shows emphysema and b/l infiltrates (R>L). Her heparin drip is now off. WBCs 31K with normal diff, Mg 1.7, LA 2.4, ALT 1247, , PCT 0.54, CK 1079. UDS + amphetamines. Rapid test is neg today (). Subjective (20) :  Patient is seen and examined at bedside. Patient reports feeling tired. Reports her breathing is \"ok\" while on 10L HFNC. ROS: 10 point review of systems is otherwise negative with the exception of the elements mentioned above.     Objective:    Patient Vitals for the past 24 hrs:   Temp Pulse Resp BP SpO2   20 1259 97.6 °F (36.4 °C) 87 18 142/73 97 %   20 1200  71   99 %   20 1130 97.1 °F (36.2 °C) 73 20 122/61 100 %   20 0831 97.4 °F (36.3 °C) 73 20 157/71 99 %   20 0746  66      20 0628  75 21  98 %   20 0620  70 18 111/64 98 %   20 0615  70 21 90/55 97 %   20 0610  70 15 93/51 96 %   20 0609  71 15  96 %   20 0605  70 14 105/57 96 %   20 0600  72 14 105/55 96 %   20 0555  71 15 103/53 96 %   08/06/20 0550  73 14 112/57 97 %   08/06/20 0545  72 16 105/59 97 %   08/06/20 0540  72 15 98/53 97 %   08/06/20 0535  73 15 91/53 97 %   08/06/20 0530  72 15 95/51 98 %   08/06/20 0525  71 14 117/58 98 %   08/06/20 0520  69 16 127/60 98 %   08/06/20 0515  70 14 113/55 97 %   08/06/20 0506  69 16 124/57 97 %   08/06/20 0500  70 17 111/56 97 %   08/06/20 0455  70 16 109/59 96 %   08/06/20 0450  70 17 105/59 96 %   08/06/20 0445  69 19 128/68 98 %   08/06/20 0440  69 15 125/65 97 %   08/06/20 0435  71 15 125/65 97 %   08/06/20 0430  70 14 125/62 97 %   08/06/20 0425  70 14 122/60 97 %   08/06/20 0420  71 17 120/60 97 %   08/06/20 0415  71 17 120/61 97 %   08/06/20 0411  71 17 120/60 98 %   08/06/20 0405  72 17 118/59 98 %   08/06/20 0400  72 18 122/61 98 %   08/06/20 0355  73 17 115/59 98 %   08/06/20 0350  76 16 90/55 98 %   08/06/20 0345  77 16 (!) 77/44 98 %   08/06/20 0340  77 21 (!) 88/51 98 %   08/06/20 0335  81 17 (!) 89/55 100 %   08/06/20 0334 98.7 °F (37.1 °C) 77 11 98/57 98 %   08/06/20 0330  74 18 112/56 98 %   08/06/20 0325  74 15 113/57 98 %   08/06/20 0240  78 18 102/54 98 %   08/06/20 0235  80 19 99/55 97 %   08/06/20 0230  80 20 98/55 98 %   08/06/20 0225  82 19 93/54 98 %   08/06/20 0220  81 19 90/51 97 %   08/06/20 0215  81 19 105/54 96 %   08/06/20 0211  84 27 109/53 96 %   08/06/20 0206  93 26 141/66 93 %   08/06/20 0132  79 21 95/54 96 %   08/06/20 0127  81 20 97/55 96 %   08/06/20 0125  82 19  95 %   08/06/20 0122  82 20 107/55 95 %   08/06/20 0117  85 22 113/57 92 %   08/06/20 0113  81 28 91/52 95 %   08/06/20 0112  83 26 (!) 88/47 95 %   08/06/20 0107  85 18 (!) 84/45 95 %   08/06/20 0104  84 21 (!) 84/45 95 %   08/06/20 0103  85 19 (!) 84/40 96 %   08/06/20 0057  89 21 (!) 73/33 97 %   08/06/20 0053  91 20 (!) 86/41 96 %   08/06/20 0050  96 16 90/48 96 %   08/06/20 0045  92 20 (!) 83/40 97 %   08/06/20 0042  96 20  97 % 08/06/20 0040  95 22 94/46 96 %   08/06/20 0037  95 20  97 %   08/06/20 0034  99 23 94/47 98 %   08/06/20 0032  100 23  96 %   08/06/20 0028  (!) 108 17  95 %   08/06/20 0015 (!) 103.6 °F (39.8 °C)       08/06/20 0014  (!) 127 (!) 41 167/78 (!) 80 %   08/06/20 0010  (!) 128 27  94 %   08/06/20 0000  (!) 131  190/84 93 %     Oxygen Therapy  O2 Sat (%): 97 % (08/06/20 1259)  Pulse via Oximetry: 79 beats per minute (08/06/20 1200)  O2 Device: Hi flow nasal cannula (08/06/20 1200)  O2 Flow Rate (L/min): 10 l/min (08/06/20 1200)    Estimated body mass index is 25.38 kg/m² as calculated from the following:    Height as of this encounter: 5' 3\" (1.6 m). Weight as of this encounter: 65 kg (143 lb 4.8 oz). Intake/Output Summary (Last 24 hours) at 8/6/2020 1338  Last data filed at 8/6/2020 0050  Gross per 24 hour   Intake 100 ml   Output    Net 100 ml       *Note that automatically entered I/Os may not be accurate; dependent on patient compliance with collection and accurate  by techs. Physical Exam:   General:     Chronically ill appearing, alert and awake. Not in distress  Head:   normocephalic, atraumatic  Eyes, Ears, nose: PERRL, EOMI. Normal conjunctiva  Neck:    supple, non-tender. Trachea midline. Lungs:   Wheezing with bilateral crackles  Cardiac:   RRR, Normal S1 and S2. Abdomen:   Soft, non distended, nontender, +BS  Extremities:   Warm, dry. No edema   Skin:   No rashes, no jaundice  Neuro:  AAOx3.  No gross focal neurological deficit  Psychiatric:  No anxiety, calm, cooperative    Data Review:  I have reviewed all labs, meds, and studies from the last 24 hours:    Recent Results (from the past 24 hour(s))   METABOLIC PANEL, COMPREHENSIVE    Collection Time: 08/06/20 12:16 AM   Result Value Ref Range    Sodium 134 (L) 136 - 145 mmol/L    Potassium 4.2 3.5 - 5.1 mmol/L    Chloride 100 98 - 107 mmol/L    CO2 24 21 - 32 mmol/L    Anion gap 10 7 - 16 mmol/L    Glucose 143 (H) 65 - 100 mg/dL    BUN 21 8 - 23 MG/DL    Creatinine 0.84 0.6 - 1.0 MG/DL    GFR est AA >60 >60 ml/min/1.73m2    GFR est non-AA >60 >60 ml/min/1.73m2    Calcium 8.0 (L) 8.3 - 10.4 MG/DL    Bilirubin, total 1.2 (H) 0.2 - 1.1 MG/DL    ALT (SGPT) 1,247 (H) 12 - 65 U/L    AST (SGOT) 288 (H) 15 - 37 U/L    Alk. phosphatase 199 (H) 50 - 136 U/L    Protein, total 6.8 6.3 - 8.2 g/dL    Albumin 2.8 (L) 3.2 - 4.6 g/dL    Globulin 4.0 (H) 2.3 - 3.5 g/dL    A-G Ratio 0.7 (L) 1.2 - 3.5     CBC WITH AUTOMATED DIFF    Collection Time: 08/06/20 12:16 AM   Result Value Ref Range    WBC 31.4 (H) 4.3 - 11.1 K/uL    RBC 3.86 (L) 4.05 - 5.2 M/uL    HGB 10.6 (L) 11.7 - 15.4 g/dL    HCT 35.0 (L) 35.8 - 46.3 %    MCV 90.7 79.6 - 97.8 FL    MCH 27.5 26.1 - 32.9 PG    MCHC 30.3 (L) 31.4 - 35.0 g/dL    RDW 18.1 (H) 11.9 - 14.6 %    PLATELET 358 400 - 885 K/uL    MPV 10.3 9.4 - 12.3 FL    ABSOLUTE NRBC 0.96 (H) 0.0 - 0.2 K/uL    NEUTROPHILS 67 43 - 78 %    LYMPHOCYTES 16 13 - 44 %    MONOCYTES 10 4.0 - 12.0 %    EOSINOPHILS 1 0.5 - 7.8 %    BASOPHILS 1 0.0 - 2.0 %    IMMATURE GRANULOCYTES 5 0.0 - 5.0 %    ABS. NEUTROPHILS 21.1 (H) 1.7 - 8.2 K/UL    ABS. LYMPHOCYTES 5.0 (H) 0.5 - 4.6 K/UL    ABS. MONOCYTES 3.1 (H) 0.1 - 1.3 K/UL    ABS. EOSINOPHILS 0.3 0.0 - 0.8 K/UL    ABS. BASOPHILS 0.3 (H) 0.0 - 0.2 K/UL    ABS. IMM. GRANS.  1.6 (H) 0.0 - 0.5 K/UL    RBC COMMENTS MODERATE  ANISOCYTOSIS + POIKILOCYTOSIS        RBC COMMENTS MODERATE  POLYCHROMASIA        WBC COMMENTS Result Confirmed By Smear      PLATELET COMMENTS ADEQUATE      DF AUTOMATED     MAGNESIUM    Collection Time: 08/06/20 12:16 AM   Result Value Ref Range    Magnesium 1.7 (L) 1.8 - 2.4 mg/dL   TROPONIN-HIGH SENSITIVITY    Collection Time: 08/06/20 12:16 AM   Result Value Ref Range    Troponin-High Sensitivity 18.9 (H) 0 - 14 pg/mL   CK WITH MB    Collection Time: 08/06/20 12:16 AM   Result Value Ref Range    CK - MB 20.1 (H) 0.5 - 3.6 ng/ml    CK-MB Index 1.9 <2.5 %    CK 1,079 (H) 21 - 215 U/L   D DIMER    Collection Time: 08/06/20 12:16 AM   Result Value Ref Range    D DIMER 19.69 (HH) <0.56 ug/ml(FEU)   DRUG SCREEN, URINE    Collection Time: 08/06/20 12:16 AM   Result Value Ref Range    PCP(PHENCYCLIDINE) Negative      BENZODIAZEPINES Negative      COCAINE Negative      AMPHETAMINES Positive      METHADONE Negative      THC (TH-CANNABINOL) Negative      OPIATES Negative      BARBITURATES Negative     LACTIC ACID    Collection Time: 08/06/20 12:16 AM   Result Value Ref Range    Lactic acid 2.4 (HH) 0.4 - 2.0 MMOL/L   URINALYSIS W/ RFLX MICROSCOPIC    Collection Time: 08/06/20 12:16 AM   Result Value Ref Range    Color DRAGAN      Appearance CLOUDY      Specific gravity 1.023 1.001 - 1.023      pH (UA) 5.5 5.0 - 9.0      Protein 100 (A) NEG mg/dL    Glucose Negative mg/dL    Ketone TRACE (A) NEG mg/dL    Bilirubin SMALL (A) NEG      Blood LARGE (A) NEG      Urobilinogen 1.0 0.2 - 1.0 EU/dL    Nitrites Negative NEG      Leukocyte Esterase Negative NEG      WBC 3-5 0 /hpf    RBC 10-20 0 /hpf    Epithelial cells 3-5 0 /hpf    Bacteria TRACE 0 /hpf    Casts 0-3 0 /lpf   PROCALCITONIN    Collection Time: 08/06/20 12:16 AM   Result Value Ref Range    Procalcitonin 0.54 ng/mL   EKG, 12 LEAD, INITIAL    Collection Time: 08/06/20 12:16 AM   Result Value Ref Range    Ventricular Rate 127 BPM    Atrial Rate 127 BPM    P-R Interval 148 ms    QRS Duration 90 ms    Q-T Interval 288 ms    QTC Calculation (Bezet) 418 ms    Calculated P Axis 88 degrees    Calculated R Axis -38 degrees    Calculated T Axis 99 degrees    Diagnosis       !! AGE AND GENDER SPECIFIC ECG ANALYSIS !!   Sinus tachycardia  Biatrial enlargement  Left axis deviation  Anteroseptal infarct (cited on or before 01-JUL-2020)  Abnormal ECG  When compared with ECG of 17-JUL-2020 08:46,  Serial changes of Anteroseptal infarct Present  Confirmed by Janes Alba MD (), MARY BUCKLEY ([id number]) on 8/6/2020 8:00:11 AM     POC CG8I    Collection Time: 08/06/20 12:21 AM   Result Value Ref Range    Device: NASAL CANNULA      pH (POC) 7.44 7.35 - 7.45      pCO2 (POC) 33.8 (L) 35 - 45 MMHG    pO2 (POC) 72 (L) 75 - 100 MMHG    HCO3 (POC) 23.0 22 - 26 MMOL/L    sO2 (POC) 95 95 - 98 %    Base deficit (POC) 1 mmol/L    Allens test (POC) YES      Site RIGHT RADIAL      Specimen type (POC) ARTERIAL      Sodium,  (L) 136 - 145 MMOL/L    Potassium, POC 3.9 3.5 - 5.1 MMOL/L    Glucose,  (H) 65 - 100 MG/DL    Calcium, ionized (POC) 1.12 1.12 - 1.32 mmol/L    Performed by [facility]     Flow rate (POC) 10.000 L/min    Critical value read back 00:20     Respiratory comment: PhysicianNotified     COLLECT TIME 10     POC G3    Collection Time: 08/06/20  3:30 AM   Result Value Ref Range    Device: NASAL CANNULA      pH (POC) 7.35 7.35 - 7.45      pCO2 (POC) 35.4 35 - 45 MMHG    pO2 (POC) 105 (H) 75 - 100 MMHG    HCO3 (POC) 19.7 (L) 22 - 26 MMOL/L    sO2 (POC) 98 95 - 98 %    Base deficit (POC) 5 mmol/L    Allens test (POC) YES      Site RIGHT RADIAL      Specimen type (POC) ARTERIAL      Performed by [facility]     CO2, POC 21 MMOL/L    Flow rate (POC) 9.000 L/min    Critical value read back 03:25     Respiratory comment: NurseNotified     COLLECT TIME 315     LACTIC ACID    Collection Time: 08/06/20  7:48 AM   Result Value Ref Range    Lactic acid 1.3 0.4 - 2.0 MMOL/L   GLUCOSE, POC    Collection Time: 08/06/20  7:56 AM   Result Value Ref Range    Glucose (POC) 153 (H) 65 - 100 mg/dL   SARS-COV-2    Collection Time: 08/06/20  8:08 AM   Result Value Ref Range    Specimen source Nasopharyngeal      COVID-19 rapid test Not detected NOTD      SARS CoV-2 PENDING    GLUCOSE, POC    Collection Time: 08/06/20 11:22 AM   Result Value Ref Range    Glucose (POC) 157 (H) 65 - 100 mg/dL        All Micro Results     Procedure Component Value Units Date/Time    CULTURE, BLOOD [[id number]] Collected:  08/06/20 0020    Order Status:  Completed Specimen:  Blood Updated:  08/06/20 0041    CULTURE, BLOOD [[id number]] Collected:  08/06/20 0020    Order Status:  Completed Specimen:  Blood Updated:  08/06/20 0041          Current Meds:  Current Facility-Administered Medications   Medication Dose Route Frequency    sodium chloride (NS) flush 5-10 mL  5-10 mL IntraVENous PRN    albuterol (PROVENTIL HFA, VENTOLIN HFA, PROAIR HFA) inhaler 2 Puff  2 Puff Inhalation Q4H RT    sodium chloride (NS) flush 5-40 mL  5-40 mL IntraVENous Q8H    sodium chloride (NS) flush 5-40 mL  5-40 mL IntraVENous PRN    acetaminophen (TYLENOL) tablet 650 mg  650 mg Oral Q6H PRN    Or    acetaminophen (TYLENOL) suppository 650 mg  650 mg Rectal Q6H PRN    polyethylene glycol (MIRALAX) packet 17 g  17 g Oral DAILY PRN    promethazine (PHENERGAN) tablet 12.5 mg  12.5 mg Oral Q6H PRN    Or    ondansetron (ZOFRAN) injection 4 mg  4 mg IntraVENous Q6H PRN    enoxaparin (LOVENOX) injection 40 mg  40 mg SubCUTAneous DAILY    methylPREDNISolone (PF) (SOLU-MEDROL) injection 40 mg  40 mg IntraVENous Q12H    piperacillin-tazobactam (ZOSYN) 3.375 g in 0.9% sodium chloride (MBP/ADV) 100 mL  3.375 g IntraVENous Q8H    insulin lispro (HUMALOG) injection   SubCUTAneous AC&HS    pantoprazole (PROTONIX) tablet 40 mg  40 mg Oral ACB    vancomycin (VANCOCIN) 1,000 mg in 0.9% sodium chloride (MBP/ADV) 250 mL  1,000 mg IntraVENous Q12H         Other Studies:  Ct Chest W Cont    Result Date: 8/6/2020  EXAM: CT Chest with IV contrast - PE protocol. INDICATION: Altered mental status and pain. Covid 19. COMPARISON: Prior CT chest on September 26, 2018. TECHNIQUE: Axial CT images of the chest were obtained after the intravenous injection of 100 mL Isovue 370 CT contrast. Radiation dose reduction techniques were used for this study. Our CT scanners use one or all of the following: Automated exposure control, adjustment of the mA and/or kV according to patient size, iterative reconstruction.  FINDINGS: - Pleura/pericardium: Within normal limits. - Lungs: There are emphysematous changes in the lungs with patchy areas of consolidation in the mid and lower lungs bilaterally, worse on the right. There are also scattered prominent interstitial markings. - Gladys/Mediastinum: A few borderline mediastinal and bilateral hilar lymph nodes are probably reactive in nature. - Tracheobronchial tree: Within normal limits. - Aorta/pulmonary arteries: Within normal limits. - Heart: Within normal limits. - Coronary arteries: No coronary artery calcifications. - Chest wall: Within normal limits. - Spine/bones: No acute process. - Additional comments: None. IMPRESSION: 1. Emphysema. 2. Bilateral mid and lower lung infiltrates, worse on the right, suspect for acute pneumonia. Covid 19 pneumonia is a consideration. Xr Chest Port    Result Date: 8/6/2020  EXAM: Chest x-ray. INDICATION: Dyspnea. COMPARISON: Prior chest x-ray on July 15, 2020. TECHNIQUE: Frontal view chest x-ray. FINDINGS: Previous bibasilar lung infiltrates have improved, with mild residual. The cardiac size is within normal limits. No pneumothorax, vascular congestion or pleural effusion is seen. Again noted is elevation of the right diaphragm. Previously seen right arm PICC line has been removed. IMPRESSION: Bibasilar lung infiltrates have improved since the prior study, with mild residual remaining.          Assessment:    Active Hospital Problems    Diagnosis Date Noted    HAP (hospital-acquired pneumonia) 08/06/2020    Elevated ALT measurement 08/06/2020    Elevated lactic acid level 08/06/2020    Hypomagnesemia 08/06/2020    Pneumonia due to COVID-19 virus 07/02/2020    COPD exacerbation ([facility] [address] 07/02/2020    Sepsis ([facility] [address] 07/01/2020    Acute hypoxemic respiratory failure (HCC) 09/26/2018    Leukocytosis 09/26/2018    Tobacco abuse 09/26/2018       Plan:  Acute Respiratory failure with hypoxia  Septic (on admission) shock likely pulmonary source  COVID(+) on 7/2 and received convalescent plasma, Remdesivir and dexamethasone. Repeat COVID test on 7/22 and 8/6 is negative. CT chest is neg for PE but still showing bilateral infiltrates as well as emphysema. - Treat for HAP given recent hospitalization.  - follow up BCx  - O2 supplement and wean as tolerated    Transaminitis            - ALT>AST. Could be shock liver from hypotension but unsure why AST significantly less elevated. CMPs daily.     Hypomagnesemia: replete and monitor     Elevated d-dimer  - Suspect 2/2 hypoxia. CT neg for PE.     DM2: continue with ISS      Diet:  DIET DIABETIC CONSISTENT CARB  DVT PPx: lovenox  Code: Full Code  Dispo: pending clinical course and PT/OT Eval  Estimated Discharge: TBD based on clinical course    Labs/Imaging Reviewed. Patient is high risk due to current condition and comorbid conditions as well as requiring frequent monitoring. Plan discussed with staff, patient/family and are in agreement. Time Spent: Greater than 45 minutes was spent in reviewing charts, physical exam, discussion with patient, and answered any questions.     Signed By: Gillian Richards MD     August 6, 2020

## 2020-08-06 NOTE — H&P
Hospitalist Note     Admit Date:  2020 11:57 PM   Name:  Ubaldo Flores   Age:  60 y.o.  :  1955   MRN:  [id number]   PCP:  None  Treatment Team: Attending Provider: Masoud Webster MD    HPI/Subjective:   Mrs. Yenny Yanez is a 61 y/o WF with a h/o COPD and DM2 who was hospitalized from  to  after presenting with acute hypoxemic respiratory failure and pneumonia secondary to COVID-19. She received convalescent plasma and completed courses of both dexamethasone and Remdesivir. She was discharged on  on 4-6L NC O2. Says she stopped smoking prior to her last hospitalization but does live with family and some of them smoke inside the house (but apparently she told them to stop 2-3 days ago). She developed SOB a few days ago but otherwise denies fevers, headaches, chest pain ,N/V/D, abdominal pain, edema. EMS called to the house tonight and she was found to be 76% on 5L NC O2. On arrival in ER she was hypotensive at 73/33 and started on Levophed drip. Also agitated requiring Haldol. She was given 3L IVFs and antibiotics. Her Levophed was weaned off with improvement of BPs. CXR with improved b/l infiltrates. D-dimer elevated at 19 so she was empirically started on a heparin drip. CT chest negative for PE, also shows emphysema and b/l infiltrates (R>L). Her heparin drip is now off. WBCs 31K with normal diff, Mg 1.7, LA 2.4, ALT 1247, , PCT 0.54, CK 1079. UDS + amphetamines. Hospitalist consulted for admission and further management. 10 systems reviewed and negative except as noted in HPI. Past Medical History:   Diagnosis Date    COPD (chronic obstructive pulmonary disease) ([facility]     Diabetes ([facility]       No past surgical history on file.    No Known Allergies   Social History     Tobacco Use    Smoking status: Current Every Day Smoker     Types: Cigars   Substance Use Topics    Alcohol use: No      Family History   Problem Relation Age of Onset    Cancer Mother     Liver Disease Father       Family history reviewed and noncontributory. Immunization History   Administered Date(s) Administered    TB Skin Test (PPD) Intradermal 09/27/2018     PTA Medications:  Prior to Admission Medications   Prescriptions Last Dose Informant Patient Reported? Taking? albuterol (PROAIR HFA) 90 mcg/actuation inhaler   No No   Sig: Take 2 Puffs by inhalation every four (4) hours as needed for Wheezing. budesonide-formoteroL (SYMBICORT) 160-4.5 mcg/actuation HFAA   No No   Sig: Take 2 Puffs by inhalation two (2) times a day. ibuprofen (MOTRIN) 600 mg tablet   No No   Sig: Take 1 Tab by mouth every six (6) hours as needed for Pain.   metFORMIN (GLUCOPHAGE) 500 mg tablet   No No   Sig: Take 1 Tab by mouth two (2) times daily (with meals).       Facility-Administered Medications: None       Objective:     Patient Vitals for the past 24 hrs:   Temp Pulse Resp BP SpO2   08/06/20 0628  75 21  98 %   08/06/20 0620  70 18 111/64 98 %   08/06/20 0615  70 21 90/55 97 %   08/06/20 0610  70 15 93/51 96 %   08/06/20 0609  71 15  96 %   08/06/20 0605  70 14 105/57 96 %   08/06/20 0600  72 14 105/55 96 %   08/06/20 0555  71 15 103/53 96 %   08/06/20 0550  73 14 112/57 97 %   08/06/20 0545  72 16 105/59 97 %   08/06/20 0540  72 15 98/53 97 %   08/06/20 0535  73 15 91/53 97 %   08/06/20 0530  72 15 95/51 98 %   08/06/20 0525  71 14 117/58 98 %   08/06/20 0520  69 16 127/60 98 %   08/06/20 0515  70 14 113/55 97 %   08/06/20 0506  69 16 124/57 97 %   08/06/20 0500  70 17 111/56 97 %   08/06/20 0455  70 16 109/59 96 %   08/06/20 0450  70 17 105/59 96 %   08/06/20 0445  69 19 128/68 98 %   08/06/20 0440  69 15 125/65 97 %   08/06/20 0435  71 15 125/65 97 %   08/06/20 0430  70 14 125/62 97 %   08/06/20 0425  70 14 122/60 97 %   08/06/20 0420  71 17 120/60 97 %   08/06/20 0415  71 17 120/61 97 %   08/06/20 0411  71 17 120/60 98 %   08/06/20 0405  72 17 118/59 98 %   08/06/20 0400  72 18 122/61 98 %   08/06/20 0355  73 17 115/59 98 %   08/06/20 0350  76 16 90/55 98 %   08/06/20 0345  77 16 (!) 77/44 98 %   08/06/20 0340  77 21 (!) 88/51 98 %   08/06/20 0335  81 17 (!) 89/55 100 %   08/06/20 0334 98.7 °F (37.1 °C) 77 11 98/57 98 %   08/06/20 0330  74 18 112/56 98 %   08/06/20 0325  74 15 113/57 98 %   08/06/20 0240  78 18 102/54 98 %   08/06/20 0235  80 19 99/55 97 %   08/06/20 0230  80 20 98/55 98 %   08/06/20 0225  82 19 93/54 98 %   08/06/20 0220  81 19 90/51 97 %   08/06/20 0215  81 19 105/54 96 %   08/06/20 0211  84 27 109/53 96 %   08/06/20 0206  93 26 141/66 93 %   08/06/20 0132  79 21 95/54 96 %   08/06/20 0127  81 20 97/55 96 %   08/06/20 0125  82 19  95 %   08/06/20 0122  82 20 107/55 95 %   08/06/20 0117  85 22 113/57 92 %   08/06/20 0113  81 28 91/52 95 %   08/06/20 0112  83 26 (!) 88/47 95 %   08/06/20 0107  85 18 (!) 84/45 95 %   08/06/20 0104  84 21 (!) 84/45 95 %   08/06/20 0103  85 19 (!) 84/40 96 %   08/06/20 0057  89 21 (!) 73/33 97 %   08/06/20 0053  91 20 (!) 86/41 96 %   08/06/20 0050  96 16 90/48 96 %   08/06/20 0045  92 20 (!) 83/40 97 %   08/06/20 0042  96 20  97 %   08/06/20 0040  95 22 94/46 96 %   08/06/20 0037  95 20  97 %   08/06/20 0034  99 23 94/47 98 %   08/06/20 0032  100 23  96 %   08/06/20 0028  (!) 108 17  95 %   08/06/20 0015 (!) 103.6 °F (39.8 °C)       08/06/20 0014  (!) 127 (!) 41 167/78 (!) 80 %   08/06/20 0010  (!) 128 27  94 %   08/06/20 0000  (!) 131  190/84 93 %     Oxygen Therapy  O2 Sat (%): 98 % (08/06/20 0628)  Pulse via Oximetry: 74 beats per minute (08/06/20 0628)  O2 Device: Hi flow nasal cannula (08/06/20 0620)  O2 Flow Rate (L/min): 10 l/min (08/06/20 0620)    Estimated body mass index is 25.38 kg/m² as calculated from the following:    Height as of 7/11/20: 5' 3\" (1.6 m). Weight as of this encounter: 65 kg (143 lb 4.8 oz).       Intake/Output Summary (Last 24 hours) at 8/6/2020 0645  Last data filed at 8/6/2020 0050  Gross per 24 hour   Intake 100 ml   Output    Net 100 ml       *Note that automatically entered I/Os may not be accurate; dependent on patient compliance with collection and accurate  by assistants. Physical Exam:  General:    Chronically ill appearing, but A/O x3 and in no acute distress. Eyes:   Normal sclerae. Extraocular movements intact. HENT:  Normocephalic, atraumatic. Dry mucous membranes  CV:   RRR. No m/r/g. Lungs:  Bilateral rales with end expiratory wheezes. 10L HFNC O2. Abdomen: Soft, nontender, nondistended. Extremities: Warm and dry. No cyanosis or edema. Excoriations/bruising on LEs. Neurologic: CN II-XII grossly intact. Sensation intact. Skin:     No rashes or jaundice. Normal coloration  Psych:  Normal mood and affect. I reviewed the labs, imaging, EKGs, telemetry, and other studies done this admission. Data Reviewed:   Recent Results (from the past 24 hour(s))   METABOLIC PANEL, COMPREHENSIVE    Collection Time: 08/06/20 12:16 AM   Result Value Ref Range    Sodium 134 (L) 136 - 145 mmol/L    Potassium 4.2 3.5 - 5.1 mmol/L    Chloride 100 98 - 107 mmol/L    CO2 24 21 - 32 mmol/L    Anion gap 10 7 - 16 mmol/L    Glucose 143 (H) 65 - 100 mg/dL    BUN 21 8 - 23 MG/DL    Creatinine 0.84 0.6 - 1.0 MG/DL    GFR est AA >60 >60 ml/min/1.73m2    GFR est non-AA >60 >60 ml/min/1.73m2    Calcium 8.0 (L) 8.3 - 10.4 MG/DL    Bilirubin, total 1.2 (H) 0.2 - 1.1 MG/DL    ALT (SGPT) 1,247 (H) 12 - 65 U/L    AST (SGOT) 288 (H) 15 - 37 U/L    Alk.  phosphatase 199 (H) 50 - 136 U/L    Protein, total 6.8 6.3 - 8.2 g/dL    Albumin 2.8 (L) 3.2 - 4.6 g/dL    Globulin 4.0 (H) 2.3 - 3.5 g/dL    A-G Ratio 0.7 (L) 1.2 - 3.5     CBC WITH AUTOMATED DIFF    Collection Time: 08/06/20 12:16 AM   Result Value Ref Range    WBC 31.4 (H) 4.3 - 11.1 K/uL    RBC 3.86 (L) 4.05 - 5.2 M/uL    HGB 10.6 (L) 11.7 - 15.4 g/dL    HCT 35.0 (L) 35.8 - 46.3 %    MCV 90.7 79.6 - 97.8 FL    MCH 27.5 26.1 - 32.9 PG    MCHC 30.3 (L) 31.4 - 35.0 g/dL    RDW 18.1 (H) 11.9 - 14.6 %    PLATELET 451 720 - 186 K/uL    MPV 10.3 9.4 - 12.3 FL    ABSOLUTE NRBC 0.96 (H) 0.0 - 0.2 K/uL    NEUTROPHILS 67 43 - 78 %    LYMPHOCYTES 16 13 - 44 %    MONOCYTES 10 4.0 - 12.0 %    EOSINOPHILS 1 0.5 - 7.8 %    BASOPHILS 1 0.0 - 2.0 %    IMMATURE GRANULOCYTES 5 0.0 - 5.0 %    ABS. NEUTROPHILS 21.1 (H) 1.7 - 8.2 K/UL    ABS. LYMPHOCYTES 5.0 (H) 0.5 - 4.6 K/UL    ABS. MONOCYTES 3.1 (H) 0.1 - 1.3 K/UL    ABS. EOSINOPHILS 0.3 0.0 - 0.8 K/UL    ABS. BASOPHILS 0.3 (H) 0.0 - 0.2 K/UL    ABS. IMM. GRANS.  1.6 (H) 0.0 - 0.5 K/UL    RBC COMMENTS MODERATE  ANISOCYTOSIS + POIKILOCYTOSIS        RBC COMMENTS MODERATE  POLYCHROMASIA        WBC COMMENTS Result Confirmed By Smear      PLATELET COMMENTS ADEQUATE      DF AUTOMATED     MAGNESIUM    Collection Time: 08/06/20 12:16 AM   Result Value Ref Range    Magnesium 1.7 (L) 1.8 - 2.4 mg/dL   TROPONIN-HIGH SENSITIVITY    Collection Time: 08/06/20 12:16 AM   Result Value Ref Range    Troponin-High Sensitivity 18.9 (H) 0 - 14 pg/mL   CK WITH MB    Collection Time: 08/06/20 12:16 AM   Result Value Ref Range    CK - MB 20.1 (H) 0.5 - 3.6 ng/ml    CK-MB Index 1.9 <2.5 %    CK 1,079 (H) 21 - 215 U/L   D DIMER    Collection Time: 08/06/20 12:16 AM   Result Value Ref Range    D DIMER 19.69 (HH) <0.56 ug/ml(FEU)   DRUG SCREEN, URINE    Collection Time: 08/06/20 12:16 AM   Result Value Ref Range    PCP(PHENCYCLIDINE) Negative      BENZODIAZEPINES Negative      COCAINE Negative      AMPHETAMINES Positive      METHADONE Negative      THC (TH-CANNABINOL) Negative      OPIATES Negative      BARBITURATES Negative     LACTIC ACID    Collection Time: 08/06/20 12:16 AM   Result Value Ref Range    Lactic acid 2.4 (HH) 0.4 - 2.0 MMOL/L   URINALYSIS W/ RFLX MICROSCOPIC    Collection Time: 08/06/20 12:16 AM   Result Value Ref Range    Color DRAGAN      Appearance CLOUDY      Specific gravity 1.023 1.001 - 1.023      pH (UA) 5.5 5.0 - 9.0      Protein 100 (A) NEG mg/dL    Glucose Negative mg/dL    Ketone TRACE (A) NEG mg/dL    Bilirubin SMALL (A) NEG      Blood LARGE (A) NEG      Urobilinogen 1.0 0.2 - 1.0 EU/dL    Nitrites Negative NEG      Leukocyte Esterase Negative NEG      WBC 3-5 0 /hpf    RBC 10-20 0 /hpf    Epithelial cells 3-5 0 /hpf    Bacteria TRACE 0 /hpf    Casts 0-3 0 /lpf   PROCALCITONIN    Collection Time: 08/06/20 12:16 AM   Result Value Ref Range    Procalcitonin 0.54 ng/mL   EKG, 12 LEAD, INITIAL    Collection Time: 08/06/20 12:16 AM   Result Value Ref Range    Ventricular Rate 127 BPM    Atrial Rate 127 BPM    P-R Interval 148 ms    QRS Duration 90 ms    Q-T Interval 288 ms    QTC Calculation (Bezet) 418 ms    Calculated P Axis 88 degrees    Calculated R Axis -38 degrees    Calculated T Axis 99 degrees    Diagnosis       !! AGE AND GENDER SPECIFIC ECG ANALYSIS !!   Sinus tachycardia  Biatrial enlargement  Left axis deviation  Anteroseptal infarct (cited on or before 01-JUL-2020)  Abnormal ECG  When compared with ECG of 17-JUL-2020 08:46,  Serial changes of Anteroseptal infarct Present     POC CG8I    Collection Time: 08/06/20 12:21 AM   Result Value Ref Range    Device: NASAL CANNULA      pH (POC) 7.44 7.35 - 7.45      pCO2 (POC) 33.8 (L) 35 - 45 MMHG    pO2 (POC) 72 (L) 75 - 100 MMHG    HCO3 (POC) 23.0 22 - 26 MMOL/L    sO2 (POC) 95 95 - 98 %    Base deficit (POC) 1 mmol/L    Allens test (POC) YES      Site RIGHT RADIAL      Specimen type (POC) ARTERIAL      Sodium,  (L) 136 - 145 MMOL/L    Potassium, POC 3.9 3.5 - 5.1 MMOL/L    Glucose,  (H) 65 - 100 MG/DL    Calcium, ionized (POC) 1.12 1.12 - 1.32 mmol/L    Performed by Annita     Flow rate (POC) 10.000 L/min    Critical value read back 00:20     Respiratory comment: PhysicianNotified     COLLECT TIME 10     POC G3    Collection Time: 08/06/20  3:30 AM   Result Value Ref Range    Device: NASAL CANNULA      pH (POC) 7.35 7.35 - 7.45 pCO2 (POC) 35.4 35 - 45 MMHG    pO2 (POC) 105 (H) 75 - 100 MMHG    HCO3 (POC) 19.7 (L) 22 - 26 MMOL/L    sO2 (POC) 98 95 - 98 %    Base deficit (POC) 5 mmol/L    Allens test (POC) YES      Site RIGHT RADIAL      Specimen type (POC) ARTERIAL      Performed by Annita     CO2, POC 21 MMOL/L    Flow rate (POC) 9.000 L/min    Critical value read back 03:25     Respiratory comment: NurseNotified     COLLECT TIME 315         All Micro Results     Procedure Component Value Units Date/Time    CULTURE, BLOOD [[id number]] Collected:  08/06/20 0020    Order Status:  Completed Specimen:  Blood Updated:  08/06/20 0041    CULTURE, BLOOD [[id number]] Collected:  08/06/20 0020    Order Status:  Completed Specimen:  Blood Updated:  08/06/20 0041          Current Facility-Administered Medications   Medication Dose Route Frequency    sodium chloride (NS) flush 5-10 mL  5-10 mL IntraVENous PRN    magnesium sulfate 2 g/50 ml IVPB (premix or compounded)  2 g IntraVENous ONCE       Other Studies:  No results found for this visit on 08/05/20. Ct Chest W Cont    Result Date: 8/6/2020  EXAM: CT Chest with IV contrast - PE protocol. INDICATION: Altered mental status and pain. Covid 19. COMPARISON: Prior CT chest on September 26, 2018. TECHNIQUE: Axial CT images of the chest were obtained after the intravenous injection of 100 mL Isovue 370 CT contrast. Radiation dose reduction techniques were used for this study. Our CT scanners use one or all of the following: Automated exposure control, adjustment of the mA and/or kV according to patient size, iterative reconstruction. FINDINGS: - Pleura/pericardium: Within normal limits. - Lungs: There are emphysematous changes in the lungs with patchy areas of consolidation in the mid and lower lungs bilaterally, worse on the right. There are also scattered prominent interstitial markings.  - Gladys/Mediastinum: A few borderline mediastinal and bilateral hilar lymph nodes are probably reactive in nature. - Tracheobronchial tree: Within normal limits. - Aorta/pulmonary arteries: Within normal limits. - Heart: Within normal limits. - Coronary arteries: No coronary artery calcifications. - Chest wall: Within normal limits. - Spine/bones: No acute process. - Additional comments: None. IMPRESSION: 1. Emphysema. 2. Bilateral mid and lower lung infiltrates, worse on the right, suspect for acute pneumonia. Covid 19 pneumonia is a consideration. Xr Chest Port    Result Date: 8/6/2020  EXAM: Chest x-ray. INDICATION: Dyspnea. COMPARISON: Prior chest x-ray on July 15, 2020. TECHNIQUE: Frontal view chest x-ray. FINDINGS: Previous bibasilar lung infiltrates have improved, with mild residual. The cardiac size is within normal limits. No pneumothorax, vascular congestion or pleural effusion is seen. Again noted is elevation of the right diaphragm. Previously seen right arm PICC line has been removed. IMPRESSION: Bibasilar lung infiltrates have improved since the prior study, with mild residual remaining.       SARS-CoV-2 Lab Results  \"Novel Coronavirus\" Test: No results found for: COV2NT   \"Emergent Disease\" Test: No results found for: EDPR  \"SARS-COV-2\" Test:   Lab Results   Component Value Date/Time    XGCOVT Detected (A) 07/02/2020 10:00 AM     \"Precision Labs\" Test: No results found for: RSLT  Rapid Test:   Lab Results   Component Value Date/Time    COVR Detected (A) 07/01/2020 03:27 PM              Assessment and Plan:     Hospital Problems as of 8/6/2020 Never Reviewed          Codes Class Noted - Resolved POA    HAP (hospital-acquired pneumonia) ICD-10-CM: J18.9, Y95  ICD-9-CM: 823  8/6/2020 - Present Yes        Elevated ALT measurement ICD-10-CM: R74.0  ICD-9-CM: 790.4  8/6/2020 - Present Yes        Elevated lactic acid level ICD-10-CM: R79.89  ICD-9-CM: 276.2  8/6/2020 - Present Yes        Hypomagnesemia ICD-10-CM: E83.42  ICD-9-CM: 275.2  8/6/2020 - Present Yes        Pneumonia due to COVID-19 virus ICD-10-CM: U07.1, J12.89  ICD-9-CM: 480.8  7/2/2020 - Present Yes        COPD exacerbation ([facility] ICD-10-CM: J44.1  ICD-9-CM: 491.21  7/2/2020 - Present Yes        Sepsis ([facility] ICD-10-CM: A41.9  ICD-9-CM: 038.9, 995.91  7/1/2020 - Present Yes        Leukocytosis ICD-10-CM: D00.923  ICD-9-CM: 288.60  9/26/2018 - Present Yes        * (Principal) Acute hypoxemic respiratory failure (HCC) ICD-10-CM: J96.01  ICD-9-CM: 518.81  9/26/2018 - Present Yes        Tobacco abuse ICD-10-CM: Z72.0  ICD-9-CM: 305.1  9/26/2018 - Present Yes              Plan:  # Acute hypoxemic respiratory failure   - Recent diagnosis of COVID-19. During her stay she was given convalescent plasma, dexamethasone and Remdesivir. She has no treatment options remaining if this is still sequelae from Jimmy. Her CT is negative for PE but does still show bilateral infiltrates as well as emphysema. Given recent hospitalizaiton I will cover for HAP. Will add steroids for suspected COPDe component. Follow up blood cultures and tailor abx accordingly. # Sepsis 2/2 pulm source   - As above. Abx. # Hypotension   - Resolved. Was on Levophed in ER and this has been weaned off. # Elevated LA   - Repeat this morning. # Transaminitis   - ALT>AST. Could be shock liver from hypotension but unsure why AST significantly less elevated. CMPs daily. # HypoMg   - 2g IV. # Elevated d-dimer   - Suspect 2/2 hypoxia. CT neg for PE. # DM2   - SSI    Discharge planning: Unclear, likely home. DVT ppx ordered: Lovenox  Code status:  Full code.   Estimated LOS:  Greater than 2 midnights  Risk:  high    Signed:  Cynthia Nickerson MD

## 2020-08-06 NOTE — PROGRESS NOTES
SW reviewed patient's chart on this date. Patient readmitted after recent DC to [facility] on 7/20/2020. Patient was at home prior to this readmission. Admitted by hospitalist service due to acute hypoxemic respiratory failure / COVID19 positive. Per chart review, patient has altered mental status and on 10L O2. SW attempted to contact son, Lachelle Mcnael, but he was not available at time of contact. LIYAH left message requesting returned call. Care Management Interventions  Mode of Transport at Discharge: Other (see comment)  Transition of Care Consult (CM Consult):  Other(No CM consult)  Discharge Durable Medical Equipment: No  Physical Therapy Consult: Yes  Occupational Therapy Consult: Yes  Speech Therapy Consult: No  Confirm Follow Up Transport: Family  Discharge Location  Discharge Placement: Unable to determine at this time

## 2020-08-06 NOTE — INTERDISCIPLINARY ROUNDS
Interdisciplinary team rounds were held 8/6/2020 with the following team members:Care Management, Nursing and Physician. Plan of care discussed. See clinical pathway and/or care plan for interventions and desired outcomes.

## 2020-08-06 NOTE — PROGRESS NOTES
Patient transported via wheelchair to 817. On 10 L HF NC.  Zosyn @ 25 ml/hr. No signs of distress. Transported by assistant Hannah.

## 2020-08-06 NOTE — PROGRESS NOTES
Received report from Jarrett Kaur [facility]. Patient asleep in bed. Respirations present. On 10 L HF NC. No signs of distress. Awakes to verbal stimuli. Eye opening spontaneous. AxO x4. No needs expressed. S1 and S2 noted. Radial and pedal pulses bilaterally. Bowel sounds active. Dual skin assessment completed with Madonna Strutton, Redness noted to bilateral feet. No pressure injuries noted. Cardiac monitor placed on patient. Box number [id number]. NSR per monitor room. SCD's placed on patient. Bed low and locked. Call light within reach. Will continue to monitor.

## 2020-08-06 NOTE — PROGRESS NOTES
TRANSFER - OUT REPORT:    Verbal report given to Dilia(name) on Matthew Drummond  being transferred to [facility](unit) for routine progression of care       Report consisted of patients Situation, Background, Assessment and   Recommendations(SBAR). Information from the following report(s) SBAR was reviewed with the receiving nurse. Lines:   PICC Double Lumen 54/89/55 Right;Basilic (Active)       Peripheral IV 08/06/20 Right Antecubital (Active)   Site Assessment Clean, dry, & intact 08/06/20 1106   Phlebitis Assessment 0 08/06/20 1106   Infiltration Assessment 0 08/06/20 1106   Dressing Status Clean, dry, & intact 08/06/20 1106   Dressing Type Transparent 08/06/20 1106   Hub Color/Line Status Capped 08/06/20 1106       Peripheral IV 08/06/20 Left Antecubital (Active)   Site Assessment Clean, dry, & intact 08/06/20 1106   Phlebitis Assessment 0 08/06/20 1106   Infiltration Assessment 0 08/06/20 1106   Dressing Status Clean, dry, & intact 08/06/20 1106   Dressing Type Transparent 08/06/20 1106   Hub Color/Line Status Infusing 08/06/20 1106        Opportunity for questions and clarification was provided.       Patient transported with:   O2 @ 10 liters

## 2020-08-06 NOTE — ED TRIAGE NOTES
BIB [facility]- family called due to patients altered mental status, back pain, hypoxic on her home O2  (wears 5LNC baseline) 76% on 5LNC with EMS, placed on NRB went up to 96%. Per family positive COVID test 3 days ago- was hospitalized here at that time at [facility] then sent home with home oxygen. Pt and family denies any drug use. Pt is very restless upon arrival. EMS unable to obtain a BP. Temp 101.5.

## 2020-08-06 NOTE — PROGRESS NOTES
Pharmacokinetic Consult to Pharmacist    Johnny Zane is a 60 y.o. female being treated for HAP with vancomycin. Height: 5' 3\" (160 cm)  Weight: 65 kg (143 lb 4.8 oz)  Lab Results   Component Value Date/Time    BUN 21 08/06/2020 12:16 AM    Creatinine 0.84 08/06/2020 12:16 AM    WBC 31.4 (H) 08/06/2020 12:16 AM    Procalcitonin 0.54 08/06/2020 12:16 AM    Lactic acid 2.4 (HH) 08/06/2020 12:16 AM    Lactic Acid (POC) 1.9 09/25/2018 10:19 PM      Estimated Creatinine Clearance: 61.3 mL/min (based on SCr of 0.84 mg/dL). CULTURES:  8/6 - Blood cultures pending    No results found for: VANCT, VANCR    Day 1 of vancomycin. Goal trough is 15-20. Will load with 1250 mg and then initiate 1000 mg every 12 hours for now. Pharmacy will order levels and adjust the dose as clinically indicated. Will continue to follow patient.       Thank you,  Grabiel Flood, PharmD, [address]  Clinical Pharmacy Specialist  [phone]

## 2020-08-06 NOTE — ED NOTES
TRANSFER - OUT REPORT:    Verbal report given to 5th floor RN on Sang Adames  being transferred to 5th floor for routine progression of care       Report consisted of patients Situation, Background, Assessment and   Recommendations(SBAR). Information from the following report(s) SBAR, ED Summary, [facility] and Recent Results was reviewed with the receiving nurse. Lines:   PICC Double Lumen 42/87/02 Right;Basilic (Active)       Peripheral IV 08/06/20 Right Antecubital (Active)       Peripheral IV 08/06/20 Left Antecubital (Active)   Site Assessment Clean, dry, & intact 08/06/20 0036   Phlebitis Assessment 0 08/06/20 0036   Infiltration Assessment 0 08/06/20 0036        Opportunity for questions and clarification was provided.       Patient transported with:   O2 @ 10 liters  Tech

## 2020-08-06 NOTE — PROGRESS NOTES
Attempted to call patient's child, Natalie Guidry, for a family update and to notify patient transfer to . No answer on home or mobile phone at this time.

## 2020-08-06 NOTE — ED PROVIDER NOTES
78-year-old female brought to ED by EMS for shortness of breath and altered mental status. Patient arrived is flailing in the bed speaking incoherently and in some respiratory distress. Patient is acting like someone who is on methamphetamines. Patient was recently in the hospital for pneumonia and sepsis was admitted July 1 and discharged July 20 during that stay she was found to have COVID at the end of that hospital stay her code was negative. The history is provided by the patient. Altered mental status    This is a recurrent problem. The current episode started yesterday. The problem has been rapidly worsening. Associated symptoms include confusion and agitation. Mental status baseline is normal.  Her past medical history does not include seizures, CVA or dementia. Fever           Past Medical History:   Diagnosis Date    [facility] disease) [facility] [address] [facility]     Diabetes ([facility] [address]        No past surgical history on file.       Family History:   Problem Relation Age of Onset    Cancer Mother     Liver Disease Father        Social History     Socioeconomic History    Marital status:      Spouse name: Not on file    Number of children: Not on file    Years of education: Not on file    Highest education level: Not on file   Occupational History    Not on file   Social Needs    Financial resource strain: Not on file    Food insecurity     Worry: Not on file     Inability: Not on file    Transportation needs     Medical: Not on file     Non-medical: Not on file   Tobacco Use    Smoking status: Current Every Day Smoker     Types: Cigars   Substance and Sexual Activity    Alcohol use: No    Drug use: No    Sexual activity: Never   Lifestyle    Physical activity     Days per week: Not on file     Minutes per session: Not on file    Stress: Not on file   Relationships    Social connections     Talks on phone: Not on file     Gets together: Not on file     Attends Ca[employer] service: Not on file     Active member of club or organization: Not on file     Attends meetings of clubs or organizations: Not on file     Relationship status: Not on file    Intimate partner violence     Fear of current or ex partner: Not on file     Emotionally abused: Not on file     Physically abused: Not on file     Forced sexual activity: Not on file   Other Topics Concern    Not on file   Social History Narrative    Not on file         ALLERGIES: Patient has no known allergies. Review of Systems   Constitutional: Positive for fever. Negative for activity change. HENT: Negative. Eyes: Negative. Respiratory: Negative. Cardiovascular: Negative. Gastrointestinal: Negative. Genitourinary: Negative. Musculoskeletal: Negative. Skin: Negative. Neurological: Negative. Psychiatric/Behavioral: Positive for agitation and confusion. All other systems reviewed and are negative. Vitals:    08/06/20 0610 08/06/20 0615 08/06/20 0620 08/06/20 0628   BP: 93/51 90/55 111/64    Pulse: 70 70 70 75   Resp: 15 21 18 21   Temp:       SpO2: 96% 97% 98% 98%   Weight:                Physical Exam  Vitals signs and nursing note reviewed. Constitutional:       General: She is in acute distress. Appearance: She is ill-appearing, toxic-appearing and diaphoretic. HENT:      Head: Normocephalic and atraumatic. Right Ear: External ear normal.      Left Ear: External ear normal.      Nose: Nose normal.      Mouth/Throat:      Pharynx: No oropharyngeal exudate. Eyes:      General: No scleral icterus. Right eye: No discharge. Left eye: No discharge. Conjunctiva/sclera: Conjunctivae normal.      Pupils: Pupils are equal, round, and reactive to light. Neck:      Musculoskeletal: Normal range of motion and neck supple. Vascular: No JVD. Trachea: No tracheal deviation. Cardiovascular:      Rate and Rhythm: Regular rhythm. Tachycardia present.    Pulmonary: Effort: Respiratory distress present. Breath sounds: No stridor. Wheezing and rhonchi present. Chest:      Chest wall: No tenderness. Abdominal:      General: Bowel sounds are normal. There is no distension. Palpations: Abdomen is soft. There is no mass. Tenderness: There is no abdominal tenderness. Musculoskeletal: Normal range of motion. General: No tenderness. Skin:     General: Skin is warm. Coloration: Skin is pale. Skin is not cyanotic. Findings: No erythema or rash. Neurological:      Mental Status: She is oriented to person, place, and time. GCS: GCS eye subscore is 4. GCS verbal subscore is 4. GCS motor subscore is 5. Cranial Nerves: No cranial nerve deficit. Psychiatric:         Behavior: Behavior is agitated. Thought Content: Thought content normal.         Cognition and Memory: Cognition is impaired. MDM  Number of Diagnoses or Management Options  Pneumonia of right middle lobe due to infectious organism: new and requires workup  Sepsis, due to unspecified organism, unspecified whether acute organ dysfunction present [facility]): new and requires workup  Diagnosis management comments: Patient was nearly uncontrollable when she arrived she appeared to be tweaking when asked if she used any drugs answered no then started her family gave her something they said would help her earlier. After she said that she did not want to talk about it anymore. Haldol was used to try to control her enough to get IVs and oxygen on her. Work-up finding included pneumonia and sepsis. Patient was hypotensive had to be given Levophed after 3 L of fluid did not bring her blood pressure above 90 and her map above 55. Levophed was kept on for several hours and slowly weaned off she was able to stay off the Levophed and keep a map of 65.   He was placed on high flow O2 which improved her hypoxia she had a CT of the chest she is admitted to the hospitalist service       Amount and/or Complexity of Data Reviewed  Clinical lab tests: ordered and reviewed  Tests in the radiology section of CPT®: ordered and reviewed  Tests in the medicine section of CPT®: ordered and reviewed    Risk of Complications, Morbidity, and/or Mortality  Presenting problems: high  Diagnostic procedures: high  Management options: high           CRITICAL CARE (ASAP ONLY)  Performed by: Loyd Fragoso MD  Authorized by: Loyd Fragoso MD     Critical care provider statement:     Critical care time (minutes):  45    Critical care time was exclusive of:  Separately billable procedures and treating other patients    Critical care was necessary to treat or prevent imminent or life-threatening deterioration of the following conditions:  Cardiac failure, respiratory failure, sepsis, shock, dehydration, CNS failure or compromise and circulatory failure    Critical care was time spent personally by me on the following activities:  Blood draw for specimens, development of treatment plan with patient or surrogate, discussions with consultants, evaluation of patient's response to treatment, examination of patient, gastric intubation, ordering and performing treatments and interventions, ordering and review of laboratory studies, ordering and review of radiographic studies, pulse oximetry, re-evaluation of patient's condition, review of old charts and vascular access procedures    I assumed direction of critical care for this patient from another provider in my specialty: no

## 2020-08-06 NOTE — PROGRESS NOTES
TRANSFER - IN REPORT:    Verbal report received from TYLER Langley on Ubaldo Flores being received from Emergency Department for routine progression of care. Report consisted of patients Situation, Background, Assessment and   Recommendations(SBAR). Information from the following report(s) SBAR, Kardex, ED Summary, Intake/Output, MAR and Recent Results was reviewed with the receiving nurse. Opportunity for questions and clarification was provided. Assessment will be completed upon patients arrival to unit and care assumed.

## 2020-08-06 NOTE — PROGRESS NOTES
STG:  (1.)Ms. Madiha Olson will move from supine to sit and sit to supine , scoot up and down, and roll side to side with SUPERVISION within 4 treatment day(s). (2.)Ms. Madiha Olson will transfer from bed to chair and chair to bed with SUPERVISION using the least restrictive device within 4 treatment day(s). (3.)Ms. Madiha Olson will ambulate with SUPERVISION for 75 feet with the least restrictive device within 4 treatment day(s). LTG:  (1.)Ms. Madiha Olson will move from supine to sit and sit to supine , scoot up and down, and roll side to side in bed with MODIFIED INDEPENDENCE within 7 treatment day(s). (2.)Ms. Madiha Olson will transfer from bed to chair and chair to bed with MODIFIED INDEPENDENCE using the least restrictive device within 7 treatment day(s). (3.)Ms. Madiha Olson will ambulate with MODIFIED INDEPENDENCE for 125 feet with the least restrictive device within 7 treatment day(s). ________________________________________________________________________________________________      PHYSICAL THERAPY: Initial Assessment and PM 8/6/2020  INPATIENT:    [place] / [place] / Product Type: Medicare /       NAME/AGE/GENDER: Ronnie Woo is a 60 y.o. female   PRIMARY DIAGNOSIS: Acute hypoxemic respiratory failure ([facility] [address] [J96.01]  Sepsis ([facility] [address] [A41.9] Acute hypoxemic respiratory failure ([facility] [address] Acute hypoxemic respiratory failure (HCC)        ICD-10: Treatment Diagnosis:    Generalized Muscle Weakness (M62.81)  Other lack of cordination (R27.8)  Difficulty in walking, Not elsewhere classified (R26.2)  Other abnormalities of gait and mobility (R26.89)   Precaution/Allergies:  Patient has no known allergies. ASSESSMENT:     Ms. Madiha Olson   is a pleasant disabled female with above diagnosis (post CV + and now CV -)   who demonstrates with decreased transfers, ambulation and mobility below her prior functional baseline. She is mildly confused and on 12 liters 02 nasal cannula, but no specific SOB. All transfers are currently limited at [facility] x 1 with fair trunk balance and performing therapeutic exercise in the bedside chair. Upon standing, this is followed by ambulation [facility] x 1 for 10 feet with the deficits stated below. She then returns to seated in the bedside chair. Skilled PT is indicated for this patient's functional mobility deficits. Patient requires cues to perform exercises correctly. Overall good progress towards physical therapy goals is anticipated. Goals listed above are appropriate. PT will continue efforts as patient is still below functional baseline. This section established at most recent assessment   PROBLEM LIST (Impairments causing functional limitations):  Decreased Strength  Decreased ADL/Functional Activities  Decreased Transfer Abilities  Decreased Ambulation Ability/Technique  Decreased Balance  Decreased Activity Tolerance  Decreased [place] with Home Exercise Program   INTERVENTIONS PLANNED: (Benefits and precautions of physical therapy have been discussed with the patient.)  Balance Exercise  Bed Mobility  Home Exercise Program (HEP)  Manual Therapy  Neuromuscular Re-education/Strengthening  Range of Motion (ROM)  Therapeutic Activites  Therapeutic Exercise/Strengthening     TREATMENT PLAN: Frequency/Duration: 3 times a week for duration of hospital stay  Rehabilitation Potential For Stated Goals: Good     REHAB RECOMMENDATIONS (at time of discharge pending progress):    Placement: It is my opinion, based on this patient's performance to date, that Ms. Steven Dueñas may benefit from [address] after discharge due to the functional deficits listed above that are likely to improve with skilled rehabilitation because he/she has multiple medical issues that affect his/her functional mobility in the community. Equipment:   None at this time              HISTORY:   History of Present Injury/Illness (Reason for Referral):  PER MD H&P     Mrs. Steven Dueñas is a 59 y/o WF with a h/o COPD and DM2 who was hospitalized from July 1 to July 20 after presenting with acute hypoxemic respiratory failure and pneumonia secondary to COVID-19. She received convalescent plasma and completed courses of both dexamethasone and Remdesivir. She was discharged on 7/20 on 4-6L NC O2. Says she stopped smoking prior to her last hospitalization but does live with family and some of them smoke inside the house (but apparently she told them to stop 2-3 days ago). She developed SOB a few days ago but otherwise denies fevers, headaches, chest pain ,N/V/D, abdominal pain, edema. EMS called to the house tonight and she was found to be 76% on 5L NC O2. On arrival in ER she was hypotensive at 73/33 and started on Levophed drip. Also agitated requiring Haldol. She was given 3L IVFs and antibiotics. Her Levophed was weaned off with improvement of BPs. CXR with improved b/l infiltrates. D-dimer elevated at 19 so she was empirically started on a heparin drip. CT chest negative for PE, also shows emphysema and b/l infiltrates (R>L). Her heparin drip is now off. WBCs 31K with normal diff, Mg 1.7, LA 2.4, ALT 1247, , PCT 0.54, CK 1079. UDS + amphetamines. Hospitalist consulted for admission and further management. 10 systems reviewed and negative except as noted in HPI. Past Medical History/Comorbidities:   Ms. Tariq Mott  has a past medical history of COPD (chronic obstructive pulmonary disease) ([facility] [address] and Diabetes ([facility] [address] Ms. Tariq Mott  has no past surgical history on file. Social History/Living Environment:   Home Environment: Private residence  # Steps to Enter: 0  One/Two Story Residence: One story  Living Alone: No  Support Systems: Family member(s)  Patient Expects to be Discharged to[de-identified] Unknown  Current DME Used/Available at Home: None  Prior Level of Function/Work/Activity:  Per patient had been independent with all functional mobility .     Dominant Side:         RIGHT    Personal Factors:          Sex: female        Age:  60 y.o. Number of Personal Factors/Comorbidities that affect the Plan of Care: 0: LOW COMPLEXITY   EXAMINATION:   Most Recent Physical Functioning:   Gross Assessment:  AROM: Generally decreased, functional  Strength: Generally decreased, functional  Coordination: Generally decreased, functional  Tone: Normal  Sensation: Intact               Posture:  Posture (WDL): Exceptions to WDL  Posture Assessment: Cervical, Forward head  Balance:  Sitting: Impaired  Sitting - Static: Fair (occasional)  Sitting - Dynamic: Fair (occasional)  Standing: Impaired  Standing - Static: Fair  Standing - Dynamic : Fair Bed Mobility:  Rolling: (already seated.  )  Scooting: Contact guard assistance  Wheelchair Mobility:     Transfers:  Sit to Stand: Contact guard assistance  Stand to Sit: Contact guard assistance  Bed to Chair: Contact guard assistance  Gait:     Base of Support: Center of gravity altered  Speed/Katt: Fluctuations; Pace decreased (<100 feet/min); Shuffled; Slow  Step Length: Left shortened;Right shortened  Swing Pattern: Left asymmetrical;Right asymmetrical  Stance: Left decreased;Right decreased;Time;Weight shift  Gait Abnormalities: Decreased step clearance  Distance (ft): 20 Feet (ft)  Assistive Device: (HHA x 1 )  Ambulation - Level of Assistance: Contact guard assistance  Interventions: Manual cues; Safety awareness training; Tactile cues; Verbal cues; Visual/Demos      Body Structures Involved:  Bones  Joints  Muscles  Ligaments Body Functions Affected:  Neuromusculoskeletal  Movement Related  Skin Related  Metobolic/Endocrine Activities and Participation Affected:  General Tasks and Demands  Communication  Mobility  Self Care  Community, Social and [place]   Number of elements that affect the Plan of Care: 1-2: LOW COMPLEXITY   CLINICAL PRESENTATION:   Presentation: Stable and uncomplicated: LOW COMPLEXITY   CLINICAL DECISION MAKIN[address] [place] [id number] [facility] 6 Pending sale to [facility]. [place] [address] Inpatient Short Form  How much difficulty does the patient currently have. .. Unable A Lot A Little None   1. Turning over in bed (including adjusting bedclothes, sheets and blankets)? [] 1   [] 2   [x] 3   [] 4   2. Sitting down on and standing up from a chair with arms ( e.g., wheelchair, bedside commode, etc.)   [] 1   [] 2   [x] 3   [] 4   3. Moving from lying on back to sitting on the side of the bed? [] 1   [] 2   [x] 3   [] 4   How much help from another person does the patient currently need. .. Total A Lot A Little None   4. Moving to and from a bed to a chair (including a wheelchair)? [] 1   [] 2   [x] 3   [] 4   5. Need to walk in hospital room? [] 1   [] 2   [x] 3   [] 4   6. Climbing 3-5 steps with a railing? [] 1   [x] 2   [] 3   [] 4   © 2007, Trustees of [address] [place] [address], under license to [employer]. All rights reserved      Score:  Initial: 17 Most Recent: X (Date: -- )    Interpretation of Tool:  Represents activities that are increasingly more difficult (i.e. Bed mobility, Transfers, Gait). Medical Necessity:     Patient demonstrates   good   rehab potential due to higher previous functional level. Reason for Services/Other Comments:  Patient continues to require skilled intervention due to   medical complications, patient unable to attend/participate in therapy as expected, and debility and decreased mobility. .   Use of outcome tool(s) and clinical judgement create a POC that gives a: Clear prediction of patient's progress: LOW COMPLEXITY            TREATMENT:   (In addition to Assessment/Re-Assessment sessions the following treatments were rendered)   Pre-treatment Symptoms/Complaints:  0/10   Pain: Initial:   Pain Intensity 1: 0  Post Session:  0/10      Therapeutic Activity: (    15 mins): Therapeutic activities including transfers, Chair transfers, and Ambulation on level ground to improve mobility, strength, balance, and coordination.   Required minimal Manual cues;Safety awareness training; Tactile cues; Verbal cues; Visual/Demos to promote coordination of bilateral, lower extremity(s) and promote motor control of bilateral, lower extremity(s). Therapeutic Exercise: ( 8 mins):  Exercises per grid below to improve mobility, strength, balance, and coordination. Required minimal visual, verbal, manual, and tactile cues to promote proper body alignment, promote proper body posture, and promote proper body mechanics. Progressed complexity of movement as indicated. Date:  8/6/20  Date:   Date:     Activity/Exercise Parameters Parameters Parameters   AP's  10 x's      LAQ's  10 x's      HIP ABD  10 x's      MARCHES  10 x's                         Braces/Orthotics/Lines/Etc:   IV  PUREWICK   O2 Device: Hi flow nasal cannula  Treatment/Session Assessment:    Response to Treatment:  improved transfers, ambulation and mobility from chair today without shortness of breath although on 12 liters 02 nasal cannula. Interdisciplinary Collaboration:   Physical Therapist  Registered Nurse  After treatment position/precautions:   Up in chair  Bed alarm/tab alert on  Bed/Chair-wheels locked  Bed in low position  Caregiver at bedside  Call light within reach  RN notified   Compliance with Program/Exercises: Will assess as treatment progresses  Recommendations/Intent for next treatment session: \"Next visit will focus on advancements to more challenging activities, reduction in assistance provided, and transfers, ambulation and mobility. \".   Total Treatment Duration:  PT Patient Time In/Time Out  Time In: 1150  Time Out: [address] Edvin Wade

## 2020-08-06 NOTE — PROGRESS NOTES
Patient lying in bed with eyes open. Alert and oriented x 4. Respirations even and unlabored on 10 L/NC. Dyspnea on exertion. Breath sounds diminished throughout. Telemetry intact and pt in NSR. Abdomen soft and non tender. Bowel sounds present. Bilateral lower extremities red. Pt up with +1 assist. Bed low and locked. Call light in reach. Will continue to monitor.

## 2020-08-06 NOTE — PROGRESS NOTES
Problem: Patient Education: Go to Patient Education Activity  Goal: Patient/Family Education  Description: 1. Patient will complete lower body bathing and dressing with MOD I and adaptive equipment as needed. 2. Patient will complete toileting with MOD I.   3. Patient will tolerate 25 minutes of OT treatment with 1-2 rest breaks to increase activity tolerance for ADLs. 4. Patient will complete functional transfers with MOD I and adaptive equipment as needed. 5. Patient will verbalize 3 energy conservation strategies to increase activity tolerance for ADL performance. 6. Patient will complete functional mobility for household distances with MOD I and adaptive equipment as needed. 7. Patient will complete self-grooming while standing edge of sink with MOD I and adaptive equipment as needed. Timeframe: 7 visits       Outcome: Progressing Towards Goal      OCCUPATIONAL THERAPY: Initial Assessment, Daily Note, and AM 8/6/2020  INPATIENT: OT Visit Days: 1  [place] / [place] / Product Type: Medicare /      NAME/AGE/GENDER: Sang Adames is a 60 y.o. female   PRIMARY DIAGNOSIS:  Acute hypoxemic respiratory failure ([facility] [address] [J96.01]  Sepsis ([address] [A41.9] Acute hypoxemic respiratory failure ([address] Acute hypoxemic respiratory failure ([address]        ICD-10: Treatment Diagnosis:    Generalized Muscle Weakness (M62.81)   Precautions/Allergies:     Patient has no known allergies. ASSESSMENT:     Ms. Sylvester Rose presents for the above diagnoses. Upon arrival, pt supine in bed and agreeable to OT evaluation. Pt is very lethargic today, however oriented x 4. Patient known to therapist from prior hospitalization. Since her last hospitalization, pt was living with family in a 1-story home. At baseline, pt was independent with ADLs and functional mobility. Pt denies any recent falls. Currently resting on 12L 02 via n.c.   Today, pt presents with deficits in overall strength, activity tolerance, ADL performance, and functional mobility. Pt transitioned to sitting edge of bed with SBA. Static and dynamic sitting balance are intact with no additional support. Pt then stood and ambulated ~5 steps to bedside chair with CGA. Good static standing balance and fair (+) dynamic standing balance with no DME required. Pt placed sitting upright in bedside chair and left with needs met, call light within reach, posey alarm on, and RN notified. At this time, Jese Edwards is functioning below baseline for ADLs and functional mobility. Pt would benefit from skilled OT services to address OT goals and plan of care. This section established at most recent assessment   PROBLEM LIST (Impairments causing functional limitations):  Decreased Strength  Decreased ADL/Functional Activities  Decreased Transfer Abilities  Decreased Ambulation Ability/Technique  Decreased Balance  Decreased Activity Tolerance  Decreased Pacing Skills  Decreased Work Simplification/Energy Conservation Techniques  Increased Shortness of Breath   INTERVENTIONS PLANNED: (Benefits and precautions of occupational therapy have been discussed with the patient.)  Activities of daily living training  Adaptive equipment training  Balance training  Clothing management  Community reintergration  Donning&doffing training  Neuromuscular re-eduation  Re-evaluation  Therapeutic activity  Therapeutic exercise     TREATMENT PLAN: Frequency/Duration: Follow patient 3x/week to address above goals. Rehabilitation Potential For Stated Goals: Good     REHAB RECOMMENDATIONS (at time of discharge pending progress):    Placement: It is my opinion, based on this patient's performance to date, that Ms. Alison Ramachandran may benefit from [address] after discharge due to the functional deficits listed above that are likely to improve with skilled rehabilitation because he/she has multiple medical issues that affect his/her functional mobility in the [facility]. Equipment:   TBD              OCCUPATIONAL PROFILE AND HISTORY:   History of Present Injury/Illness (Reason for Referral):  See H&P  Past Medical History/Comorbidities:   Ms. Yenny Yanez  has a past medical history of COPD (chronic obstructive pulmonary disease) ([facility] [address] and Diabetes ([facility] [address] Ms. Yenny Yanez  has no past surgical history on file. Social History/Living Environment:   Home Environment: Private residence  # Steps to Enter: 2  One/Two Story Residence: One story  Living Alone: Yes  Support Systems: Family member(s)  Patient Expects to be Discharged to[de-identified] Private residence  Current DME Used/Available at Home: None  Prior Level of Function/Work/Activity:  Independent with ADLs and functional mobility. Personal Factors:          Sex:  female        Age:  60 y.o. Other factors that influence how disability is experienced by the patient:  multiple co-morbidities    Number of Personal Factors/Comorbidities that affect the Plan of Care: Brief history (0):  LOW COMPLEXITY   ASSESSMENT OF OCCUPATIONAL PERFORMANCE[de-identified]   Activities of Daily Living:   Basic ADLs (From Assessment) Complex ADLs (From Assessment)   Feeding: Setup  Oral Facial Hygiene/Grooming: Setup  Bathing: Minimum assistance  Upper Body Dressing: Setup  Lower Body Dressing: Minimum assistance  Toileting: Minimum assistance Instrumental ADL  Meal Preparation: Moderate assistance  Homemaking:  Moderate assistance   Grooming/Bathing/Dressing Activities of Daily Living     Cognitive Retraining  Safety/Judgement: Fall prevention                       Bed/Mat Mobility  Rolling: Stand-by assistance  Supine to Sit: Stand-by assistance  Sit to Stand: Stand-by assistance  Stand to Sit: Stand-by assistance  Bed to Chair: Stand-by assistance  Scooting: Stand-by assistance     Most Recent Physical Functioning:   Gross Assessment:  AROM: Generally decreased, functional  Strength: Generally decreased, functional  Coordination: Generally decreased, functional  Tone: Normal  Sensation: Intact               Posture:  Posture (WDL): Exceptions to WDL  Posture Assessment: Cervical, Forward head  Balance:  Sitting: Impaired  Sitting - Static: Fair (occasional)  Sitting - Dynamic: Fair (occasional)  Standing: Impaired  Standing - Static: Fair  Standing - Dynamic : Fair Bed Mobility:  Rolling: Stand-by assistance  Supine to Sit: Stand-by assistance  Scooting: Stand-by assistance  Wheelchair Mobility:     Transfers:  Sit to Stand: Stand-by assistance  Stand to Sit: Stand-by assistance  Bed to Chair: Stand-by assistance            Patient Vitals for the past 6 hrs:   BP BP Patient Position SpO2 O2 Flow Rate (L/min) Pulse   20 0831 157/71 -- 99 % -- 73   20 1130 122/61 -- 100 % -- 73   20 1200 -- -- 99 % 10 l/min 71   20 1259 142/73 Sitting 97 % -- 87       Mental Status  Neurologic State: Alert  Orientation Level: Oriented X4  Cognition: Follows commands  Perception: Appears intact  Perseveration: No perseveration noted  Safety/Judgement: Fall prevention                          Physical Skills Involved:  Balance  Strength  Activity Tolerance  Gross Motor Control Cognitive Skills Affected (resulting in the inability to perform in a timely and safe manner):  none Psychosocial Skills Affected:  Habits/Routines  Environmental Adaptation   Number of elements that affect the Plan of Care: 5+:  HIGH COMPLEXITY   CLINICAL DECISION MAKIN [facility] [address] [id number] AM-PAC 6 Clicks   Daily Activity Inpatient Short Form  How much help from another person does the patient currently need. .. Total A Lot A Little None   1. Putting on and taking off regular lower body clothing? [] 1   [] 2   [x] 3   [] 4   2. Bathing (including washing, rinsing, drying)? [] 1   [] 2   [x] 3   [] 4   3. Toileting, which includes using toilet, bedpan or urinal?   [] 1   [] 2   [x] 3   [] 4   4. Putting on and taking off regular upper body clothing?    [] 1   [] 2   [x] 3 [] 4   5. Taking care of personal grooming such as brushing teeth? [] 1   [] 2   [x] 3   [] 4   6. Eating meals? [] 1   [] 2   [x] 3   [] 4   © 2007, Trustees of [address] [place] [id number] [address] [id number], under license to [employer]. All rights reserved      Score:  Initial: 18 Most Recent: X (Date: -- )    Interpretation of Tool:  Represents activities that are increasingly more difficult (i.e. Bed mobility, Transfers, Gait). Medical Necessity:     Patient demonstrates   good   rehab potential due to higher previous functional level. Reason for Services/Other Comments:  Patient continues to require skilled intervention due to   medical complications and patient unable to attend/participate in therapy as expected  . Use of outcome tool(s) and clinical judgement create a POC that gives a: LOW COMPLEXITY         TREATMENT:   (In addition to Assessment/Re-Assessment sessions the following treatments were rendered)     Pre-treatment Symptoms/Complaints:    Pain: Initial:   Pain Intensity 1: 0  Post Session:  same     Therapeutic Activity: (    10 minutes): Therapeutic activities including Bed transfers, Chair transfers, Ambulation on level ground to improve mobility, strength, balance, and coordination. Required minimal Manual cues; Safety awareness training; Tactile cues; Verbal cues; Visual/Demos to promote static and dynamic balance in sitting and standing. Braces/Orthotics/Lines/Etc:   O2 Device: Hi flow nasal cannula  Treatment/Session Assessment:    Response to Treatment:  tolerated well with no issues noted. Interdisciplinary Collaboration:   Occupational Therapist  Registered Nurse  After treatment position/precautions:   Up in chair  Bed alarm/tab alert on  Bed/Chair-wheels locked  Bed in low position  Call light within reach  RN notified   Compliance with Program/Exercises: Will assess as treatment progresses. Recommendations/Intent for next treatment session:   \"Next visit will focus on advancements to more challenging activities and reduction in assistance provided\".   Total Treatment Duration:  OT Patient Time In/Time Out  Time In: 7636  Time Out: [address] [facility], OT

## 2020-08-07 ENCOUNTER — HOME HEALTH ADMISSION (OUTPATIENT)
Dept: HOME HEALTH SERVICES | Facility: HOME HEALTH | Age: 65
End: 2020-08-07
Payer: MEDICARE

## 2020-08-07 LAB
ALBUMIN SERPL-MCNC: 2.1 G/DL (ref 3.2–4.6)
ALBUMIN/GLOB SERPL: 0.6 {RATIO} (ref 1.2–3.5)
ALP SERPL-CCNC: 152 U/L (ref 50–136)
ALT SERPL-CCNC: 704 U/L (ref 12–65)
ANION GAP SERPL CALC-SCNC: 6 MMOL/L (ref 7–16)
AST SERPL-CCNC: 91 U/L (ref 15–37)
BASOPHILS # BLD: 0.1 K/UL (ref 0–0.2)
BASOPHILS NFR BLD: 1 % (ref 0–2)
BILIRUB SERPL-MCNC: 0.9 MG/DL (ref 0.2–1.1)
BUN SERPL-MCNC: 13 MG/DL (ref 8–23)
CALCIUM SERPL-MCNC: 7.9 MG/DL (ref 8.3–10.4)
CHLORIDE SERPL-SCNC: 110 MMOL/L (ref 98–107)
CO2 SERPL-SCNC: 26 MMOL/L (ref 21–32)
COVID-19 RAPID TEST, COVR: NOT DETECTED
CREAT SERPL-MCNC: 0.55 MG/DL (ref 0.6–1)
DIFFERENTIAL METHOD BLD: ABNORMAL
EOSINOPHIL # BLD: 0 K/UL (ref 0–0.8)
EOSINOPHIL NFR BLD: 0 % (ref 0.5–7.8)
ERYTHROCYTE [DISTWIDTH] IN BLOOD BY AUTOMATED COUNT: 19.6 % (ref 11.9–14.6)
GLOBULIN SER CALC-MCNC: 3.7 G/DL (ref 2.3–3.5)
GLUCOSE BLD STRIP.AUTO-MCNC: 155 MG/DL (ref 65–100)
GLUCOSE BLD STRIP.AUTO-MCNC: 219 MG/DL (ref 65–100)
GLUCOSE BLD STRIP.AUTO-MCNC: 238 MG/DL (ref 65–100)
GLUCOSE BLD STRIP.AUTO-MCNC: 252 MG/DL (ref 65–100)
GLUCOSE SERPL-MCNC: 149 MG/DL (ref 65–100)
HCT VFR BLD AUTO: 36.1 % (ref 35.8–46.3)
HGB BLD-MCNC: 11.1 G/DL (ref 11.7–15.4)
IMM GRANULOCYTES # BLD AUTO: 0.6 K/UL (ref 0–0.5)
IMM GRANULOCYTES NFR BLD AUTO: 4 % (ref 0–5)
LYMPHOCYTES # BLD: 1.4 K/UL (ref 0.5–4.6)
LYMPHOCYTES NFR BLD: 9 % (ref 13–44)
MAGNESIUM SERPL-MCNC: 2 MG/DL (ref 1.8–2.4)
MCH RBC QN AUTO: 28.5 PG (ref 26.1–32.9)
MCHC RBC AUTO-ENTMCNC: 30.7 G/DL (ref 31.4–35)
MCV RBC AUTO: 92.8 FL (ref 79.6–97.8)
MONOCYTES # BLD: 0.5 K/UL (ref 0.1–1.3)
MONOCYTES NFR BLD: 3 % (ref 4–12)
NEUTS SEG # BLD: 13.4 K/UL (ref 1.7–8.2)
NEUTS SEG NFR BLD: 84 % (ref 43–78)
NRBC # BLD: 0.04 K/UL (ref 0–0.2)
PLATELET # BLD AUTO: 187 K/UL (ref 150–450)
PMV BLD AUTO: 9.9 FL (ref 9.4–12.3)
POTASSIUM SERPL-SCNC: 4.4 MMOL/L (ref 3.5–5.1)
PROT SERPL-MCNC: 5.8 G/DL (ref 6.3–8.2)
RBC # BLD AUTO: 3.89 M/UL (ref 4.05–5.2)
SARS COV-2, XPGCVT: NEGATIVE
SODIUM SERPL-SCNC: 142 MMOL/L (ref 136–145)
SOURCE, COVRS: NORMAL
VANCOMYCIN TROUGH SERPL-MCNC: 10.5 UG/ML (ref 5–20)
WBC # BLD AUTO: 16 K/UL (ref 4.3–11.1)

## 2020-08-07 PROCEDURE — 74011250636 HC RX REV CODE- 250/636: Performed by: INTERNAL MEDICINE

## 2020-08-07 PROCEDURE — 94760 N-INVAS EAR/PLS OXIMETRY 1: CPT

## 2020-08-07 PROCEDURE — 77010033711 HC HIGH FLOW OXYGEN

## 2020-08-07 PROCEDURE — 80053 COMPREHEN METABOLIC PANEL: CPT

## 2020-08-07 PROCEDURE — 74011000250 HC RX REV CODE- 250: Performed by: INTERNAL MEDICINE

## 2020-08-07 PROCEDURE — 83735 ASSAY OF MAGNESIUM: CPT

## 2020-08-07 PROCEDURE — 65270000029 HC RM PRIVATE

## 2020-08-07 PROCEDURE — 85025 COMPLETE CBC W/AUTO DIFF WBC: CPT

## 2020-08-07 PROCEDURE — 94640 AIRWAY INHALATION TREATMENT: CPT

## 2020-08-07 PROCEDURE — 82962 GLUCOSE BLOOD TEST: CPT

## 2020-08-07 PROCEDURE — 36415 COLL VENOUS BLD VENIPUNCTURE: CPT

## 2020-08-07 PROCEDURE — 74011636637 HC RX REV CODE- 636/637: Performed by: INTERNAL MEDICINE

## 2020-08-07 PROCEDURE — 74011250637 HC RX REV CODE- 250/637: Performed by: INTERNAL MEDICINE

## 2020-08-07 PROCEDURE — 74011000258 HC RX REV CODE- 258: Performed by: INTERNAL MEDICINE

## 2020-08-07 PROCEDURE — BT40ZZZ ULTRASONOGRAPHY OF BLADDER: ICD-10-PCS | Performed by: INTERNAL MEDICINE

## 2020-08-07 PROCEDURE — 51798 US URINE CAPACITY MEASURE: CPT

## 2020-08-07 PROCEDURE — 80202 ASSAY OF VANCOMYCIN: CPT

## 2020-08-07 RX ORDER — SODIUM CHLORIDE 9 MG/ML
75 INJECTION, SOLUTION INTRAVENOUS CONTINUOUS
Status: DISPENSED | OUTPATIENT
Start: 2020-08-07 | End: 2020-08-08

## 2020-08-07 RX ORDER — FAMOTIDINE 20 MG/1
20 TABLET, FILM COATED ORAL 2 TIMES DAILY
Status: DISCONTINUED | OUTPATIENT
Start: 2020-08-07 | End: 2020-08-11

## 2020-08-07 RX ORDER — ALBUTEROL SULFATE 0.83 MG/ML
2.5 SOLUTION RESPIRATORY (INHALATION)
Status: DISCONTINUED | OUTPATIENT
Start: 2020-08-07 | End: 2020-08-10

## 2020-08-07 RX ORDER — L. ACIDOPHILUS/L.BULGARICUS 100MM CELL
1 GRANULES IN PACKET (EA) ORAL 2 TIMES DAILY
Status: DISCONTINUED | OUTPATIENT
Start: 2020-08-07 | End: 2020-08-11

## 2020-08-07 RX ORDER — BUDESONIDE 0.5 MG/2ML
500 INHALANT ORAL
Status: DISCONTINUED | OUTPATIENT
Start: 2020-08-07 | End: 2020-08-13 | Stop reason: HOSPADM

## 2020-08-07 RX ORDER — INSULIN LISPRO 100 [IU]/ML
INJECTION, SOLUTION INTRAVENOUS; SUBCUTANEOUS
Status: DISCONTINUED | OUTPATIENT
Start: 2020-08-07 | End: 2020-08-11

## 2020-08-07 RX ADMIN — ALBUTEROL SULFATE 2.5 MG: 2.5 SOLUTION RESPIRATORY (INHALATION) at 14:06

## 2020-08-07 RX ADMIN — ACETAMINOPHEN 650 MG: 325 TABLET, FILM COATED ORAL at 09:34

## 2020-08-07 RX ADMIN — INSULIN LISPRO 2 UNITS: 100 INJECTION, SOLUTION INTRAVENOUS; SUBCUTANEOUS at 08:44

## 2020-08-07 RX ADMIN — PIPERACILLIN AND TAZOBACTAM 4.5 G: 4; .5 INJECTION, POWDER, FOR SOLUTION INTRAVENOUS at 08:47

## 2020-08-07 RX ADMIN — ALBUTEROL SULFATE 2.5 MG: 2.5 SOLUTION RESPIRATORY (INHALATION) at 19:09

## 2020-08-07 RX ADMIN — BUDESONIDE 500 MCG: 0.5 INHALANT RESPIRATORY (INHALATION) at 14:06

## 2020-08-07 RX ADMIN — Medication 10 ML: at 05:06

## 2020-08-07 RX ADMIN — PANTOPRAZOLE SODIUM 40 MG: 40 TABLET, DELAYED RELEASE ORAL at 08:39

## 2020-08-07 RX ADMIN — FAMOTIDINE 20 MG: 20 TABLET, FILM COATED ORAL at 21:03

## 2020-08-07 RX ADMIN — METHYLPREDNISOLONE SODIUM SUCCINATE 40 MG: 40 INJECTION, POWDER, FOR SOLUTION INTRAMUSCULAR; INTRAVENOUS at 08:44

## 2020-08-07 RX ADMIN — PIPERACILLIN AND TAZOBACTAM 4.5 G: 4; .5 INJECTION, POWDER, FOR SOLUTION INTRAVENOUS at 17:33

## 2020-08-07 RX ADMIN — PIPERACILLIN AND TAZOBACTAM 3.38 G: 3; .375 INJECTION, POWDER, FOR SOLUTION INTRAVENOUS at 01:20

## 2020-08-07 RX ADMIN — SODIUM CHLORIDE 500 ML: 900 INJECTION, SOLUTION INTRAVENOUS at 11:42

## 2020-08-07 RX ADMIN — BUDESONIDE 500 MCG: 0.5 INHALANT RESPIRATORY (INHALATION) at 19:09

## 2020-08-07 RX ADMIN — ENOXAPARIN SODIUM 40 MG: 40 INJECTION SUBCUTANEOUS at 08:44

## 2020-08-07 RX ADMIN — Medication 10 ML: at 21:06

## 2020-08-07 RX ADMIN — INSULIN LISPRO 4 UNITS: 100 INJECTION, SOLUTION INTRAVENOUS; SUBCUTANEOUS at 21:02

## 2020-08-07 RX ADMIN — ALBUTEROL SULFATE 2 PUFF: 108 INHALANT RESPIRATORY (INHALATION) at 07:40

## 2020-08-07 RX ADMIN — ALBUTEROL SULFATE 2 PUFF: 108 INHALANT RESPIRATORY (INHALATION) at 11:12

## 2020-08-07 RX ADMIN — VANCOMYCIN HYDROCHLORIDE 1000 MG: 1 INJECTION, POWDER, LYOPHILIZED, FOR SOLUTION INTRAVENOUS at 17:34

## 2020-08-07 RX ADMIN — VANCOMYCIN HYDROCHLORIDE 1000 MG: 1 INJECTION, POWDER, LYOPHILIZED, FOR SOLUTION INTRAVENOUS at 04:58

## 2020-08-07 RX ADMIN — LACTOBACILLUS ACIDOPHILUS / LACTOBACILLUS BULGARICUS 1 PACKET: 100 MILLION CFU STRENGTH GRANULES at 21:03

## 2020-08-07 NOTE — PROGRESS NOTES
Name: Sang Adames MRN: [id number]  : 1955  Age:65 y.o.  female  Admit Date:  2020 LOS: 1      Hospitalist Progress Note    Sang Adames is a 60 y.o. female with medical history significant for COPD , DM2, recent [address] Infection( positive on  and neg on ) who presented from home due to shortness of breath. She was admitted with sepsis possibly secondary to COPD exacerbation. She is improving with intravenous antibiotics and IV hydration. ROS: 10 point review of systems is otherwise negative with the exception of the elements mentioned above. Objective:    Patient Vitals for the past 24 hrs:   Temp Pulse Resp BP SpO2   20 1540 97.6 °F (36.4 °C) 84 20 109/52 95 %   20 1406     96 %   20 1200  90      20 1140 97.7 °F (36.5 °C) 100 20 124/61 96 %   20 1112     92 %   20 0800  87      20 0741     93 %   20 0740 97 °F (36.1 °C) 87 20 144/69 98 %   20 0400  84      20 0349 97.4 °F (36.3 °C) 83 17 114/70 98 %   20 0000  83      20 2346 98.2 °F (36.8 °C) 83 19 130/78 94 %   20  83      20 195     96 %   20 195 97.8 °F (36.6 °C) 82 16 108/53 95 %     Oxygen Therapy  O2 Sat (%): 95 % (20 1540)  Pulse via Oximetry: 87 beats per minute (20 1406)  O2 Device: Hi flow nasal cannula (20 140)  O2 Flow Rate (L/min): 10 l/min (20 140)    Estimated body mass index is 25.38 kg/m² as calculated from the following:    Height as of this encounter: 5' 3\" (1.6 m). Weight as of this encounter: 65 kg (143 lb 4.8 oz). Intake/Output Summary (Last 24 hours) at 2020 1733  Last data filed at 2020 1540  Gross per 24 hour   Intake    Output 2100 ml   Net -2100 ml       *Note that automatically entered I/Os may not be accurate; dependent on patient compliance with collection and accurate  by techs.     Physical Exam:   General:     Chronically ill appearing, alert and awake. Head:   normocephalic, atraumatic  Eyes, Ears, nose: PERRL  Neck:    supple, non-tender. Trachea midline. Lungs:   Not decreased breath sounds in both lung bases  Cardiac:   RRR, Normal S1 and S2. Abdomen:   Soft, non distended, nontender, +BS  Extremities:   Warm, dry. No edema   Skin:   No rashes, no jaundice  Neuro:  AAOx3. No gross focal neurological deficit  Psychiatric:  No anxiety, calm, cooperative    Data Review:  I have reviewed all labs, meds, and studies from the last 24 hours:    Recent Results (from the past 24 hour(s))   GLUCOSE, POC    Collection Time: 08/06/20  9:07 PM   Result Value Ref Range    Glucose (POC) 330 (H) 65 - 612 mg/dL   METABOLIC PANEL, COMPREHENSIVE    Collection Time: 08/07/20  5:53 AM   Result Value Ref Range    Sodium 142 136 - 145 mmol/L    Potassium 4.4 3.5 - 5.1 mmol/L    Chloride 110 (H) 98 - 107 mmol/L    CO2 26 21 - 32 mmol/L    Anion gap 6 (L) 7 - 16 mmol/L    Glucose 149 (H) 65 - 100 mg/dL    BUN 13 8 - 23 MG/DL    Creatinine 0.55 (L) 0.6 - 1.0 MG/DL    GFR est AA >60 >60 ml/min/1.73m2    GFR est non-AA >60 >60 ml/min/1.73m2    Calcium 7.9 (L) 8.3 - 10.4 MG/DL    Bilirubin, total 0.9 0.2 - 1.1 MG/DL    ALT (SGPT) 704 (H) 12 - 65 U/L    AST (SGOT) 91 (H) 15 - 37 U/L    Alk.  phosphatase 152 (H) 50 - 136 U/L    Protein, total 5.8 (L) 6.3 - 8.2 g/dL    Albumin 2.1 (L) 3.2 - 4.6 g/dL    Globulin 3.7 (H) 2.3 - 3.5 g/dL    A-G Ratio 0.6 (L) 1.2 - 3.5     CBC WITH AUTOMATED DIFF    Collection Time: 08/07/20  5:53 AM   Result Value Ref Range    WBC 16.0 (H) 4.3 - 11.1 K/uL    RBC 3.89 (L) 4.05 - 5.2 M/uL    HGB 11.1 (L) 11.7 - 15.4 g/dL    HCT 36.1 35.8 - 46.3 %    MCV 92.8 79.6 - 97.8 FL    MCH 28.5 26.1 - 32.9 PG    MCHC 30.7 (L) 31.4 - 35.0 g/dL    RDW 19.6 (H) 11.9 - 14.6 %    PLATELET 706 251 - 179 K/uL    MPV 9.9 9.4 - 12.3 FL    ABSOLUTE NRBC 0.04 0.0 - 0.2 K/uL    DF AUTOMATED      NEUTROPHILS 84 (H) 43 - 78 %    LYMPHOCYTES 9 (L) 13 - 44 %    MONOCYTES 3 (L) 4.0 - 12.0 %    EOSINOPHILS 0 (L) 0.5 - 7.8 %    BASOPHILS 1 0.0 - 2.0 %    IMMATURE GRANULOCYTES 4 0.0 - 5.0 %    ABS. NEUTROPHILS 13.4 (H) 1.7 - 8.2 K/UL    ABS. LYMPHOCYTES 1.4 0.5 - 4.6 K/UL    ABS. MONOCYTES 0.5 0.1 - 1.3 K/UL    ABS. EOSINOPHILS 0.0 0.0 - 0.8 K/UL    ABS. BASOPHILS 0.1 0.0 - 0.2 K/UL    ABS. IMM.  GRANS. 0.6 (H) 0.0 - 0.5 K/UL   MAGNESIUM    Collection Time: 08/07/20  5:53 AM   Result Value Ref Range    Magnesium 2.0 1.8 - 2.4 mg/dL   GLUCOSE, POC    Collection Time: 08/07/20  7:35 AM   Result Value Ref Range    Glucose (POC) 155 (H) 65 - 100 mg/dL   GLUCOSE, POC    Collection Time: 08/07/20 11:36 AM   Result Value Ref Range    Glucose (POC) 252 (H) 65 - 100 mg/dL   Romario Bragaen    Collection Time: 08/07/20  4:31 PM   Result Value Ref Range    Vancomycin,trough 10.5 5 - 20 ug/mL   GLUCOSE, POC    Collection Time: 08/07/20  4:43 PM   Result Value Ref Range    Glucose (POC) 219 (H) 65 - 100 mg/dL        All Micro Results     Procedure Component Value Units Date/Time    CULTURE, BLOOD [[id number]] Collected:  08/06/20 0020    Order Status:  Completed Specimen:  Blood Updated:  08/07/20 0756     Special Requests: --        RIGHT  Antecubital       Culture result: NO GROWTH 1 DAY       CULTURE, BLOOD [[id number]] Collected:  08/06/20 0020    Order Status:  Completed Specimen:  Blood Updated:  08/07/20 0756     Special Requests: --        LEFT  Antecubital       Culture result: NO GROWTH 1 DAY             Current Meds:  Current Facility-Administered Medications   Medication Dose Route Frequency    piperacillin-tazobactam (ZOSYN) 4.5 g in 0.9% sodium chloride (MBP/ADV) 100 mL  4.5 g IntraVENous Q8H    albuterol (PROVENTIL VENTOLIN) nebulizer solution 2.5 mg  2.5 mg Nebulization Q6H RT    budesonide (PULMICORT) 500 mcg/2 ml nebulizer suspension  500 mcg Nebulization BID RT    sodium chloride (NS) flush 5-10 mL  5-10 mL IntraVENous PRN    sodium chloride (NS) flush 5-40 mL  5-40 mL IntraVENous Q8H    sodium chloride (NS) flush 5-40 mL  5-40 mL IntraVENous PRN    acetaminophen (TYLENOL) tablet 650 mg  650 mg Oral Q6H PRN    Or    acetaminophen (TYLENOL) suppository 650 mg  650 mg Rectal Q6H PRN    polyethylene glycol (MIRALAX) packet 17 g  17 g Oral DAILY PRN    promethazine (PHENERGAN) tablet 12.5 mg  12.5 mg Oral Q6H PRN    Or    ondansetron (ZOFRAN) injection 4 mg  4 mg IntraVENous Q6H PRN    enoxaparin (LOVENOX) injection 40 mg  40 mg SubCUTAneous DAILY    methylPREDNISolone (PF) (SOLU-MEDROL) injection 40 mg  40 mg IntraVENous Q12H    pantoprazole (PROTONIX) tablet 40 mg  40 mg Oral ACB    vancomycin (VANCOCIN) 1,000 mg in 0.9% sodium chloride (MBP/ADV) 250 mL  1,000 mg IntraVENous Q12H         Other Studies:  Ct Chest W Cont    Result Date: 8/6/2020  EXAM: CT Chest with IV contrast - PE protocol. INDICATION: Altered mental status and pain. Covid 19. COMPARISON: Prior CT chest on September 26, 2018. TECHNIQUE: Axial CT images of the chest were obtained after the intravenous injection of 100 mL Isovue 370 CT contrast. Radiation dose reduction techniques were used for this study. Our CT scanners use one or all of the following: Automated exposure control, adjustment of the mA and/or kV according to patient size, iterative reconstruction. FINDINGS: - Pleura/pericardium: Within normal limits. - Lungs: There are emphysematous changes in the lungs with patchy areas of consolidation in the mid and lower lungs bilaterally, worse on the right. There are also scattered prominent interstitial markings. - Gladys/Mediastinum: A few borderline mediastinal and bilateral hilar lymph nodes are probably reactive in nature. - Tracheobronchial tree: Within normal limits. - Aorta/pulmonary arteries: Within normal limits. - Heart: Within normal limits. - Coronary arteries: No coronary artery calcifications. - Chest wall: Within normal limits.  - Spine/bones: No acute process. - Additional comments: None. IMPRESSION: 1. Emphysema. 2. Bilateral mid and lower lung infiltrates, worse on the right, suspect for acute pneumonia. Covid 19 pneumonia is a consideration. Xr Chest Port    Result Date: 8/6/2020  EXAM: Chest x-ray. INDICATION: Dyspnea. COMPARISON: Prior chest x-ray on July 15, 2020. TECHNIQUE: Frontal view chest x-ray. FINDINGS: Previous bibasilar lung infiltrates have improved, with mild residual. The cardiac size is within normal limits. No pneumothorax, vascular congestion or pleural effusion is seen. Again noted is elevation of the right diaphragm. Previously seen right arm PICC line has been removed. IMPRESSION: Bibasilar lung infiltrates have improved since the prior study, with mild residual remaining. Assessment:    Active Hospital Problems    Diagnosis Date Noted    HAP (hospital-acquired pneumonia) 08/06/2020    Elevated ALT measurement 08/06/2020    Elevated lactic acid level 08/06/2020    Hypomagnesemia 08/06/2020    Pneumonia due to COVID-19 virus 07/02/2020    COPD exacerbation ([facility] [address] 07/02/2020    Sepsis ([facility] [address] 07/01/2020    Acute hypoxemic respiratory failure (HCC) 09/26/2018    Leukocytosis 09/26/2018    Tobacco abuse 09/26/2018       Plan:  Acute Respiratory failure with hypoxia  Septic (on admission) shock likely pulmonary source  COVID(+) on 7/2 and received convalescent plasma, Remdesivir and dexamethasone. Repeat COVID test on 7/22 and 8/6 is negative. CT chest is neg for PE but still showing bilateral infiltrates as well as emphysema. She was admitted on broad-spectrum antibiotics to treat her for possible H CAP. No evidence of staphylococcal infection. Will stop vancomycin today. Continue Zosyn for now. Continue low-dose of intravenous steroids. Transaminitis            - ALT>AST. Could be shock liver from hypotension.   Improving        DM2: continue with ISS      Diet:  DIET DIABETIC CONSISTENT CARB  DIET NUTRITIONAL SUPPLEMENTS  DVT PPx: lovenox      Signed By: Saman Landry MD     August 7, 2020

## 2020-08-07 NOTE — PROGRESS NOTES
Comprehensive Nutrition Assessment    Type and Reason for Visit: Initial, Positive nutrition screen    Nutrition History and Allergies: h/o COPD and DM2 who was hospitalized from July 1 to July 20 after presenting with acute hypoxemic respiratory failure and pneumonia secondary to COVID-19. Presented with altered mental status, back pain, hypoxic      Nutrition Assessment:  Pt reports her appetite is \"OK\" anf that she ate most of her breakfast and lunch today. Denies any change in appetite or intake PTA-was eating 3 meals/d. Denies weight loss. Estimated Daily Nutrient Needs:  Energy (kcal):  2856-0487 kcal/d (20-25 kcal/kg of listed weight of 65 kg)  Protein (g):  65-78 grams/d (1-1.2 grams/kg of listed weight of 65 kg)         Current Nutrition Therapies:   DIET DIABETIC CONSISTENT CARB Regular  DIET NUTRITIONAL SUPPLEMENTS All Meals; Glucerna Shake    Anthropometric Measures:  · Height:  5' 3\" (160 cm)  Current Body Wt:      Last 3 Recorded Weights in this Encounter    08/06/20 0051   Weight: 65 kg (143 lb 4.8 oz)   Weight hx per EMR ( Based on connect care functionality, RD cannot know if these weight are actual versus stated):   WT / BMI WEIGHT BODY MASS INDEX   7/15/2020 142 lb 13.7 oz 25.31 kg/m2   Body mass index is 25.38 kg/m². · BMI Category: Overweight (BMI 25.0-29. 9)       Nutrition Diagnosis:   No nutrition diagnosis at this time     Nutrition Interventions:   Food and/or Nutrient Delivery: Continue current diet, Discontinue oral nutrition supplement    Discharge Planning:      Continue current diet    Electronically signed by Bre Gunter RD on 8/7/2020 at 2:34 PM    Contact: [phone]

## 2020-08-07 NOTE — PROGRESS NOTES
MSN, CM:  Spoke with patient this afternoon about discharge planning. Patient lives with her brother \"Hermes\", son \"Andrew\", and Andrew's girlfriend \"Klaudia\" in own house with 3 steps for entrance. Patient is independent with all ADL's and requires not equipment for ambulation. Patient denies any home oxygen but does have a nebulizer. Patient also denies any HH or rehab in the past.  Patient confirms she has no PCP so [facility] referred. PT and OT consulted for evaluation and recommendations. Case Management will continue to follow for discharge needs. Care Management Interventions  Mode of Transport at Discharge: Other (see comment)  Transition of Care Consult (CM Consult):  Other(No CM consult)  Discharge Durable Medical Equipment: No  Physical Therapy Consult: Yes  Occupational Therapy Consult: Yes  Speech Therapy Consult: No  Confirm Follow Up Transport: Family  Discharge Location  Discharge Placement: Unable to determine at this time

## 2020-08-07 NOTE — PROGRESS NOTES
Problem: Diabetes Self-Management  Goal: *Disease process and treatment process  Description: Define diabetes and identify own type of diabetes; list 3 options for treating diabetes. Outcome: Progressing Towards Goal  Goal: *Incorporating nutritional management into lifestyle  Description: Describe effect of type, amount and timing of food on blood glucose; list 3 methods for planning meals. Outcome: Progressing Towards Goal  Goal: *Incorporating physical activity into lifestyle  Description: State effect of exercise on blood glucose levels. Outcome: Progressing Towards Goal  Goal: *Developing strategies to promote health/change behavior  Description: Define the ABC's of diabetes; identify appropriate screenings, schedule and personal plan for screenings. Outcome: Progressing Towards Goal  Goal: *Using medications safely  Description: State effect of diabetes medications on diabetes; name diabetes medication taking, action and side effects. Outcome: Progressing Towards Goal  Goal: *Monitoring blood glucose, interpreting and using results  Description: Identify recommended blood glucose targets  and personal targets. Outcome: Progressing Towards Goal  Goal: *Prevention, detection, treatment of acute complications  Description: List symptoms of hyper- and hypoglycemia; describe how to treat low blood sugar and actions for lowering  high blood glucose level. Outcome: Progressing Towards Goal  Goal: *Prevention, detection and treatment of chronic complications  Description: Define the natural course of diabetes and describe the relationship of blood glucose levels to long term complications of diabetes.   Outcome: Progressing Towards Goal  Goal: *Developing strategies to address psychosocial issues  Description: Describe feelings about living with diabetes; identify support needed and support network  Outcome: Progressing Towards Goal  Goal: *Insulin pump training  Outcome: Progressing Towards Goal  Goal: *Sick day guidelines  Outcome: Progressing Towards Goal  Goal: *Patient Specific Goal (EDIT GOAL, INSERT TEXT)  Outcome: Progressing Towards Goal     Problem: Patient Education: Go to Patient Education Activity  Goal: Patient/Family Education  Outcome: Progressing Towards Goal     Problem: Falls - Risk of  Goal: *Absence of Falls  Description: Document Reta Jama Fall Risk and appropriate interventions in the flowsheet. Outcome: Progressing Towards Goal  Note: Fall Risk Interventions:  Mobility Interventions: PT Consult for mobility concerns, OT consult for ADLs, Bed/chair exit alarm         Medication Interventions: Patient to call before getting OOB                   Problem: Patient Education: Go to Patient Education Activity  Goal: Patient/Family Education  Outcome: Progressing Towards Goal     Problem: Pressure Injury - Risk of  Goal: *Prevention of pressure injury  Description: Document Dale Scale and appropriate interventions in the flowsheet. Outcome: Progressing Towards Goal  Note: Pressure Injury Interventions:  Sensory Interventions: Assess changes in LOC, Keep linens dry and wrinkle-free, Monitor skin under medical devices, Pressure redistribution bed/mattress (bed type)    Moisture Interventions: Minimize layers    Activity Interventions: Increase time out of bed, Pressure redistribution bed/mattress(bed type), PT/OT evaluation    Mobility Interventions: Pressure redistribution bed/mattress (bed type), HOB 30 degrees or less, PT/OT evaluation    Nutrition Interventions: Document food/fluid/supplement intake                     Problem: Patient Education: Go to Patient Education Activity  Goal: Patient/Family Education  Outcome: Progressing Towards Goal     Problem: Patient Education: Go to Patient Education Activity  Goal: Patient/Family Education  Description: 1. Patient will complete lower body bathing and dressing with MOD I and adaptive equipment as needed.    2. Patient will complete toileting with MOD I. 3. Patient will tolerate 25 minutes of OT treatment with 1-2 rest breaks to increase activity tolerance for ADLs. 4. Patient will complete functional transfers with MOD I and adaptive equipment as needed. 5. Patient will verbalize 3 energy conservation strategies to increase activity tolerance for ADL performance. 6. Patient will complete functional mobility for household distances with MOD I and adaptive equipment as needed. 7. Patient will complete self-grooming while standing edge of sink with MOD I and adaptive equipment as needed.     Timeframe: 7 visits       Outcome: Progressing Towards Goal

## 2020-08-07 NOTE — ACP (ADVANCE CARE PLANNING)
Advance Care Planning     Advance Care Planning Activator (Inpatient)  Conversation Note      Date of ACP Conversation: 08/07/20     Conversation Conducted with:   Patient with Decision Making Capacity    ACP Activator: Mariza Nickerson RN    *When Decision Maker makes decisions on behalf of the incapacitated patient: Decision Maker is asked to consider and make decisions based on patient values, known preferences, or best interests. Health Care Decision Maker:    Current Designated Health Care Decision Maker:   (If there is a valid [place] named in the [address] [place] [address] Makers\" box in the ACP activity, but it is not visible above, be sure to open that field and then select the health care decision maker relationship (ie \"primary\") in the blank space to the right of the name.) Validate  this information as still accurate & up-to-date; edit [place] field as needed.)    Note: Assess and validate information in current ACP documents, as indicated. If no Decision Maker listed above or available through scanned documents, then:    If no Authorized Decision Maker has previously been identified, then patient chooses [place]:  \"Who would you like to name as your primary health care decision-maker? \"    Name: Lachelle Mcneal   Relationship: Son Phone number: [phone]  Giancarlo Couple this person be reached easily? \" YES  \"Who would you like to name as your back-up decision maker? \"   Name: Malvin Hudson  Relationship: Brother Phone number: [phone]  Giancarlo Couple this person be reached easily? \" YES    Note: If the relationship of these Decision-Makers to the patient does NOT follow your state's Next of Kin hierarchy, recommend that patient complete ACP document that meets state-specific requirements to allow them to act on the patient's behalf when appropriate. Care Preferences    Ventilation:   \"If you were in your present state of health and suddenly became very ill and were unable to breathe on your own, what would your preference be about the use of a ventilator (breathing machine) if it were available to you? \"      If patient would desire the use of a ventilator (breathing machine), answer \"yes\", if not \"no\":yes    \"If your health worsens and it becomes clear that your chance of recovery is unlikely, what would your preference be about the use of a ventilator (breathing machine) if it were available to you? \"     Would the patient desire the use of a ventilator (breathing machine)? YES      Resuscitation  \"CPR works best to restart the heart when there is a sudden event, like a heart attack, in someone who is otherwise healthy. Unfortunately, CPR does not typically restart the heart for people who have serious health conditions or who are very sick. \"    \"In the event your heart stopped as a result of an underlying serious health condition, would you want attempts to be made to restart your heart (answer \"yes\" for attempt to resuscitate) or would you prefer a natural death (answer \"no\" for do not attempt to resuscitate)? \" yes      NOTE: If the patient has a valid advance directive AND now provides care preference(s) that are inconsistent with that prior directive, advise the patient to consider either: creating a new advance directive that complies with state-specific requirements; or, if that is not possible, orally revoking that prior directive in accordance with state-specific requirements, which must be documented in the EHR. [x] Yes  [] No   Educated Patient / Costa Echeverria regarding differences between Advance Directives and portable DNR orders.     Length of ACP Conversation in minutes:      Conversation Outcomes:  [x] ACP discussion completed  [] Existing advance directive reviewed with patient; no changes to patient's previously recorded wishes     [] New Advance Directive completed   [] Portable Do Not Resuscitate prepared for Provider review and signature  [] POLST/POST/MOLST/MOST prepared for Provider review and signature      Follow-up plan:    [] Schedule follow-up conversation to continue planning  [] Referred individual to Provider for additional questions/concerns   [] Advised patient/agent/surrogate to review completed ACP document and update if needed with changes in condition, patient preferences or care setting     [x] This note routed to one or more involved healthcare providers

## 2020-08-07 NOTE — PROGRESS NOTES
Pt attempted two times since beginning of shift at 1900 to urinate. Pt unable to void. Upon second attempt at [facility] bladder scanned pt. Bladder scan showed 725 ml urine. RN contacted physician on call for order and suazo placed. At 0100 RN using sterile technique and witnessed by Stephie Garcia RN placed 16 french suazo catheter,  800 ml, abel colored with strong odor urine returned. Pt tolerated well. Order to notify physician if less than 30 ml urine returned in an hour time. Will continue to monitor.

## 2020-08-07 NOTE — PROGRESS NOTES
Patient lying in bed with eyes open. Alert and oriented x 4. Respirations even and unlabored on 10 L/NC. Dyspnea on exertion. Breath sounds diminished throughout. Telemetry intact and pt in NSR. Abdomen soft and non tender. Bowel sounds present. Bilateral lower extremities red. Pt has suazo catheter for accurate I&O. Pt up with +1 assist. Bed low and locked. Call light in reach.  Will continue to monitor.

## 2020-08-07 NOTE — PROGRESS NOTES
MSN, CM:  PT recommends HH. Patient has no preference and has never used a [facility] service in the past.  [facility] [place] referred. Case Management will continue to follow. [facility] [place].

## 2020-08-08 LAB
ALBUMIN SERPL-MCNC: 1.9 G/DL (ref 3.2–4.6)
ALBUMIN/GLOB SERPL: 0.5 {RATIO} (ref 1.2–3.5)
ALP SERPL-CCNC: 140 U/L (ref 50–136)
ALT SERPL-CCNC: 500 U/L (ref 12–65)
ANION GAP SERPL CALC-SCNC: 7 MMOL/L (ref 7–16)
AST SERPL-CCNC: 48 U/L (ref 15–37)
BASOPHILS # BLD: 0 K/UL (ref 0–0.2)
BASOPHILS NFR BLD: 0 % (ref 0–2)
BILIRUB SERPL-MCNC: 0.5 MG/DL (ref 0.2–1.1)
BUN SERPL-MCNC: 15 MG/DL (ref 8–23)
CALCIUM SERPL-MCNC: 7.7 MG/DL (ref 8.3–10.4)
CHLORIDE SERPL-SCNC: 112 MMOL/L (ref 98–107)
CO2 SERPL-SCNC: 26 MMOL/L (ref 21–32)
CREAT SERPL-MCNC: 0.63 MG/DL (ref 0.6–1)
DIFFERENTIAL METHOD BLD: ABNORMAL
EOSINOPHIL # BLD: 0 K/UL (ref 0–0.8)
EOSINOPHIL NFR BLD: 0 % (ref 0.5–7.8)
ERYTHROCYTE [DISTWIDTH] IN BLOOD BY AUTOMATED COUNT: 20.1 % (ref 11.9–14.6)
GLOBULIN SER CALC-MCNC: 3.7 G/DL (ref 2.3–3.5)
GLUCOSE BLD STRIP.AUTO-MCNC: 135 MG/DL (ref 65–100)
GLUCOSE BLD STRIP.AUTO-MCNC: 161 MG/DL (ref 65–100)
GLUCOSE BLD STRIP.AUTO-MCNC: 163 MG/DL (ref 65–100)
GLUCOSE BLD STRIP.AUTO-MCNC: 168 MG/DL (ref 65–100)
GLUCOSE SERPL-MCNC: 155 MG/DL (ref 65–100)
HCT VFR BLD AUTO: 32.4 % (ref 35.8–46.3)
HGB BLD-MCNC: 9.5 G/DL (ref 11.7–15.4)
IMM GRANULOCYTES # BLD AUTO: 0.5 K/UL (ref 0–0.5)
IMM GRANULOCYTES NFR BLD AUTO: 3 % (ref 0–5)
LYMPHOCYTES # BLD: 1 K/UL (ref 0.5–4.6)
LYMPHOCYTES NFR BLD: 7 % (ref 13–44)
MCH RBC QN AUTO: 27.7 PG (ref 26.1–32.9)
MCHC RBC AUTO-ENTMCNC: 29.3 G/DL (ref 31.4–35)
MCV RBC AUTO: 94.5 FL (ref 79.6–97.8)
MONOCYTES # BLD: 1.1 K/UL (ref 0.1–1.3)
MONOCYTES NFR BLD: 8 % (ref 4–12)
NEUTS SEG # BLD: 12.1 K/UL (ref 1.7–8.2)
NEUTS SEG NFR BLD: 82 % (ref 43–78)
NRBC # BLD: 0 K/UL (ref 0–0.2)
PLATELET # BLD AUTO: 191 K/UL (ref 150–450)
PMV BLD AUTO: 10.1 FL (ref 9.4–12.3)
POTASSIUM SERPL-SCNC: 4.4 MMOL/L (ref 3.5–5.1)
PROT SERPL-MCNC: 5.6 G/DL (ref 6.3–8.2)
RBC # BLD AUTO: 3.43 M/UL (ref 4.05–5.2)
SODIUM SERPL-SCNC: 145 MMOL/L (ref 136–145)
WBC # BLD AUTO: 14.7 K/UL (ref 4.3–11.1)

## 2020-08-08 PROCEDURE — 74011000250 HC RX REV CODE- 250: Performed by: INTERNAL MEDICINE

## 2020-08-08 PROCEDURE — 74011250637 HC RX REV CODE- 250/637: Performed by: INTERNAL MEDICINE

## 2020-08-08 PROCEDURE — 74011250636 HC RX REV CODE- 250/636: Performed by: INTERNAL MEDICINE

## 2020-08-08 PROCEDURE — 94640 AIRWAY INHALATION TREATMENT: CPT

## 2020-08-08 PROCEDURE — 85025 COMPLETE CBC W/AUTO DIFF WBC: CPT

## 2020-08-08 PROCEDURE — 65270000029 HC RM PRIVATE

## 2020-08-08 PROCEDURE — 94760 N-INVAS EAR/PLS OXIMETRY 1: CPT

## 2020-08-08 PROCEDURE — 77010033678 HC OXYGEN DAILY

## 2020-08-08 PROCEDURE — 77010033711 HC HIGH FLOW OXYGEN

## 2020-08-08 PROCEDURE — 82962 GLUCOSE BLOOD TEST: CPT

## 2020-08-08 PROCEDURE — 74011000258 HC RX REV CODE- 258: Performed by: INTERNAL MEDICINE

## 2020-08-08 PROCEDURE — 80053 COMPREHEN METABOLIC PANEL: CPT

## 2020-08-08 PROCEDURE — 36415 COLL VENOUS BLD VENIPUNCTURE: CPT

## 2020-08-08 PROCEDURE — 74011636637 HC RX REV CODE- 636/637: Performed by: INTERNAL MEDICINE

## 2020-08-08 RX ORDER — MORPHINE SULFATE 2 MG/ML
2 INJECTION, SOLUTION INTRAMUSCULAR; INTRAVENOUS ONCE
Status: COMPLETED | OUTPATIENT
Start: 2020-08-08 | End: 2020-08-08

## 2020-08-08 RX ORDER — IPRATROPIUM BROMIDE AND ALBUTEROL SULFATE 2.5; .5 MG/3ML; MG/3ML
3 SOLUTION RESPIRATORY (INHALATION)
Status: DISCONTINUED | OUTPATIENT
Start: 2020-08-08 | End: 2020-08-10

## 2020-08-08 RX ORDER — BUSPIRONE HYDROCHLORIDE 5 MG/1
15 TABLET ORAL
Status: DISCONTINUED | OUTPATIENT
Start: 2020-08-08 | End: 2020-08-13 | Stop reason: HOSPADM

## 2020-08-08 RX ADMIN — BUDESONIDE 500 MCG: 0.5 INHALANT RESPIRATORY (INHALATION) at 07:40

## 2020-08-08 RX ADMIN — LACTOBACILLUS ACIDOPHILUS / LACTOBACILLUS BULGARICUS 1 PACKET: 100 MILLION CFU STRENGTH GRANULES at 08:42

## 2020-08-08 RX ADMIN — ALBUTEROL SULFATE 2.5 MG: 2.5 SOLUTION RESPIRATORY (INHALATION) at 01:31

## 2020-08-08 RX ADMIN — FAMOTIDINE 20 MG: 20 TABLET, FILM COATED ORAL at 17:28

## 2020-08-08 RX ADMIN — ALBUTEROL SULFATE 2.5 MG: 2.5 SOLUTION RESPIRATORY (INHALATION) at 19:17

## 2020-08-08 RX ADMIN — FAMOTIDINE 20 MG: 20 TABLET, FILM COATED ORAL at 08:40

## 2020-08-08 RX ADMIN — IPRATROPIUM BROMIDE AND ALBUTEROL SULFATE 3 ML: .5; 3 SOLUTION RESPIRATORY (INHALATION) at 14:19

## 2020-08-08 RX ADMIN — PIPERACILLIN AND TAZOBACTAM 4.5 G: 4; .5 INJECTION, POWDER, FOR SOLUTION INTRAVENOUS at 00:33

## 2020-08-08 RX ADMIN — Medication 10 ML: at 22:37

## 2020-08-08 RX ADMIN — METHYLPREDNISOLONE SODIUM SUCCINATE 40 MG: 40 INJECTION, POWDER, FOR SOLUTION INTRAMUSCULAR; INTRAVENOUS at 08:40

## 2020-08-08 RX ADMIN — BUSPIRONE HYDROCHLORIDE 15 MG: 5 TABLET ORAL at 22:34

## 2020-08-08 RX ADMIN — MORPHINE SULFATE 2 MG: 2 INJECTION, SOLUTION INTRAMUSCULAR; INTRAVENOUS at 13:41

## 2020-08-08 RX ADMIN — Medication 10 ML: at 06:08

## 2020-08-08 RX ADMIN — LACTOBACILLUS ACIDOPHILUS / LACTOBACILLUS BULGARICUS 1 PACKET: 100 MILLION CFU STRENGTH GRANULES at 17:28

## 2020-08-08 RX ADMIN — PANTOPRAZOLE SODIUM 40 MG: 40 TABLET, DELAYED RELEASE ORAL at 06:08

## 2020-08-08 RX ADMIN — PIPERACILLIN AND TAZOBACTAM 4.5 G: 4; .5 INJECTION, POWDER, FOR SOLUTION INTRAVENOUS at 08:40

## 2020-08-08 RX ADMIN — INSULIN LISPRO 2 UNITS: 100 INJECTION, SOLUTION INTRAVENOUS; SUBCUTANEOUS at 11:59

## 2020-08-08 RX ADMIN — ALBUTEROL SULFATE 2.5 MG: 2.5 SOLUTION RESPIRATORY (INHALATION) at 13:05

## 2020-08-08 RX ADMIN — ALBUTEROL SULFATE 2.5 MG: 2.5 SOLUTION RESPIRATORY (INHALATION) at 07:40

## 2020-08-08 RX ADMIN — PIPERACILLIN AND TAZOBACTAM 4.5 G: 4; .5 INJECTION, POWDER, FOR SOLUTION INTRAVENOUS at 17:28

## 2020-08-08 RX ADMIN — INSULIN LISPRO 2 UNITS: 100 INJECTION, SOLUTION INTRAVENOUS; SUBCUTANEOUS at 17:28

## 2020-08-08 RX ADMIN — BUDESONIDE 500 MCG: 0.5 INHALANT RESPIRATORY (INHALATION) at 19:17

## 2020-08-08 RX ADMIN — ENOXAPARIN SODIUM 40 MG: 40 INJECTION SUBCUTANEOUS at 08:40

## 2020-08-08 NOTE — PROGRESS NOTES
Progress Note    Patient: Matthew Drummond MRN: [id number]  SSN: [id number]    YOB: 1955  Age: 60 y.o. Sex: female      Admit Date: 8/5/2020    LOS: 2 days     Subjective:     Patient seen and examined at bedside. This morning still presenting with SOB, with some improvement from yesterday. Objective:     Vitals:    08/08/20 0417 08/08/20 0741 08/08/20 0753 08/08/20 1204   BP: 139/76  135/69 142/68   Pulse: 98  (!) 103 (!) 108   Resp: 19 18 18   Temp: 97.7 °F (36.5 °C)  97.5 °F (36.4 °C) 97.8 °F (36.6 °C)   SpO2: 97% 97% 94% 97%   Weight:       Height:            Intake and Output:  Current Shift: No intake/output data recorded.   Last three shifts: 08/06 1901 - 08/08 0700  In: -   Out: 2100 [Urine:2100]    ROS  10 ROS negative except from stated on subjective    Physical Exam:   General: Alert, oriented, NAD  HEENT: NC/AT, EOM are intact  Neck: supple, no JVD  Cardiovascular: RRR, S1, S2, no murmurs  Respiratory: Basilar crackles, mils wheezes  Abdomen: Soft, NT, ND  Back: No CVA tenderness, no paraspinal tenderness  Extremities: LE without pedal edema, no erythema  Neuro: A&O, CN are intact, no focal deficits  Skin: no rash or ulcers  Psych: good mood and affect    Lab/Data Review:  I have personally reviewed patients laboratory data showing  Recent Results (from the past 24 hour(s))   VANCOMYCIN, TROUGH    Collection Time: 08/07/20  4:31 PM   Result Value Ref Range    Vancomycin,trough 10.5 5 - 20 ug/mL   GLUCOSE, POC    Collection Time: 08/07/20  4:43 PM   Result Value Ref Range    Glucose (POC) 219 (H) 65 - 100 mg/dL   GLUCOSE, POC    Collection Time: 08/07/20 10:31 PM   Result Value Ref Range    Glucose (POC) 238 (H) 65 - 213 mg/dL   METABOLIC PANEL, COMPREHENSIVE    Collection Time: 08/08/20  5:33 AM   Result Value Ref Range    Sodium 145 136 - 145 mmol/L    Potassium 4.4 3.5 - 5.1 mmol/L    Chloride 112 (H) 98 - 107 mmol/L    CO2 26 21 - 32 mmol/L    Anion gap 7 7 - 16 mmol/L Glucose 155 (H) 65 - 100 mg/dL    BUN 15 8 - 23 MG/DL    Creatinine 0.63 0.6 - 1.0 MG/DL    GFR est AA >60 >60 ml/min/1.73m2    GFR est non-AA >60 >60 ml/min/1.73m2    Calcium 7.7 (L) 8.3 - 10.4 MG/DL    Bilirubin, total 0.5 0.2 - 1.1 MG/DL    ALT (SGPT) 500 (H) 12 - 65 U/L    AST (SGOT) 48 (H) 15 - 37 U/L    Alk. phosphatase 140 (H) 50 - 136 U/L    Protein, total 5.6 (L) 6.3 - 8.2 g/dL    Albumin 1.9 (L) 3.2 - 4.6 g/dL    Globulin 3.7 (H) 2.3 - 3.5 g/dL    A-G Ratio 0.5 (L) 1.2 - 3.5     CBC WITH AUTOMATED DIFF    Collection Time: 08/08/20  5:33 AM   Result Value Ref Range    WBC 14.7 (H) 4.3 - 11.1 K/uL    RBC 3.43 (L) 4.05 - 5.2 M/uL    HGB 9.5 (L) 11.7 - 15.4 g/dL    HCT 32.4 (L) 35.8 - 46.3 %    MCV 94.5 79.6 - 97.8 FL    MCH 27.7 26.1 - 32.9 PG    MCHC 29.3 (L) 31.4 - 35.0 g/dL    RDW 20.1 (H) 11.9 - 14.6 %    PLATELET 249 769 - 291 K/uL    MPV 10.1 9.4 - 12.3 FL    ABSOLUTE NRBC 0.00 0.0 - 0.2 K/uL    DF AUTOMATED      NEUTROPHILS 82 (H) 43 - 78 %    LYMPHOCYTES 7 (L) 13 - 44 %    MONOCYTES 8 4.0 - 12.0 %    EOSINOPHILS 0 (L) 0.5 - 7.8 %    BASOPHILS 0 0.0 - 2.0 %    IMMATURE GRANULOCYTES 3 0.0 - 5.0 %    ABS. NEUTROPHILS 12.1 (H) 1.7 - 8.2 K/UL    ABS. LYMPHOCYTES 1.0 0.5 - 4.6 K/UL    ABS. MONOCYTES 1.1 0.1 - 1.3 K/UL    ABS. EOSINOPHILS 0.0 0.0 - 0.8 K/UL    ABS. BASOPHILS 0.0 0.0 - 0.2 K/UL    ABS. IMM. GRANS. 0.5 0.0 - 0.5 K/UL   GLUCOSE, POC    Collection Time: 08/08/20  7:20 AM   Result Value Ref Range    Glucose (POC) 135 (H) 65 - 100 mg/dL   GLUCOSE, POC    Collection Time: 08/08/20 11:51 AM   Result Value Ref Range    Glucose (POC) 168 (H) 65 - 100 mg/dL        Image:  I have personally reviewed patients imaging showing  CT CHEST W CONT   Final Result   IMPRESSION:    1. Emphysema. 2. Bilateral mid and lower lung infiltrates, worse on the right, suspect for   acute pneumonia. Covid 19 pneumonia is a consideration.          XR CHEST PORT   Final Result   IMPRESSION: Bibasilar lung infiltrates have improved since the prior study, with   mild residual remaining. Hospital problems     Principal Problem:    Acute hypoxemic respiratory failure ([facility] [address] (9/26/2018)    Active Problems:    Leukocytosis (9/26/2018)      Tobacco abuse (9/26/2018)      Sepsis ([facility] [address] (7/1/2020)      Pneumonia due to COVID-19 virus (7/2/2020)      COPD exacerbation ([facility] [address] (7/2/2020)      HAP (hospital-acquired pneumonia) (8/6/2020)      Elevated ALT measurement (8/6/2020)      Elevated lactic acid level (8/6/2020)      Hypomagnesemia (8/6/2020)        Assessment and Plan:     Tana Bowers is a 60 y.o. female with medical history significant for COPD , DM2, recent [address] Infection( positive on 7/2 and neg on 7/22, 8/6) who presented from home due to shortness of breath    1. Acute hypoxic respiratory failure and septic shock concerning of HAP  - Recent COVID on 7/2 / COPD      - Received convalescent plasma, remdesivir and dexamethasone      - Repeated COVID on 7/22 and 8/6 negative  - CT chest with BL infiltrates, no PE  - Continue zosyn  - Vancomycin discontinue yesterday  - Follow BCx - NGTD  - Continue O2 therapy to maintain O2 Sat >92%  - Continue solumedrol and taper down  - Continue albuterol, pulmicort  - Symptomatic management    2. DM  - ISS  - BS ACHS    DVT ppx lovenox    I have reviewed, updated, and verified this note's content and spent 38 minutes of my 42 minutes visit performing counseling and coordination of care regarding medical management.      Signed By: Love Caraballo MD     August 8, 2020

## 2020-08-08 NOTE — PROGRESS NOTES
Problem: Diabetes Self-Management  Goal: *Disease process and treatment process  Description: Define diabetes and identify own type of diabetes; list 3 options for treating diabetes. Outcome: Progressing Towards Goal  Goal: *Incorporating nutritional management into lifestyle  Description: Describe effect of type, amount and timing of food on blood glucose; list 3 methods for planning meals. Outcome: Progressing Towards Goal  Goal: *Incorporating physical activity into lifestyle  Description: State effect of exercise on blood glucose levels. Outcome: Progressing Towards Goal  Goal: *Developing strategies to promote health/change behavior  Description: Define the ABC's of diabetes; identify appropriate screenings, schedule and personal plan for screenings. Outcome: Progressing Towards Goal  Goal: *Using medications safely  Description: State effect of diabetes medications on diabetes; name diabetes medication taking, action and side effects. Outcome: Progressing Towards Goal  Goal: *Monitoring blood glucose, interpreting and using results  Description: Identify recommended blood glucose targets  and personal targets. Outcome: Progressing Towards Goal  Goal: *Prevention, detection, treatment of acute complications  Description: List symptoms of hyper- and hypoglycemia; describe how to treat low blood sugar and actions for lowering  high blood glucose level. Outcome: Progressing Towards Goal  Goal: *Prevention, detection and treatment of chronic complications  Description: Define the natural course of diabetes and describe the relationship of blood glucose levels to long term complications of diabetes.   Outcome: Progressing Towards Goal  Goal: *Developing strategies to address psychosocial issues  Description: Describe feelings about living with diabetes; identify support needed and support network  Outcome: Progressing Towards Goal

## 2020-08-08 NOTE — PROGRESS NOTES
Respiratory Care Services Policy Number: -[id number]    Title: Aerosolized Medication Protocol    Effective Date: 10/1998    Revised Date: 06/13, 03/16, 11/17, 07/19     Reviewed Date: 05/14/ 03/15 , 06/17, 5/18   I. Policy: The Aerosolized Medication Protocol shall by implemented by Respiratory Care Practitioners (RCP) for patients with orders to receive aerosol therapy with medication. II. Purpose: To open and maintain obstructed airways, the RCP, will utilize the following   protocol to select the indicated aerosolized medication(s) and determine the most effective method of delivery to the patient. III. Patient Type: All patients who are determined to meet aerosolized medication criteria as          outlined in this protocol. IV. Responsibility: Director, [address], registered Respiratory Care Practitioners (RCP's) with documented competency in the performance of respiratory therapeutic techniques. V. Equipment needed:  A. Stethoscope  B. Pulse oximeter  C. AeroEclipse nebulizer  D. Dry Powder Inhaler (DPI)     VI. Protocol:   A. The following conditions are accepted indications for aerosolized medication therapy. 1. Bronchospasm/wheezing  2. Impaired mucociliary clearance  3. Tracheobronchial mucosal congestion/and laryngeal stridor  4. Diseases which commonly require aerosolized medication therapy include, but are not limited to:  a. Asthma/reactive airway disease  b. Bronchitis/emphysema (COPD)  c. Cystic fibrosis  d. Severe laryngitis/tracheitis  e. Bronchiectasis  f. Smoke inhalation or chemical trauma to the lung or upper airway  g. Physical trauma to the upper airway  h. Laryngotracheobronchitis  i. Bronchiolitis  j. Non-specific wheezing              B. Indications for bronchodilator medications will include:  a. Bronchospasm/ wheezing  b. Asthma/reactive airway disease  c. Chronic obstructive pulmonary disease  d.  Obstructive defect on pulmonary function testing  C. Administration of medications  1. If a bronchodilator or any other type of respiratory medication is needed, a physician order must be indicated in the medication section in the patient's EMR. 2. When the physician specifies the medication and dosage at the time of request, the ordered medication will be used as part of the care plan. D. The following guidelines will be utilized in the evaluation and selection of the appropriate delivery device for indicated medication(s):  1. Unassisted aerosol (UA) is the preferred method of aerosol delivery and indicated if  a. Ventilation is inadequate  b. Patient demonstrates wheezing   c. Routine treatments shall be given via the AeroEclipse nebulizer. d. The Aerogen nebulizer shall be used in the following circumstances:  i. ER patients and they will continue with this nebulizer if admitted to 8th floor or ICU.  ii. Patients in ICU   iii. Patients on 8th floor with severe wheezing (at the RCP's discretion)  2. Dry PowderInhaler (DPI)   a. Patient should be alert/cooperative  b. Able to perform 3 second breath hold. c. Patient has used DPI therapy previously, either at home or in the hospital.  d. Note: The only approved inhaler on formulary is Spiriva. VII. Guidelines:   Monitor patient's vital signs and evaluate patient's clinical status. The need to change medication and/or modality may be indicated by:  1. A pulse greater than 120 bpm, or if a pulse increase of 20 bpm occurs with bronchodilator medications. 2. Significant worsening of dyspnea or wheezing occurring during or within 30 minutes of discontinuing therapy. 3. Worsening of patient's sensorium (e.g. patient becomes confused or obtunded, and unable to follow directions). 4. Worsening of patient's chest x-ray. 5. Change in sputum (e.g. increased pulmonary infiltrate, which might indicate need for volume expansion therapy).   6. Patient has difficulty coughing up secretions, which might indicate need for acetylcysteine and/or bronchial hygiene therapy. 7. Call physician immediately if dyspnea worsens and is not responsive to modifications allowed by protocol. VIII. Clinical Responsibility:  1. The therapy assessment guidelines will be used to evaluate all patients receiving aerosolized medications with the exception of critical care areas. 1. RCP's will perform changes in therapy per protocol. 2. It will be the responsibility of RCP to provide instruction regarding respiratory medications, possible side effects, aerosol therapy and proper DPI technique, as well as, spacer usage to patients ordered DPI therapy. 3. Current therapy that is part of a patient's home regimen will not be discontinued. a. Provide spacer and educate patient on proper inhaler technique if needed. IX. Documentation  A. Document assessment findings in the respiratory assessment section of the patient's EMR. B. Document changes in therapy per protocol in the respiratory orders section and in the care plan section of the patient's EMR. C. Document patient education in the patient education section of the patient's EMR. X. Outcome Criteria:  A. Relief of wheezes and obstruction  B. Improved cough and sputum color and consistency  C. Improved chest x-ray  D. Improved arterial oxygen tension and or SaO2  E. Improved Peak Flow on asthmatic patients        XI. Related Protocols:  A. Respiratory Patient Care Protocols  B. Bronchial Hygiene Therapy  C. Oxygen Protocol    Reference:  L - Respiratory Care Department Policy, Procedure and Protocol Guideline Manual, 1995, SUKHDEV Benítez. L -  Therapist Driven Respiratory Care Protocols  A Practitioner's Guide for Criteria-Based Respiratory Care by Ayesha Fernandez M.D., and SUKHDEV Quinones, MAYITO. L - The rationale for therapist-driven protocols: an update. Respiratory Care 1998; [id number]. N -[facility] Clinical Practice Guidelines.

## 2020-08-09 LAB
ALBUMIN SERPL-MCNC: 2.2 G/DL (ref 3.2–4.6)
ALBUMIN/GLOB SERPL: 0.7 {RATIO} (ref 1.2–3.5)
ALP SERPL-CCNC: 133 U/L (ref 50–136)
ALT SERPL-CCNC: 402 U/L (ref 12–65)
ANION GAP SERPL CALC-SCNC: 7 MMOL/L (ref 7–16)
APPEARANCE UR: CLEAR
AST SERPL-CCNC: 67 U/L (ref 15–37)
BACTERIA URNS QL MICRO: 0 /HPF
BASOPHILS # BLD: 0.1 K/UL (ref 0–0.2)
BASOPHILS NFR BLD: 0 % (ref 0–2)
BILIRUB SERPL-MCNC: 0.6 MG/DL (ref 0.2–1.1)
BILIRUB UR QL: NEGATIVE
BUN SERPL-MCNC: 14 MG/DL (ref 8–23)
CALCIUM SERPL-MCNC: 7.5 MG/DL (ref 8.3–10.4)
CASTS URNS QL MICRO: 0 /LPF
CHLORIDE SERPL-SCNC: 109 MMOL/L (ref 98–107)
CO2 SERPL-SCNC: 27 MMOL/L (ref 21–32)
COLOR UR: YELLOW
CREAT SERPL-MCNC: 0.5 MG/DL (ref 0.6–1)
DIFFERENTIAL METHOD BLD: ABNORMAL
EOSINOPHIL # BLD: 0 K/UL (ref 0–0.8)
EOSINOPHIL NFR BLD: 0 % (ref 0.5–7.8)
EPI CELLS #/AREA URNS HPF: ABNORMAL /HPF
ERYTHROCYTE [DISTWIDTH] IN BLOOD BY AUTOMATED COUNT: 20.6 % (ref 11.9–14.6)
GLOBULIN SER CALC-MCNC: 3.1 G/DL (ref 2.3–3.5)
GLUCOSE BLD STRIP.AUTO-MCNC: 105 MG/DL (ref 65–100)
GLUCOSE BLD STRIP.AUTO-MCNC: 109 MG/DL (ref 65–100)
GLUCOSE BLD STRIP.AUTO-MCNC: 117 MG/DL (ref 65–100)
GLUCOSE BLD STRIP.AUTO-MCNC: 122 MG/DL (ref 65–100)
GLUCOSE BLD STRIP.AUTO-MCNC: 129 MG/DL (ref 65–100)
GLUCOSE SERPL-MCNC: 101 MG/DL (ref 65–100)
GLUCOSE UR STRIP.AUTO-MCNC: NEGATIVE MG/DL
HCT VFR BLD AUTO: 36.2 % (ref 35.8–46.3)
HGB BLD-MCNC: 11 G/DL (ref 11.7–15.4)
HGB UR QL STRIP: ABNORMAL
IMM GRANULOCYTES # BLD AUTO: 0.4 K/UL (ref 0–0.5)
IMM GRANULOCYTES NFR BLD AUTO: 3 % (ref 0–5)
KETONES UR QL STRIP.AUTO: NEGATIVE MG/DL
LEUKOCYTE ESTERASE UR QL STRIP.AUTO: ABNORMAL
LYMPHOCYTES # BLD: 1.5 K/UL (ref 0.5–4.6)
LYMPHOCYTES NFR BLD: 12 % (ref 13–44)
MCH RBC QN AUTO: 28.6 PG (ref 26.1–32.9)
MCHC RBC AUTO-ENTMCNC: 30.4 G/DL (ref 31.4–35)
MCV RBC AUTO: 94.3 FL (ref 79.6–97.8)
MONOCYTES # BLD: 1.2 K/UL (ref 0.1–1.3)
MONOCYTES NFR BLD: 9 % (ref 4–12)
NEUTS SEG # BLD: 9.8 K/UL (ref 1.7–8.2)
NEUTS SEG NFR BLD: 76 % (ref 43–78)
NITRITE UR QL STRIP.AUTO: NEGATIVE
NRBC # BLD: 0 K/UL (ref 0–0.2)
PH UR STRIP: 7.5 [PH] (ref 5–9)
PLATELET # BLD AUTO: 190 K/UL (ref 150–450)
PMV BLD AUTO: 11.4 FL (ref 9.4–12.3)
POTASSIUM SERPL-SCNC: 4.5 MMOL/L (ref 3.5–5.1)
PROT SERPL-MCNC: 5.3 G/DL (ref 6.3–8.2)
PROT UR STRIP-MCNC: NEGATIVE MG/DL
RBC # BLD AUTO: 3.84 M/UL (ref 4.05–5.2)
RBC #/AREA URNS HPF: >100 /HPF
SODIUM SERPL-SCNC: 143 MMOL/L (ref 136–145)
SP GR UR REFRACTOMETRY: 1.01 (ref 1–1.02)
UROBILINOGEN UR QL STRIP.AUTO: 1 EU/DL (ref 0.2–1)
WBC # BLD AUTO: 13 K/UL (ref 4.3–11.1)
WBC URNS QL MICRO: ABNORMAL /HPF

## 2020-08-09 PROCEDURE — 74011250636 HC RX REV CODE- 250/636: Performed by: INTERNAL MEDICINE

## 2020-08-09 PROCEDURE — 36415 COLL VENOUS BLD VENIPUNCTURE: CPT

## 2020-08-09 PROCEDURE — 74011000250 HC RX REV CODE- 250: Performed by: INTERNAL MEDICINE

## 2020-08-09 PROCEDURE — 65270000029 HC RM PRIVATE

## 2020-08-09 PROCEDURE — 94760 N-INVAS EAR/PLS OXIMETRY 1: CPT

## 2020-08-09 PROCEDURE — 85025 COMPLETE CBC W/AUTO DIFF WBC: CPT

## 2020-08-09 PROCEDURE — 92610 EVALUATE SWALLOWING FUNCTION: CPT

## 2020-08-09 PROCEDURE — 87106 FUNGI IDENTIFICATION YEAST: CPT

## 2020-08-09 PROCEDURE — 74011000258 HC RX REV CODE- 258: Performed by: INTERNAL MEDICINE

## 2020-08-09 PROCEDURE — 82962 GLUCOSE BLOOD TEST: CPT

## 2020-08-09 PROCEDURE — 74011250637 HC RX REV CODE- 250/637: Performed by: INTERNAL MEDICINE

## 2020-08-09 PROCEDURE — 77010033678 HC OXYGEN DAILY

## 2020-08-09 PROCEDURE — 80053 COMPREHEN METABOLIC PANEL: CPT

## 2020-08-09 PROCEDURE — 81001 URINALYSIS AUTO W/SCOPE: CPT

## 2020-08-09 PROCEDURE — 94640 AIRWAY INHALATION TREATMENT: CPT

## 2020-08-09 PROCEDURE — 87086 URINE CULTURE/COLONY COUNT: CPT

## 2020-08-09 RX ORDER — MORPHINE SULFATE 2 MG/ML
2 INJECTION, SOLUTION INTRAMUSCULAR; INTRAVENOUS ONCE
Status: COMPLETED | OUTPATIENT
Start: 2020-08-09 | End: 2020-08-09

## 2020-08-09 RX ADMIN — PIPERACILLIN AND TAZOBACTAM 4.5 G: 4; .5 INJECTION, POWDER, FOR SOLUTION INTRAVENOUS at 08:24

## 2020-08-09 RX ADMIN — PIPERACILLIN AND TAZOBACTAM 4.5 G: 4; .5 INJECTION, POWDER, FOR SOLUTION INTRAVENOUS at 00:17

## 2020-08-09 RX ADMIN — PANTOPRAZOLE SODIUM 40 MG: 40 TABLET, DELAYED RELEASE ORAL at 05:24

## 2020-08-09 RX ADMIN — ALBUTEROL SULFATE 2.5 MG: 2.5 SOLUTION RESPIRATORY (INHALATION) at 07:53

## 2020-08-09 RX ADMIN — MORPHINE SULFATE 2 MG: 2 INJECTION, SOLUTION INTRAMUSCULAR; INTRAVENOUS at 08:41

## 2020-08-09 RX ADMIN — IPRATROPIUM BROMIDE AND ALBUTEROL SULFATE 3 ML: .5; 3 SOLUTION RESPIRATORY (INHALATION) at 14:20

## 2020-08-09 RX ADMIN — LACTOBACILLUS ACIDOPHILUS / LACTOBACILLUS BULGARICUS 1 PACKET: 100 MILLION CFU STRENGTH GRANULES at 16:46

## 2020-08-09 RX ADMIN — ENOXAPARIN SODIUM 40 MG: 40 INJECTION SUBCUTANEOUS at 08:25

## 2020-08-09 RX ADMIN — BUDESONIDE 500 MCG: 0.5 INHALANT RESPIRATORY (INHALATION) at 07:53

## 2020-08-09 RX ADMIN — Medication 5 ML: at 13:49

## 2020-08-09 RX ADMIN — Medication 10 ML: at 05:24

## 2020-08-09 RX ADMIN — Medication 10 ML: at 22:10

## 2020-08-09 RX ADMIN — IPRATROPIUM BROMIDE AND ALBUTEROL SULFATE 3 ML: .5; 3 SOLUTION RESPIRATORY (INHALATION) at 00:17

## 2020-08-09 RX ADMIN — IPRATROPIUM BROMIDE AND ALBUTEROL SULFATE 3 ML: .5; 3 SOLUTION RESPIRATORY (INHALATION) at 18:10

## 2020-08-09 RX ADMIN — METHYLPREDNISOLONE SODIUM SUCCINATE 40 MG: 40 INJECTION, POWDER, FOR SOLUTION INTRAMUSCULAR; INTRAVENOUS at 08:24

## 2020-08-09 RX ADMIN — FAMOTIDINE 20 MG: 20 TABLET, FILM COATED ORAL at 16:48

## 2020-08-09 RX ADMIN — PIPERACILLIN AND TAZOBACTAM 4.5 G: 4; .5 INJECTION, POWDER, FOR SOLUTION INTRAVENOUS at 16:46

## 2020-08-09 RX ADMIN — BUDESONIDE 500 MCG: 0.5 INHALANT RESPIRATORY (INHALATION) at 18:10

## 2020-08-09 NOTE — PROGRESS NOTES
Progress Note    Patient: Johnny Degroot MRN: [id number]  SSN: [id number]    YOB: 1955  Age: 60 y.o. Sex: female      Admit Date: 8/5/2020    LOS: 3 days     Subjective:     Patient seen and examined at bedside. This morning after breakfast she presented with cough and respiratory distress.      Objective:     Vitals:    08/09/20 0145 08/09/20 0405 08/09/20 0755 08/09/20 0814   BP:  176/75  178/90   Pulse: 83 94  (!) 107   Resp:  18  18   Temp:  97.7 °F (36.5 °C)  97.6 °F (36.4 °C)   SpO2:  93% 97% 95%   Weight:       Height:            Intake and Output:  Current Shift: 08/09 0701 - 08/09 1900  In: -   Out: 500 [Urine:500]  Last three shifts: 08/07 1901 - 08/09 0700  In: -   Out: 1000 [Urine:1000]    ROS  10 ROS negative except from stated on subjective    Physical Exam:   General: Alert, oriented, NAD  HEENT: NC/AT, EOM are intact  Neck: supple, no JVD  Cardiovascular: RRR, S1, S2, no murmurs  Respiratory: Basilar crackles, mils wheezes  Abdomen: Soft, NT, ND  Back: No CVA tenderness, no paraspinal tenderness  Extremities: LE without pedal edema, no erythema  Neuro: A&O, CN are intact, no focal deficits  Skin: no rash or ulcers  Psych: good mood and affect    Lab/Data Review:  I have personally reviewed patients laboratory data showing  Recent Results (from the past 24 hour(s))   GLUCOSE, POC    Collection Time: 08/08/20 11:51 AM   Result Value Ref Range    Glucose (POC) 168 (H) 65 - 100 mg/dL   GLUCOSE, POC    Collection Time: 08/08/20  4:51 PM   Result Value Ref Range    Glucose (POC) 163 (H) 65 - 100 mg/dL   GLUCOSE, POC    Collection Time: 08/08/20  9:07 PM   Result Value Ref Range    Glucose (POC) 161 (H) 65 - 261 mg/dL   METABOLIC PANEL, COMPREHENSIVE    Collection Time: 08/09/20  5:23 AM   Result Value Ref Range    Sodium 143 136 - 145 mmol/L    Potassium 4.5 3.5 - 5.1 mmol/L    Chloride 109 (H) 98 - 107 mmol/L    CO2 27 21 - 32 mmol/L    Anion gap 7 7 - 16 mmol/L    Glucose 101 (H) 65 - 100 mg/dL    BUN 14 8 - 23 MG/DL    Creatinine 0.50 (L) 0.6 - 1.0 MG/DL    GFR est AA >60 >60 ml/min/1.73m2    GFR est non-AA >60 >60 ml/min/1.73m2    Calcium 7.5 (L) 8.3 - 10.4 MG/DL    Bilirubin, total 0.6 0.2 - 1.1 MG/DL    ALT (SGPT) 402 (H) 12 - 65 U/L    AST (SGOT) 67 (H) 15 - 37 U/L    Alk. phosphatase 133 50 - 136 U/L    Protein, total 5.3 (L) 6.3 - 8.2 g/dL    Albumin 2.2 (L) 3.2 - 4.6 g/dL    Globulin 3.1 2.3 - 3.5 g/dL    A-G Ratio 0.7 (L) 1.2 - 3.5     CBC WITH AUTOMATED DIFF    Collection Time: 08/09/20  5:23 AM   Result Value Ref Range    WBC 13.0 (H) 4.3 - 11.1 K/uL    RBC 3.84 (L) 4.05 - 5.2 M/uL    HGB 11.0 (L) 11.7 - 15.4 g/dL    HCT 36.2 35.8 - 46.3 %    MCV 94.3 79.6 - 97.8 FL    MCH 28.6 26.1 - 32.9 PG    MCHC 30.4 (L) 31.4 - 35.0 g/dL    RDW 20.6 (H) 11.9 - 14.6 %    PLATELET 842 585 - 486 K/uL    MPV 11.4 9.4 - 12.3 FL    ABSOLUTE NRBC 0.00 0.0 - 0.2 K/uL    DF AUTOMATED      NEUTROPHILS 76 43 - 78 %    LYMPHOCYTES 12 (L) 13 - 44 %    MONOCYTES 9 4.0 - 12.0 %    EOSINOPHILS 0 (L) 0.5 - 7.8 %    BASOPHILS 0 0.0 - 2.0 %    IMMATURE GRANULOCYTES 3 0.0 - 5.0 %    ABS. NEUTROPHILS 9.8 (H) 1.7 - 8.2 K/UL    ABS. LYMPHOCYTES 1.5 0.5 - 4.6 K/UL    ABS. MONOCYTES 1.2 0.1 - 1.3 K/UL    ABS. EOSINOPHILS 0.0 0.0 - 0.8 K/UL    ABS. BASOPHILS 0.1 0.0 - 0.2 K/UL    ABS. IMM. GRANS.  0.4 0.0 - 0.5 K/UL   GLUCOSE, POC    Collection Time: 08/09/20  7:56 AM   Result Value Ref Range    Glucose (POC) 105 (H) 65 - 100 mg/dL   URINALYSIS W/ RFLX MICROSCOPIC    Collection Time: 08/09/20 10:30 AM   Result Value Ref Range    Color YELLOW      Appearance CLEAR      Specific gravity 1.012 1.001 - 1.023      pH (UA) 7.5 5.0 - 9.0      Protein Negative NEG mg/dL    Glucose Negative mg/dL    Ketone Negative NEG mg/dL    Bilirubin Negative NEG      Blood LARGE (A) NEG      Urobilinogen 1.0 0.2 - 1.0 EU/dL    Nitrites Negative NEG      Leukocyte Esterase TRACE (A) NEG      WBC 3-5 0 /hpf    RBC >100 (H) 0 /hpf Epithelial cells 0-3 0 /hpf    Bacteria 0 0 /hpf    Casts 0 0 /lpf        Image:  I have personally reviewed patients imaging showing  CT CHEST W CONT   Final Result   IMPRESSION:    1. Emphysema. 2. Bilateral mid and lower lung infiltrates, worse on the right, suspect for   acute pneumonia. Covid 19 pneumonia is a consideration. XR CHEST PORT   Final Result   IMPRESSION: Bibasilar lung infiltrates have improved since the prior study, with   mild residual remaining. XR SWALLOW FUNC VIDEO    (Results Pending)        Hospital problems     Principal Problem:    Acute hypoxemic respiratory failure ([address] (9/26/2018)    Active Problems:    Leukocytosis (9/26/2018)      Tobacco abuse (9/26/2018)      Sepsis ([address] (7/1/2020)      Pneumonia due to COVID-19 virus (7/2/2020)      COPD exacerbation ([address] (7/2/2020)      HAP (hospital-acquired pneumonia) (8/6/2020)      Elevated ALT measurement (8/6/2020)      Elevated lactic acid level (8/6/2020)      Hypomagnesemia (8/6/2020)        Assessment and Plan:     Mortimer Bonine is a 60 y.o. female with medical history significant for COPD , DM2, recent [address] Infection( positive on 7/2 and neg on 7/22, 8/6) who presented from home due to shortness of breath    1. Acute hypoxic respiratory failure and septic shock concerning of HAP vs aspiration PNA  / COPD  - Recent COVID positive on 7/2      - Received convalescent plasma, remdesivir and dexamethasone      - Repeated COVID on 7/22 and 8/6 negative  - CT chest with BL infiltrates, no PE  - This morning concerns of aspiration event  - NPO  - Speech and swallow eval   - Continue zosyn  - Follow BCx - NGTD   - Continue O2 therapy to maintain O2 Sat >92%  - Continue solumedrol and taper down   - Continue albuterol, pulmicort  - Symptomatic management    2.  DM  - ISS  - BS ACHS    DVT ppx lovenox    I have reviewed, updated, and verified this note's content and spent 36 minutes of my 40 minutes visit performing counseling and coordination of care regarding medical management.      Signed By: Gladys Lyon MD     August 9, 2020

## 2020-08-09 NOTE — PROGRESS NOTES
Problem: Dysphagia (Adult)  Goal: *Acute Goals and Plan of Care (Insert Text)  Description: ST. Pt. Will tolerate PO trials with SLP with no overt s/sx of aspiration/penetration. 2. Pt. Will participate in modified barium swallow with 100% participation to fully evaluate swallow function. LTG: Pt. Will tolerate least restrictive diet without respiratory decline. Outcome: Progressing Towards Goal   SPEECH LANGUAGE PATHOLOGY: DYSPHAGIA- Initial Assessment    NAME/AGE/GENDER: Bryn Issa is a 60 y.o. female  DATE: 2020  PRIMARY DIAGNOSIS: Acute hypoxemic respiratory failure ([facility] [J96.01]  Sepsis ([facility] [A41.9]       ICD-10: Treatment Diagnosis: R13.12 Dysphagia, Oropharyngeal Phase    RECOMMENDATIONS   DIET:   NPO with ice chips for pleasure post oral care    MEDICATIONS: Whole in puree     ASPIRATION PRECAUTIONS  Slow rate of intake  Small bites/sips  Upright at 90 degrees during meal     COMPENSATORY STRATEGIES/MODIFICATIONS  None     EDUCATION:  Recommendations discussed with Nursing  Patient     CONTINUATION OF SKILLED SERVICES/MEDICAL NECESSITY:  Patient is expected to demonstrate progress in  swallow strength, swallow timeliness, swallow function, diet tolerance, and swallow safety in order to  improve swallow safety, work toward diet advancement, and decrease aspiration risk. Patient continues to require skilled intervention due to dysphagia. RECOMMENDATIONS for CONTINUED SPEECH THERAPY:   YES: Anticipate need for ongoing speech therapy during this hospitalization and at next level of care. ASSESSMENT   Patient presents with dyspnea with intake alongside overt wet coughing with presentations of solids/liquids. Recommend NPO with medications, ice chips for pleasure post oral care - Modified barium swallow to fully assess pharyngeal function.     REHABILITATION POTENTIAL FOR STATED GOALS: Good    PLAN    FREQUENCY/DURATION: Continue to follow patient 3 times a week for duration of hospital stay to address above goals. - Recommendations for next treatment session: Next treatment will address modified barium swallow study    SUBJECTIVE   Pleasant, cooperative. History of Present Injury/Illness: Ms. Jamil Sierra  has a past medical history of COPD (chronic obstructive pulmonary disease) ([facility] and Diabetes ([facility] . She also  has no past surgical history on file. Problem List:  (Impairments causing functional limitations):  Dysphagia, dyspnea with intake    Previous Dysphagia: NONE REPORTED  Diet Prior to Evaluation: regular/thin    Orientation:   Person  Place  Time  Situation    Pain: Pain Scale 1: Numeric (0 - 10)  Pain Intensity 1: 0    Cognitive-Linguistic Screen:  Speech Production:   Impaired  ?baseline  Expressive Language: WNL     Receptive Language: WNL     Cognition:   Needs further assessment  ?baseline    OBJECTIVE   Oral Motor:   Labial: Impaired coordination  Dentition: Edentulous  Oral Hygiene: Adequate  Lingual: Impaired coordination    Swallow evaluation:   Patient consumed trials of ice chip, thin liquids via cup and straw sip, applesauce and mixed consistency fruit. Increasing s/sx and wet vocal quality with presentations noted. Throat clearing more evident with thicker consistencies. Recommend NPO with modified barium swallow study. INTERDISCIPLINARY COLLABORATION: Registered Nurse  PRECAUTIONS/ALLERGIES: Patient has no known allergies.       Tool Used: Dysphagia Outcome and Severity Scale (REG)    Score Comments   Normal Diet  [] 7 With no strategies or extra time needed   Functional Swallow  [] 6 May have mild oral or pharyngeal delay   Mild Dysphagia  [] 5 Which may require one diet consistency restricted    Mild-Moderate Dysphagia  [] 4 With 1-2 diet consistencies restricted   Moderate Dysphagia  [] 3 With 2 or more diet consistencies restricted   Moderate-Severe Dysphagia  [x] 2 With partial PO strategies (trials with ST only)   Severe Dysphagia  [] 1 With inability to tolerate any PO safely      Score:  Initial: 2 Most Recent: n/a (Date 08/09/20 )   Interpretation of Tool: The Dysphagia Outcome and Severity Scale (REG) is a simple, easy-to-use, 7-point scale developed to systematically rate the functional severity of dysphagia based on objective assessment and make recommendations for diet level, independence level, and type of nutrition. Current Medications:   No current facility-administered medications on file prior to encounter. Current Outpatient Medications on File Prior to Encounter   Medication Sig Dispense Refill    budesonide-formoteroL (SYMBICORT) 160-4.5 mcg/actuation HFAA Take 2 Puffs by inhalation two (2) times a day. 1 Inhaler 0    ibuprofen (MOTRIN) 600 mg tablet Take 1 Tab by mouth every six (6) hours as needed for Pain. 1 Tab 0    metFORMIN (GLUCOPHAGE) 500 mg tablet Take 1 Tab by mouth two (2) times daily (with meals). 60 Tab 0    albuterol (PROAIR HFA) 90 mcg/actuation inhaler Take 2 Puffs by inhalation every four (4) hours as needed for Wheezing. 1 Inhaler 0       After treatment position/precautions:  Upright in bed  Call light within reach    Total Treatment Duration:   Time In: 1008  Time Out: 7000 Nitin Scruggs, MS, CCC-SLP

## 2020-08-09 NOTE — PROGRESS NOTES
Patient seems real anxious earlier and saying she was having hard time breath and requested something.  Buspar 15mg PO BID PRN ordered per MD.

## 2020-08-10 ENCOUNTER — APPOINTMENT (OUTPATIENT)
Dept: GENERAL RADIOLOGY | Age: 65
DRG: 871 | End: 2020-08-10
Attending: INTERNAL MEDICINE
Payer: MEDICARE

## 2020-08-10 LAB
ANION GAP SERPL CALC-SCNC: 5 MMOL/L (ref 7–16)
BASOPHILS # BLD: 0.1 K/UL (ref 0–0.2)
BASOPHILS NFR BLD: 0 % (ref 0–2)
BUN SERPL-MCNC: 12 MG/DL (ref 8–23)
CALCIUM SERPL-MCNC: 8.2 MG/DL (ref 8.3–10.4)
CHLORIDE SERPL-SCNC: 107 MMOL/L (ref 98–107)
CO2 SERPL-SCNC: 31 MMOL/L (ref 21–32)
CREAT SERPL-MCNC: 0.47 MG/DL (ref 0.6–1)
DIFFERENTIAL METHOD BLD: ABNORMAL
EOSINOPHIL # BLD: 0.1 K/UL (ref 0–0.8)
EOSINOPHIL NFR BLD: 1 % (ref 0.5–7.8)
ERYTHROCYTE [DISTWIDTH] IN BLOOD BY AUTOMATED COUNT: 20.2 % (ref 11.9–14.6)
GLUCOSE BLD STRIP.AUTO-MCNC: 105 MG/DL (ref 65–100)
GLUCOSE BLD STRIP.AUTO-MCNC: 109 MG/DL (ref 65–100)
GLUCOSE BLD STRIP.AUTO-MCNC: 109 MG/DL (ref 65–100)
GLUCOSE BLD STRIP.AUTO-MCNC: 117 MG/DL (ref 65–100)
GLUCOSE BLD STRIP.AUTO-MCNC: 122 MG/DL (ref 65–100)
GLUCOSE BLD STRIP.AUTO-MCNC: 129 MG/DL (ref 65–100)
GLUCOSE BLD STRIP.AUTO-MCNC: 135 MG/DL (ref 65–100)
GLUCOSE BLD STRIP.AUTO-MCNC: 143 MG/DL (ref 65–100)
GLUCOSE BLD STRIP.AUTO-MCNC: 161 MG/DL (ref 65–100)
GLUCOSE BLD STRIP.AUTO-MCNC: 163 MG/DL (ref 65–100)
GLUCOSE BLD STRIP.AUTO-MCNC: 168 MG/DL (ref 65–100)
GLUCOSE BLD STRIP.AUTO-MCNC: 168 MG/DL (ref 65–100)
GLUCOSE BLD STRIP.AUTO-MCNC: 219 MG/DL (ref 65–100)
GLUCOSE BLD STRIP.AUTO-MCNC: 238 MG/DL (ref 65–100)
GLUCOSE BLD STRIP.AUTO-MCNC: 67 MG/DL (ref 65–100)
GLUCOSE SERPL-MCNC: 68 MG/DL (ref 65–100)
HCT VFR BLD AUTO: 35.4 % (ref 35.8–46.3)
HGB BLD-MCNC: 11.1 G/DL (ref 11.7–15.4)
IMM GRANULOCYTES # BLD AUTO: 0.3 K/UL (ref 0–0.5)
IMM GRANULOCYTES NFR BLD AUTO: 3 % (ref 0–5)
LYMPHOCYTES # BLD: 2.3 K/UL (ref 0.5–4.6)
LYMPHOCYTES NFR BLD: 18 % (ref 13–44)
MCH RBC QN AUTO: 28.8 PG (ref 26.1–32.9)
MCHC RBC AUTO-ENTMCNC: 31.4 G/DL (ref 31.4–35)
MCV RBC AUTO: 91.9 FL (ref 79.6–97.8)
MONOCYTES # BLD: 1.3 K/UL (ref 0.1–1.3)
MONOCYTES NFR BLD: 10 % (ref 4–12)
NEUTS SEG # BLD: 8.9 K/UL (ref 1.7–8.2)
NEUTS SEG NFR BLD: 68 % (ref 43–78)
NRBC # BLD: 0 K/UL (ref 0–0.2)
PLATELET # BLD AUTO: 230 K/UL (ref 150–450)
PMV BLD AUTO: 10.8 FL (ref 9.4–12.3)
POTASSIUM SERPL-SCNC: 3.9 MMOL/L (ref 3.5–5.1)
RBC # BLD AUTO: 3.85 M/UL (ref 4.05–5.2)
SODIUM SERPL-SCNC: 143 MMOL/L (ref 136–145)
WBC # BLD AUTO: 13.1 K/UL (ref 4.3–11.1)

## 2020-08-10 PROCEDURE — 71045 X-RAY EXAM CHEST 1 VIEW: CPT

## 2020-08-10 PROCEDURE — 97530 THERAPEUTIC ACTIVITIES: CPT | Performed by: PHYSICAL THERAPIST

## 2020-08-10 PROCEDURE — 74011250636 HC RX REV CODE- 250/636: Performed by: INTERNAL MEDICINE

## 2020-08-10 PROCEDURE — 94760 N-INVAS EAR/PLS OXIMETRY 1: CPT

## 2020-08-10 PROCEDURE — 85025 COMPLETE CBC W/AUTO DIFF WBC: CPT

## 2020-08-10 PROCEDURE — 74230 X-RAY XM SWLNG FUNCJ C+: CPT

## 2020-08-10 PROCEDURE — 74011000255 HC RX REV CODE- 255: Performed by: INTERNAL MEDICINE

## 2020-08-10 PROCEDURE — 65270000029 HC RM PRIVATE

## 2020-08-10 PROCEDURE — 74011250637 HC RX REV CODE- 250/637: Performed by: INTERNAL MEDICINE

## 2020-08-10 PROCEDURE — 74011000250 HC RX REV CODE- 250: Performed by: INTERNAL MEDICINE

## 2020-08-10 PROCEDURE — 94640 AIRWAY INHALATION TREATMENT: CPT

## 2020-08-10 PROCEDURE — 97530 THERAPEUTIC ACTIVITIES: CPT

## 2020-08-10 PROCEDURE — 36415 COLL VENOUS BLD VENIPUNCTURE: CPT

## 2020-08-10 PROCEDURE — 80048 BASIC METABOLIC PNL TOTAL CA: CPT

## 2020-08-10 PROCEDURE — 74011000258 HC RX REV CODE- 258: Performed by: INTERNAL MEDICINE

## 2020-08-10 PROCEDURE — 77010033678 HC OXYGEN DAILY

## 2020-08-10 PROCEDURE — 97535 SELF CARE MNGMENT TRAINING: CPT

## 2020-08-10 PROCEDURE — 92611 MOTION FLUOROSCOPY/SWALLOW: CPT

## 2020-08-10 RX ORDER — ALBUTEROL SULFATE 0.83 MG/ML
2.5 SOLUTION RESPIRATORY (INHALATION)
Status: DISCONTINUED | OUTPATIENT
Start: 2020-08-10 | End: 2020-08-13 | Stop reason: HOSPADM

## 2020-08-10 RX ORDER — IPRATROPIUM BROMIDE AND ALBUTEROL SULFATE 2.5; .5 MG/3ML; MG/3ML
3 SOLUTION RESPIRATORY (INHALATION)
Status: DISCONTINUED | OUTPATIENT
Start: 2020-08-10 | End: 2020-08-13 | Stop reason: HOSPADM

## 2020-08-10 RX ORDER — DEXTROSE 40 %
15 GEL (GRAM) ORAL AS NEEDED
Status: DISCONTINUED | OUTPATIENT
Start: 2020-08-10 | End: 2020-08-13 | Stop reason: HOSPADM

## 2020-08-10 RX ORDER — DEXTROSE 50 % IN WATER (D50W) INTRAVENOUS SYRINGE
25-50 AS NEEDED
Status: DISCONTINUED | OUTPATIENT
Start: 2020-08-10 | End: 2020-08-13 | Stop reason: HOSPADM

## 2020-08-10 RX ORDER — DEXTROSE MONOHYDRATE AND SODIUM CHLORIDE 5; .9 G/100ML; G/100ML
50 INJECTION, SOLUTION INTRAVENOUS CONTINUOUS
Status: DISCONTINUED | OUTPATIENT
Start: 2020-08-10 | End: 2020-08-13 | Stop reason: HOSPADM

## 2020-08-10 RX ADMIN — PANTOPRAZOLE SODIUM 40 MG: 40 TABLET, DELAYED RELEASE ORAL at 08:46

## 2020-08-10 RX ADMIN — IPRATROPIUM BROMIDE AND ALBUTEROL SULFATE 3 ML: .5; 3 SOLUTION RESPIRATORY (INHALATION) at 22:10

## 2020-08-10 RX ADMIN — DEXTROSE MONOHYDRATE AND SODIUM CHLORIDE 50 ML/HR: 5; .9 INJECTION, SOLUTION INTRAVENOUS at 18:10

## 2020-08-10 RX ADMIN — Medication 10 ML: at 05:20

## 2020-08-10 RX ADMIN — METHYLPREDNISOLONE SODIUM SUCCINATE 40 MG: 40 INJECTION, POWDER, FOR SOLUTION INTRAMUSCULAR; INTRAVENOUS at 08:46

## 2020-08-10 RX ADMIN — DEXTROSE MONOHYDRATE 12.5 G: 25 INJECTION, SOLUTION INTRAVENOUS at 08:47

## 2020-08-10 RX ADMIN — PIPERACILLIN AND TAZOBACTAM 4.5 G: 4; .5 INJECTION, POWDER, FOR SOLUTION INTRAVENOUS at 08:46

## 2020-08-10 RX ADMIN — LACTOBACILLUS ACIDOPHILUS / LACTOBACILLUS BULGARICUS 1 PACKET: 100 MILLION CFU STRENGTH GRANULES at 08:46

## 2020-08-10 RX ADMIN — Medication 10 ML: at 13:26

## 2020-08-10 RX ADMIN — ACETAMINOPHEN 650 MG: 325 TABLET, FILM COATED ORAL at 21:04

## 2020-08-10 RX ADMIN — IPRATROPIUM BROMIDE AND ALBUTEROL SULFATE 3 ML: .5; 3 SOLUTION RESPIRATORY (INHALATION) at 08:27

## 2020-08-10 RX ADMIN — ACETAMINOPHEN 650 MG: 325 TABLET, FILM COATED ORAL at 15:44

## 2020-08-10 RX ADMIN — BARIUM SULFATE 30 ML: 400 SUSPENSION ORAL at 09:54

## 2020-08-10 RX ADMIN — BUDESONIDE 500 MCG: 0.5 INHALANT RESPIRATORY (INHALATION) at 08:26

## 2020-08-10 RX ADMIN — FAMOTIDINE 20 MG: 20 TABLET, FILM COATED ORAL at 08:46

## 2020-08-10 RX ADMIN — BUDESONIDE 500 MCG: 0.5 INHALANT RESPIRATORY (INHALATION) at 22:10

## 2020-08-10 RX ADMIN — PIPERACILLIN AND TAZOBACTAM 4.5 G: 4; .5 INJECTION, POWDER, FOR SOLUTION INTRAVENOUS at 17:13

## 2020-08-10 RX ADMIN — ALBUTEROL SULFATE 2.5 MG: 2.5 SOLUTION RESPIRATORY (INHALATION) at 02:10

## 2020-08-10 RX ADMIN — Medication 10 ML: at 21:05

## 2020-08-10 RX ADMIN — IPRATROPIUM BROMIDE AND ALBUTEROL SULFATE 3 ML: .5; 3 SOLUTION RESPIRATORY (INHALATION) at 15:29

## 2020-08-10 RX ADMIN — BARIUM SULFATE 30 ML: 400 PASTE ORAL at 09:55

## 2020-08-10 RX ADMIN — PIPERACILLIN AND TAZOBACTAM 4.5 G: 4; .5 INJECTION, POWDER, FOR SOLUTION INTRAVENOUS at 01:01

## 2020-08-10 RX ADMIN — FAMOTIDINE 20 MG: 20 TABLET, FILM COATED ORAL at 15:44

## 2020-08-10 RX ADMIN — ENOXAPARIN SODIUM 40 MG: 40 INJECTION SUBCUTANEOUS at 08:53

## 2020-08-10 RX ADMIN — BARIUM SULFATE 45 ML: 980 POWDER, FOR SUSPENSION ORAL at 09:54

## 2020-08-10 NOTE — PROGRESS NOTES
Problem: Patient Education: Go to Patient Education Activity  Goal: Patient/Family Education  Description: 1. Patient will complete lower body bathing and dressing with MOD I and adaptive equipment as needed. 2. Patient will complete toileting with MOD I.   3. Patient will tolerate 25 minutes of OT treatment with 1-2 rest breaks to increase activity tolerance for ADLs. 4. Patient will complete functional transfers with MOD I and adaptive equipment as needed. 5. Patient will verbalize 3 energy conservation strategies to increase activity tolerance for ADL performance. 6. Patient will complete functional mobility for household distances with MOD I and adaptive equipment as needed. 7. Patient will complete self-grooming while standing edge of sink with MOD I and adaptive equipment as needed. Timeframe: 7 visits       Outcome: Progressing Towards Goal      OCCUPATIONAL THERAPY: Daily Note and PM 8/10/2020  INPATIENT: OT Visit Days: 2  [place] / [place] / Product Type: Medicare /      NAME/AGE/GENDER: Molina Jimenes is a 60 y.o. female   PRIMARY DIAGNOSIS:  Acute hypoxemic respiratory failure ([address] [J96.01]  Sepsis ([address] [A41.9] Acute hypoxemic respiratory failure ([address] Acute hypoxemic respiratory failure ([address]       ICD-10: Treatment Diagnosis:    · Generalized Muscle Weakness (M62.81)   Precautions/Allergies:     Patient has no known allergies. ASSESSMENT:     Ms. Anahi Keith presents for the above diagnoses. Upon arrival, pt supine in bed and agreeable to OT evaluation. Pt is very lethargic today, however oriented x 4. Patient known to therapist from prior hospitalization. Since her last hospitalization, pt was living with family in a 1-story home. At baseline, pt was independent with ADLs and functional mobility. Pt denies any recent falls. Currently resting on 12L 02 via n.c.    8/10/20 Pt presents supine upon arrival. Pt alert and cooperative for therapy.  Pt able to perform all functional transfers with supervision. Pt demonstrates fair standing balance at this time. Pt able to perform toileting with supervision LB dressing this session. Pt performed functional mobility in hallway without AE to increase strength, balance, and activity tolerance. Pt tolerated well and O2 sats stayed WNL. Pt returned to room and performed a few UE exercises as well for endurance. Pt encouraged to stay OOB and to sit up in chair. Pt verbalized understanding. Pt making progress with goals. Will continue to benefit from skilled OT during stay. This section established at most recent assessment   PROBLEM LIST (Impairments causing functional limitations):  1. Decreased Strength  2. Decreased ADL/Functional Activities  3. Decreased Transfer Abilities  4. Decreased Ambulation Ability/Technique  5. Decreased Balance  6. Decreased Activity Tolerance  7. Decreased Pacing Skills  8. Decreased Work Simplification/Energy Conservation Techniques  9. Increased Shortness of Breath   INTERVENTIONS PLANNED: (Benefits and precautions of occupational therapy have been discussed with the patient.)  1. Activities of daily living training  2. Adaptive equipment training  3. Balance training  4. Clothing management  5. Community reintergration  6. Donning&doffing training  7. Neuromuscular re-eduation  8. Re-evaluation  9. Therapeutic activity  10. Therapeutic exercise     TREATMENT PLAN: Frequency/Duration: Follow patient 3x/week to address above goals. Rehabilitation Potential For Stated Goals: Good     REHAB RECOMMENDATIONS (at time of discharge pending progress):    Placement: It is my opinion, based on this patient's performance to date, that Ms. Anahi Keith may benefit from [address] after discharge due to the functional deficits listed above that are likely to improve with skilled rehabilitation because he/she has multiple medical issues that affect his/her functional mobility in the community. Equipment:    TBD              OCCUPATIONAL PROFILE AND HISTORY:   History of Present Injury/Illness (Reason for Referral):  See H&P  Past Medical History/Comorbidities:   Ms. Chad Garcia  has a past medical history of COPD (chronic obstructive pulmonary disease) ([facility] and Diabetes ([facility] Ms. Chad Garcia  has no past surgical history on file. Social History/Living Environment:   Home Environment: Private residence  # Steps to Enter: 2  One/Two Story Residence: One story  Living Alone: Yes  Support Systems: Family member(s)  Patient Expects to be Discharged to[de-identified] Private residence  Current DME Used/Available at Home: None  Prior Level of Function/Work/Activity:  Independent with ADLs and functional mobility. Personal Factors:          Sex:  female        Age:  60 y.o. Other factors that influence how disability is experienced by the patient:  multiple co-morbidities    Number of Personal Factors/Comorbidities that affect the Plan of Care: Brief history (0):  LOW COMPLEXITY   ASSESSMENT OF OCCUPATIONAL PERFORMANCE[de-identified]   Activities of Daily Living:   Basic ADLs (From Assessment) Complex ADLs (From Assessment)   Feeding: Setup  Oral Facial Hygiene/Grooming: Setup  Bathing: Minimum assistance  Upper Body Dressing: Setup  Lower Body Dressing: Minimum assistance  Toileting: Minimum assistance Instrumental ADL  Meal Preparation: Moderate assistance  Homemaking:  Moderate assistance   Grooming/Bathing/Dressing Activities of Daily Living     Cognitive Retraining  Safety/Judgement: Awareness of environment           Toileting  Bladder Hygiene: Supervision  Clothing Management: Supervision     Functional Transfers  Toilet Transfer : Supervision     Bed/Mat Mobility  Supine to Sit: Supervision  Sit to Stand: Supervision     Most Recent Physical Functioning:   Gross Assessment:                  Posture:  Posture (WDL): Exceptions to WDL  Posture Assessment: Cervical, Forward head  Balance:  Sitting: Impaired  Sitting - Static: Good (unsupported)  Sitting - Dynamic: Fair (occasional)  Standing: Impaired  Standing - Static: Good  Standing - Dynamic : Fair Bed Mobility:  Supine to Sit: Supervision  Wheelchair Mobility:     Transfers:  Sit to Stand: Supervision            Patient Vitals for the past 6 hrs:   BP BP Patient Position SpO2 O2 Flow Rate (L/min) Pulse   08/10/20 1128 138/67 At rest 91 %  94   08/10/20 1528 (!) 167/93 At rest;Pre-activity 93 %  94   08/10/20 1529   94 % 5 l/min        Mental Status  Neurologic State: Alert  Orientation Level: Oriented X4  Cognition: Follows commands  Perception: Appears intact  Perseveration: No perseveration noted  Safety/Judgement: Awareness of environment                          Physical Skills Involved:  1. Balance  2. Strength  3. Activity Tolerance  4. Gross Motor Control Cognitive Skills Affected (resulting in the inability to perform in a timely and safe manner): 1. none Psychosocial Skills Affected:  1. Habits/Routines  2. Environmental Adaptation   Number of elements that affect the Plan of Care: 5+:  HIGH COMPLEXITY   CLINICAL DECISION MAKING:   [employer] AM-PAC 6 Clicks   Daily Activity Inpatient Short Form  How much help from another person does the patient currently need. .. Total A Lot A Little None   1. Putting on and taking off regular lower body clothing? [] 1   [] 2   [x] 3   [] 4   2. Bathing (including washing, rinsing, drying)? [] 1   [] 2   [x] 3   [] 4   3. Toileting, which includes using toilet, bedpan or urinal?   [] 1   [] 2   [x] 3   [] 4   4. Putting on and taking off regular upper body clothing? [] 1   [] 2   [x] 3   [] 4   5. Taking care of personal grooming such as brushing teeth? [] 1   [] 2   [x] 3   [] 4   6. Eating meals? [] 1   [] 2   [x] 3   [] 4   © 2007, Trustees of [employer], under license to [employer].  All rights reserved      Score:  Initial: 18 Most Recent: X (Date: -- )    Interpretation of Tool: Represents activities that are increasingly more difficult (i.e. Bed mobility, Transfers, Gait). Medical Necessity:     · Patient demonstrates   · good  ·  rehab potential due to higher previous functional level. Reason for Services/Other Comments:  · Patient continues to require skilled intervention due to   · medical complications and patient unable to attend/participate in therapy as expected  · . Use of outcome tool(s) and clinical judgement create a POC that gives a: LOW COMPLEXITY         TREATMENT:   (In addition to Assessment/Re-Assessment sessions the following treatments were rendered)     Pre-treatment Symptoms/Complaints:    Pain: Initial:      Post Session:  same     Therapeutic Activity: (15 minutes): Therapeutic activities including Bed transfers, Chair transfers,functional transfers on level ground to improve mobility, strength, balance, and coordination. Required SBA/CGA to promote static and dynamic balance in standing/sitting. Self Care: (15 minutes): Procedure(s) (per grid) utilized to improve and/or restore self-care/home management as related to dressing and toileting. Required no visual, verbal and tactile cueing to facilitate activities of daily living skills. Braces/Orthotics/Lines/Etc:   · O2 Device: Hi flow nasal cannula  Treatment/Session Assessment:    · Response to Treatment:  tolerated well with no issues noted. · Interdisciplinary Collaboration:   o Certified Occupational Therapy Assistant  o Registered Nurse  · After treatment position/precautions:   o Up in chair  o Bed/Chair-wheels locked  o Bed in low position  o Call light within reach  o RN notified   · Compliance with Program/Exercises: Will assess as treatment progresses. · Recommendations/Intent for next treatment session: \"Next visit will focus on advancements to more challenging activities and reduction in assistance provided\".   Total Treatment Duration:  OT Patient Time In/Time Out  Time In: 1315  Time Out: 514 [facility] Ivone Adames

## 2020-08-10 NOTE — PROGRESS NOTES
Problem: Dysphagia (Adult)  Goal: *Acute Goals and Plan of Care (Insert Text)  Description: ST. Pt. Will tolerate PO trials with SLP with no overt s/sx of aspiration/penetration. 2. Pt. Will participate in modified barium swallow with 100% participation to fully evaluate swallow function. MET 8/10/2020  3. Patient will participate in dysphagia exercises with 80% accuracy given minimal cueing. Added 8/10/2020  4. Patient will participate in repeat modified barium swallow study to re evaluate swallow function when deemed appropriate. Added 8/10/2020    LTG: Pt. Will tolerate least restrictive diet without respiratory decline.     Outcome: Progressing Towards Goal  SPEECH LANGUAGE PATHOLOGY: MODIFIED BARIUM SWALLOW STUDY  Initial Assessment    NAME/AGE/GENDER: Jennifer Mayorga is a 60 y.o. female  DATE: 8/10/2020  PRIMARY DIAGNOSIS: Acute hypoxemic respiratory failure ([facility] [address] [J96.01]  Sepsis ([facility] [A41.9]      ICD-10: Treatment Diagnosis: Oropharyngeal dysphagia (R13.12)  INTERDISCIPLINARY COLLABORATION: Radiologist  PRECAUTIONS/ALLERGIES: Patient has no known allergies.      RECOMMENDATIONS/PLAN   DIET:    NPO    Consider short term alternative means of nutrition     MEDICATIONS: crushed in pudding if necessary     COMPENSATORY STRATEGIES/MODIFICATIONS INCLUDING:  · None     OTHER RECOMMENDATIONS (including follow up treatment recommendations):   · Treatment to improve/facilitate oral/pharyngeal skills   · Training in laryngeal strengthening and coordination exercises  · Training in use of compensatory safe swallowing strategies/feeding guidelines  · Patient/family education  · Follow-up MBS as deemed necessary following therapy    Recommendations for next treatment session: Next treatment will address exercises       CONTINUATION OF SKILLED SERVICES/MEDICAL NECESSITY:  · Patient is expected to demonstrate progress in  swallow strength, swallow timeliness, swallow function, diet tolerance, and swallow safety in order to  improve swallow safety, work toward diet advancement, and decrease aspiration risk. · Patient continues to require skilled intervention due to dysphagia.       RECOMMENDATIONS for CONTINUED SPEECH THERAPY:   YES: Anticipate need for ongoing speech therapy during this hospitalization and at next level of care. FREQUENCY/DURATION: Continue to follow patient 3 times a week for duration of hospital stay to address above goals. ASSESSMENT   Ms. Aury Garcia presents with moderate-severe oropharyngeal dysphagia. Premature spillage with delayed swallow initiation to level of pyriforms with all liquid trials, and delayed to posterior epiglottis with pudding. Incomplete laryngeal closure with non clearing penetration during the swallow with all liquid trials, including thin liquids, nectar thick liquids, honey thick liquids, and thin liquids with chin tuck. Shallow penetration with pudding that appears to clear during the swallow, but difficult to determine if trace contrast remaining in laryngeal vestibule new non clearing penetration versus residue from prior trials. Recommend NPO. OK for 4-5 ice chips for pleasure after oral care with staff. meds crushed in pudding if necessary. Consider short term alternative means of nutrition. Will benefit from dysphagia treatment and follow up modified barium swallow study when clinically indicated. SUBJECTIVE   Upright in chair. History of Present Injury/Illness: Ms. Aury Garcia  has a past medical history of COPD (chronic obstructive pulmonary disease) ([facility] [address] and Diabetes ([facility] [address] . She also  has no past surgical history on file.    Pain:  Pain Intensity 1: 0  Pain Location 1: Generalized    Current dietary status prior to evaluation today:  NPO    Previous Modified Barium Swallow studies: n/a  Chest XR 8/10/2020: IMPRESSION: Increased peripheral infiltrate in the right lung    Current Medications:   No current facility-administered medications on file prior to encounter. Current Outpatient Medications on File Prior to Encounter   Medication Sig Dispense Refill    budesonide-formoteroL (SYMBICORT) 160-4.5 mcg/actuation HFAA Take 2 Puffs by inhalation two (2) times a day. 1 Inhaler 0    ibuprofen (MOTRIN) 600 mg tablet Take 1 Tab by mouth every six (6) hours as needed for Pain. 1 Tab 0    metFORMIN (GLUCOPHAGE) 500 mg tablet Take 1 Tab by mouth two (2) times daily (with meals). 60 Tab 0    albuterol (PROAIR HFA) 90 mcg/actuation inhaler Take 2 Puffs by inhalation every four (4) hours as needed for Wheezing. 1 Inhaler 0       OBJECTIVE   Orientation:    Person   Place   Time   Situation    Vocal Quality: WFL    Modified barium swallow study was performed in the radiology suite with Ms. Nugent in the lateral plane seated upright 90° in the [facility] [place] [place] chair. To evaluate her swallow function, barium coated liquid and food was administered in the form of thin liquids (by spoon, cup sip, straw sip and cup sip with chin tuck), nectar-thick liquids (by spoon and cup sip), honey-thick liquids (by spoon and cup sip) and pudding.     Oral phase of swallow:    premature spillage to pharynx pre-swallow   piecemeal deglutition  With all trials    Pharyngeal phase of swallow:    Thin liquid by tsp: premature spillage to pyriforms with flash penetration during the swallow on first trial. On second trial, delayed initiation to pyriforms with non clearing shallow penetration during the swallow   Thin liquids by cup: premature spillage to pyriforms with shallow penetration from pyriforms during the swallow that does not clear   Thin liquids via cup with chin tuck: unable to complete oral hold with premature spillage into laryngeal vestibule before the swallow then tucks chin with deep penetration during the swallow    Thin liquids by straw: piecemeal swallow with premature spillage to pyriforms with non clearing deep laryngeal penetration during the swallow   Nectar thick liquids by tsp: no new aspiration or penetration   Nectar thick liquid by cup: piecemeal swallow with non clearing shallow penetration with first portion and pooling in valleculae ~3 seconds with deep silent penetration during the swallow   Honey thick liquids by tsp: premature spillage to valleculae with initiation delayed at pyriforms. piecemeal swallow with non clearing shallow penetration during the swallow.  Honey thick liquids by cup: premature spillage to pyriforms with non clearing shallow penetration during the swallow.  Pudding: initiated at posterior epiglottis with shallow penetration during the swallow that appears to clear, but difficult to rule out trace non clearing due to residual remaining in laryngeal vestibule from previous liquid trials. Pharyngeal characteristics:   delayed pharyngeal swallow initiation   delayed retraction of base of tongue   adequate hyolaryngeal elevation/excursion   delayed epiglottic inversion   adequate constriction of posterior pharyngeal wall   incomplete laryngeal closure  Attempted strategies:    chin tuck  Effective strategies:    none  Aspiration/Penetration Scale: 5 (Deep penetration/visible residue. Contrast contacts the folds and is not ejected.)    Cervical esophageal phase of swallow:    adequate and timely clearance of all boluses through cervical esophagus  **Distal esophagus not assessed due to limitations of MBS study. Assessment/Reassessment only, no treatment provided today.       Tool Used: Dysphagia Outcome and Severity Scale (REG)    Comments   Normal Diet 7 With no strategies or extra time needed   Functional Swallow 6 May have mild oral or pharyngeal delay   Mild Dysphagia 5 Which may require one diet consistency restricted    Mild-Moderate Dysphagia 4 With 1-2 diet consistencies restricted   Moderate Dysphagia 3 With 2 or more diet consistencies restricted   Moderately Severe Dysphagia 2 With partial PO strategies (trials with ST only)   Severe Dysphagia 1 With inability to tolerate any PO safely      Score:  Initial: 2 Most Recent: x (Date:8/10/2020)   Interpretation of Tool: The Dysphagia Outcome and Severity Scale (REG) is a simple, easy-to-use, 7-point scale developed to systematically rate the functional severity of dysphagia based on objective assessment and make recommendations for diet level, independence level, and type of nutrition. [place] / [place] / Product Type: Medicare /     Education:  · Recommendations discussed with nursing and patient  · Notified MD via Ceonve. Safety:   After treatment position/precautions:  · Patient waiting in radiology holding bay for transport back to room.       Total Treatment Duration:  Time In: 0930   Time Out: [address], [address] CCC-SLP

## 2020-08-10 NOTE — PROGRESS NOTES
Progress Note    Patient: Kari Lobato MRN: [id number]  SSN: [id number]    YOB: 1955  Age: 60 y.o. Sex: female      Admit Date: 8/5/2020    LOS: 4 days     Subjective:     Patient seen and examined at bedside. This morning states her breathing is better, still presenting with cough. Objective:     Vitals:    08/10/20 0210 08/10/20 0348 08/10/20 0757 08/10/20 0829   BP:  157/71 145/69    Pulse:  93 92    Resp:  18 18    Temp:  97.5 °F (36.4 °C) 97.7 °F (36.5 °C)    SpO2: 94% 93% 90% 92%   Weight:       Height:            Intake and Output:  Current Shift: No intake/output data recorded.   Last three shifts: 08/08 1901 - 08/10 0700  In: -   Out: 1500 [Urine:1500]    ROS  10 ROS negative except from stated on subjective    Physical Exam:   General: Alert, oriented, NAD  HEENT: NC/AT, EOM are intact  Neck: supple, no JVD  Cardiovascular: RRR, S1, S2, no murmurs  Respiratory: Basilar crackles, mils wheezes  Abdomen: Soft, NT, ND  Back: No CVA tenderness, no paraspinal tenderness  Extremities: LE without pedal edema, no erythema  Neuro: A&O, CN are intact, no focal deficits  Skin: no rash or ulcers  Psych: good mood and affect    Lab/Data Review:  I have personally reviewed patients laboratory data showing  Recent Results (from the past 24 hour(s))   URINALYSIS W/ RFLX MICROSCOPIC    Collection Time: 08/09/20 10:30 AM   Result Value Ref Range    Color YELLOW      Appearance CLEAR      Specific gravity 1.012 1.001 - 1.023      pH (UA) 7.5 5.0 - 9.0      Protein Negative NEG mg/dL    Glucose Negative mg/dL    Ketone Negative NEG mg/dL    Bilirubin Negative NEG      Blood LARGE (A) NEG      Urobilinogen 1.0 0.2 - 1.0 EU/dL    Nitrites Negative NEG      Leukocyte Esterase TRACE (A) NEG      WBC 3-5 0 /hpf    RBC >100 (H) 0 /hpf    Epithelial cells 0-3 0 /hpf    Bacteria 0 0 /hpf    Casts 0 0 /lpf   GLUCOSE, POC    Collection Time: 08/09/20 11:40 AM   Result Value Ref Range    Glucose (POC) 109 (H) 65 - 100 mg/dL   GLUCOSE, POC    Collection Time: 08/09/20 11:56 AM   Result Value Ref Range    Glucose (POC) 117 (H) 65 - 100 mg/dL   GLUCOSE, POC    Collection Time: 08/09/20  4:32 PM   Result Value Ref Range    Glucose (POC) 122 (H) 65 - 100 mg/dL   GLUCOSE, POC    Collection Time: 08/09/20  9:44 PM   Result Value Ref Range    Glucose (POC) 129 (H) 65 - 100 mg/dL   CBC WITH AUTOMATED DIFF    Collection Time: 08/10/20  5:20 AM   Result Value Ref Range    WBC 13.1 (H) 4.3 - 11.1 K/uL    RBC 3.85 (L) 4.05 - 5.2 M/uL    HGB 11.1 (L) 11.7 - 15.4 g/dL    HCT 35.4 (L) 35.8 - 46.3 %    MCV 91.9 79.6 - 97.8 FL    MCH 28.8 26.1 - 32.9 PG    MCHC 31.4 31.4 - 35.0 g/dL    RDW 20.2 (H) 11.9 - 14.6 %    PLATELET 378 118 - 658 K/uL    MPV 10.8 9.4 - 12.3 FL    ABSOLUTE NRBC 0.00 0.0 - 0.2 K/uL    DF AUTOMATED      NEUTROPHILS 68 43 - 78 %    LYMPHOCYTES 18 13 - 44 %    MONOCYTES 10 4.0 - 12.0 %    EOSINOPHILS 1 0.5 - 7.8 %    BASOPHILS 0 0.0 - 2.0 %    IMMATURE GRANULOCYTES 3 0.0 - 5.0 %    ABS. NEUTROPHILS 8.9 (H) 1.7 - 8.2 K/UL    ABS. LYMPHOCYTES 2.3 0.5 - 4.6 K/UL    ABS. MONOCYTES 1.3 0.1 - 1.3 K/UL    ABS. EOSINOPHILS 0.1 0.0 - 0.8 K/UL    ABS. BASOPHILS 0.1 0.0 - 0.2 K/UL    ABS. IMM.  GRANS. 0.3 0.0 - 0.5 K/UL   METABOLIC PANEL, BASIC    Collection Time: 08/10/20  5:20 AM   Result Value Ref Range    Sodium 143 136 - 145 mmol/L    Potassium 3.9 3.5 - 5.1 mmol/L    Chloride 107 98 - 107 mmol/L    CO2 31 21 - 32 mmol/L    Anion gap 5 (L) 7 - 16 mmol/L    Glucose 68 65 - 100 mg/dL    BUN 12 8 - 23 MG/DL    Creatinine 0.47 (L) 0.6 - 1.0 MG/DL    GFR est AA >60 >60 ml/min/1.73m2    GFR est non-AA >60 >60 ml/min/1.73m2    Calcium 8.2 (L) 8.3 - 10.4 MG/DL   GLUCOSE, POC    Collection Time: 08/10/20  7:53 AM   Result Value Ref Range    Glucose (POC) 67 65 - 100 mg/dL        Image:  I have personally reviewed patients imaging showing  XR CHEST SNGL V   Final Result   IMPRESSION: Increased peripheral infiltrate in the right lung CT CHEST W CONT   Final Result   IMPRESSION:    1. Emphysema. 2. Bilateral mid and lower lung infiltrates, worse on the right, suspect for   acute pneumonia. Covid 19 pneumonia is a consideration. XR CHEST PORT   Final Result   IMPRESSION: Bibasilar lung infiltrates have improved since the prior study, with   mild residual remaining. XR SWALLOW FUNC VIDEO    (Results Pending)        Hospital problems     Principal Problem:    Acute hypoxemic respiratory failure ([address] (9/26/2018)    Active Problems:    Leukocytosis (9/26/2018)      Tobacco abuse (9/26/2018)      Sepsis ([address] (7/1/2020)      Pneumonia due to COVID-19 virus (7/2/2020)      COPD exacerbation ([address] (7/2/2020)      HAP (hospital-acquired pneumonia) (8/6/2020)      Elevated ALT measurement (8/6/2020)      Elevated lactic acid level (8/6/2020)      Hypomagnesemia (8/6/2020)        Assessment and Plan:     Brendan Simon is a 60 y.o. female with medical history significant for COPD , DM2, recent [address] Infection( positive on 7/2 and neg on 7/22, 8/6) who presented from home due to shortness of breath    1. Acute hypoxic respiratory failure and septic shock concerning of HAP vs aspiration PNA  / COPD  - Recent COVID positive on 7/2      - Received convalescent plasma, remdesivir and dexamethasone      - Repeated COVID on 7/22 and 8/6 negative  - CT chest with BL infiltrates, no PE  - Yesterday concerns of aspiration event  - Continue NPO for now  - Speech and swallow recommended to continue NPO  - Continue zosyn for now  - Repeat CXR today   - Follow BCx - NGTD   - Continue O2 therapy to maintain O2 Sat >92%  - Continue solumedrol and taper down   - Continue albuterol, pulmicort  - Symptomatic management    2. DM  - ISS  - BS ACHS    DVT ppx lovenox    I have reviewed, updated, and verified this note's content and spent 36 minutes of my 40 minutes visit performing counseling and coordination of care regarding medical management. Signed By: Charlene Smith MD     August 10, 2020

## 2020-08-10 NOTE — PROGRESS NOTES
SPEECH PATHOLOGY NOTE:      Modified barium swallow. Patient demonstrates significant dysphagia. Recommend NPO with alternative means of nutrition at this time. If necessary, meds crushed in pudding. Full detailed report to follow.        [address] CCC-SLP

## 2020-08-10 NOTE — PROGRESS NOTES
Problem: Diabetes Self-Management  Goal: *Disease process and treatment process  Description: Define diabetes and identify own type of diabetes; list 3 options for treating diabetes. Outcome: Progressing Towards Goal  Goal: *Incorporating nutritional management into lifestyle  Description: Describe effect of type, amount and timing of food on blood glucose; list 3 methods for planning meals. Outcome: Progressing Towards Goal  Goal: *Incorporating physical activity into lifestyle  Description: State effect of exercise on blood glucose levels. Outcome: Progressing Towards Goal  Goal: *Developing strategies to promote health/change behavior  Description: Define the ABC's of diabetes; identify appropriate screenings, schedule and personal plan for screenings. Outcome: Progressing Towards Goal  Goal: *Using medications safely  Description: State effect of diabetes medications on diabetes; name diabetes medication taking, action and side effects. Outcome: Progressing Towards Goal  Goal: *Monitoring blood glucose, interpreting and using results  Description: Identify recommended blood glucose targets  and personal targets. Outcome: Progressing Towards Goal  Goal: *Prevention, detection, treatment of acute complications  Description: List symptoms of hyper- and hypoglycemia; describe how to treat low blood sugar and actions for lowering  high blood glucose level. Outcome: Progressing Towards Goal  Goal: *Prevention, detection and treatment of chronic complications  Description: Define the natural course of diabetes and describe the relationship of blood glucose levels to long term complications of diabetes.   Outcome: Progressing Towards Goal  Goal: *Developing strategies to address psychosocial issues  Description: Describe feelings about living with diabetes; identify support needed and support network  Outcome: Progressing Towards Goal  Goal: *Insulin pump training  Outcome: Progressing Towards Goal  Goal: *Sick day guidelines  Outcome: Progressing Towards Goal  Goal: *Patient Specific Goal (EDIT GOAL, INSERT TEXT)  Outcome: Progressing Towards Goal     Problem: Patient Education: Go to Patient Education Activity  Goal: Patient/Family Education  Outcome: Progressing Towards Goal     Problem: Falls - Risk of  Goal: *Absence of Falls  Description: Document Haroldo Arias Fall Risk and appropriate interventions in the flowsheet. Outcome: Progressing Towards Goal  Note: Fall Risk Interventions:  Mobility Interventions: Bed/chair exit alarm, Communicate number of staff needed for ambulation/transfer, Patient to call before getting OOB         Medication Interventions: Bed/chair exit alarm, Patient to call before getting OOB, Teach patient to arise slowly    Elimination Interventions: Bed/chair exit alarm, Call light in reach, Patient to call for help with toileting needs, Stay With Me (per policy), Toilet paper/wipes in reach, Toileting schedule/hourly rounds    History of Falls Interventions: Bed/chair exit alarm, Consult care management for discharge planning, Door open when patient unattended, Investigate reason for fall         Problem: Patient Education: Go to Patient Education Activity  Goal: Patient/Family Education  Outcome: Progressing Towards Goal     Problem: Pressure Injury - Risk of  Goal: *Prevention of pressure injury  Description: Document Dale Scale and appropriate interventions in the flowsheet.   Outcome: Progressing Towards Goal  Note: Pressure Injury Interventions:  Sensory Interventions: Assess changes in LOC, Avoid rigorous massage over bony prominences    Moisture Interventions: Absorbent underpads, Check for incontinence Q2 hours and as needed    Activity Interventions: Increase time out of bed, Pressure redistribution bed/mattress(bed type), PT/OT evaluation    Mobility Interventions: HOB 30 degrees or less, Pressure redistribution bed/mattress (bed type), PT/OT evaluation    Nutrition Interventions: Document food/fluid/supplement intake, Offer support with meals,snacks and hydration    Friction and Shear Interventions: HOB 30 degrees or less                Problem: Patient Education: Go to Patient Education Activity  Goal: Patient/Family Education  Outcome: Progressing Towards Goal     Problem: Patient Education: Go to Patient Education Activity  Goal: Patient/Family Education  Description: 1. Patient will complete lower body bathing and dressing with MOD I and adaptive equipment as needed. 2. Patient will complete toileting with MOD I.   3. Patient will tolerate 25 minutes of OT treatment with 1-2 rest breaks to increase activity tolerance for ADLs. 4. Patient will complete functional transfers with MOD I and adaptive equipment as needed. 5. Patient will verbalize 3 energy conservation strategies to increase activity tolerance for ADL performance. 6. Patient will complete functional mobility for household distances with MOD I and adaptive equipment as needed. 7. Patient will complete self-grooming while standing edge of sink with MOD I and adaptive equipment as needed.     Timeframe: 7 visits       Outcome: Progressing Towards Goal     Problem: Patient Education: Go to Patient Education Activity  Goal: Patient/Family Education  Outcome: Progressing Towards Goal

## 2020-08-10 NOTE — PROGRESS NOTES
STG:  (1.)Ms. Tuan Broussard will move from supine to sit and sit to supine , scoot up and down, and roll side to side with SUPERVISION within 4 treatment day(s). (2.)Ms. Tuan Broussard will transfer from bed to chair and chair to bed with SUPERVISION using the least restrictive device within 4 treatment day(s). (3.)Ms. Tuan Broussard will ambulate with SUPERVISION for 75 feet with the least restrictive device within 4 treatment day(s). LTG:  (1.)Ms. Tuan Broussard will move from supine to sit and sit to supine , scoot up and down, and roll side to side in bed with MODIFIED INDEPENDENCE within 7 treatment day(s). (2.)Ms. Tuan Broussard will transfer from bed to chair and chair to bed with MODIFIED INDEPENDENCE using the least restrictive device within 7 treatment day(s). (3.)Ms. Tuan Broussard will ambulate with MODIFIED INDEPENDENCE for 125 feet with the least restrictive device within 7 treatment day(s). ________________________________________________________________________________________________      PHYSICAL THERAPY: Daily Note and PM 8/10/2020  INPATIENT: PT Visit Days : 2  [place] / [place] / Product Type: Medicare /       NAME/AGE/GENDER: Jorge Saunders is a 60 y.o. female   PRIMARY DIAGNOSIS: Acute hypoxemic respiratory failure ([address] [J96.01]  Sepsis ([address] [A41.9] Acute hypoxemic respiratory failure ([facility] Acute hypoxemic respiratory failure ([facility]       ICD-10: Treatment Diagnosis:    · Generalized Muscle Weakness (M62.81)  · Other lack of cordination (R27.8)  · Difficulty in walking, Not elsewhere classified (R26.2)  · Other abnormalities of gait and mobility (R26.89)   Precaution/Allergies:  Patient has no known allergies. ASSESSMENT:     Upon entering, Pnt is agreeable to PT treatment. she reports no pain in her back at rest. Pnt performed supine > sit with supervision, sitting EOB with good sitting balance control.  Sit > stand with CGA using nothing, followed by toileting w/ min A. Gait x 350 ft with RW & CGA, cues for step clearance, followed by a seated rest breaks another 100ft of ambulation w/ CGA. Gait is noted to be slow. Stand > sit with SBA, followed by positioning for comfort. At end of session pt supine in bed with all needs within reach, alarm activated for safety, RN notified. Overall, good progress today as pnt improved in ambulation disitance considerably. Pnt continues to present as functioning below her baseline, with deficits in mobility including transfers, gait, balance, and activity tolerance. Pt will continue to benefit from skilled therapy services to address stated deficits to promote return to highest level of function, independence, and safety. Will continue to follow. This section established at most recent assessment   PROBLEM LIST (Impairments causing functional limitations):  1. Decreased Strength  2. Decreased ADL/Functional Activities  3. Decreased Transfer Abilities  4. Decreased Ambulation Ability/Technique  5. Decreased Balance  6. Decreased Activity Tolerance  7. Decreased [place] with Home Exercise Program   INTERVENTIONS PLANNED: (Benefits and precautions of physical therapy have been discussed with the patient.)  1. Balance Exercise  2. Bed Mobility  3. Home Exercise Program (HEP)  4. Manual Therapy  5. Neuromuscular Re-education/Strengthening  6. Range of Motion (ROM)  7. Therapeutic Activites  8. Therapeutic Exercise/Strengthening     TREATMENT PLAN: Frequency/Duration: 3 times a week for duration of hospital stay  Rehabilitation Potential For Stated Goals: Good     REHAB RECOMMENDATIONS (at time of discharge pending progress):    Placement: It is my opinion, based on this patient's performance to date, that Ms. Beto Akers may benefit from [address] after discharge due to the functional deficits listed above that are likely to improve with skilled rehabilitation because he/she has multiple medical issues that affect his/her functional mobility in the community. Equipment:    None at this time              HISTORY:   History of Present Injury/Illness (Reason for Referral):  PER MD H&P     Mrs. Rip Oliver is a 61 y/o WF with a h/o COPD and DM2 who was hospitalized from July 1 to July 20 after presenting with acute hypoxemic respiratory failure and pneumonia secondary to COVID-19. She received convalescent plasma and completed courses of both dexamethasone and Remdesivir. She was discharged on 7/20 on 4-6L NC O2. Says she stopped smoking prior to her last hospitalization but does live with family and some of them smoke inside the house (but apparently she told them to stop 2-3 days ago). She developed SOB a few days ago but otherwise denies fevers, headaches, chest pain ,N/V/D, abdominal pain, edema. EMS called to the house tonight and she was found to be 76% on 5L NC O2. On arrival in ER she was hypotensive at 73/33 and started on Levophed drip. Also agitated requiring Haldol. She was given 3L IVFs and antibiotics. Her Levophed was weaned off with improvement of BPs. CXR with improved b/l infiltrates. D-dimer elevated at 19 so she was empirically started on a heparin drip. CT chest negative for PE, also shows emphysema and b/l infiltrates (R>L). Her heparin drip is now off. WBCs 31K with normal diff, Mg 1.7, LA 2.4, ALT 1247, , PCT 0.54, CK 1079. UDS + amphetamines. Hospitalist consulted for admission and further management. 10 systems reviewed and negative except as noted in HPI. Past Medical History/Comorbidities:   Ms. Rip Oliver  has a past medical history of COPD (chronic obstructive pulmonary disease) ([facility] and Diabetes ([facility] Ms. Rip Oliver  has no past surgical history on file.   Social History/Living Environment:   Home Environment: Private residence  # Steps to Enter: 0  One/Two Story Residence: One story  Living Alone: No  Support Systems: Family member(s)  Patient Expects to be Discharged to[de-identified] Unknown  Current DME Used/Available at Home: None  Prior Level of Function/Work/Activity:  Per patient had been independent with all functional mobility . Dominant Side:         RIGHT    Personal Factors:          Sex:  female        Age:  60 y.o. Number of Personal Factors/Comorbidities that affect the Plan of Care: 0: LOW COMPLEXITY   EXAMINATION:   Most Recent Physical Functioning:   Gross Assessment:                  Posture:     Balance:  Sitting: Impaired  Sitting - Static: Good (unsupported)  Sitting - Dynamic: Fair (occasional)  Standing: Impaired  Standing - Static: Good  Standing - Dynamic : Fair Bed Mobility:  Supine to Sit: Supervision  Duration: 25 Minutes  Wheelchair Mobility:     Transfers:  Sit to Stand: Contact guard assistance  Stand to Sit: Stand-by assistance  Gait:     Base of Support: Center of gravity altered  Speed/Katt: Fluctuations  Step Length: Right shortened;Left shortened  Swing Pattern: Left asymmetrical  Stance: Left decreased;Right decreased  Gait Abnormalities: Trunk sway increased;Decreased step clearance  Distance (ft): 450 Feet (ft)  Ambulation - Level of Assistance: Contact guard assistance  Interventions: Manual cues; Tactile cues; Safety awareness training;Verbal cues; Visual/Demos      Body Structures Involved:  1. Bones  2. Joints  3. Muscles  4. Ligaments Body Functions Affected:  1. Neuromusculoskeletal  2. Movement Related  3. Skin Related  4. Metobolic/Endocrine Activities and Participation Affected:  1. General Tasks and Demands  2. Communication  3. Mobility  4. Self Care  5. Community, Social and [address] [place] [address]   Number of elements that affect the Plan of Care: 1-2: LOW COMPLEXITY   CLINICAL PRESENTATION:   Presentation: Stable and uncomplicated: LOW COMPLEXITY   CLINICAL DECISION MAKIN [facility] Inpatient Short Form  How much difficulty does the patient currently have. .. Unable A Lot A Little None   1.   Turning over in bed (including adjusting bedclothes, sheets and blankets)? [] 1   [] 2   [x] 3   [] 4   2. Sitting down on and standing up from a chair with arms ( e.g., wheelchair, bedside commode, etc.)   [] 1   [] 2   [x] 3   [] 4   3. Moving from lying on back to sitting on the side of the bed? [] 1   [] 2   [x] 3   [] 4   How much help from another person does the patient currently need. .. Total A Lot A Little None   4. Moving to and from a bed to a chair (including a wheelchair)? [] 1   [] 2   [x] 3   [] 4   5. Need to walk in hospital room? [] 1   [] 2   [x] 3   [] 4   6. Climbing 3-5 steps with a railing? [] 1   [x] 2   [] 3   [] 4   © 2007, Trustees of [address] [place] [id number], under license to [employer]. All rights reserved      Score:  Initial: 17 Most Recent: X (Date: -- )    Interpretation of Tool:  Represents activities that are increasingly more difficult (i.e. Bed mobility, Transfers, Gait). Medical Necessity:     · Patient demonstrates   · good  ·  rehab potential due to higher previous functional level. Reason for Services/Other Comments:  · Patient continues to require skilled intervention due to   · medical complications, patient unable to attend/participate in therapy as expected, and debility and decreased mobility. · .   Use of outcome tool(s) and clinical judgement create a POC that gives a: Clear prediction of patient's progress: LOW COMPLEXITY            TREATMENT:   (In addition to Assessment/Re-Assessment sessions the following treatments were rendered)   Pre-treatment Symptoms/Complaints:  0/10   Pain: Initial:      Post Session:  0/10      Therapeutic Activity: (25 Minutes   ):  Therapeutic activities including transfers, Chair transfers, toilet transfer, and Ambulation on level ground to improve mobility, strength, balance, and coordination. Required minimal Manual cues; Tactile cues; Safety awareness training;Verbal cues; Visual/Demos to promote coordination of bilateral, lower extremity(s) and promote motor control of bilateral, lower extremity(s). Therapeutic Exercise: ( ):  Exercises per grid below to improve mobility, strength, balance, and coordination. Required minimal visual, verbal, manual, and tactile cues to promote proper body alignment, promote proper body posture, and promote proper body mechanics. Progressed complexity of movement as indicated. Date:  8/6/20  Date:   Date:     Activity/Exercise Parameters Parameters Parameters   AP's  10 x's      LAQ's  10 x's      HIP ABD  10 x's      MARCHES  10 x's                         Braces/Orthotics/Lines/Etc:   · O2 Device: Hi flow nasal cannula  Treatment/Session Assessment:    · Response to Treatment:  Fatigue and mild SOB only  · Interdisciplinary Collaboration:   o Physical Therapist  o Registered Nurse  · After treatment position/precautions:   o Supine in bed  o Bed alarm/tab alert on  o Bed/Chair-wheels locked  o Bed in low position  o Call light within reach  o RN notified   · Compliance with Program/Exercises: Will assess as treatment progresses  · Recommendations/Intent for next treatment session: \"Next visit will focus on advancements to more challenging activities, reduction in assistance provided, and transfers, ambulation and mobility. \".   Total Treatment Duration:  PT Patient Time In/Time Out  Time In: 8518  Time Out: [address]que 36.

## 2020-08-10 NOTE — PROGRESS NOTES
Upon CM ready Speech Therapy notes patient failed the modified Barium Swallow. [facility] d[facility]c [facility]. CM will continue to monitor.

## 2020-08-11 ENCOUNTER — APPOINTMENT (OUTPATIENT)
Dept: GENERAL RADIOLOGY | Age: 65
DRG: 871 | End: 2020-08-11
Attending: HOSPITALIST
Payer: MEDICARE

## 2020-08-11 LAB
ANION GAP SERPL CALC-SCNC: 7 MMOL/L (ref 7–16)
BACTERIA SPEC CULT: NORMAL
BACTERIA SPEC CULT: NORMAL
BASOPHILS # BLD: 0.1 K/UL (ref 0–0.2)
BASOPHILS NFR BLD: 0 % (ref 0–2)
BUN SERPL-MCNC: 14 MG/DL (ref 8–23)
CALCIUM SERPL-MCNC: 8.2 MG/DL (ref 8.3–10.4)
CHLORIDE SERPL-SCNC: 104 MMOL/L (ref 98–107)
CO2 SERPL-SCNC: 30 MMOL/L (ref 21–32)
CREAT SERPL-MCNC: 0.52 MG/DL (ref 0.6–1)
DIFFERENTIAL METHOD BLD: ABNORMAL
EOSINOPHIL # BLD: 0.2 K/UL (ref 0–0.8)
EOSINOPHIL NFR BLD: 2 % (ref 0.5–7.8)
ERYTHROCYTE [DISTWIDTH] IN BLOOD BY AUTOMATED COUNT: 19.9 % (ref 11.9–14.6)
GLUCOSE BLD STRIP.AUTO-MCNC: 120 MG/DL (ref 65–100)
GLUCOSE BLD STRIP.AUTO-MCNC: 134 MG/DL (ref 65–100)
GLUCOSE BLD STRIP.AUTO-MCNC: 75 MG/DL (ref 65–100)
GLUCOSE BLD STRIP.AUTO-MCNC: 87 MG/DL (ref 65–100)
GLUCOSE SERPL-MCNC: 77 MG/DL (ref 65–100)
HCT VFR BLD AUTO: 37.2 % (ref 35.8–46.3)
HGB BLD-MCNC: 11.6 G/DL (ref 11.7–15.4)
IMM GRANULOCYTES # BLD AUTO: 0.3 K/UL (ref 0–0.5)
IMM GRANULOCYTES NFR BLD AUTO: 2 % (ref 0–5)
LYMPHOCYTES # BLD: 2.3 K/UL (ref 0.5–4.6)
LYMPHOCYTES NFR BLD: 17 % (ref 13–44)
MAGNESIUM SERPL-MCNC: 1.9 MG/DL (ref 1.8–2.4)
MCH RBC QN AUTO: 28.4 PG (ref 26.1–32.9)
MCHC RBC AUTO-ENTMCNC: 31.2 G/DL (ref 31.4–35)
MCV RBC AUTO: 91.2 FL (ref 79.6–97.8)
MONOCYTES # BLD: 1.3 K/UL (ref 0.1–1.3)
MONOCYTES NFR BLD: 10 % (ref 4–12)
NEUTS SEG # BLD: 9.4 K/UL (ref 1.7–8.2)
NEUTS SEG NFR BLD: 69 % (ref 43–78)
NRBC # BLD: 0 K/UL (ref 0–0.2)
PHOSPHATE SERPL-MCNC: 3.7 MG/DL (ref 2.3–3.7)
PLATELET # BLD AUTO: 249 K/UL (ref 150–450)
PMV BLD AUTO: 10.6 FL (ref 9.4–12.3)
POTASSIUM SERPL-SCNC: 3.7 MMOL/L (ref 3.5–5.1)
RBC # BLD AUTO: 4.08 M/UL (ref 4.05–5.2)
SERVICE CMNT-IMP: NORMAL
SERVICE CMNT-IMP: NORMAL
SODIUM SERPL-SCNC: 141 MMOL/L (ref 136–145)
TRIGL SERPL-MCNC: 102 MG/DL (ref 35–150)
WBC # BLD AUTO: 13.5 K/UL (ref 4.3–11.1)

## 2020-08-11 PROCEDURE — 74011000250 HC RX REV CODE- 250: Performed by: INTERNAL MEDICINE

## 2020-08-11 PROCEDURE — 74011000258 HC RX REV CODE- 258: Performed by: INTERNAL MEDICINE

## 2020-08-11 PROCEDURE — 74011250636 HC RX REV CODE- 250/636: Performed by: INTERNAL MEDICINE

## 2020-08-11 PROCEDURE — 74018 RADEX ABDOMEN 1 VIEW: CPT

## 2020-08-11 PROCEDURE — 36415 COLL VENOUS BLD VENIPUNCTURE: CPT

## 2020-08-11 PROCEDURE — 77030004950 HC CATH ENTRL NG COVD -A

## 2020-08-11 PROCEDURE — 94640 AIRWAY INHALATION TREATMENT: CPT

## 2020-08-11 PROCEDURE — 77030008771 HC TU NG SALEM SUMP -A

## 2020-08-11 PROCEDURE — 74011250637 HC RX REV CODE- 250/637: Performed by: HOSPITALIST

## 2020-08-11 PROCEDURE — 74011250636 HC RX REV CODE- 250/636: Performed by: HOSPITALIST

## 2020-08-11 PROCEDURE — 65270000029 HC RM PRIVATE

## 2020-08-11 PROCEDURE — 83735 ASSAY OF MAGNESIUM: CPT

## 2020-08-11 PROCEDURE — 84100 ASSAY OF PHOSPHORUS: CPT

## 2020-08-11 PROCEDURE — 77010033678 HC OXYGEN DAILY

## 2020-08-11 PROCEDURE — 92526 ORAL FUNCTION THERAPY: CPT

## 2020-08-11 PROCEDURE — 84478 ASSAY OF TRIGLYCERIDES: CPT

## 2020-08-11 PROCEDURE — 77030040832 HC IRR TRAY MDII -A

## 2020-08-11 PROCEDURE — 80048 BASIC METABOLIC PNL TOTAL CA: CPT

## 2020-08-11 PROCEDURE — 94760 N-INVAS EAR/PLS OXIMETRY 1: CPT

## 2020-08-11 PROCEDURE — 74011000250 HC RX REV CODE- 250: Performed by: HOSPITALIST

## 2020-08-11 PROCEDURE — 82962 GLUCOSE BLOOD TEST: CPT

## 2020-08-11 PROCEDURE — 85025 COMPLETE CBC W/AUTO DIFF WBC: CPT

## 2020-08-11 RX ORDER — VANCOMYCIN 1.75 GRAM/500 ML IN 0.9 % SODIUM CHLORIDE INTRAVENOUS
1750 ONCE
Status: COMPLETED | OUTPATIENT
Start: 2020-08-11 | End: 2020-08-11

## 2020-08-11 RX ORDER — CLONAZEPAM 0.5 MG/1
0.25 TABLET ORAL
Status: DISCONTINUED | OUTPATIENT
Start: 2020-08-11 | End: 2020-08-12

## 2020-08-11 RX ORDER — INSULIN LISPRO 100 [IU]/ML
INJECTION, SOLUTION INTRAVENOUS; SUBCUTANEOUS EVERY 4 HOURS
Status: DISCONTINUED | OUTPATIENT
Start: 2020-08-11 | End: 2020-08-13 | Stop reason: HOSPADM

## 2020-08-11 RX ORDER — MAG HYDROX/ALUMINUM HYD/SIMETH 200-200-20
30 SUSPENSION, ORAL (FINAL DOSE FORM) ORAL
Status: DISCONTINUED | OUTPATIENT
Start: 2020-08-11 | End: 2020-08-13 | Stop reason: HOSPADM

## 2020-08-11 RX ORDER — MORPHINE SULFATE 2 MG/ML
1 INJECTION, SOLUTION INTRAMUSCULAR; INTRAVENOUS ONCE
Status: COMPLETED | OUTPATIENT
Start: 2020-08-11 | End: 2020-08-11

## 2020-08-11 RX ORDER — VANCOMYCIN/0.9 % SOD CHLORIDE 1.5G/250ML
1500 PLASTIC BAG, INJECTION (ML) INTRAVENOUS EVERY 12 HOURS
Status: DISCONTINUED | OUTPATIENT
Start: 2020-08-11 | End: 2020-08-12

## 2020-08-11 RX ORDER — DICYCLOMINE HYDROCHLORIDE 10 MG/1
10 CAPSULE ORAL 4 TIMES DAILY
Status: DISCONTINUED | OUTPATIENT
Start: 2020-08-11 | End: 2020-08-13 | Stop reason: HOSPADM

## 2020-08-11 RX ADMIN — MORPHINE SULFATE 1 MG: 2 INJECTION, SOLUTION INTRAMUSCULAR; INTRAVENOUS at 13:05

## 2020-08-11 RX ADMIN — Medication 10 ML: at 05:33

## 2020-08-11 RX ADMIN — PIPERACILLIN AND TAZOBACTAM 4.5 G: 4; .5 INJECTION, POWDER, FOR SOLUTION INTRAVENOUS at 09:08

## 2020-08-11 RX ADMIN — METHYLPREDNISOLONE SODIUM SUCCINATE 40 MG: 40 INJECTION, POWDER, FOR SOLUTION INTRAMUSCULAR; INTRAVENOUS at 09:09

## 2020-08-11 RX ADMIN — DICYCLOMINE HYDROCHLORIDE 10 MG: 10 CAPSULE ORAL at 18:04

## 2020-08-11 RX ADMIN — DICYCLOMINE HYDROCHLORIDE 10 MG: 10 CAPSULE ORAL at 22:06

## 2020-08-11 RX ADMIN — PIPERACILLIN AND TAZOBACTAM 4.5 G: 4; .5 INJECTION, POWDER, FOR SOLUTION INTRAVENOUS at 16:52

## 2020-08-11 RX ADMIN — Medication 10 ML: at 13:05

## 2020-08-11 RX ADMIN — BUDESONIDE 500 MCG: 0.5 INHALANT RESPIRATORY (INHALATION) at 09:23

## 2020-08-11 RX ADMIN — IPRATROPIUM BROMIDE AND ALBUTEROL SULFATE 3 ML: .5; 3 SOLUTION RESPIRATORY (INHALATION) at 14:18

## 2020-08-11 RX ADMIN — VANCOMYCIN HYDROCHLORIDE 1500 MG: 10 INJECTION, POWDER, LYOPHILIZED, FOR SOLUTION INTRAVENOUS at 19:55

## 2020-08-11 RX ADMIN — ENOXAPARIN SODIUM 40 MG: 40 INJECTION SUBCUTANEOUS at 09:08

## 2020-08-11 RX ADMIN — IPRATROPIUM BROMIDE AND ALBUTEROL SULFATE 3 ML: .5; 3 SOLUTION RESPIRATORY (INHALATION) at 20:07

## 2020-08-11 RX ADMIN — VANCOMYCIN HYDROCHLORIDE 1750 MG: 10 INJECTION, POWDER, LYOPHILIZED, FOR SOLUTION INTRAVENOUS at 09:08

## 2020-08-11 RX ADMIN — FAMOTIDINE 20 MG: 10 INJECTION INTRAVENOUS at 10:10

## 2020-08-11 RX ADMIN — IPRATROPIUM BROMIDE AND ALBUTEROL SULFATE 3 ML: .5; 3 SOLUTION RESPIRATORY (INHALATION) at 09:23

## 2020-08-11 RX ADMIN — CLONAZEPAM 0.25 MG: 0.5 TABLET ORAL at 22:06

## 2020-08-11 RX ADMIN — BUDESONIDE 500 MCG: 0.5 INHALANT RESPIRATORY (INHALATION) at 20:07

## 2020-08-11 RX ADMIN — PIPERACILLIN AND TAZOBACTAM 4.5 G: 4; .5 INJECTION, POWDER, FOR SOLUTION INTRAVENOUS at 02:54

## 2020-08-11 RX ADMIN — FAMOTIDINE 20 MG: 10 INJECTION INTRAVENOUS at 22:06

## 2020-08-11 RX ADMIN — Medication 10 ML: at 22:07

## 2020-08-11 NOTE — PROGRESS NOTES
Problem: Dysphagia (Adult)  Goal: *Acute Goals and Plan of Care (Insert Text)  Description:   STG: Pt. Will tolerate PO trials with SLP with no overt s/sx of aspiration/penetration. STG: Patient will participate in dysphagia exercises with 80% accuracy given minimal cueing. Added 8/10/2020  STG: Patient will participate in repeat modified barium swallow study to re evaluate swallow function when deemed appropriate. Added 8/10/2020    LTG: Pt. Will tolerate least restrictive diet without respiratory decline.     Outcome: Progressing Towards Goal  SPEECH LANGUAGE PATHOLOGY: DYSPHAGIA- Daily Note 1    NAME/AGE/GENDER: Benjamín Alvarez is a 60 y.o. female  DATE: 8/11/2020  PRIMARY DIAGNOSIS: Acute hypoxemic respiratory failure ([facility] [J96.01]  Sepsis ([facility] [A41.9]      ICD-10: Treatment Diagnosis: R13.12 Dysphagia, Oropharyngeal Phase    RECOMMENDATIONS   DIET:    NPO    3-5 ice chips/hour with RN after oral care     MEDICATIONS: Non-oral     ASPIRATION PRECAUTIONS  · Slow rate of intake  · Small bites/sips  · Upright at 90 degrees during meal     COMPENSATORY STRATEGIES/MODIFICATIONS  · None     EDUCATION:  · Recommendations discussed with Nursing and Patient     CONTINUATION OF SKILLED SERVICES/MEDICAL NECESSITY:   Patient is expected to demonstrate progress in  swallow strength, swallow timeliness, swallow function and swallow safety in order to  improve swallow safety, work toward diet advancement and decrease aspiration risk.  Patient continues to require skilled intervention due to dysphagia. RECOMMENDATIONS for CONTINUED SPEECH THERAPY:   YES: Anticipate need for ongoing speech therapy during this hospitalization and at next level of care. ASSESSMENT   Patient presents with moderate-severe oropharyngeal dysphagia identified on modified barium swallow study 8/10/2020. Patient completed dysphagia exercises with min cues to improve swallow function.      Continue continue NPO with short term alternative means of nutrition at this time. Will follow for dysphagia treatment. Anticipate repeat modified barium swallow study later this week to re assess pharyngeal swallow. COMPLIANCE WITH PROGRAM/EXERCISES: Will assess as treatment progresses  REHABILITATION POTENTIAL FOR STATED GOALS: Good    PLAN    FREQUENCY/DURATION: Continue to follow patient 3 times a week for duration of hospital stay to address above goals. - Recommendations for next treatment session: Next treatment will address exercises    SUBJECTIVE   Upright in bed. NGT placed today by nursing. \"I probably could eat something soft\"     Problem List:  (Impairments causing functional limitations):  1. Oropharyngeal dysphagia    Orientation:   Person  Place  Modified barium swallow study 8/10/2020: moderate-severe oropharyngeal dysphagia. Premature spillage with delayed swallow initiation to level of pyriforms with all liquid trials, and delayed to posterior epiglottis with pudding. Incomplete laryngeal closure with non clearing penetration during the swallow with all liquid trials, including thin liquids, nectar thick liquids, honey thick liquids, and thin liquids with chin tuck. Shallow penetration with pudding that appears to clear during the swallow, but difficult to determine if trace contrast remaining in laryngeal vestibule new non clearing penetration versus residue from prior trials.        Recommend NPO. OK for 4-5 ice chips for pleasure after oral care with staff. meds crushed in pudding if necessary. Consider short term alternative means of nutrition. Pain: Pain Scale 1: FLACC  Pain Intensity 1: 0  Pain Location 1: Abdomen  Pain Intervention(s) 1: Medication (see MAR)    OBJECTIVE   Patient seen for laryngeal exercises. Reviewed results of swallow study and recommendation of NPO at this time. Patient participated in the following dysphagia exercises to maximize swallow function and improve safety with po intake.    Osvaldo Abreu verbal cues for technique  Effortful swallow: presented with 5 ice chips to complete 5sets x5reps  Tiesha: 2 sets x5 reps    Strong coughing x1       INTERDISCIPLINARY COLLABORATION: Registered Nurse  PRECAUTIONS/ALLERGIES: Patient has no known allergies. Tool Used: Dysphagia Outcome and Severity Scale (REG)    Score Comments   Normal Diet  [] 7 With no strategies or extra time needed   Functional Swallow  [] 6 May have mild oral or pharyngeal delay   Mild Dysphagia  [] 5 Which may require one diet consistency restricted    Mild-Moderate Dysphagia  [] 4 With 1-2 diet consistencies restricted   Moderate Dysphagia  [] 3 With 2 or more diet consistencies restricted   Moderate-Severe Dysphagia  [] 2 With partial PO strategies (trials with ST only)   Severe Dysphagia  [] 1 With inability to tolerate any PO safely      Score:  Initial: 2 Most Recent: 2 (Date 08/11/20 )   Interpretation of Tool: The Dysphagia Outcome and Severity Scale (REG) is a simple, easy-to-use, 7-point scale developed to systematically rate the functional severity of dysphagia based on objective assessment and make recommendations for diet level, independence level, and type of nutrition.      After treatment position/precautions:  · Upright in bed  · RN notified  · Call light within reach    Total Treatment Duration:   Time In: 3733  Time Out: [address] [facility], [facility] MEDICO DEL [facility] [place] [place], [address] [facility]

## 2020-08-11 NOTE — PROGRESS NOTES
Comprehensive Nutrition Assessment    Type and Reason for Visit: Consult (Zoltan/Hospitalist)  8/11/2020 0730 Reason for Consult: Answer: General Nutrition Management & Supplements, Pt is NPO, please evaluate to start PPN   8/11/2020 1230 Reason for Consult: Answer: Management of Tube Feeding   Nutrition Recommendations/Plan:   Pt with functioning GI tract not indicative of PN for primary needs secondary to dysphagia, discussed with Dr. Jignesh Marin need to pursue TF for primary needs. Initiate Jevity 1.5 margot via NGFT once placement confrimed at 15ml/hour, progress by 10 ml/hour every 4 hours to goal rate of 45ml/hour. Water flush 35 ml/hour. At goal will provide 1620 kcal (100% estimated calorie needs), 69 grams protein (100% estimated protein needs) and ~1661ml free fluid (~1 ml/kcal). IV Fluids: Discontinue with TF start. EN labs: BMP daily x 3, Mg and Phos MWF tomorrow am.   Vitamin and Mineral Supplement Therapy:  Nutrition support orders for electrolyte management replacement activated on MAR. Nutrition Assessment:   h/o COPD and DM2 who was hospitalized from July 1 to July 20 after presenting with acute hypoxemic respiratory failure and pneumonia secondary to COVID-19. Presented with altered mental status, back pain, hypoxic. Remains hospitalized with hypxoic respiratory failure with concerns for aspiration. Pt has been NPO since 8/9 secondary SLP eddie, s/p MBS with aspiration 8/10. RD visit to explain functioning gut and need for enteral nutrition for primary needs. Pt refuses to engage in conversation with RD and repeatedly asks to get up to bedside commode. RN notified of pt request for toileting.    Last recorded BM: 8/11, formed, active BS  Pertinent Medications:  Pepcid, SSI, solumedrol, zosyn, D5 NS @ 50 ml/hr  Lab Results   Component Value Date/Time    Sodium 141 08/11/2020 05:16 AM    Potassium 3.7 08/11/2020 05:16 AM    Chloride 104 08/11/2020 05:16 AM    CO2 30 08/11/2020 05:16 AM Anion gap 7 08/11/2020 05:16 AM    Glucose 77 08/11/2020 05:16 AM    BUN 14 08/11/2020 05:16 AM    Creatinine 0.52 (L) 08/11/2020 05:16 AM    Calcium 8.2 (L) 08/11/2020 05:16 AM    Albumin 2.2 (L) 08/09/2020 05:23 AM    Phosphorus 3.7 08/11/2020 05:16 AM   Mg 1.9  Recent Glucose Results:   Lab Results   Component Value Date/Time    GLU 77 08/11/2020 05:16 AM    GLUCPOC 87 08/11/2020 11:15 AM    GLUCPOC 75 08/11/2020 07:25 AM    GLUCPOC 168 (H) 08/10/2020 08:50 PM     Estimated Daily Nutrient Needs:  Energy (kcal):  4462-3930 kcal/d (20-25 kcal/kg of listed weight of 65 kg)  Protein (g):  65-78 grams/d (1-1.2 grams/kg of listed weight of 65 kg)       Fluid (ml/day):  1ml/kcal    Current Nutrition Therapies:   DIET NPO With Ice Chips, Except Meds    Anthropometric Measures:  · Height:  5' 3\" (160 cm)  · Current Body Wt:  65 kg (143 lb 4.8 oz)(8/6 wt no source)     Nutrition Diagnosis:   · Inadequate oral intake related to swallowing difficulty as evidenced by swallowing study results    Nutrition Interventions:   Food and/or Nutrient Delivery: Continue NPO, Start tube feeding  Coordination of Nutrition Care:  POC discussed with Angie Chaney RN and Dr. Johan Martinez. Goals: Tolerate goal TF within 7 days       Nutrition Monitoring and Evaluation:   Food/Nutrient Intake Outcomes: Enteral nutrition intake/tolerance  Physical Signs/Symptoms Outcomes: Biochemical data, Chewing or swallowing    Discharge Planning:     Too soon to determine     Gloria Cano, ALLEY, LDN on 8/11/2020 at 12:59 PM  Contact: [phone]

## 2020-08-11 NOTE — PROGRESS NOTES
Pharmacokinetic Consult to Pharmacist    Benjamín Kim is a 60 y.o. female being treated for HAP with Vancomycin, and Zosyn. Height: 5' 3\" (160 cm)  Weight: 65 kg (143 lb 4.8 oz)  Lab Results   Component Value Date/Time    BUN 14 08/11/2020 05:16 AM    Creatinine 0.52 (L) 08/11/2020 05:16 AM    WBC 13.5 (H) 08/11/2020 05:16 AM    Procalcitonin 0.54 08/06/2020 12:16 AM    Lactic acid 1.3 08/06/2020 07:48 AM    Lactic Acid (POC) 1.9 09/25/2018 10:19 PM      Estimated Creatinine Clearance: 73.6 mL/min (A) (by C-G formula based on SCr of 0.52 mg/dL (L)). CULTURES:   Results     Procedure Component Value Units Date/Time    CULTURE, URINE [[id number]]  (Abnormal) Collected:  08/09/20 1006    Order Status:  Completed Specimen:  Cath Urine Updated:  08/11/20 0729     Special Requests: NO SPECIAL REQUESTS        Culture result:       50,000-100,000 COLONIES/mL YEAST IDENTIFICATION TO FOLLOW          CULTURE, BLOOD [[id number]] Collected:  08/06/20 0020    Order Status:  Completed Specimen:  Blood Updated:  08/11/20 0726     Special Requests: --        RIGHT  Antecubital       Culture result: NO GROWTH 5 DAYS       CULTURE, BLOOD [[id number]] Collected:  08/06/20 0020    Order Status:  Completed Specimen:  Blood Updated:  08/11/20 0726     Special Requests: --        LEFT  Antecubital       Culture result: NO GROWTH 5 DAYS             Lab Results   Component Value Date/Time    Vancomycin,trough 10.5 08/07/2020 04:31 PM       Day 1 of vancomycin. Goal trough is 15-20 mcg/ml. Loading dose of 1750 mg was given at 0908  on 8/11/2020. Continue with maintenance dose of 1500 mg Q12H at 2000 on 8/11/2020. Using every 12 hour dosing due to age, condition, and renal function. Patient was previously subtherapeutic (recent hospitalization) on vancomycin 1000 mg IV q12h. Expected trough is ~ 16.1 mcg/ml. Will obtain vancomycin level prior to 3rd dose ([email or web address]). Will continue to follow patient.       Thank you,  Bettie Gonzalez Chrystal Singh, PharmD Candidate

## 2020-08-11 NOTE — PROGRESS NOTES
Problem: Diabetes Self-Management  Goal: *Disease process and treatment process  Description: Define diabetes and identify own type of diabetes; list 3 options for treating diabetes. Outcome: Progressing Towards Goal  Goal: *Incorporating nutritional management into lifestyle  Description: Describe effect of type, amount and timing of food on blood glucose; list 3 methods for planning meals. Outcome: Progressing Towards Goal  Goal: *Incorporating physical activity into lifestyle  Description: State effect of exercise on blood glucose levels. Outcome: Progressing Towards Goal  Goal: *Developing strategies to promote health/change behavior  Description: Define the ABC's of diabetes; identify appropriate screenings, schedule and personal plan for screenings. Outcome: Progressing Towards Goal  Goal: *Using medications safely  Description: State effect of diabetes medications on diabetes; name diabetes medication taking, action and side effects. Outcome: Progressing Towards Goal  Goal: *Monitoring blood glucose, interpreting and using results  Description: Identify recommended blood glucose targets  and personal targets. Outcome: Progressing Towards Goal  Goal: *Prevention, detection, treatment of acute complications  Description: List symptoms of hyper- and hypoglycemia; describe how to treat low blood sugar and actions for lowering  high blood glucose level. Outcome: Progressing Towards Goal  Goal: *Prevention, detection and treatment of chronic complications  Description: Define the natural course of diabetes and describe the relationship of blood glucose levels to long term complications of diabetes.   Outcome: Progressing Towards Goal  Goal: *Developing strategies to address psychosocial issues  Description: Describe feelings about living with diabetes; identify support needed and support network  Outcome: Progressing Towards Goal  Goal: *Insulin pump training  Outcome: Progressing Towards Goal  Goal: *Sick day guidelines  Outcome: Progressing Towards Goal  Goal: *Patient Specific Goal (EDIT GOAL, INSERT TEXT)  Outcome: Progressing Towards Goal     Problem: Patient Education: Go to Patient Education Activity  Goal: Patient/Family Education  Outcome: Progressing Towards Goal     Problem: Falls - Risk of  Goal: *Absence of Falls  Description: Document January Portillo Fall Risk and appropriate interventions in the flowsheet. Outcome: Progressing Towards Goal  Note: Fall Risk Interventions:  Mobility Interventions: Bed/chair exit alarm, Communicate number of staff needed for ambulation/transfer, Patient to call before getting OOB    Mentation Interventions: Bed/chair exit alarm, Door open when patient unattended, Increase mobility, More frequent rounding, Reorient patient    Medication Interventions: Bed/chair exit alarm, Patient to call before getting OOB, Teach patient to arise slowly    Elimination Interventions: Bed/chair exit alarm, Call light in reach, Stay With Me (per policy), Patient to call for help with toileting needs, Toilet paper/wipes in reach    History of Falls Interventions: Bed/chair exit alarm, Consult care management for discharge planning, Door open when patient unattended, Investigate reason for fall         Problem: Patient Education: Go to Patient Education Activity  Goal: Patient/Family Education  Outcome: Progressing Towards Goal     Problem: Pressure Injury - Risk of  Goal: *Prevention of pressure injury  Description: Document Dale Scale and appropriate interventions in the flowsheet.   Outcome: Progressing Towards Goal  Note: Pressure Injury Interventions:  Sensory Interventions: Assess changes in LOC, Avoid rigorous massage over bony prominences    Moisture Interventions: Absorbent underpads, Check for incontinence Q2 hours and as needed    Activity Interventions: Increase time out of bed, Pressure redistribution bed/mattress(bed type), PT/OT evaluation    Mobility Interventions: HOB 30 degrees or less, Pressure redistribution bed/mattress (bed type), PT/OT evaluation    Nutrition Interventions: Document food/fluid/supplement intake, Offer support with meals,snacks and hydration    Friction and Shear Interventions: HOB 30 degrees or less                Problem: Patient Education: Go to Patient Education Activity  Goal: Patient/Family Education  Outcome: Progressing Towards Goal     Problem: Patient Education: Go to Patient Education Activity  Goal: Patient/Family Education  Description: 1. Patient will complete lower body bathing and dressing with MOD I and adaptive equipment as needed. 2. Patient will complete toileting with MOD I.   3. Patient will tolerate 25 minutes of OT treatment with 1-2 rest breaks to increase activity tolerance for ADLs. 4. Patient will complete functional transfers with MOD I and adaptive equipment as needed. 5. Patient will verbalize 3 energy conservation strategies to increase activity tolerance for ADL performance. 6. Patient will complete functional mobility for household distances with MOD I and adaptive equipment as needed. 7. Patient will complete self-grooming while standing edge of sink with MOD I and adaptive equipment as needed.     Timeframe: 7 visits       Outcome: Progressing Towards Goal     Problem: Patient Education: Go to Patient Education Activity  Goal: Patient/Family Education  Outcome: Progressing Towards Goal     Problem: Breathing Pattern - Ineffective  Goal: *Absence of hypoxia  Outcome: Progressing Towards Goal

## 2020-08-11 NOTE — PROGRESS NOTES
Progress Note    Patient: Brooklyn Tabares MRN: [id number]  SSN: [id number]    YOB: 1955  Age: 60 y.o. Sex: female      Admit Date: 8/5/2020    LOS: 5 days     Subjective:     Pt resting in bed, reports \"I feel that I'm being starved\". She denies any other issues. I have counseled her about the reason to keep her NPO given risk of aspiration. No fever, chills, night sweats, chest pain. She is on 4-5 ltrs NC      ROS: 10 point ROS negative except what mentioned above    Objective:     Vitals:    08/10/20 2024 08/10/20 2210 08/10/20 2357 08/11/20 0401   BP: 147/64  150/72 157/67   Pulse: 88  (!) 101 87   Resp: 19 19 18   Temp: 97.6 °F (36.4 °C)  97.9 °F (36.6 °C) 97.7 °F (36.5 °C)   SpO2: 96% 98% 96% 94%   Weight:       Height:            Intake and Output:  Current Shift: No intake/output data recorded.   Last three shifts: 08/09 1901 - 08/11 0700  In: -   Out: 100 [Urine:100]    ROS  10 ROS negative except from stated on subjective    Physical Exam:   General: elderly, frail, on 4 ltr NC  HEENT: NC/AT, EOM are intact  Neck: supple, no JVD  Cardiovascular: RRR, S1, S2, no murmurs  Respiratory: coarse breath sounds bilaterally  Abdomen: Soft, NT, ND  Back: No CVA tenderness, no paraspinal tenderness  Extremities: LE without pedal edema, no erythema  Neuro: A&O, CN are intact, no focal deficits  Skin: no rash or ulcers  Psych: good mood and affect    Lab/Data Review:  I have personally reviewed patients laboratory data showing  Recent Results (from the past 24 hour(s))   GLUCOSE, POC    Collection Time: 08/10/20  7:53 AM   Result Value Ref Range    Glucose (POC) 67 65 - 100 mg/dL   GLUCOSE, POC    Collection Time: 08/10/20 11:26 AM   Result Value Ref Range    Glucose (POC) 109 (H) 65 - 100 mg/dL   GLUCOSE, POC    Collection Time: 08/10/20  4:31 PM   Result Value Ref Range    Glucose (POC) 143 (H) 65 - 100 mg/dL   GLUCOSE, POC    Collection Time: 08/10/20  8:50 PM   Result Value Ref Range Glucose (POC) 168 (H) 65 - 100 mg/dL   CBC WITH AUTOMATED DIFF    Collection Time: 08/11/20  5:16 AM   Result Value Ref Range    WBC 13.5 (H) 4.3 - 11.1 K/uL    RBC 4.08 4.05 - 5.2 M/uL    HGB 11.6 (L) 11.7 - 15.4 g/dL    HCT 37.2 35.8 - 46.3 %    MCV 91.2 79.6 - 97.8 FL    MCH 28.4 26.1 - 32.9 PG    MCHC 31.2 (L) 31.4 - 35.0 g/dL    RDW 19.9 (H) 11.9 - 14.6 %    PLATELET 926 554 - 375 K/uL    MPV 10.6 9.4 - 12.3 FL    ABSOLUTE NRBC 0.00 0.0 - 0.2 K/uL    DF AUTOMATED      NEUTROPHILS 69 43 - 78 %    LYMPHOCYTES 17 13 - 44 %    MONOCYTES 10 4.0 - 12.0 %    EOSINOPHILS 2 0.5 - 7.8 %    BASOPHILS 0 0.0 - 2.0 %    IMMATURE GRANULOCYTES 2 0.0 - 5.0 %    ABS. NEUTROPHILS 9.4 (H) 1.7 - 8.2 K/UL    ABS. LYMPHOCYTES 2.3 0.5 - 4.6 K/UL    ABS. MONOCYTES 1.3 0.1 - 1.3 K/UL    ABS. EOSINOPHILS 0.2 0.0 - 0.8 K/UL    ABS. BASOPHILS 0.1 0.0 - 0.2 K/UL    ABS. IMM. GRANS. 0.3 0.0 - 0.5 K/UL   METABOLIC PANEL, BASIC    Collection Time: 08/11/20  5:16 AM   Result Value Ref Range    Sodium 141 136 - 145 mmol/L    Potassium 3.7 3.5 - 5.1 mmol/L    Chloride 104 98 - 107 mmol/L    CO2 30 21 - 32 mmol/L    Anion gap 7 7 - 16 mmol/L    Glucose 77 65 - 100 mg/dL    BUN 14 8 - 23 MG/DL    Creatinine 0.52 (L) 0.6 - 1.0 MG/DL    GFR est AA >60 >60 ml/min/1.73m2    GFR est non-AA >60 >60 ml/min/1.73m2    Calcium 8.2 (L) 8.3 - 10.4 MG/DL        Image:  I have personally reviewed patients imaging showing  XR SWALLOW FUNC VIDEO   Final Result   IMPRESSION: Near aspiration of all liquids         XR CHEST SNGL V   Final Result   IMPRESSION: Increased peripheral infiltrate in the right lung         CT CHEST W CONT   Final Result   IMPRESSION:    1. Emphysema. 2. Bilateral mid and lower lung infiltrates, worse on the right, suspect for   acute pneumonia. Covid 19 pneumonia is a consideration. XR CHEST PORT   Final Result   IMPRESSION: Bibasilar lung infiltrates have improved since the prior study, with   mild residual remaining. Hospital problems     Principal Problem:    Acute hypoxemic respiratory failure ([facility] [address] (9/26/2018)    Active Problems:    Leukocytosis (9/26/2018)      Tobacco abuse (9/26/2018)      Sepsis ([facility] [address] (7/1/2020)      Pneumonia due to COVID-19 virus (7/2/2020)      COPD exacerbation ([address] (7/2/2020)      HAP (hospital-acquired pneumonia) (8/6/2020)      Elevated ALT measurement (8/6/2020)      Elevated lactic acid level (8/6/2020)      Hypomagnesemia (8/6/2020)        Assessment and Plan:     Jese Edwards is a 60 y.o. female with medical history significant for COPD , DM2, recent [address] Infection( positive on 7/2 and neg on 7/22, 8/6) who presented from home due to shortness of breath    1. Acute hypoxic respiratory failure and septic shock concerning of HAP vs aspiration PNA  / COPD  - Recent COVID positive on 7/2      - Received convalescent plasma, remdesivir and dexamethasone      - Repeated COVID on 7/22 and 8/6 negative  - CT chest with BL infiltrates, no PE  - concerns for aspiration  - Continue NPO for now as per speech recommendations  - Continue zosyn for now, added IV Vancomycin  - CXR from 8/10 reviewed, showed increased peripheral infiltrate in the right lung  - Follow BCx - NGTD   - Continue O2 therapy to maintain O2 Sat >92%  - Continue solumedrol and taper down   - Continue albuterol, pulmicort  - Symptomatic management    - will place NG tube to start tube feeding from today    2. DM  - ISS  - BS ACHS    DVT ppx lovenox    High risk    Dispo: pending until medically stable.     Signed By: Lele Giron MD     August 11, 2020

## 2020-08-11 NOTE — PROGRESS NOTES
Late entry progress note for visit on 8.10.20:  's initial visit attempted. Ms. Kate Sumner did not respond to my voice. She appeared asleep. I provided 's card on bedside table.  Chaplains remain available for follow-up upon request.    Lisandro Perdomo 69  Board Certified

## 2020-08-12 ENCOUNTER — APPOINTMENT (OUTPATIENT)
Dept: GENERAL RADIOLOGY | Age: 65
DRG: 871 | End: 2020-08-12
Attending: HOSPITALIST
Payer: MEDICARE

## 2020-08-12 PROBLEM — J96.10 CHRONIC RESPIRATORY FAILURE (HCC): Status: ACTIVE | Noted: 2020-08-12

## 2020-08-12 PROBLEM — J69.0 ASPIRATION PNEUMONIA (HCC): Status: ACTIVE | Noted: 2020-08-12

## 2020-08-12 LAB
ANION GAP SERPL CALC-SCNC: 7 MMOL/L (ref 7–16)
BACTERIA SPEC CULT: ABNORMAL
BUN SERPL-MCNC: 12 MG/DL (ref 8–23)
CALCIUM SERPL-MCNC: 8.6 MG/DL (ref 8.3–10.4)
CHLORIDE SERPL-SCNC: 108 MMOL/L (ref 98–107)
CO2 SERPL-SCNC: 28 MMOL/L (ref 21–32)
CREAT SERPL-MCNC: 0.59 MG/DL (ref 0.6–1)
GLUCOSE BLD STRIP.AUTO-MCNC: 116 MG/DL (ref 65–100)
GLUCOSE BLD STRIP.AUTO-MCNC: 154 MG/DL (ref 65–100)
GLUCOSE BLD STRIP.AUTO-MCNC: 186 MG/DL (ref 65–100)
GLUCOSE BLD STRIP.AUTO-MCNC: 227 MG/DL (ref 65–100)
GLUCOSE BLD STRIP.AUTO-MCNC: 63 MG/DL (ref 65–100)
GLUCOSE BLD STRIP.AUTO-MCNC: 73 MG/DL (ref 65–100)
GLUCOSE BLD STRIP.AUTO-MCNC: 77 MG/DL (ref 65–100)
GLUCOSE SERPL-MCNC: 94 MG/DL (ref 65–100)
MAGNESIUM SERPL-MCNC: 1.9 MG/DL (ref 1.8–2.4)
PHOSPHATE SERPL-MCNC: 3.8 MG/DL (ref 2.3–3.7)
POTASSIUM SERPL-SCNC: 3.4 MMOL/L (ref 3.5–5.1)
SERVICE CMNT-IMP: ABNORMAL
SODIUM SERPL-SCNC: 143 MMOL/L (ref 136–145)
VANCOMYCIN TROUGH SERPL-MCNC: 22.9 UG/ML (ref 5–20)

## 2020-08-12 PROCEDURE — 74018 RADEX ABDOMEN 1 VIEW: CPT

## 2020-08-12 PROCEDURE — 74011250636 HC RX REV CODE- 250/636: Performed by: INTERNAL MEDICINE

## 2020-08-12 PROCEDURE — 74011000250 HC RX REV CODE- 250: Performed by: INTERNAL MEDICINE

## 2020-08-12 PROCEDURE — 65270000029 HC RM PRIVATE

## 2020-08-12 PROCEDURE — 77030008771 HC TU NG SALEM SUMP -A

## 2020-08-12 PROCEDURE — 84100 ASSAY OF PHOSPHORUS: CPT

## 2020-08-12 PROCEDURE — 94760 N-INVAS EAR/PLS OXIMETRY 1: CPT

## 2020-08-12 PROCEDURE — 36415 COLL VENOUS BLD VENIPUNCTURE: CPT

## 2020-08-12 PROCEDURE — 92526 ORAL FUNCTION THERAPY: CPT

## 2020-08-12 PROCEDURE — 80048 BASIC METABOLIC PNL TOTAL CA: CPT

## 2020-08-12 PROCEDURE — 74011250636 HC RX REV CODE- 250/636: Performed by: HOSPITALIST

## 2020-08-12 PROCEDURE — 80202 ASSAY OF VANCOMYCIN: CPT

## 2020-08-12 PROCEDURE — 93306 TTE W/DOPPLER COMPLETE: CPT

## 2020-08-12 PROCEDURE — 74011000250 HC RX REV CODE- 250: Performed by: HOSPITALIST

## 2020-08-12 PROCEDURE — 74011000258 HC RX REV CODE- 258: Performed by: INTERNAL MEDICINE

## 2020-08-12 PROCEDURE — C9113 INJ PANTOPRAZOLE SODIUM, VIA: HCPCS | Performed by: HOSPITALIST

## 2020-08-12 PROCEDURE — 77010033678 HC OXYGEN DAILY

## 2020-08-12 PROCEDURE — 94640 AIRWAY INHALATION TREATMENT: CPT

## 2020-08-12 PROCEDURE — 99223 1ST HOSP IP/OBS HIGH 75: CPT | Performed by: INTERNAL MEDICINE

## 2020-08-12 PROCEDURE — 0D9670Z DRAINAGE OF STOMACH WITH DRAINAGE DEVICE, VIA NATURAL OR ARTIFICIAL OPENING: ICD-10-PCS | Performed by: HOSPITALIST

## 2020-08-12 PROCEDURE — 74011636637 HC RX REV CODE- 636/637: Performed by: HOSPITALIST

## 2020-08-12 PROCEDURE — 74011250637 HC RX REV CODE- 250/637: Performed by: HOSPITALIST

## 2020-08-12 PROCEDURE — 83735 ASSAY OF MAGNESIUM: CPT

## 2020-08-12 PROCEDURE — 82962 GLUCOSE BLOOD TEST: CPT

## 2020-08-12 RX ORDER — PREDNISONE 10 MG/1
40 TABLET ORAL
Status: DISCONTINUED | OUTPATIENT
Start: 2020-08-12 | End: 2020-08-13 | Stop reason: HOSPADM

## 2020-08-12 RX ORDER — MORPHINE SULFATE 2 MG/ML
1 INJECTION, SOLUTION INTRAMUSCULAR; INTRAVENOUS
Status: DISCONTINUED | OUTPATIENT
Start: 2020-08-12 | End: 2020-08-13 | Stop reason: HOSPADM

## 2020-08-12 RX ORDER — CLONAZEPAM 0.5 MG/1
0.5 TABLET ORAL 2 TIMES DAILY
Status: DISCONTINUED | OUTPATIENT
Start: 2020-08-12 | End: 2020-08-13 | Stop reason: HOSPADM

## 2020-08-12 RX ORDER — POTASSIUM CHLORIDE 14.9 MG/ML
20 INJECTION INTRAVENOUS
Status: COMPLETED | OUTPATIENT
Start: 2020-08-12 | End: 2020-08-12

## 2020-08-12 RX ORDER — AMLODIPINE BESYLATE 5 MG/1
5 TABLET ORAL DAILY
Status: DISCONTINUED | OUTPATIENT
Start: 2020-08-12 | End: 2020-08-13 | Stop reason: HOSPADM

## 2020-08-12 RX ORDER — LORAZEPAM 2 MG/ML
1 INJECTION INTRAMUSCULAR
Status: DISCONTINUED | OUTPATIENT
Start: 2020-08-12 | End: 2020-08-13 | Stop reason: HOSPADM

## 2020-08-12 RX ADMIN — IPRATROPIUM BROMIDE AND ALBUTEROL SULFATE 3 ML: .5; 3 SOLUTION RESPIRATORY (INHALATION) at 21:21

## 2020-08-12 RX ADMIN — IPRATROPIUM BROMIDE AND ALBUTEROL SULFATE 3 ML: .5; 3 SOLUTION RESPIRATORY (INHALATION) at 08:34

## 2020-08-12 RX ADMIN — PIPERACILLIN AND TAZOBACTAM 4.5 G: 4; .5 INJECTION, POWDER, FOR SOLUTION INTRAVENOUS at 08:06

## 2020-08-12 RX ADMIN — INSULIN LISPRO 2 UNITS: 100 INJECTION, SOLUTION INTRAVENOUS; SUBCUTANEOUS at 17:06

## 2020-08-12 RX ADMIN — ALUMINUM HYDROXIDE, MAGNESIUM HYDROXIDE, AND SIMETHICONE 30 ML: 200; 200; 20 SUSPENSION ORAL at 15:42

## 2020-08-12 RX ADMIN — IPRATROPIUM BROMIDE AND ALBUTEROL SULFATE 3 ML: .5; 3 SOLUTION RESPIRATORY (INHALATION) at 01:40

## 2020-08-12 RX ADMIN — Medication 10 ML: at 13:00

## 2020-08-12 RX ADMIN — PIPERACILLIN AND TAZOBACTAM 4.5 G: 4; .5 INJECTION, POWDER, FOR SOLUTION INTRAVENOUS at 01:05

## 2020-08-12 RX ADMIN — SODIUM CHLORIDE 40 MG: 9 INJECTION, SOLUTION INTRAMUSCULAR; INTRAVENOUS; SUBCUTANEOUS at 08:06

## 2020-08-12 RX ADMIN — FAMOTIDINE 20 MG: 10 INJECTION INTRAVENOUS at 21:27

## 2020-08-12 RX ADMIN — BUDESONIDE 500 MCG: 0.5 INHALANT RESPIRATORY (INHALATION) at 08:34

## 2020-08-12 RX ADMIN — DICYCLOMINE HYDROCHLORIDE 10 MG: 10 CAPSULE ORAL at 12:39

## 2020-08-12 RX ADMIN — AMLODIPINE BESYLATE 5 MG: 5 TABLET ORAL at 12:40

## 2020-08-12 RX ADMIN — DICYCLOMINE HYDROCHLORIDE 10 MG: 10 CAPSULE ORAL at 21:41

## 2020-08-12 RX ADMIN — PREDNISONE 40 MG: 10 TABLET ORAL at 12:39

## 2020-08-12 RX ADMIN — Medication 10 ML: at 05:07

## 2020-08-12 RX ADMIN — Medication 1 SPRAY: at 15:42

## 2020-08-12 RX ADMIN — CLONAZEPAM 0.5 MG: 0.5 TABLET ORAL at 21:27

## 2020-08-12 RX ADMIN — ENOXAPARIN SODIUM 40 MG: 40 INJECTION SUBCUTANEOUS at 08:06

## 2020-08-12 RX ADMIN — POTASSIUM CHLORIDE 20 MEQ: 200 INJECTION, SOLUTION INTRAVENOUS at 11:51

## 2020-08-12 RX ADMIN — IPRATROPIUM BROMIDE AND ALBUTEROL SULFATE 3 ML: .5; 3 SOLUTION RESPIRATORY (INHALATION) at 14:55

## 2020-08-12 RX ADMIN — BUDESONIDE 500 MCG: 0.5 INHALANT RESPIRATORY (INHALATION) at 21:21

## 2020-08-12 RX ADMIN — PIPERACILLIN AND TAZOBACTAM 4.5 G: 4; .5 INJECTION, POWDER, FOR SOLUTION INTRAVENOUS at 17:06

## 2020-08-12 RX ADMIN — MORPHINE SULFATE 1 MG: 2 INJECTION, SOLUTION INTRAMUSCULAR; INTRAVENOUS at 15:42

## 2020-08-12 RX ADMIN — FAMOTIDINE 20 MG: 10 INJECTION INTRAVENOUS at 08:06

## 2020-08-12 RX ADMIN — POTASSIUM CHLORIDE 20 MEQ: 200 INJECTION, SOLUTION INTRAVENOUS at 10:00

## 2020-08-12 RX ADMIN — LORAZEPAM 1 MG: 2 INJECTION INTRAMUSCULAR; INTRAVENOUS at 08:07

## 2020-08-12 RX ADMIN — Medication 10 ML: at 21:28

## 2020-08-12 RX ADMIN — DEXTROSE MONOHYDRATE 12.5 G: 25 INJECTION, SOLUTION INTRAVENOUS at 11:51

## 2020-08-12 RX ADMIN — DICYCLOMINE HYDROCHLORIDE 10 MG: 10 CAPSULE ORAL at 17:06

## 2020-08-12 RX ADMIN — INSULIN LISPRO 2 UNITS: 100 INJECTION, SOLUTION INTRAVENOUS; SUBCUTANEOUS at 20:00

## 2020-08-12 NOTE — PROGRESS NOTES
Progress Note    Patient: Molina Jimenes MRN: [id number]  SSN: [id number]    YOB: 1955  Age: 60 y.o. Sex: female      Admit Date: 8/5/2020    LOS: 6 days     Subjective:     Pt resting in bed. She lost her NG tube accidentally last night. I have discussed with her about prior hospitalization with COVID 19, and that she was discharged home with 4 ltrs of oxygen. Pt admitted that she has been using oxygen at home continuously ~ 3-5 ltr. This AM, she has been weaned down to 2-3 ltrs. I have discussed with her about need to placed NG back in for TF, to which she has agreed. No fever, chills, nausea/vomiting, headache, diarrhea. ROS: 10 point ROS negative except what mentioned above    Objective:     Vitals:    08/11/20 2007 08/11/20 2348 08/12/20 0140 08/12/20 0443   BP:  159/79  179/70   Pulse:  97  97   Resp:  18  19   Temp:  97.4 °F (36.3 °C)  97.8 °F (36.6 °C)   SpO2: 94% 94% 97% 97%   Weight:       Height:            Intake and Output:  Current Shift: No intake/output data recorded.   Last three shifts: 08/10 1901 - 08/12 0700  In: 60   Out: -     ROS  10 ROS negative except from stated on subjective    Physical Exam:   General: elderly, frail, on 2  ltr NC  HEENT: NC/AT, EOM are intact  Neck: supple, no JVD  Cardiovascular: RRR, S1, S2, no murmurs  Respiratory: clear breath sounds bilaterally  Abdomen: Soft, NT, ND  Back: No CVA tenderness, no paraspinal tenderness  Extremities: LE without pedal edema, no erythema  Neuro: A&O, CN are intact, no focal deficits  Skin: no rash or ulcers  Psych: good mood and affect    Lab/Data Review:  I have personally reviewed patients laboratory data showing  Recent Results (from the past 24 hour(s))   GLUCOSE, POC    Collection Time: 08/11/20  7:25 AM   Result Value Ref Range    Glucose (POC) 75 65 - 100 mg/dL   GLUCOSE, POC    Collection Time: 08/11/20 11:15 AM   Result Value Ref Range    Glucose (POC) 87 65 - 100 mg/dL   GLUCOSE, POC Collection Time: 08/11/20  4:39 PM   Result Value Ref Range    Glucose (POC) 134 (H) 65 - 100 mg/dL   GLUCOSE, POC    Collection Time: 08/11/20  7:16 PM   Result Value Ref Range    Glucose (POC) 120 (H) 65 - 100 mg/dL   GLUCOSE, POC    Collection Time: 08/12/20 12:03 AM   Result Value Ref Range    Glucose (POC) 116 (H) 65 - 100 mg/dL   GLUCOSE, POC    Collection Time: 08/12/20  3:44 AM   Result Value Ref Range    Glucose (POC) 77 65 - 811 mg/dL   METABOLIC PANEL, BASIC    Collection Time: 08/12/20  4:56 AM   Result Value Ref Range    Sodium 143 136 - 145 mmol/L    Potassium 3.4 (L) 3.5 - 5.1 mmol/L    Chloride 108 (H) 98 - 107 mmol/L    CO2 28 21 - 32 mmol/L    Anion gap 7 7 - 16 mmol/L    Glucose 94 65 - 100 mg/dL    BUN 12 8 - 23 MG/DL    Creatinine 0.59 (L) 0.6 - 1.0 MG/DL    GFR est AA >60 >60 ml/min/1.73m2    GFR est non-AA >60 >60 ml/min/1.73m2    Calcium 8.6 8.3 - 10.4 MG/DL   MAGNESIUM    Collection Time: 08/12/20  4:56 AM   Result Value Ref Range    Magnesium 1.9 1.8 - 2.4 mg/dL   PHOSPHORUS    Collection Time: 08/12/20  4:56 AM   Result Value Ref Range    Phosphorus 3.8 (H) 2.3 - 3.7 MG/DL        Image:  I have personally reviewed patients imaging showing  XR ABD (KUB)   Final Result   IMPRESSION: Feeding tube advanced into the distal stomach. XR ABD (KUB)   Final Result   IMPRESSION:   1. Enteric tube terminates in the gastric body with the sidehole just below the   GE junction. Consider slight advancement. XR SWALLOW FUNC VIDEO   Final Result   IMPRESSION: Near aspiration of all liquids         XR CHEST SNGL V   Final Result   IMPRESSION: Increased peripheral infiltrate in the right lung         CT CHEST W CONT   Final Result   IMPRESSION:    1. Emphysema. 2. Bilateral mid and lower lung infiltrates, worse on the right, suspect for   acute pneumonia. Covid 19 pneumonia is a consideration.          XR CHEST PORT   Final Result   IMPRESSION: Bibasilar lung infiltrates have improved since the prior study, with   mild residual remaining. Hospital problems     Principal Problem:    Acute hypoxemic respiratory failure ([address] (9/26/2018)    Active Problems:    Leukocytosis (9/26/2018)      Tobacco abuse (9/26/2018)      Sepsis ([address] (7/1/2020)      Pneumonia due to COVID-19 virus (7/2/2020)      COPD exacerbation ([address] (7/2/2020)      HAP (hospital-acquired pneumonia) (8/6/2020)      Elevated ALT measurement (8/6/2020)      Elevated lactic acid level (8/6/2020)      Hypomagnesemia (8/6/2020)        Assessment and Plan:     Johnny Degroot is a 60 y.o. female with medical history significant for COPD , DM2, recent [address] Infection( positive on 7/2 and neg on 7/22, 8/6) who presented from home due to shortness of breath    1. Acute on chronic hypoxic respiratory failure and septic shock concerning of HAP vs aspiration PNA  / COPD  - Recent COVID positive on 7/2      - Received convalescent plasma, remdesivir and dexamethasone      - Repeated COVID on 7/22 and 8/6 negative  - CT chest with BL infiltrates, no PE  - concerns for aspiration  - Continue NPO for now as per speech recommendations  - Continue zosyn with Vancomycin  - CXR from 8/10 reviewed, showed increased peripheral infiltrate in the right lung  - Follow BCx - NGTD   - weaned down to 2-3 ltrs  - Continue solumedrol and taper down   - Continue albuterol, pulmicort  - switched solumedrol to prednisone on 8/12/20  - pulmonary consulted as pt's DIL requested for 2nd opinion.    - NG tube placed yesterday, accidentally came out last night, to be replaced this AM    Hypokalemia: addressed, given IV potassium, recheck BMP in AM.    Echo ordered    Started on norvasc 5 mg po daily for HTN    2. DM  - ISS  - BS ACHS    DVT ppx lovenox    High risk    Dispo: pending until medically stable.     Signed By: Gomez So MD     August 12, 2020

## 2020-08-12 NOTE — CONSULTS
CONSULT NOTE    SuadAsha Antony    8/12/2020    Date of Admission:  8/5/2020    The patient's chart is reviewed and the patient is discussed with the staff. Subjective:     Patient is a 60 y.o.  female seen and evaluated at the request of Dr. Mesha Elliott for persistent acute respiratory failure in the setting of aspiration pneumonia. Currently resting in bed, denies shortness of breath, having no cough or sputum production. Complains of throat  soreness \"from this tube in my nose\". Confirms she has been compliant with home O2. Was admitted by Dr. Dena Rucker 8/5/20:  61 y/o WF with a h/o COPD and DM2 who was hospitalized from July 1 - July 20 after presenting with acute hypoxemic respiratory failure and pneumonia secondary to COVID-19. She received convalescent plasma and completed courses of both dexamethasone and Remdesivir. She was discharged on 7/20 on 4-6L NC O2. Says she stopped smoking prior to her last hospitalization but does live with family and some of them smoke inside the house (but apparently she told them to stop 2-3 days ago). She developed SOB a few days ago but otherwise denies fevers, headaches, chest pain ,N/V/D, abdominal pain, edema. EMS called to the house tonight and she was found to be 76% on 5L NC O2. On arrival in ER she was hypotensive at 73/33 and started on Levophed drip. Also agitated requiring Haldol. She was given 3L IVFs and antibiotics. Her Levophed was weaned off with improvement of BPs. CXR with improved b/l infiltrates. D-dimer elevated at 19 so she was empirically started on a heparin drip. CT chest negative for PE, also shows emphysema and b/l infiltrates (R>L). Her heparin drip is now off. WBCs 31K with normal diff, Mg 1.7, LA 2.4, ALT 1247, , PCT 0.54, CK 1079. UDS + amphetamines. Hospitalist consulted for admission and further management.       Review of Systems  A comprehensive review of systems was negative except for: Constitutional: positive for fatigue and malaise  Respiratory: positive for emphysema, chronic respiratory failure on NC 4-6L  Gastrointestinal: positive for dysphagia  Musculoskeletal: positive for muscle weakness  Endocrine: positive for DM    Patient Active Problem List   Diagnosis Code    CAP (community acquired pneumonia) J18.9    Leukocytosis D72.829    Acute hypoxemic respiratory failure (HCC) J96.01    Tobacco abuse Z72.0    Suspected COVID-19 virus infection Z20.828    Acute respiratory failure with hypoxemia (HCC) J96.01    Sepsis ([facility] A41.9    Pneumonia due to COVID-19 virus U07.1, J12.89    COPD exacerbation ([facility] J44.1    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 Taking? albuterol (PROAIR HFA) 90 mcg/actuation inhaler 7/30/2020 at Unknown time  No Yes   Sig: Take 2 Puffs by inhalation every four (4) hours as needed for Wheezing. budesonide-formoteroL (SYMBICORT) 160-4.5 mcg/actuation HFAA 7/30/2020 at Unknown time  No Yes   Sig: Take 2 Puffs by inhalation two (2) times a day. ibuprofen (MOTRIN) 600 mg tablet 7/30/2020 at Unknown time  No Yes   Sig: Take 1 Tab by mouth every six (6) hours as needed for Pain.   metFORMIN (GLUCOPHAGE) 500 mg tablet 7/30/2020 at Unknown time  No Yes   Sig: Take 1 Tab by mouth two (2) times daily (with meals). Facility-Administered Medications: None       Past Medical History:   Diagnosis Date    COPD (chronic obstructive pulmonary disease) ([facility]     Diabetes ([facility]      No past surgical history on file.   Social History     Socioeconomic History    Marital status:      Spouse name: Not on file    Number of children: Not on file    Years of education: Not on file    Highest education level: Not on file   Occupational History    Not on file   Social Needs    Financial resource strain: Not on file    Food insecurity     Worry: Not on file     Inability: Not on file    Transportation needs     Medical: Not on file     Non-medical: Not on file   Tobacco Use    Smoking status: Current Every Day Smoker     Types: Cigars   Substance and Sexual Activity    Alcohol use: No    Drug use: No    Sexual activity: Never   Lifestyle    Physical activity     Days per week: Not on file     Minutes per session: Not on file    Stress: Not on file   Relationships    Social connections     Talks on phone: Not on file     Gets together: Not on file     Attends [employer] service: Not on file     Active member of club or organization: Not on file     Attends meetings of clubs or organizations: Not on file     Relationship status: Not on file    Intimate partner violence     Fear of current or ex partner: Not on file     Emotionally abused: Not on file     Physically abused: Not on file     Forced sexual activity: Not on file   Other Topics Concern    Not on file   Social History Narrative    Not on file     Family History   Problem Relation Age of Onset    Cancer Mother     Liver Disease Father      No Known Allergies    Current Facility-Administered Medications   Medication Dose Route Frequency    amLODIPine (NORVASC) tablet 5 mg  5 mg Oral DAILY    predniSONE (DELTASONE) tablet 40 mg  40 mg Oral DAILY WITH BREAKFAST    clonazePAM (KlonoPIN) tablet 0.5 mg  0.5 mg Oral BID    LORazepam (ATIVAN) injection 1 mg  1 mg IntraVENous Q8H PRN    potassium chloride 20 mEq in 100 ml IVPB  20 mEq IntraVENous Q2H    vancomycin (VANCOCIN) 1,000 mg in 0.9% sodium chloride (MBP/ADV) 250 mL  1,000 mg IntraVENous Q12H    pantoprazole (PROTONIX) 40 mg in 0.9% sodium chloride 10 mL injection  40 mg IntraVENous ACB    famotidine (PF) (PEPCID) 20 mg in 0.9% sodium chloride 10 mL injection  20 mg IntraVENous Q12H    insulin lispro (HUMALOG) injection   SubCUTAneous Q4H    alum-mag hydroxide-simeth (MYLANTA) oral suspension 30 mL  30 mL Oral Q4H PRN    dicyclomine (BENTYL) capsule 10 mg  10 mg Oral QID    dextrose 40% (GLUTOSE) oral gel 1 Tube  15 g Oral PRN    glucagon (GLUCAGEN) injection 1 mg  1 mg IntraMUSCular PRN    dextrose (D50W) injection syrg 12.5-25 g  25-50 mL IntraVENous PRN    albuterol (PROVENTIL VENTOLIN) nebulizer solution 2.5 mg  2.5 mg Nebulization Q4H PRN    albuterol-ipratropium (DUO-NEB) 2.5 MG-0.5 MG/3 ML  3 mL Nebulization Q6H RT    [Held by provider] dextrose 5% and 0.9% NaCl infusion  50 mL/hr IntraVENous CONTINUOUS    busPIRone (BUSPAR) tablet 15 mg  15 mg Oral BID PRN    piperacillin-tazobactam (ZOSYN) 4.5 g in 0.9% sodium chloride (MBP/ADV) 100 mL  4.5 g IntraVENous Q8H    budesonide (PULMICORT) 500 mcg/2 ml nebulizer suspension  500 mcg Nebulization BID RT    sodium chloride (NS) flush 5-10 mL  5-10 mL IntraVENous PRN    sodium chloride (NS) flush 5-40 mL  5-40 mL IntraVENous Q8H    sodium chloride (NS) flush 5-40 mL  5-40 mL IntraVENous PRN    acetaminophen (TYLENOL) tablet 650 mg  650 mg Oral Q6H PRN    Or    acetaminophen (TYLENOL) suppository 650 mg  650 mg Rectal Q6H PRN    polyethylene glycol (MIRALAX) packet 17 g  17 g Oral DAILY PRN    promethazine (PHENERGAN) tablet 12.5 mg  12.5 mg Oral Q6H PRN    Or    ondansetron (ZOFRAN) injection 4 mg  4 mg IntraVENous Q6H PRN    enoxaparin (LOVENOX) injection 40 mg  40 mg SubCUTAneous DAILY         Objective:     Vitals:    08/12/20 0140 08/12/20 0443 08/12/20 0728 08/12/20 0834   BP:  179/70 161/75    Pulse:  97 83    Resp:  19 17    Temp:  97.8 °F (36.6 °C) 97.6 °F (36.4 °C)    SpO2: 97% 97% 91% 96%   Weight:       Height:           PHYSICAL EXAM     Constitutional:  the patient is well developed and in no acute distress, NC 2L sat 96%  EENMT:  Sclera clear, pupils equal, oral mucosa moist  Respiratory: few scattered crackles, dry cough, no wheezing, diminished in bases  Cardiovascular:  RRR without M,G,R  Gastrointestinal: soft and non-tender; with positive bowel sounds. Musculoskeletal: warm without cyanosis. There is no lower extremity edema. Skin:  no jaundice or rashes, no wounds   Neurologic: no gross neuro deficits     Psychiatric:  alert and oriented x 2    Chest CT 8/6/20:  1. Emphysema. 2. Bilateral mid and lower lung infiltrates, worse on the right, suspect for  acute pneumonia. Covid 19 pneumonia is a consideration. MBS 8/9/20:  Near aspiration of all liquids    CXR:   8/10/20: Increased peripheral infiltrate in the right lung      Recent Labs     08/11/20  0516 08/10/20  0520   WBC 13.5* 13.1*   HGB 11.6* 11.1*   HCT 37.2 35.4*    230     Recent Labs     08/12/20  0456 08/11/20  0516 08/10/20  0520    141 143   K 3.4* 3.7 3.9   * 104 107   GLU 94 77 68   CO2 28 30 31   BUN 12 14 12   CREA 0.59* 0.52* 0.47*   MG 1.9 1.9  --    PHOS 3.8* 3.7  --    CA 8.6 8.2* 8.2*     No results for input(s): PH, PCO2, PO2, HCO3, PHI, PCO2I, PO2I, HCO3I in the last 72 hours. No results for input(s): LCAD, LAC in the last 72 hours.     Assessment:  (Medical Decision Making)     Hospital Problems  Date Reviewed: 8/12/2020          Codes Class Noted POA    Chronic respiratory failure [facility]) ICD-10-CM: J96.10  ICD-9-CM: 518.83  8/12/2020 Unknown    Overview Signed 8/12/2020 11:58 AM by Dwayne Maxwell NP     Home O2 4-6L NC         On NC 2-3L now    Aspiration pneumonia [facility]) ICD-10-CM: J69.0  ICD-9-CM: 507.0  8/12/2020 Unknown    Remains on Zosyn and Vancomycin    HAP (hospital-acquired pneumonia) ICD-10-CM: J18.9, Y95  ICD-9-CM: 036  8/6/2020 Yes        Elevated ALT measurement ICD-10-CM: R74.0  ICD-9-CM: 790.4  8/6/2020 Yes        Elevated lactic acid level ICD-10-CM: R79.89  ICD-9-CM: 276.2  8/6/2020 Yes Hypomagnesemia ICD-10-CM: E83.42  ICD-9-CM: 275.2  8/6/2020 Yes    1.9 on today lab    Pneumonia due to COVID-19 virus ICD-10-CM: U07.1, J12.89  ICD-9-CM: 480.8  7/2/2020 Yes        COPD exacerbation ([facility] ICD-10-CM: J44.1  ICD-9-CM: 491.21  7/2/2020 Yes    No wheezing--continue current nebs    Sepsis ([facility] ICD-10-CM: A41.9  ICD-9-CM: 038.9, 995.91  7/1/2020 Yes    Hemodynamically stable    Leukocytosis ICD-10-CM: D72.829  ICD-9-CM: 288.60  9/26/2018 Yes    WBC yesterday 13.5    * (Principal) Acute hypoxemic respiratory failure ([facility] ICD-10-CM: J96.01  ICD-9-CM: 518.81  9/26/2018 Yes    Weaned to NC 3L    Tobacco abuse ICD-10-CM: Z72.0  ICD-9-CM: 305.1  9/26/2018 Yes    States she has quit and had cut way down prior to admission in July          Plan:  (Medical Decision Making)     --Duoneb, Pulmicort  --Prednisone 40mg daily  --Zosyn, Vancomycin day 7  --NG tube for TFs --failed MBS. Speech following.   --ECHO pending today  --Continue PT for mobility--states she is independent at home but has faimily to assist is needed. --Continue supplemental O2--was on prior to admission using 4-6L per NC.    --Patient has a home nebulizer and home inhaler but only rarely has to use either. More than 50% of the time documented was spent in face-to-face contact with the patient and in the care of the patient on the floor/unit where the patient is located. Thank you very much for this referral.  We appreciate the opportunity to participate in this patient's care. Will follow along with above stated plan. Edgar Melgoza, MAJOR   Lungs:  Few crackles on the R  Heart:  RRR with no Murmur/Rubs/Gallops    Additional Comments:  Day 7 iv antibx- pt wants to go home, to have repeat mbs-she does not want peg ?  Try thickened liquids  Repeat cxr tomorrow am-  Hopefully home soon but probably needs to have ng tube out with trial of thickened liquids before she goes home   Stop vanc- no staph on cultures    I have spoken with and examined the patient. I agree with the above assessment and plan as documented.     Monique Avelar MD

## 2020-08-12 NOTE — PROGRESS NOTES
When this RN entered the patients room to start tube feedings, patient was found with cherry pepsi in the bed with her that had some gone from it. When this RN asked the patient whose drink that was and if she was drinking the pepsi, she stated that it was her daughter in laws that had just left the room. She very hastily placed the pepsi on the bedside table as soon I seen the pepsi in the bed with her. I re-educated the patient on why she cant have anything to eat or drink other than a few ice chips and moved the pepsi to the other side of the room out of the patients reach. Patient states she is ready for the modified barium swallow because she knows she can pass no problem. I explained speech will be around to see her and talk to her more about that.

## 2020-08-12 NOTE — PROGRESS NOTES
SPEECH PATHOLOGY NOTE:    Attempted to see patient for dysphagia treatment, however echo in progress. Will check back at later time/date as schedule permits.          Ashley Reid, [facility], [facility], CCC-SLP

## 2020-08-12 NOTE — PROGRESS NOTES
PT NOTE:    Attempted to see for PT, however echo in progress. Will check back at later time/date as schedule allows.     Arnold Alvarado, PTA

## 2020-08-12 NOTE — PROGRESS NOTES
Comprehensive Nutrition Assessment    Type and Reason for Visit: Reassess  Follow up for Tube Feeding Management (Hospitalist)  Nutrition Recommendations/Plan:   Replace NGFT, not a candidate for PN with functioning gut. Initiate Jevity 1.5 margot via NGFT once placement confrimed at 15ml/hour, progress by 10 ml/hour every 4 hours to goal rate of 45ml/hour. Water flush 35 ml/hour. At goal will provide 1620 kcal (100% estimated calorie needs), 69 grams protein (100% estimated protein needs) and ~1661ml free fluid (~1 ml/kcal). IV Fluids: Discontinue with TF start. EN labs: BMP daily x 3, Mg and Phos MWF tomorrow am.   Vitamin and Mineral Supplement Therapy:  Nutrition support orders for electrolyte management replacement activated on MAR.   K replacement ordered IV as pt without EN access at lab review. Nutrition Assessment:   h/o COPD and DM2 who was hospitalized from July 1 to July 20 after presenting with acute hypoxemic respiratory failure and pneumonia secondary to COVID-19. Presented with altered mental status, back pain, hypoxic. Remains hospitalized with hypxoic respiratory failure with concerns for aspiration. Pt has been NPO since 8/9 secondary SLP eddie, s/p MBS with aspiration 8/10. TF initiated 8/11 after MBS recommending NPO 8/10. FT was self discontinued overnight, TF was infusing at 15 ml/hr per flowsheet. Pt more interactive with RD at today's visit. She reports \"it just came out while I was sleeping\" when asked about the feeding tube. Reinforced the need for the short term tube feedings while awaiting repeat MBS. Pt is agreeable to FT replacement at my visit. Abdominal status: last recorded BM 8/11, active BS.    Pertinent Medications:   Pepcid, SSI (no recent doses), protonix, zosyn, prednisone  Lab Results   Component Value Date/Time    Sodium 143 08/12/2020 04:56 AM    Potassium 3.4 (L) 08/12/2020 04:56 AM    Chloride 108 (H) 08/12/2020 04:56 AM    CO2 28 08/12/2020 04:56 AM    Anion gap 7 08/12/2020 04:56 AM    Glucose 94 08/12/2020 04:56 AM    BUN 12 08/12/2020 04:56 AM    Creatinine 0.59 (L) 08/12/2020 04:56 AM    Calcium 8.6 08/12/2020 04:56 AM    Albumin 2.2 (L) 08/09/2020 05:23 AM    Phosphorus 3.8 (H) 08/12/2020 04:56 AM   Mg 1.9  Labs remarkable for decrease in K, mild elevation in phos    Estimated Daily Nutrient Needs:  Energy (kcal):  0705-7824 kcal/d (20-25 kcal/kg of listed weight of 65 kg)  Protein (g):  65-78 grams/d (1-1.2 grams/kg of listed weight of 65 kg)       Fluid (ml/day):  1ml/kcal    Current Nutrition Therapies:   DIET NPO With Ice Chips, Except Meds  DIET TUBE FEEDING    Anthropometric Measures:  · Height:  5' 3\" (160 cm)  · Current Body Wt:  65 kg (143 lb 4.8 oz)(8/6 wt no source)     Nutrition Diagnosis:   · Inadequate oral intake related to swallowing difficulty as evidenced by swallowing study results      Nutrition Interventions:   Food and/or Nutrient Delivery: Continue NPO(Replace FT and resume TF orders)  Coordination of Nutrition Care:  POC discussed with Dr. Renato Ruiz via [facility] and Cristina Jimenez RN on unit. Goals: Tolerate goal TF within 7 days       Nutrition Monitoring and Evaluation:   Food/Nutrient Intake Outcomes: Enteral nutrition intake/tolerance  Physical Signs/Symptoms Outcomes: Biochemical data    Discharge Planning:     Too soon to determine     Gloria Swan RD, LDN on 8/12/2020 at 1:26 PM  Contact: [phone]

## 2020-08-12 NOTE — PROGRESS NOTES
Problem: Diabetes Self-Management  Goal: *Disease process and treatment process  Description: Define diabetes and identify own type of diabetes; list 3 options for treating diabetes. Outcome: Progressing Towards Goal  Goal: *Incorporating nutritional management into lifestyle  Description: Describe effect of type, amount and timing of food on blood glucose; list 3 methods for planning meals. Outcome: Progressing Towards Goal  Goal: *Monitoring blood glucose, interpreting and using results  Description: Identify recommended blood glucose targets  and personal targets. Outcome: Progressing Towards Goal  Goal: *Prevention, detection, treatment of acute complications  Description: List symptoms of hyper- and hypoglycemia; describe how to treat low blood sugar and actions for lowering  high blood glucose level. Outcome: Progressing Towards Goal  Goal: *Prevention, detection and treatment of chronic complications  Description: Define the natural course of diabetes and describe the relationship of blood glucose levels to long term complications of diabetes. Outcome: Progressing Towards Goal  Goal: *Developing strategies to address psychosocial issues  Description: Describe feelings about living with diabetes; identify support needed and support network  Outcome: Progressing Towards Goal  Goal: *Insulin pump training  Outcome: Progressing Towards Goal     Problem: Falls - Risk of  Goal: *Absence of Falls  Description: Document [facility] Fall Risk and appropriate interventions in the flowsheet.   Outcome: Progressing Towards Goal  Note: Fall Risk Interventions:  Mobility Interventions: Bed/chair exit alarm, Communicate number of staff needed for ambulation/transfer, Patient to call before getting OOB    Mentation Interventions: Bed/chair exit alarm, Door open when patient unattended, Increase mobility, More frequent rounding, Reorient patient, Room close to nurse's station    Medication Interventions: Bed/chair exit alarm, Patient to call before getting OOB, Teach patient to arise slowly    Elimination Interventions: Bed/chair exit alarm, Call light in reach, Patient to call for help with toileting needs, Stay With Me (per policy), Toileting schedule/hourly rounds, Toilet paper/wipes in reach    History of Falls Interventions: Bed/chair exit alarm, Consult care management for discharge planning, Door open when patient unattended, Investigate reason for fall, Room close to nurse's station, Utilize gait belt for transfer/ambulation         Problem: Patient Education: Go to Patient Education Activity  Goal: Patient/Family Education  Outcome: Progressing Towards Goal     Problem: Pressure Injury - Risk of  Goal: *Prevention of pressure injury  Description: Document Dale Scale and appropriate interventions in the flowsheet. Outcome: Progressing Towards Goal  Note: Pressure Injury Interventions:  Sensory Interventions: Assess changes in LOC, Avoid rigorous massage over bony prominences    Moisture Interventions: Absorbent underpads, Check for incontinence Q2 hours and as needed    Activity Interventions: Increase time out of bed, Pressure redistribution bed/mattress(bed type), PT/OT evaluation    Mobility Interventions: HOB 30 degrees or less, Pressure redistribution bed/mattress (bed type), PT/OT evaluation    Nutrition Interventions: Document food/fluid/supplement intake, Offer support with meals,snacks and hydration    Friction and Shear Interventions: HOB 30 degrees or less                Problem: Patient Education: Go to Patient Education Activity  Goal: Patient/Family Education  Outcome: Progressing Towards Goal     Problem: Patient Education: Go to Patient Education Activity  Goal: Patient/Family Education  Description: 1. Patient will complete lower body bathing and dressing with MOD I and adaptive equipment as needed.    2. Patient will complete toileting with MOD I.   3. Patient will tolerate 25 minutes of OT treatment with 1-2 rest breaks to increase activity tolerance for ADLs. 4. Patient will complete functional transfers with MOD I and adaptive equipment as needed. 5. Patient will verbalize 3 energy conservation strategies to increase activity tolerance for ADL performance. 6. Patient will complete functional mobility for household distances with MOD I and adaptive equipment as needed. 7. Patient will complete self-grooming while standing edge of sink with MOD I and adaptive equipment as needed.     Timeframe: 7 visits       Outcome: Progressing Towards Goal     Problem: Patient Education: Go to Patient Education Activity  Goal: Patient/Family Education  Outcome: Progressing Towards Goal     Problem: Breathing Pattern - Ineffective  Goal: *Absence of hypoxia  Outcome: Progressing Towards Goal

## 2020-08-12 NOTE — PROGRESS NOTES
Pharmacokinetic Consult to Pharmacist    Kari Talbertozzy is a 60 y.o. female being treated for HAP with Zosyn and Vancomycin. Height: 5' 3\" (160 cm)  Weight: 65 kg (143 lb 4.8 oz)  Lab Results   Component Value Date/Time    BUN 12 08/12/2020 04:56 AM    Creatinine 0.59 (L) 08/12/2020 04:56 AM    WBC 13.5 (H) 08/11/2020 05:16 AM    Procalcitonin 0.54 08/06/2020 12:16 AM    Lactic acid 1.3 08/06/2020 07:48 AM    Lactic Acid (POC) 1.9 09/25/2018 10:19 PM      Estimated Creatinine Clearance: 73.6 mL/min (A) (by C-G formula based on SCr of 0.59 mg/dL (L)). CULTURES:  Results     Procedure Component Value Units Date/Time    CULTURE, URINE [[id number]]  (Abnormal) Collected:  08/09/20 1006    Order Status:  Completed Specimen:  Cath Urine Updated:  08/12/20 0749     Special Requests: NO SPECIAL REQUESTS        Culture result:       50,000-100,000 COLONIES/mL CANDIDA ALBICANS          CULTURE, BLOOD [[id number]] Collected:  08/06/20 0020    Order Status:  Completed Specimen:  Blood Updated:  08/11/20 0726     Special Requests: --        RIGHT  Antecubital       Culture result: NO GROWTH 5 DAYS       CULTURE, BLOOD [[id number]] Collected:  08/06/20 0020    Order Status:  Completed Specimen:  Blood Updated:  08/11/20 0726     Special Requests: --        LEFT  Antecubital       Culture result: NO GROWTH 5 DAYS               Lab Results   Component Value Date/Time    Vancomycin,trough 22.9 (HH) 08/12/2020 07:16 AM       Day 2 of vancomycin. Goal trough is 15-20 mcg/ml. Patient was given loading dose of 1750 mg at 0908 on 08/11/20, then maintenance dose of 1500 mg Q12H at [facility]. Trough was supratherapeutic (22.9). 0806 dose was held this morning. Adjust dose to 1000 mg Q12H and give at 1300 on 8/12/20. Expected trough is 15.05 mcg/ml. Consider obtaining vancomycin trough prior to 3rd dose based on vancomycin treatment history. Will continue to follow.        Thank you,    Mehnaz Ruiz, PharmD Candidate

## 2020-08-12 NOTE — PROGRESS NOTES
Problem: Dysphagia (Adult)  Goal: *Acute Goals and Plan of Care (Insert Text)  Description:   STG: Pt. Will tolerate PO trials with SLP with no overt s/sx of aspiration/penetration. STG: Patient will participate in dysphagia exercises with 80% accuracy given minimal cueing. Added 8/10/2020  STG: Patient will participate in repeat modified barium swallow study to re evaluate swallow function when deemed appropriate. Added 8/10/2020    LTG: Pt. Will tolerate least restrictive diet without respiratory decline.     Outcome: Progressing Towards Goal  SPEECH LANGUAGE PATHOLOGY: DYSPHAGIA- Daily Note 2    NAME/AGE/GENDER: Shalonda Ascencio is a 60 y.o. female  DATE: 8/12/2020  PRIMARY DIAGNOSIS: Acute hypoxemic respiratory failure ([facility] [address] [J96.01]  Sepsis ([facility] [A41.9]      ICD-10: Treatment Diagnosis: R13.12 Dysphagia, Oropharyngeal Phase    RECOMMENDATIONS   DIET:   · NPO with alternative means of nutrition  · 3-5 ice chips/hour with RN after oral care     MEDICATIONS: Non-oral     ASPIRATION PRECAUTIONS  · Slow rate of intake  · Small bites/sips  · Upright at 90 degrees during meal     COMPENSATORY STRATEGIES/MODIFICATIONS  · None     EDUCATION:  · Recommendations discussed with Nursing and Patient and daughter in law  · Notified MD via perfect serve      CONTINUATION OF SKILLED SERVICES/MEDICAL NECESSITY:   Patient is expected to demonstrate progress in  swallow strength, swallow timeliness, swallow function and swallow safety in order to  improve swallow safety, work toward diet advancement and decrease aspiration risk.  Patient continues to require skilled intervention due to dysphagia. RECOMMENDATIONS for CONTINUED SPEECH THERAPY:   YES: Anticipate need for ongoing speech therapy during this hospitalization and at next level of care. ASSESSMENT   Patient presents with moderate-severe oropharyngeal dysphagia identified on modified barium swallow study 8/10/2020.  Patient completed dysphagia exercises with min-mod cues to improve swallow function. Daughter in law present with a multitude of concerns. Therefore, majority of session spent discussing plan of care in regards to dysphagia and providing modified barium swallow study results, recommendations, risks, and options. LONG discussion with RN present. Patient adamant about resuming regular diet and going home. Daughter in law initially requesting repeat swallow study be completed in AM so patient can leave (under the impression discharge date is dependent on completion of swallow study). However, after discussing purpose in repeating the test, both agreed that repeat swallow study is not warranted at this time given patient's wishes of resuming regular diet regardless of results. Daughter in law verbalized understanding of options and aspiration risks, however then stated she will \"compromise with a tube in the stomach and eating regular food\". .Provided further education regarding aspiration risks with oral or non oral intake and ultimately, patient's wishes of oral intake. Conversation ended with all in agreement to discuss wishes and concerns (removing NGT, resuming regular diet, and going home) with Dr. Alma Burroughs. Continue continue NPO with short term alternative means of nutrition at this time until goals of care are clarified. Ongoing discussion with MD and patient/family regarding wishes. At this point, do not anticipate a repeat modified barium swallow study as patient wanting to resume regular diet regardless. Both accept the risks and want to discuss with MD. Notified MD via [employer]. COMPLIANCE WITH PROGRAM/EXERCISES: Will assess as treatment progresses  REHABILITATION POTENTIAL FOR STATED GOALS: Good    PLAN    FREQUENCY/DURATION: Continue to follow patient 3 times a week for duration of hospital stay to address above goals.     - Recommendations for next treatment session: Next treatment will address exercises    SUBJECTIVE Patient's daughter in law called SLP office to discuss discharge date and repeating swallow test. Notified daughter in law that discharge is not up to SLP. Discussed meeting during [facility]  session (~2:00) this date to discuss role of SLP, modified barium swallow study results, and plan. Daughter in law present at SLP visit. Daughter in law reports patient will get better quicker at home. Requesting mental health doctor visit today. Reports patient has fear of leaving the house and this has been a lot for her being hospitalized. Problem List:  (Impairments causing functional limitations):  1. Oropharyngeal dysphagia    Orientation:   Person  Place  Modified barium swallow study 8/10/2020: moderate-severe oropharyngeal dysphagia. Premature spillage with delayed swallow initiation to level of pyriforms with all liquid trials, and delayed to posterior epiglottis with pudding. Incomplete laryngeal closure with non clearing penetration during the swallow with all liquid trials, including thin liquids, nectar thick liquids, honey thick liquids, and thin liquids with chin tuck. Shallow penetration with pudding that appears to clear during the swallow, but difficult to determine if trace contrast remaining in laryngeal vestibule new non clearing penetration versus residue from prior trials.        Recommend NPO. OK for 4-5 ice chips for pleasure after oral care with staff. meds crushed in pudding if necessary. Consider short term alternative means of nutrition. Pain: Pain Scale 1: FLACC  Pain Intensity 1: 0    OBJECTIVE   Patient seen for laryngeal exercises. Reviewed results of swallow study again. Patient participated in the following dysphagia exercises to maximize swallow function and improve safety with po intake. Min-mod verbal cues for technique  Effortful swallow: presented 5sets x5reps with ice chips     Strong coughing x3 before and after the swallow with ice chips.        INTERDISCIPLINARY COLLABORATION: Registered Nurse  And MD  PRECAUTIONS/ALLERGIES: Patient has no known allergies. Tool Used: Dysphagia Outcome and Severity Scale (REG)    Score Comments   Normal Diet  [] 7 With no strategies or extra time needed   Functional Swallow  [] 6 May have mild oral or pharyngeal delay   Mild Dysphagia  [] 5 Which may require one diet consistency restricted    Mild-Moderate Dysphagia  [] 4 With 1-2 diet consistencies restricted   Moderate Dysphagia  [] 3 With 2 or more diet consistencies restricted   Moderate-Severe Dysphagia  [] 2 With partial PO strategies (trials with ST only)   Severe Dysphagia  [] 1 With inability to tolerate any PO safely      Score:  Initial: 2 Most Recent: 2 (Date 08/12/20 )   Interpretation of Tool: The Dysphagia Outcome and Severity Scale (REG) is a simple, easy-to-use, 7-point scale developed to systematically rate the functional severity of dysphagia based on objective assessment and make recommendations for diet level, independence level, and type of nutrition.      After treatment position/precautions:  · Upright in bed  · RN notified  · Call light within reach   · Daughter in law present   · MD notified     Total Treatment Duration:   Time In: 1401  Time Out: [address], [address] [address] [facility]

## 2020-08-12 NOTE — PROGRESS NOTES
Patient given bath at sink with assistance from this RN. Patients linens were changed, brief was changed, gown was changed, and patient was taken to the [facility]. Patient then assisted back to bed with all items in reach, resting quietly.

## 2020-08-13 ENCOUNTER — APPOINTMENT (OUTPATIENT)
Dept: GENERAL RADIOLOGY | Age: 65
DRG: 871 | End: 2020-08-13
Attending: INTERNAL MEDICINE
Payer: MEDICARE

## 2020-08-13 ENCOUNTER — APPOINTMENT (OUTPATIENT)
Dept: GENERAL RADIOLOGY | Age: 65
DRG: 871 | End: 2020-08-13
Attending: HOSPITALIST
Payer: MEDICARE

## 2020-08-13 VITALS
HEIGHT: 63 IN | TEMPERATURE: 97.9 F | WEIGHT: 145.5 LBS | RESPIRATION RATE: 18 BRPM | DIASTOLIC BLOOD PRESSURE: 73 MMHG | HEART RATE: 85 BPM | BODY MASS INDEX: 25.78 KG/M2 | OXYGEN SATURATION: 87 % | SYSTOLIC BLOOD PRESSURE: 135 MMHG

## 2020-08-13 LAB
ANION GAP SERPL CALC-SCNC: 7 MMOL/L (ref 7–16)
BUN SERPL-MCNC: 18 MG/DL (ref 8–23)
CALCIUM SERPL-MCNC: 8.4 MG/DL (ref 8.3–10.4)
CHLORIDE SERPL-SCNC: 108 MMOL/L (ref 98–107)
CO2 SERPL-SCNC: 28 MMOL/L (ref 21–32)
CREAT SERPL-MCNC: 0.7 MG/DL (ref 0.6–1)
GLUCOSE BLD STRIP.AUTO-MCNC: 124 MG/DL (ref 65–100)
GLUCOSE BLD STRIP.AUTO-MCNC: 137 MG/DL (ref 65–100)
GLUCOSE SERPL-MCNC: 158 MG/DL (ref 65–100)
MAGNESIUM SERPL-MCNC: 2.2 MG/DL (ref 1.8–2.4)
PHOSPHATE SERPL-MCNC: 3.5 MG/DL (ref 2.3–3.7)
POTASSIUM SERPL-SCNC: 3.8 MMOL/L (ref 3.5–5.1)
SODIUM SERPL-SCNC: 143 MMOL/L (ref 136–145)

## 2020-08-13 PROCEDURE — 77010033678 HC OXYGEN DAILY

## 2020-08-13 PROCEDURE — 74011000250 HC RX REV CODE- 250: Performed by: INTERNAL MEDICINE

## 2020-08-13 PROCEDURE — 97530 THERAPEUTIC ACTIVITIES: CPT

## 2020-08-13 PROCEDURE — 74230 X-RAY XM SWLNG FUNCJ C+: CPT

## 2020-08-13 PROCEDURE — 36415 COLL VENOUS BLD VENIPUNCTURE: CPT

## 2020-08-13 PROCEDURE — 92611 MOTION FLUOROSCOPY/SWALLOW: CPT

## 2020-08-13 PROCEDURE — 97535 SELF CARE MNGMENT TRAINING: CPT

## 2020-08-13 PROCEDURE — 74011000255 HC RX REV CODE- 255: Performed by: HOSPITALIST

## 2020-08-13 PROCEDURE — 82962 GLUCOSE BLOOD TEST: CPT

## 2020-08-13 PROCEDURE — C9113 INJ PANTOPRAZOLE SODIUM, VIA: HCPCS | Performed by: HOSPITALIST

## 2020-08-13 PROCEDURE — 83735 ASSAY OF MAGNESIUM: CPT

## 2020-08-13 PROCEDURE — 74011250636 HC RX REV CODE- 250/636: Performed by: HOSPITALIST

## 2020-08-13 PROCEDURE — 74011000250 HC RX REV CODE- 250: Performed by: HOSPITALIST

## 2020-08-13 PROCEDURE — 99232 SBSQ HOSP IP/OBS MODERATE 35: CPT | Performed by: INTERNAL MEDICINE

## 2020-08-13 PROCEDURE — 71046 X-RAY EXAM CHEST 2 VIEWS: CPT

## 2020-08-13 PROCEDURE — 74011250636 HC RX REV CODE- 250/636: Performed by: INTERNAL MEDICINE

## 2020-08-13 PROCEDURE — 74011000258 HC RX REV CODE- 258: Performed by: INTERNAL MEDICINE

## 2020-08-13 PROCEDURE — 94640 AIRWAY INHALATION TREATMENT: CPT

## 2020-08-13 PROCEDURE — 80048 BASIC METABOLIC PNL TOTAL CA: CPT

## 2020-08-13 PROCEDURE — 74011636637 HC RX REV CODE- 636/637: Performed by: HOSPITALIST

## 2020-08-13 PROCEDURE — 74011250637 HC RX REV CODE- 250/637: Performed by: HOSPITALIST

## 2020-08-13 PROCEDURE — 84100 ASSAY OF PHOSPHORUS: CPT

## 2020-08-13 PROCEDURE — 94760 N-INVAS EAR/PLS OXIMETRY 1: CPT

## 2020-08-13 RX ORDER — AMOXICILLIN AND CLAVULANATE POTASSIUM 875; 125 MG/1; MG/1
1 TABLET, FILM COATED ORAL EVERY 12 HOURS
Qty: 14 TAB | Refills: 0 | Status: SHIPPED | OUTPATIENT
Start: 2020-08-13 | End: 2020-08-20

## 2020-08-13 RX ORDER — CLONAZEPAM 0.5 MG/1
0.5 TABLET ORAL 2 TIMES DAILY
Qty: 20 TAB | Refills: 0 | Status: SHIPPED | OUTPATIENT
Start: 2020-08-13 | End: 2020-08-26

## 2020-08-13 RX ORDER — AMLODIPINE BESYLATE 5 MG/1
5 TABLET ORAL DAILY
Qty: 30 TAB | Refills: 3 | Status: ON HOLD | OUTPATIENT
Start: 2020-08-13 | End: 2020-08-26 | Stop reason: SDUPTHER

## 2020-08-13 RX ORDER — PREDNISONE 20 MG/1
TABLET ORAL
Qty: 15 TAB | Refills: 0 | Status: SHIPPED | OUTPATIENT
Start: 2020-08-13 | End: 2020-08-26

## 2020-08-13 RX ORDER — DICYCLOMINE HYDROCHLORIDE 10 MG/1
10 CAPSULE ORAL 4 TIMES DAILY
Qty: 28 CAP | Refills: 0 | Status: SHIPPED | OUTPATIENT
Start: 2020-08-13 | End: 2020-08-20

## 2020-08-13 RX ADMIN — ENOXAPARIN SODIUM 40 MG: 40 INJECTION SUBCUTANEOUS at 10:07

## 2020-08-13 RX ADMIN — PIPERACILLIN AND TAZOBACTAM 4.5 G: 4; .5 INJECTION, POWDER, FOR SOLUTION INTRAVENOUS at 00:05

## 2020-08-13 RX ADMIN — IPRATROPIUM BROMIDE AND ALBUTEROL SULFATE 3 ML: .5; 3 SOLUTION RESPIRATORY (INHALATION) at 09:22

## 2020-08-13 RX ADMIN — BUDESONIDE 500 MCG: 0.5 INHALANT RESPIRATORY (INHALATION) at 09:22

## 2020-08-13 RX ADMIN — FAMOTIDINE 20 MG: 10 INJECTION INTRAVENOUS at 10:01

## 2020-08-13 RX ADMIN — Medication 10 ML: at 05:47

## 2020-08-13 RX ADMIN — CLONAZEPAM 0.5 MG: 0.5 TABLET ORAL at 09:47

## 2020-08-13 RX ADMIN — SODIUM CHLORIDE 40 MG: 9 INJECTION, SOLUTION INTRAMUSCULAR; INTRAVENOUS; SUBCUTANEOUS at 10:00

## 2020-08-13 RX ADMIN — BARIUM SULFATE 45 ML: 980 POWDER, FOR SUSPENSION ORAL at 09:02

## 2020-08-13 RX ADMIN — DICYCLOMINE HYDROCHLORIDE 10 MG: 10 CAPSULE ORAL at 09:48

## 2020-08-13 RX ADMIN — INSULIN LISPRO 4 UNITS: 100 INJECTION, SOLUTION INTRAVENOUS; SUBCUTANEOUS at 00:00

## 2020-08-13 RX ADMIN — BARIUM SULFATE 15 ML: 400 PASTE ORAL at 09:02

## 2020-08-13 RX ADMIN — DICYCLOMINE HYDROCHLORIDE 10 MG: 10 CAPSULE ORAL at 12:51

## 2020-08-13 RX ADMIN — IPRATROPIUM BROMIDE AND ALBUTEROL SULFATE 3 ML: .5; 3 SOLUTION RESPIRATORY (INHALATION) at 02:32

## 2020-08-13 RX ADMIN — PREDNISONE 40 MG: 10 TABLET ORAL at 09:47

## 2020-08-13 RX ADMIN — AMLODIPINE BESYLATE 5 MG: 5 TABLET ORAL at 09:00

## 2020-08-13 NOTE — DISCHARGE SUMMARY
Hospitalist Discharge Summary     Patient ID:  Rose Arredondo  [id number]  89 y.o.  1955  Admit date: 8/5/2020 11:57 PM  Discharge date and time: 8/13/2020  Attending: Sanjana Belcher MD  PCP:  None  Treatment Team: Attending Provider: Sanjana Belcher MD; Utilization Review: Aida Mckeon; Consulting Provider: Aaron Gunter MD; Care Manager: Dilcia Dillon [facility]; Physical Therapy Assistant: Amita Yepez; Occupational Therapy Assistant: Janes Healy    Principal Diagnosis Acute hypoxemic respiratory failure [facility])   Principal Problem:    Acute hypoxemic respiratory failure ([address] (9/26/2018)    Active Problems:    Leukocytosis (9/26/2018)      Tobacco abuse (9/26/2018)      Sepsis ([address] (7/1/2020)      Pneumonia due to COVID-19 virus (7/2/2020)      COPD exacerbation ([address] (7/2/2020)      HAP (hospital-acquired pneumonia) (8/6/2020)      Elevated ALT measurement (8/6/2020)      Elevated lactic acid level (8/6/2020)      Hypomagnesemia (8/6/2020)      Chronic respiratory failure ([address] (8/12/2020)      Overview: Home O2 4-6L NC      Aspiration pneumonia ([address] (8/12/2020)             Hospital Course:  Please refer to the admission H&P for details of presentation. In summary, the patient is 60years old female with hx of COPD, DM-2, anxiety disorder admitted on 8/6 for acute on chronic hypoxic respiratory failure secondary to aspiration pneumonia. Prior to this pt was hospitalized from July 1 - July 20 after presenting with acute hypoxemic respiratory failure and pneumonia secondary to COVID-19. She received convalescent plasma, and completed courses of both dexamethasone and Remdesivir. She was discharged on 7/20 on 4-6 ltrs NC to [place]. She used to be a smoker, stopped recently but some family members still smoke inside the house. During this hospitalization, CT chest was done that ruled out PE. She was kept on IV zosyn for aspiration pneumonia.  She was continued on supplemental oxygen. Speech eval was done, recommended NPO and pt was started on TF via NG tube. Pt was very resistant about plan of care and was adamant about taking the tube out. Pt underwent MBS on 8/13, did well and was cleared for mechanical soft diet with thin liquids. She has been weaned down to 2-3 ltrs NC. She will be discharged on tapering steroid and symbicort along with antibiotic for 7 days. She will follow up with [facility] in 3-4 weeks. She is medically cleared and hemodynamically stable for discharge to home with HHA, speech therapy, and home oxygen. Rest of the hospital course was uneventful, for further details, please refer to daily progress notes. Significant Diagnostic Studies:   HISTORY: Feeding difficulty and mismanagement, dysphasia     EXAM: Modified barium swallow     TECHNIQUE: The patient ingested various consistencies of barium under direct  fluoroscopic evaluation. The exam is performed in conjunction with speech  pathology. 1.0 minutes fluoroscopy time utilized for this procedure.     FINDINGS: No laryngeal penetration or aspiration with any consistency of barium     IMPRESSION  IMPRESSION:     No laryngeal penetration or aspiration. Please see detailed report from speech  pathology for further description. History: pneumonia     Two views chest     COMPARISON: 8/10/2020     Findings: There is persistent elevation of the right hemidiaphragm. There is  persistent peripheral opacity within the right midlung with scarring at the  right lung base. The left lung is clear. No change in the appearance of the  mediastinal contour or osseous structures.      IMPRESSION  Impression: Persistence of, but improvement of abnormal opacity in the right  midlung. The exam is otherwise stable. CT chest 8/6/20:  IMPRESSION:   1. Emphysema. 2. Bilateral mid and lower lung infiltrates, worse on the right, suspect for  acute pneumonia.  Covid 19 pneumonia is a consideration.       Labs: Results:       Chemistry Recent Labs     08/13/20  0540 08/12/20  0456 08/11/20  0516   * 94 77    143 141   K 3.8 3.4* 3.7   * 108* 104   CO2 28 28 30   BUN 18 12 14   CREA 0.70 0.59* 0.52*   CA 8.4 8.6 8.2*   AGAP 7 7 7      CBC w/Diff Recent Labs     08/11/20  0516   WBC 13.5*   RBC 4.08   HGB 11.6*   HCT 37.2      GRANS 69   LYMPH 17   EOS 2      Cardiac Enzymes No results for input(s): CPK, CKND1, MALLORY in the last 72 hours. No lab exists for component: CKRMB, TROIP   Coagulation No results for input(s): PTP, INR, APTT, INREXT in the last 72 hours. Lipid Panel Lab Results   Component Value Date/Time    Triglyceride 102 08/11/2020 05:16 AM      BNP No results for input(s): BNPP in the last 72 hours. Liver Enzymes No results for input(s): TP, ALB, TBIL, AP in the last 72 hours. No lab exists for component: SGOT, GPT, DBIL   Thyroid Studies No results found for: T4, T3U, TSH, TSHEXT         Discharge Exam:  Visit Vitals  /73 (BP 1 Location: Left arm, BP Patient Position: At rest)   Pulse 85   Temp 97.9 °F (36.6 °C)   Resp 18   Ht 5' 3\" (1.6 m)   Wt 66 kg (145 lb 8.1 oz)   SpO2 (!) 87%   BMI 25.77 kg/m²     General appearance: alert, cooperative, no distress, appears stated age, on 1 ltrs NC  Lungs: clear to auscultation bilaterally  Heart: regular rate and rhythm, S1, S2 normal, no murmur, click, rub or gallop  Abdomen: soft, non-tender. Bowel sounds normal. No masses,  no organomegaly  Extremities: no cyanosis or edema  Neurologic: Grossly normal    Disposition: home with A, speech and PT/OT  Discharge Condition: stable  Patient Instructions:   Current Discharge Medication List      START taking these medications    Details   amLODIPine (NORVASC) 5 mg tablet Take 1 Tab by mouth daily for 30 days. Qty: 30 Tab, Refills: 3      clonazePAM (KlonoPIN) 0.5 mg tablet Take 1 Tab by mouth two (2) times a day for 10 days. Max Daily Amount: 1 mg.   Qty: 20 Tab, Refills: 0    Associated Diagnoses: Anxiety      dicyclomine (BENTYL) 10 mg capsule Take 1 Cap by mouth four (4) times daily for 7 days. Qty: 28 Cap, Refills: 0      predniSONE (DELTASONE) 20 mg tablet 40 mg po daily x 3 days, 20 mg po daily x 3 days, 10 mg po daily x 3 days. Qty: 15 Tab, Refills: 0      amoxicillin-clavulanate (AUGMENTIN) 875-125 mg per tablet Take 1 Tab by mouth every twelve (12) hours for 7 days. Qty: 14 Tab, Refills: 0         CONTINUE these medications which have NOT CHANGED    Details   budesonide-formoteroL (SYMBICORT) 160-4.5 mcg/actuation HFAA Take 2 Puffs by inhalation two (2) times a day. Qty: 1 Inhaler, Refills: 0      ibuprofen (MOTRIN) 600 mg tablet Take 1 Tab by mouth every six (6) hours as needed for Pain. Qty: 1 Tab, Refills: 0      metFORMIN (GLUCOPHAGE) 500 mg tablet Take 1 Tab by mouth two (2) times daily (with meals). Qty: 60 Tab, Refills: 0      albuterol (PROAIR HFA) 90 mcg/actuation inhaler Take 2 Puffs by inhalation every four (4) hours as needed for Wheezing.   Qty: 1 Inhaler, Refills: 0             Activity: PT/OT per Home Health  Diet: mechanical soft with thin liquids  Wound Care: None needed    Follow-up  ·   Follow up with [place] [facility] in 3-4 weeks  · Follow up with PCP in 1-2 weeks  Time spent to discharge patient 35 minutes  Signed:  Carmencita Foy MD  8/13/2020  10:01 AM

## 2020-08-13 NOTE — PROGRESS NOTES
Problem: Diabetes Self-Management  Goal: *Disease process and treatment process  Description: Define diabetes and identify own type of diabetes; list 3 options for treating diabetes. Outcome: Resolved/Met  Goal: *Incorporating nutritional management into lifestyle  Description: Describe effect of type, amount and timing of food on blood glucose; list 3 methods for planning meals. Outcome: Resolved/Met  Goal: *Monitoring blood glucose, interpreting and using results  Description: Identify recommended blood glucose targets  and personal targets. Outcome: Resolved/Met  Goal: *Prevention, detection, treatment of acute complications  Description: List symptoms of hyper- and hypoglycemia; describe how to treat low blood sugar and actions for lowering  high blood glucose level. Outcome: Resolved/Met  Goal: *Prevention, detection and treatment of chronic complications  Description: Define the natural course of diabetes and describe the relationship of blood glucose levels to long term complications of diabetes. Outcome: Resolved/Met  Goal: *Developing strategies to address psychosocial issues  Description: Describe feelings about living with diabetes; identify support needed and support network  Outcome: Resolved/Met  Goal: *Insulin pump training  Outcome: Resolved/Met     Problem: Falls - Risk of  Goal: *Absence of Falls  Description: Document Lexie Fall Risk and appropriate interventions in the flowsheet.   Outcome: Resolved/Met  Note: Fall Risk Interventions:  Mobility Interventions: Bed/chair exit alarm, OT consult for ADLs, Patient to call before getting OOB, PT Consult for mobility concerns    Mentation Interventions: Bed/chair exit alarm, Door open when patient unattended    Medication Interventions: Bed/chair exit alarm, Patient to call before getting OOB, Teach patient to arise slowly    Elimination Interventions: Call light in reach, Bed/chair exit alarm, Patient to call for help with toileting needs    History of Falls Interventions: Bed/chair exit alarm, Consult care management for discharge planning, Door open when patient unattended         Problem: Patient Education: Go to Patient Education Activity  Goal: Patient/Family Education  Outcome: Resolved/Met     Problem: Pressure Injury - Risk of  Goal: *Prevention of pressure injury  Description: Document Dale Scale and appropriate interventions in the flowsheet. Outcome: Resolved/Met  Note: Pressure Injury Interventions:  Sensory Interventions: Assess changes in LOC, Avoid rigorous massage over bony prominences    Moisture Interventions: Absorbent underpads, Check for incontinence Q2 hours and as needed    Activity Interventions: Increase time out of bed, Pressure redistribution bed/mattress(bed type), PT/OT evaluation    Mobility Interventions: HOB 30 degrees or less, Pressure redistribution bed/mattress (bed type), PT/OT evaluation    Nutrition Interventions: Document food/fluid/supplement intake, Discuss nutritional consult with provider, Offer support with meals,snacks and hydration    Friction and Shear Interventions: HOB 30 degrees or less                Problem: Patient Education: Go to Patient Education Activity  Goal: Patient/Family Education  Outcome: Resolved/Met     Problem: Patient Education: Go to Patient Education Activity  Goal: Patient/Family Education  Description: 1. Patient will complete lower body bathing and dressing with MOD I and adaptive equipment as needed. 2. Patient will complete toileting with MOD I.   3. Patient will tolerate 25 minutes of OT treatment with 1-2 rest breaks to increase activity tolerance for ADLs. 4. Patient will complete functional transfers with MOD I and adaptive equipment as needed. 5. Patient will verbalize 3 energy conservation strategies to increase activity tolerance for ADL performance.    6. Patient will complete functional mobility for household distances with MOD I and adaptive equipment as needed. 7. Patient will complete self-grooming while standing edge of sink with MOD I and adaptive equipment as needed.     Timeframe: 7 visits       Outcome: Resolved/Met     Problem: Patient Education: Go to Patient Education Activity  Goal: Patient/Family Education  Outcome: Resolved/Met     Problem: Breathing Pattern - Ineffective  Goal: *Absence of hypoxia  Outcome: Resolved/Met

## 2020-08-13 NOTE — PROGRESS NOTES
Pt is medically cleared for dc to home today with RN/PT/OT services through [facility]. Son to transport the pt home. Pt requested a rolling walker. Order received and walker delivered to pt's room. Rober Alvarado supplied by [facility]- consent signed by pt and faxed to their office. No other dc needs or concerns identified at present. SW remains available to assist as needed. Care Management Interventions  PCP Verified by CM: Yes(new pt appt with Sanjana Salazar NP, rescheduled for 8/19/2020.)  Mode of Transport at Discharge: Other (see comment)(family)  Transition of Care Consult (CM Consult): [address]: Yes  Discharge Durable Medical Equipment: Yes(fixed wheel rolling walker from [facility])  Physical Therapy Consult: Yes  Occupational Therapy Consult: Yes  Speech Therapy Consult: Yes  Current Support Network: Own Home, Family Lives Nearby  Confirm Follow Up Transport: Family  The Plan for Transition of Care is Related to the Following Treatment Goals : Home health services to improve pt's functional abilties and medical condition.   The Patient and/or Patient Representative was Provided with a Choice of Provider and Agrees with the Discharge Plan?: Yes  Freedom of Choice List was Provided with Basic Dialogue that Supports the Patient's Individualized Plan of Care/Goals, Treatment Preferences and Shares the Quality Data Associated with the Providers?: Yes  Discharge Location  Discharge Placement: Home with home health([facility])

## 2020-08-13 NOTE — PROGRESS NOTES
Problem: Patient Education: Go to Patient Education Activity  Goal: Patient/Family Education  Description: 1. Patient will complete lower body bathing and dressing with MOD I and adaptive equipment as needed. 2. Patient will complete toileting with MOD I.   3. Patient will tolerate 25 minutes of OT treatment with 1-2 rest breaks to increase activity tolerance for ADLs. 4. Patient will complete functional transfers with MOD I and adaptive equipment as needed. 5. Patient will verbalize 3 energy conservation strategies to increase activity tolerance for ADL performance. 6. Patient will complete functional mobility for household distances with MOD I and adaptive equipment as needed. 7. Patient will complete self-grooming while standing edge of sink with MOD I and adaptive equipment as needed. Timeframe: 7 visits       Outcome: Progressing Towards Goal      OCCUPATIONAL THERAPY: Daily Note and AM    8/13/2020  INPATIENT: OT Visit Days: 3  [place] / [place] / Product Type: Medicare /      NAME/AGE/GENDER: Benjamín Alvarez is a 60 y.o. female   PRIMARY DIAGNOSIS:  Acute hypoxemic respiratory failure ([facility] [address] [J96.01]  Sepsis ([facility] [address] [A41.9] Acute hypoxemic respiratory failure ([address] Acute hypoxemic respiratory failure ([address]       ICD-10: Treatment Diagnosis:    · Generalized Muscle Weakness (M62.81)   Precautions/Allergies:     Patient has no known allergies. ASSESSMENT:     Ms. Steven Dueñas presents for the above diagnoses. Upon arrival, pt supine in bed and agreeable to OT evaluation. Pt is very lethargic today, however oriented x 4. Patient known to therapist from prior hospitalization. Since her last hospitalization, pt was living with family in a 1-story home. At baseline, pt was independent with ADLs and functional mobility. Pt denies any recent falls.  Currently resting on 12L 02 via n.c.    8/13/2020  Pt presents in supine upon arrival. Pt transferred to sitting independently and donned socks with SBA. Pt completed sit to stand with SBA and a rolling walker and completed with functional mobility in the hallway to increase independence and safety with house hold distances. Pt returned to her room and completed toileting with supervision and min a to don brief. Pt stood at sink and completed hygiene activities. Pt returned to supine with supervision and left with belongings in reach. Good effort. Continue POC. This section established at most recent assessment   PROBLEM LIST (Impairments causing functional limitations):  1. Decreased Strength  2. Decreased ADL/Functional Activities  3. Decreased Transfer Abilities  4. Decreased Ambulation Ability/Technique  5. Decreased Balance  6. Decreased Activity Tolerance  7. Decreased Pacing Skills  8. Decreased Work Simplification/Energy Conservation Techniques  9. Increased Shortness of Breath   INTERVENTIONS PLANNED: (Benefits and precautions of occupational therapy have been discussed with the patient.)  1. Activities of daily living training  2. Adaptive equipment training  3. Balance training  4. Clothing management  5. Community reintergration  6. Donning&doffing training  7. Neuromuscular re-eduation  8. Re-evaluation  9. Therapeutic activity  10. Therapeutic exercise     TREATMENT PLAN: Frequency/Duration: Follow patient 3x/week to address above goals. Rehabilitation Potential For Stated Goals: Good     REHAB RECOMMENDATIONS (at time of discharge pending progress):    Placement: It is my opinion, based on this patient's performance to date, that Ms. Aury Garcia may benefit from [address] after discharge due to the functional deficits listed above that are likely to improve with skilled rehabilitation because he/she has multiple medical issues that affect his/her functional mobility in the community.   Equipment:    TBD              OCCUPATIONAL PROFILE AND HISTORY:   History of Present Injury/Illness (Reason for Referral):  See H&P  Past Medical History/Comorbidities:   Ms. Tariq Mott  has a past medical history of COPD (chronic obstructive pulmonary disease) ([facility] [address] and Diabetes ([facility] [address] Ms. Tariq Mott  has no past surgical history on file. Social History/Living Environment:   Home Environment: Private residence  # Steps to Enter: 2  One/Two Story Residence: One story  Living Alone: Yes  Support Systems: Family member(s)  Patient Expects to be Discharged to[de-identified] Private residence  Current DME Used/Available at Home: None  Prior Level of Function/Work/Activity:  Independent with ADLs and functional mobility. Personal Factors:          Sex:  female        Age:  60 y.o. Other factors that influence how disability is experienced by the patient:  multiple co-morbidities    Number of Personal Factors/Comorbidities that affect the Plan of Care: Brief history (0):  LOW COMPLEXITY   ASSESSMENT OF OCCUPATIONAL PERFORMANCE[de-identified]   Activities of Daily Living:   Basic ADLs (From Assessment) Complex ADLs (From Assessment)   Feeding: Setup  Oral Facial Hygiene/Grooming: Setup  Bathing: Minimum assistance  Upper Body Dressing: Setup  Lower Body Dressing: Minimum assistance  Toileting: Minimum assistance Instrumental ADL  Meal Preparation: Moderate assistance  Homemaking:  Moderate assistance   Grooming/Bathing/Dressing Activities of Daily Living   Grooming  Grooming Assistance: Supervision  Position Performed: Standing  Washing Face: Supervision  Washing Hands: Supervision             Toileting  Toileting Assistance: Supervision  Bladder Hygiene: Supervision  Bowel Hygiene: Supervision     Functional Transfers  Bathroom Mobility: Supervision/set up  Toilet Transfer : Supervision   Lower Body Dressing Assistance  Protective Undergarmet: Minimum assistance  Socks: Stand-by assistance Bed/Mat Mobility  Rolling: Independent  Supine to Sit: Independent  Sit to Supine: Independent  Sit to Stand: Stand-by assistance  Stand to Sit: Stand-by assistance Most Recent Physical Functioning:   Gross Assessment:                  Posture:  Posture (WDL): Exceptions to WDL  Posture Assessment: Cervical, Forward head  Balance:  Sitting: Impaired  Sitting - Static: Good (unsupported)  Sitting - Dynamic: Good (unsupported)  Standing: Impaired  Standing - Static: Good  Standing - Dynamic : Fair Bed Mobility:  Rolling: Independent  Supine to Sit: Independent  Sit to Supine: Independent  Wheelchair Mobility:     Transfers:  Sit to Stand: Stand-by assistance  Stand to Sit: Stand-by assistance            Patient Vitals for the past 6 hrs:   BP BP Patient Position SpO2 O2 Flow Rate (L/min) Pulse   20 0745 135/73 At rest 90 %  85   20 0922   (!) 87 % 2 l/min        Mental Status  Neurologic State: Alert  Orientation Level: Oriented X4  Cognition: Follows commands  Perception: Appears intact  Perseveration: No perseveration noted  Safety/Judgement: Awareness of environment                          Physical Skills Involved:  1. Balance  2. Strength  3. Activity Tolerance  4. Gross Motor Control Cognitive Skills Affected (resulting in the inability to perform in a timely and safe manner): 1. none Psychosocial Skills Affected:  1. Habits/Routines  2. Environmental Adaptation   Number of elements that affect the Plan of Care: 5+:  HIGH COMPLEXITY   CLINICAL DECISION MAK[facility] [address] [id number] AM-PAC 6 Clicks   Daily Activity Inpatient Short Form  How much help from another person does the patient currently need. .. Total A Lot A Little None   1. Putting on and taking off regular lower body clothing? [] 1   [] 2   [x] 3   [] 4   2. Bathing (including washing, rinsing, drying)? [] 1   [] 2   [x] 3   [] 4   3. Toileting, which includes using toilet, bedpan or urinal?   [] 1   [] 2   [x] 3   [] 4   4. Putting on and taking off regular upper body clothing? [] 1   [] 2   [x] 3   [] 4   5. Taking care of personal grooming such as brushing teeth?    [] 1   [] 2 [x] 3   [] 4   6. Eating meals? [] 1   [] 2   [x] 3   [] 4   © 2007, Trustees of [address] [place] [address] [address] [address], under license to [employer]. All rights reserved      Score:  Initial: 18 Most Recent: X (Date: -- )    Interpretation of Tool:  Represents activities that are increasingly more difficult (i.e. Bed mobility, Transfers, Gait). Medical Necessity:     · Patient demonstrates   · good  ·  rehab potential due to higher previous functional level. Reason for Services/Other Comments:  · Patient continues to require skilled intervention due to   · medical complications and patient unable to attend/participate in therapy as expected  · . Use of outcome tool(s) and clinical judgement create a POC that gives a: LOW COMPLEXITY         TREATMENT:   (In addition to Assessment/Re-Assessment sessions the following treatments were rendered)     Pre-treatment Symptoms/Complaints:    Pain: Initial:   Pain Intensity 1: 0  Post Session:  same     Therapeutic Activity: (13 minutes): Therapeutic activities including Bed transfers, Chair transfers,functional transfer, ambulation on level ground to improve mobility, strength, balance, and coordination. Required SBA to promote static and dynamic balance in standing/sitting. Self Care: (15 minutes): Procedure(s) (per grid) utilized to improve and/or restore self-care/home management as related to dressing and toileting. Required no visual, verbal and tactile cueing to facilitate activities of daily living skills. Braces/Orthotics/Lines/Etc:   · O2 Device: Hi flow nasal cannula  Treatment/Session Assessment:    · Response to Treatment:  tolerated well with no issues noted.    · Interdisciplinary Collaboration:   o Certified Occupational Therapy Assistant  o Registered Nurse  · After treatment position/precautions:   o Supine in bed  o Bed alarm/tab alert on  o Bed/Chair-wheels locked  o Bed in low position  o Call light within reach  o RN notified  o Side rails x 2 · Compliance with Program/Exercises: Will assess as treatment progresses. · Recommendations/Intent for next treatment session: \"Next visit will focus on advancements to more challenging activities and reduction in assistance provided\".   Total Treatment Duration:  OT Patient Time In/Time Out  Time In: 1006  Time Out: [address]

## 2020-08-13 NOTE — PROGRESS NOTES
SPEECH PATHOLOGY NOTE:    Per MD, patient now agreeable to proceed with modified barium swallow study. Will plan to complete this AM in efforts to identify safest oral diet.        Shyanne Khalil, [facility], [facility] CARRINGTON, CCC-SLP

## 2020-08-13 NOTE — PROGRESS NOTES
Comprehensive Nutrition Assessment    Type and Reason for Visit: Reassess  Follow up for Tube Feeding Management (Hospitalist)  Nutrition Recommendations/Plan:   Diet per MBS. Consider oral nutrition if po <75% needs. Nutrition Assessment:   h/o COPD and DM2 who was hospitalized from July 1 to July 20 after presenting with acute hypoxemic respiratory failure and pneumonia secondary to COVID-19. Presented with altered mental status, back pain, hypoxic. Remains hospitalized with hypxoic respiratory failure with concerns for aspiration. Pt has been NPO since 8/9 secondary SLP eddie, s/p MBS with aspiration 8/10. TF initiated 8/11 after MBS recommending NPO 8/10. FT was self discontinued overnight 8/11, replaced 8/12 now discontinued s/p MBS. Pt transitioning to home s/p MBS. Estimated Daily Nutrient Needs:  Energy (kcal):  3565-2514 kcal/d (20-25 kcal/kg of listed weight of 65 kg)  Protein (g):  65-78 grams/d (1-1.2 grams/kg of listed weight of 65 kg)       Fluid (ml/day):  1ml/kcal    Current Nutrition Therapies:   DIET MECHANICAL SOFT    Anthropometric Measures:  · Height:  5' 3\" (160 cm)  · Current Body Wt:  65 kg (143 lb 4.8 oz)(8/6 wt no source)     Nutrition Diagnosis:   · Predicted inadequate energy intake related to (transition to oral diet) as evidenced by (FT d/c, diet progressed s/p MBS. )      Nutrition Interventions:   Food and/or Nutrient Delivery: Continue NPO(Replace FT and resume TF orders)  Coordination of Nutrition Care:  POC discussed with Skyler Wiley RN. Goals:  Meet >75% estimated needs in 7 days       Nutrition Monitoring and Evaluation:   Food/Nutrient Intake Outcomes: Food and nutrient intake  Physical Signs/Symptoms Outcomes: Chewing or swallowing    Discharge Planning:     Too soon to determine     Gloria Diego RD, LDN on 8/13/2020 at 10:33 AM  Contact: [phone]

## 2020-08-13 NOTE — PROGRESS NOTES
Problem: Dysphagia (Adult)  Goal: *Acute Goals and Plan of Care (Insert Text)  Description:   STG: Pt. Will tolerate PO trials with SLP with no overt s/sx of aspiration/penetration. STG: Patient will participate in dysphagia exercises with 80% accuracy given minimal cueing. Added 8/10/2020. Met and discontinued 8/13/2020  STG: Patient will participate in repeat modified barium swallow study to re evaluate swallow function when deemed appropriate. Added 8/10/2020, MET 8/13/2020    LTG: Pt. Will tolerate least restrictive diet without respiratory decline. MET 8/13/2020     Outcome: MET 8/13/2020  SPEECH LANGUAGE PATHOLOGY: MODIFIED BARIUM SWALLOW STUDY  Re-evaluation and Discharge    NAME/AGE/GENDER: Jihan Spaulding is a 60 y.o. female  DATE: 8/13/2020  PRIMARY DIAGNOSIS: Acute hypoxemic respiratory failure ([facility] [address] [J96.01]  [facility] [A41.9]      ICD-10: Treatment Diagnosis: Oral dysphagia (R13.11)  INTERDISCIPLINARY COLLABORATION: Radiologist  PRECAUTIONS/ALLERGIES: Patient has no known allergies. RECOMMENDATIONS/PLAN   DIET:    PO diet texture:  Mechanical soft   Liquids:  regular thin    MEDICATIONS: Whole in puree   1 at a time with liquid rinse      COMPENSATORY STRATEGIES/MODIFICATIONS INCLUDING:  · Upright for all PO  · Small bites and sips  · Remain upright for 20-30 min after any PO  · Slow rate of PO intake     RECOMMENDATIONS (including follow up treatment recommendations):   · No further ST indicated as oropharyngeal swallow within functional limits       ASSESSMENT   Ms. Becky Gomes presents with significant improvement in swallow function since initial modified barium swallow study completed 8/10. Patient now demonstrates functional oropharyngeal swallow. Mildly increased prep, but functional with increased time. Improved timeliness and laryngeal closure with no penetration or aspiration. No significant pharyngeal residue. Recommend mechanical soft diet/thin liquids.  meds in puree or 1 at a time with liquid rinse. No dysphagia treatment indicated. Will sign off. SUBJECTIVE   NGT in place. Eager to get NGT removed. History of Present Injury/Illness: Ms. Aury Garcia  has a past medical history of COPD (chronic obstructive pulmonary disease) ([facility] [address] and Diabetes ([facility] [address] . She also  has no past surgical history on file. Pain:  Pain Intensity 1: 0  Pain Location 1: Abdomen  Pain Intervention(s) 1: Medication (see MAR)    Current dietary status prior to evaluation today:  NPO with NGT    Previous Modified Barium Swallow studies:8/10/2020   moderate-severe oropharyngeal dysphagia. Premature spillage with delayed swallow initiation to level of pyriforms with all liquid trials, and delayed to posterior epiglottis with pudding. Incomplete laryngeal closure with non clearing penetration during the swallow with all liquid trials, including thin liquids, nectar thick liquids, honey thick liquids, and thin liquids with chin tuck. Shallow penetration with pudding that appears to clear during the swallow, but difficult to determine if trace contrast remaining in laryngeal vestibule new non clearing penetration versus residue from prior trials. Recommend NPO. OK for 4-5 ice chips for pleasure after oral care with staff. meds crushed in pudding if necessary. Consider short term alternative means of nutrition. Will benefit from dysphagia treatment and follow up modified barium swallow study when clinically indicated. Current Medications:   No current facility-administered medications on file prior to encounter. Current Outpatient Medications on File Prior to Encounter   Medication Sig Dispense Refill    budesonide-formoteroL (SYMBICORT) 160-4.5 mcg/actuation HFAA Take 2 Puffs by inhalation two (2) times a day. 1 Inhaler 0    ibuprofen (MOTRIN) 600 mg tablet Take 1 Tab by mouth every six (6) hours as needed for Pain.  1 Tab 0    metFORMIN (GLUCOPHAGE) 500 mg tablet Take 1 Tab by mouth two (2) times daily (with meals). 60 Tab 0    albuterol (PROAIR HFA) 90 mcg/actuation inhaler Take 2 Puffs by inhalation every four (4) hours as needed for Wheezing. 1 Inhaler 0       OBJECTIVE   Orientation:    Person   Place   Time    Oral Assessment:  Labial: No impairment  Dentition: Edentulous  Oral Hygiene: Adequate  Lingual: No impairment  Vocal Quality: WFL    Modified barium swallow study was performed in the radiology suite with Ms. Nugent in the lateral plane seated upright 90° in the [facility] chair. To evaluate her swallow function, barium coated liquid and food was administered in the form of thin liquids (by spoon, cup gulp and straw sip), pudding and mixed consistency. Oral phase of swallow:    mildly prolonged prep due to edentulous state, but functional    Mildly reduced containment with posterior spillage to valleculae, but functional     Pharyngeal phase of swallow:    Swallows of thin liquids were triggered at valleculae and single occasion at pyriforms with larger volume of thin by straw. No laryngeal penetration or aspiration was observed. No pharyngeal residue after swallow.  Swallows of pudding were timely. No laryngeal penetration or aspiration was observed. No pharyngeal residue after swallow.  Mixed consistencies:  Spillage to valleculae with brief pooling ~2seconds with trace amount of liquid portion spilling to pyriforms. No aspiration or penetration. No pharyngeal residue. Pharyngeal characteristics:   functional pharyngeal swallow   adequate retraction of base of tongue   adequate hyolaryngeal elevation/excursion   adequate epiglottic inversion   adequate constriction of posterior pharyngeal wall   adequate laryngeal closure    Aspiration/Penetration Scale: 1 (No penetration/aspiration.  Contrast does not enter the airway.)    Cervical esophageal phase of swallow:    Trace residue in upper esophagus   **Distal esophagus not assessed due to limitations of [facility] study.    Assessment/Reassessment only, no treatment provided today. Tool Used: Dysphagia Outcome and Severity Scale (REG)    Comments   Normal Diet 7 With no strategies or extra time needed   Functional Swallow 6 May have mild oral or pharyngeal delay   Mild Dysphagia 5 Which may require one diet consistency restricted    Mild-Moderate Dysphagia 4 With 1-2 diet consistencies restricted   Moderate Dysphagia 3 With 2 or more diet consistencies restricted   Moderately Severe Dysphagia 2 With partial PO strategies (trials with ST only)   Severe Dysphagia1 With inability to tolerate any PO safely      Score:  Initial:6 Most Recent: x (Date:8/13/2020)   Interpretation of Tool: The Dysphagia Outcome and Severity Scale (REG) is a simple, easy-to-use, 7-point scale developed to systematically rate the functional severity of dysphagia based on objective assessment and make recommendations for diet level, independence level, and type of nutrition. [place] / [place] / Product Type: Medicare /     Education:  · Recommendations discussed with patient and MD notified via perfectserve. Safety:   After treatment position/precautions:  · Patient waiting in radiology holding bay for transport back to room.       Total Treatment Duration:  Time In: 0830   Time Out: [address] [facility], [facility], CCC-SLP

## 2020-08-13 NOTE — PROGRESS NOTES
DAILY PROGRESS NOTE    Jihan Chelly    8/13/2020    Date of Admission:  8/5/2020    The patient's chart is reviewed and the patient is discussed with the staff. 60 y.o. CFevaluated at the request of Dr. Lillian Shi for persistent acute respiratory failure in the setting of aspiration pneumonia. Confirms she has been compliant with home O2 4-6 L NC since July 2020 admission. Was admitted by Dr. Yesika Salvador 8/5/20: with a h/o COPD and DM2 who was hospitalized from July 1 - July 20 after presenting with acute hypoxemic respiratory failure and pneumonia secondary to COVID-19. She received convalescent plasma and completed courses of both dexamethasone and Remdesivir. She was discharged on 7/20 on 4-6L NC O2. Says she stopped smoking prior to her last hospitalization but does live with family and some of them smoke inside the house (but apparently she told them to stop 2-3 days ago). She developed SOB a few days ago but otherwise denies fevers, headaches, chest pain ,N/V/D, abdominal pain, edema. [facility] and she was found to be 76% on 5L NC O2. On arrival in ER she was hypotensive at 73/33 and started on Levophed drip. Also agitated requiring Haldol. She was given 3L IVFs and antibiotics. Her Levophed was weaned off with improvement of BPs. CXR with improved b/l infiltrates. D-dimer elevated at 19 so she was empirically started on a heparin drip. CT chest negative for PE, also shows emphysema and b/l infiltrates (R>L). Her heparin drip is now off. WBCs 31K with normal diff, Mg 1.7, LA 2.4, ALT 1247, , PCT 0.54, CK 1079. UDS + amphetamines. Hospitalist consulted for admission and further management. Subjective:      Resting in bed, NG removed as she has been cleared for PO. Denies shortness of breath or productive cough. Has been discharged home and eager to go.     Review of Systems  A comprehensive review of systems was negative except for: Constitutional: positive for fatigue and malaise  Respiratory: positive for emphysema, chronic respiratory failure on NC 4-6L  Gastrointestinal: positive for dysphagia  Musculoskeletal: positive for muscle weakness  Endocrine: positive for DM      Current Facility-Administered Medications   Medication Dose Route Frequency    amLODIPine (NORVASC) tablet 5 mg  5 mg Oral DAILY    predniSONE (DELTASONE) tablet 40 mg  40 mg Oral DAILY WITH BREAKFAST    clonazePAM (KlonoPIN) tablet 0.5 mg  0.5 mg Oral BID    LORazepam (ATIVAN) injection 1 mg  1 mg IntraVENous Q8H PRN    morphine injection 1 mg  1 mg IntraVENous Q4H PRN    phenol throat spray (CHLORASEPTIC) 1 Spray  1 Spray Oral PRN    pantoprazole (PROTONIX) 40 mg in 0.9% sodium chloride 10 mL injection  40 mg IntraVENous ACB    famotidine (PF) (PEPCID) 20 mg in 0.9% sodium chloride 10 mL injection  20 mg IntraVENous Q12H    insulin lispro (HUMALOG) injection   SubCUTAneous Q4H    alum-mag hydroxide-simeth (MYLANTA) oral suspension 30 mL  30 mL Oral Q4H PRN    dicyclomine (BENTYL) capsule 10 mg  10 mg Oral QID    dextrose 40% (GLUTOSE) oral gel 1 Tube  15 g Oral PRN    glucagon (GLUCAGEN) injection 1 mg  1 mg IntraMUSCular PRN    dextrose (D50W) injection syrg 12.5-25 g  25-50 mL IntraVENous PRN    albuterol (PROVENTIL VENTOLIN) nebulizer solution 2.5 mg  2.5 mg Nebulization Q4H PRN    albuterol-ipratropium (DUO-NEB) 2.5 MG-0.5 MG/3 ML  3 mL Nebulization Q6H RT    [Held by provider] dextrose 5% and 0.9% NaCl infusion  50 mL/hr IntraVENous CONTINUOUS    busPIRone (BUSPAR) tablet 15 mg  15 mg Oral BID PRN    budesonide (PULMICORT) 500 mcg/2 ml nebulizer suspension  500 mcg Nebulization BID RT    sodium chloride (NS) flush 5-10 mL  5-10 mL IntraVENous PRN    sodium chloride (NS) flush 5-40 mL  5-40 mL IntraVENous Q8H    sodium chloride (NS) flush 5-40 mL  5-40 mL IntraVENous PRN    acetaminophen (TYLENOL) tablet 650 mg  650 mg Oral Q6H PRN    Or    acetaminophen (TYLENOL) suppository 650 mg  650 mg Rectal Q6H PRN    polyethylene glycol (MIRALAX) packet 17 g  17 g Oral DAILY PRN    promethazine (PHENERGAN) tablet 12.5 mg  12.5 mg Oral Q6H PRN    Or    ondansetron (ZOFRAN) injection 4 mg  4 mg IntraVENous Q6H PRN    enoxaparin (LOVENOX) injection 40 mg  40 mg SubCUTAneous DAILY         Objective:     Vitals:    08/13/20 0331 08/13/20 0400 08/13/20 0745 08/13/20 0922   BP: 133/70  135/73    Pulse: 92  85    Resp: 9  18    Temp: 97.8 °F (36.6 °C)  97.9 °F (36.6 °C)    SpO2: 91%  90% (!) 87%   Weight:  145 lb 8.1 oz (66 kg)     Height:           PHYSICAL EXAM     Constitutional:  the patient is well developed and in no acute distress, NC 2L sat 92%  EENMT:  Sclera clear, pupils equal, oral mucosa moist  Respiratory: few scattered crackles, dry cough, no wheezing, diminished in bases  Cardiovascular:  RRR without M,G,R  Gastrointestinal: soft and non-tender; with positive bowel sounds. Musculoskeletal: warm without cyanosis. There is no lower extremity edema. Skin:  no jaundice or rashes, no wounds   Neurologic: no gross neuro deficits     Psychiatric:  alert and oriented x 2    Chest CT 8/6/20:  1. Emphysema. 2. Bilateral mid and lower lung infiltrates, worse on the right, suspect for  acute pneumonia. Covid 19 pneumonia is a consideration. MBS 8/9/20:  Near aspiration of all liquids    ECHO 8/12/20:    -  Left ventricle: Systolic function normal. EF 55-60%. No regional wall motion abnormalities. CXR:   8/13/20:  Persistence of, but improvement of abnormal opacity in the right  midlung. The exam is otherwise stable. CANNOT PULL UP IMAGES!!    8/10/20:    Increased peripheral infiltrate in the right lung      Recent Labs     08/11/20  0516   WBC 13.5*   HGB 11.6*   HCT 37.2        Recent Labs     08/13/20  0540 08/12/20  0456 08/11/20  0516    143 141   K 3.8 3.4* 3.7   * 108* 104   * 94 77   CO2 28 28 30   BUN 18 12 14   CREA 0.70 0.59* 0.52*   MG 2.2 1.9 1.9   PHOS 3.5 3.8* 3.7   CA 8.4 8.6 8.2*     No results for input(s): PH, PCO2, PO2, HCO3, PHI, PCO2I, PO2I, HCO3I in the last 72 hours. No results for input(s): LCAD, LAC in the last 72 hours.     Assessment:  (Medical Decision Making)     Hospital Problems  Date Reviewed: 8/13/2020          Codes Class Noted POA    Chronic respiratory failure ([facility] ICD-10-CM: J96.10  ICD-9-CM: 518.83  8/12/2020 Unknown    Overview Signed 8/12/2020 11:58 AM by Zandra Brady NP     Home O2 4-6L NC         On NC 2L now    Aspiration pneumonia ([facility] ICD-10-CM: J69.0  ICD-9-CM: 507.0  8/12/2020 Unknown    Zosyn and Vancomycin stopped    HAP (hospital-acquired pneumonia) ICD-10-CM: J18.9, Y95  ICD-9-CM: 945  8/6/2020 Yes        Elevated ALT measurement ICD-10-CM: R74.0  ICD-9-CM: 790.4  8/6/2020 Yes        Elevated lactic acid level ICD-10-CM: R79.89  ICD-9-CM: 276.2  8/6/2020 Yes        Hypomagnesemia ICD-10-CM: E83.42  ICD-9-CM: 275.2  8/6/2020 Yes    Recent lab 1.9     Pneumonia due to COVID-19 virus ICD-10-CM: U07.1, J12.89  ICD-9-CM: 480.8  7/2/2020 Yes        COPD exacerbation ([facility] ICD-10-CM: J44.1  ICD-9-CM: 491.21  7/2/2020 Yes    No wheezing--continue current nebs    Sepsis ([facility] ICD-10-CM: A41.9  ICD-9-CM: 038.9, 995.91  7/1/2020 Yes    Hemodynamically stable    Leukocytosis ICD-10-CM: D72.829  ICD-9-CM: 288.60  9/26/2018 Yes    WBC yesterday 13.5    * (Principal) Acute hypoxemic respiratory failure ([facility] ICD-10-CM: J96.01  ICD-9-CM: 518.81  9/26/2018 Yes    Weaned to NC 2L    Tobacco abuse ICD-10-CM: Z72.0  ICD-9-CM: 305.1  9/26/2018 Yes    States she has quit and had cut way down prior to admission in July          Plan:  (Medical Decision Making)     --Orlie Card, Pulmicort  --Prednisone taper  --Antibiotics completed  --NG removed, speech cleared for mechanical soft and thin liquids  --ECHO 8/12:  Normal EF  --Continue supplemental O2, has been weaned to 2L--was on O2 prior to admission using 4-6L per NC.    --Patient has a home nebulizer and home inhaler but only rarely has to use either. --Has been discharged to home with follow up in our office in 3-4 weeks and to see her PCP in 1-2 weeks. Has been discharged and son to arrive soon. Have called the son's phone to bring her O2 tank for transport home--left a message. More than 50% of the time documented was spent in face-to-face contact with the patient and in the care of the patient on the floor/unit where the patient is located. Lupe Logan NP     Lungs:  Decreased BS   Heart:  RRR with no Murmur/Rubs/Gallops    Additional Comments: Stable at present for discharge. CXR today improved. Follow up in office in 3 weeks with CXR. I have spoken with and examined the patient. I agree with the above assessment and plan as documented.     Iker Monsivais MD

## 2020-08-14 ENCOUNTER — HOME CARE VISIT (OUTPATIENT)
Dept: SCHEDULING | Facility: HOME HEALTH | Age: 65
End: 2020-08-14
Payer: MEDICARE

## 2020-08-14 ENCOUNTER — PATIENT OUTREACH (OUTPATIENT)
Dept: CASE MANAGEMENT | Age: 65
End: 2020-08-14

## 2020-08-14 PROCEDURE — G0299 HHS/HOSPICE OF RN EA 15 MIN: HCPCS

## 2020-08-14 PROCEDURE — 400013 HH SOC

## 2020-08-14 PROCEDURE — 3331090001 HH PPS REVENUE CREDIT

## 2020-08-14 PROCEDURE — 3331090002 HH PPS REVENUE DEBIT

## 2020-08-14 NOTE — PROGRESS NOTES
Initial [facility] outreach attempt to patient's home/mobile number was unsuccessful. Left message to return call. Will attempt second outreach within 24 hours.

## 2020-08-15 VITALS
TEMPERATURE: 96.3 F | RESPIRATION RATE: 20 BRPM | HEART RATE: 101 BPM | SYSTOLIC BLOOD PRESSURE: 158 MMHG | DIASTOLIC BLOOD PRESSURE: 70 MMHG | OXYGEN SATURATION: 94 %

## 2020-08-15 PROCEDURE — 3331090002 HH PPS REVENUE DEBIT

## 2020-08-15 PROCEDURE — 3331090001 HH PPS REVENUE CREDIT

## 2020-08-16 PROCEDURE — 3331090001 HH PPS REVENUE CREDIT

## 2020-08-16 PROCEDURE — 3331090002 HH PPS REVENUE DEBIT

## 2020-08-17 ENCOUNTER — HOME CARE VISIT (OUTPATIENT)
Dept: SCHEDULING | Facility: HOME HEALTH | Age: 65
End: 2020-08-17
Payer: MEDICARE

## 2020-08-17 ENCOUNTER — PATIENT OUTREACH (OUTPATIENT)
Dept: CASE MANAGEMENT | Age: 65
End: 2020-08-17

## 2020-08-17 VITALS
HEART RATE: 95 BPM | RESPIRATION RATE: 18 BRPM | SYSTOLIC BLOOD PRESSURE: 138 MMHG | DIASTOLIC BLOOD PRESSURE: 70 MMHG | TEMPERATURE: 97.5 F

## 2020-08-17 PROCEDURE — G0151 HHCP-SERV OF PT,EA 15 MIN: HCPCS

## 2020-08-17 PROCEDURE — 3331090002 HH PPS REVENUE DEBIT

## 2020-08-17 PROCEDURE — 3331090001 HH PPS REVENUE CREDIT

## 2020-08-17 NOTE — PROGRESS NOTES
Second [facility] ROS[facility] outreach attempt made to patient's home/mobile number was unsuccessful. Left message to return call. Will attempt third outreach within 5 business days.

## 2020-08-18 ENCOUNTER — HOME CARE VISIT (OUTPATIENT)
Dept: HOME HEALTH SERVICES | Facility: HOME HEALTH | Age: 65
End: 2020-08-18
Payer: MEDICARE

## 2020-08-18 ENCOUNTER — HOME CARE VISIT (OUTPATIENT)
Dept: SCHEDULING | Facility: HOME HEALTH | Age: 65
End: 2020-08-18
Payer: MEDICARE

## 2020-08-18 ENCOUNTER — PATIENT OUTREACH (OUTPATIENT)
Dept: CASE MANAGEMENT | Age: 65
End: 2020-08-18

## 2020-08-18 VITALS
DIASTOLIC BLOOD PRESSURE: 82 MMHG | RESPIRATION RATE: 14 BRPM | OXYGEN SATURATION: 99 % | HEART RATE: 93 BPM | TEMPERATURE: 98.5 F | SYSTOLIC BLOOD PRESSURE: 152 MMHG

## 2020-08-18 VITALS
RESPIRATION RATE: 18 BRPM | HEART RATE: 88 BPM | SYSTOLIC BLOOD PRESSURE: 158 MMHG | TEMPERATURE: 98.1 F | DIASTOLIC BLOOD PRESSURE: 88 MMHG | OXYGEN SATURATION: 99 %

## 2020-08-18 PROCEDURE — G0152 HHCP-SERV OF OT,EA 15 MIN: HCPCS

## 2020-08-18 PROCEDURE — G0299 HHS/HOSPICE OF RN EA 15 MIN: HCPCS

## 2020-08-18 PROCEDURE — 3331090002 HH PPS REVENUE DEBIT

## 2020-08-18 PROCEDURE — 3331090001 HH PPS REVENUE CREDIT

## 2020-08-18 NOTE — PROGRESS NOTES
Third ANA ROSA outreach attempt made to home/mobile numbers. Left message to return calls. Unable to reach for [facility] program, will close case. Will reopen if call is returned.

## 2020-08-19 PROCEDURE — 3331090001 HH PPS REVENUE CREDIT

## 2020-08-19 PROCEDURE — 3331090002 HH PPS REVENUE DEBIT

## 2020-08-20 ENCOUNTER — HOME CARE VISIT (OUTPATIENT)
Dept: SCHEDULING | Facility: HOME HEALTH | Age: 65
End: 2020-08-20
Payer: MEDICARE

## 2020-08-20 VITALS
TEMPERATURE: 98.6 F | OXYGEN SATURATION: 98 % | HEART RATE: 94 BPM | DIASTOLIC BLOOD PRESSURE: 82 MMHG | RESPIRATION RATE: 18 BRPM | SYSTOLIC BLOOD PRESSURE: 148 MMHG

## 2020-08-20 PROCEDURE — G0299 HHS/HOSPICE OF RN EA 15 MIN: HCPCS

## 2020-08-20 PROCEDURE — 3331090001 HH PPS REVENUE CREDIT

## 2020-08-20 PROCEDURE — 3331090002 HH PPS REVENUE DEBIT

## 2020-08-21 ENCOUNTER — HOME CARE VISIT (OUTPATIENT)
Dept: SCHEDULING | Facility: HOME HEALTH | Age: 65
End: 2020-08-21
Payer: MEDICARE

## 2020-08-21 PROCEDURE — 3331090002 HH PPS REVENUE DEBIT

## 2020-08-21 PROCEDURE — 3331090001 HH PPS REVENUE CREDIT

## 2020-08-22 PROCEDURE — 3331090002 HH PPS REVENUE DEBIT

## 2020-08-22 PROCEDURE — 3331090001 HH PPS REVENUE CREDIT

## 2020-08-23 ENCOUNTER — APPOINTMENT (OUTPATIENT)
Dept: GENERAL RADIOLOGY | Age: 65
DRG: 871 | End: 2020-08-23
Attending: EMERGENCY MEDICINE
Payer: MEDICARE

## 2020-08-23 ENCOUNTER — HOSPITAL ENCOUNTER (INPATIENT)
Age: 65
LOS: 3 days | Discharge: HOME HEALTH | DRG: 871 | End: 2020-08-26
Attending: EMERGENCY MEDICINE | Admitting: INTERNAL MEDICINE
Payer: MEDICARE

## 2020-08-23 ENCOUNTER — APPOINTMENT (OUTPATIENT)
Dept: CT IMAGING | Age: 65
DRG: 871 | End: 2020-08-23
Attending: INTERNAL MEDICINE
Payer: MEDICARE

## 2020-08-23 DIAGNOSIS — J96.01 ACUTE RESPIRATORY FAILURE WITH HYPOXEMIA (HCC): ICD-10-CM

## 2020-08-23 DIAGNOSIS — J44.1 COPD EXACERBATION (HCC): ICD-10-CM

## 2020-08-23 DIAGNOSIS — J18.9 PNEUMONIA OF RIGHT LUNG DUE TO INFECTIOUS ORGANISM, UNSPECIFIED PART OF LUNG: ICD-10-CM

## 2020-08-23 DIAGNOSIS — J15.20 STAPHYLOCOCCAL PNEUMONIA (HCC): ICD-10-CM

## 2020-08-23 DIAGNOSIS — I10 ESSENTIAL HYPERTENSION: Chronic | ICD-10-CM

## 2020-08-23 DIAGNOSIS — A41.9 SEPSIS, DUE TO UNSPECIFIED ORGANISM, UNSPECIFIED WHETHER ACUTE ORGAN DYSFUNCTION PRESENT (HCC): Primary | ICD-10-CM

## 2020-08-23 DIAGNOSIS — J96.11 CHRONIC RESPIRATORY FAILURE WITH HYPOXIA (HCC): ICD-10-CM

## 2020-08-23 DIAGNOSIS — J18.9 COMMUNITY ACQUIRED PNEUMONIA, UNSPECIFIED LATERALITY: ICD-10-CM

## 2020-08-23 DIAGNOSIS — J96.01 ACUTE HYPOXEMIC RESPIRATORY FAILURE (HCC): ICD-10-CM

## 2020-08-23 LAB
ALBUMIN SERPL-MCNC: 3 G/DL (ref 3.2–4.6)
ALBUMIN/GLOB SERPL: 0.7 {RATIO} (ref 1.2–3.5)
ALP SERPL-CCNC: 89 U/L (ref 50–136)
ALT SERPL-CCNC: 36 U/L (ref 12–65)
AMPHET UR QL SCN: POSITIVE
ANION GAP SERPL CALC-SCNC: 8 MMOL/L (ref 7–16)
APPEARANCE UR: CLEAR
AST SERPL-CCNC: 17 U/L (ref 15–37)
ATRIAL RATE: 107 BPM
ATRIAL RATE: 130 BPM
BACTERIA SPEC CULT: NORMAL
BACTERIA URNS QL MICRO: 0 /HPF
BARBITURATES UR QL SCN: NEGATIVE
BASOPHILS # BLD: 0.1 K/UL (ref 0–0.2)
BASOPHILS NFR BLD: 1 % (ref 0–2)
BENZODIAZ UR QL: NEGATIVE
BILIRUB SERPL-MCNC: 0.6 MG/DL (ref 0.2–1.1)
BILIRUB UR QL: NEGATIVE
BNP SERPL-MCNC: 261 PG/ML (ref 5–125)
BUN SERPL-MCNC: 21 MG/DL (ref 8–23)
CALCIUM SERPL-MCNC: 9.1 MG/DL (ref 8.3–10.4)
CALCULATED P AXIS, ECG09: 52 DEGREES
CALCULATED P AXIS, ECG09: 53 DEGREES
CALCULATED R AXIS, ECG10: -18 DEGREES
CALCULATED R AXIS, ECG10: -25 DEGREES
CALCULATED T AXIS, ECG11: 53 DEGREES
CALCULATED T AXIS, ECG11: 73 DEGREES
CANNABINOIDS UR QL SCN: NEGATIVE
CASTS URNS QL MICRO: ABNORMAL /LPF
CHLORIDE SERPL-SCNC: 101 MMOL/L (ref 98–107)
CO2 SERPL-SCNC: 27 MMOL/L (ref 21–32)
COCAINE UR QL SCN: NEGATIVE
COLOR UR: YELLOW
CREAT SERPL-MCNC: 0.98 MG/DL (ref 0.6–1)
DIAGNOSIS, 93000: NORMAL
DIAGNOSIS, 93000: NORMAL
DIFFERENTIAL METHOD BLD: ABNORMAL
EOSINOPHIL # BLD: 0.4 K/UL (ref 0–0.8)
EOSINOPHIL NFR BLD: 2 % (ref 0.5–7.8)
EPI CELLS #/AREA URNS HPF: ABNORMAL /HPF
ERYTHROCYTE [DISTWIDTH] IN BLOOD BY AUTOMATED COUNT: 17.6 % (ref 11.9–14.6)
GLOBULIN SER CALC-MCNC: 4.6 G/DL (ref 2.3–3.5)
GLUCOSE BLD STRIP.AUTO-MCNC: 105 MG/DL (ref 65–100)
GLUCOSE BLD STRIP.AUTO-MCNC: 114 MG/DL (ref 65–100)
GLUCOSE SERPL-MCNC: 105 MG/DL (ref 65–100)
GLUCOSE UR STRIP.AUTO-MCNC: NEGATIVE MG/DL
HCT VFR BLD AUTO: 44.8 % (ref 35.8–46.3)
HGB BLD-MCNC: 13.7 G/DL (ref 11.7–15.4)
HGB UR QL STRIP: ABNORMAL
IMM GRANULOCYTES # BLD AUTO: 0.5 K/UL (ref 0–0.5)
IMM GRANULOCYTES NFR BLD AUTO: 2 % (ref 0–5)
KETONES UR QL STRIP.AUTO: NEGATIVE MG/DL
LACTATE SERPL-SCNC: 1.6 MMOL/L (ref 0.4–2)
LEUKOCYTE ESTERASE UR QL STRIP.AUTO: NEGATIVE
LYMPHOCYTES # BLD: 2.8 K/UL (ref 0.5–4.6)
LYMPHOCYTES NFR BLD: 11 % (ref 13–44)
MCH RBC QN AUTO: 28.4 PG (ref 26.1–32.9)
MCHC RBC AUTO-ENTMCNC: 30.6 G/DL (ref 31.4–35)
MCV RBC AUTO: 92.8 FL (ref 79.6–97.8)
METHADONE UR QL: NEGATIVE
MONOCYTES # BLD: 1.1 K/UL (ref 0.1–1.3)
MONOCYTES NFR BLD: 5 % (ref 4–12)
NEUTS SEG # BLD: 19.6 K/UL (ref 1.7–8.2)
NEUTS SEG NFR BLD: 80 % (ref 43–78)
NITRITE UR QL STRIP.AUTO: NEGATIVE
NRBC # BLD: 0.02 K/UL (ref 0–0.2)
OPIATES UR QL: NEGATIVE
P-R INTERVAL, ECG05: 158 MS
P-R INTERVAL, ECG05: 164 MS
PCP UR QL: NEGATIVE
PH UR STRIP: 5 [PH] (ref 5–9)
PLATELET # BLD AUTO: 301 K/UL (ref 150–450)
PMV BLD AUTO: 9.6 FL (ref 9.4–12.3)
POTASSIUM SERPL-SCNC: 3.8 MMOL/L (ref 3.5–5.1)
PROCALCITONIN SERPL-MCNC: 0.12 NG/ML
PROT SERPL-MCNC: 7.6 G/DL (ref 6.3–8.2)
PROT UR STRIP-MCNC: NEGATIVE MG/DL
Q-T INTERVAL, ECG07: 272 MS
Q-T INTERVAL, ECG07: 328 MS
QRS DURATION, ECG06: 86 MS
QRS DURATION, ECG06: 92 MS
QTC CALCULATION (BEZET), ECG08: 400 MS
QTC CALCULATION (BEZET), ECG08: 437 MS
RBC # BLD AUTO: 4.83 M/UL (ref 4.05–5.2)
RBC #/AREA URNS HPF: ABNORMAL /HPF
SERVICE CMNT-IMP: NORMAL
SODIUM SERPL-SCNC: 136 MMOL/L (ref 136–145)
SP GR UR REFRACTOMETRY: 1.02 (ref 1–1.02)
TROPONIN-HIGH SENSITIVITY: 17.9 PG/ML (ref 0–14)
UROBILINOGEN UR QL STRIP.AUTO: 0.2 EU/DL (ref 0.2–1)
VENTRICULAR RATE, ECG03: 107 BPM
VENTRICULAR RATE, ECG03: 130 BPM
WBC # BLD AUTO: 24.5 K/UL (ref 4.3–11.1)
WBC URNS QL MICRO: ABNORMAL /HPF

## 2020-08-23 PROCEDURE — 71260 CT THORAX DX C+: CPT

## 2020-08-23 PROCEDURE — 65270000029 HC RM PRIVATE

## 2020-08-23 PROCEDURE — 80053 COMPREHEN METABOLIC PANEL: CPT

## 2020-08-23 PROCEDURE — 94640 AIRWAY INHALATION TREATMENT: CPT

## 2020-08-23 PROCEDURE — 74011250636 HC RX REV CODE- 250/636: Performed by: INTERNAL MEDICINE

## 2020-08-23 PROCEDURE — 74011000258 HC RX REV CODE- 258: Performed by: EMERGENCY MEDICINE

## 2020-08-23 PROCEDURE — 93005 ELECTROCARDIOGRAM TRACING: CPT | Performed by: INTERNAL MEDICINE

## 2020-08-23 PROCEDURE — 74011250636 HC RX REV CODE- 250/636: Performed by: EMERGENCY MEDICINE

## 2020-08-23 PROCEDURE — 87040 BLOOD CULTURE FOR BACTERIA: CPT

## 2020-08-23 PROCEDURE — 96375 TX/PRO/DX INJ NEW DRUG ADDON: CPT

## 2020-08-23 PROCEDURE — 80307 DRUG TEST PRSMV CHEM ANLYZR: CPT

## 2020-08-23 PROCEDURE — 94760 N-INVAS EAR/PLS OXIMETRY 1: CPT

## 2020-08-23 PROCEDURE — 83605 ASSAY OF LACTIC ACID: CPT

## 2020-08-23 PROCEDURE — 74011000636 HC RX REV CODE- 636: Performed by: EMERGENCY MEDICINE

## 2020-08-23 PROCEDURE — 74011250637 HC RX REV CODE- 250/637: Performed by: EMERGENCY MEDICINE

## 2020-08-23 PROCEDURE — 87186 SC STD MICRODIL/AGAR DIL: CPT

## 2020-08-23 PROCEDURE — 81001 URINALYSIS AUTO W/SCOPE: CPT

## 2020-08-23 PROCEDURE — 77010033678 HC OXYGEN DAILY

## 2020-08-23 PROCEDURE — 87641 MR-STAPH DNA AMP PROBE: CPT

## 2020-08-23 PROCEDURE — 3331090001 HH PPS REVENUE CREDIT

## 2020-08-23 PROCEDURE — 83880 ASSAY OF NATRIURETIC PEPTIDE: CPT

## 2020-08-23 PROCEDURE — 74011250637 HC RX REV CODE- 250/637: Performed by: INTERNAL MEDICINE

## 2020-08-23 PROCEDURE — 87077 CULTURE AEROBIC IDENTIFY: CPT

## 2020-08-23 PROCEDURE — 85025 COMPLETE CBC W/AUTO DIFF WBC: CPT

## 2020-08-23 PROCEDURE — 74011000258 HC RX REV CODE- 258: Performed by: INTERNAL MEDICINE

## 2020-08-23 PROCEDURE — 84484 ASSAY OF TROPONIN QUANT: CPT

## 2020-08-23 PROCEDURE — 99285 EMERGENCY DEPT VISIT HI MDM: CPT

## 2020-08-23 PROCEDURE — 3331090002 HH PPS REVENUE DEBIT

## 2020-08-23 PROCEDURE — 82962 GLUCOSE BLOOD TEST: CPT

## 2020-08-23 PROCEDURE — 87070 CULTURE OTHR SPECIMN AEROBIC: CPT

## 2020-08-23 PROCEDURE — 84145 PROCALCITONIN (PCT): CPT

## 2020-08-23 PROCEDURE — 74011000250 HC RX REV CODE- 250: Performed by: INTERNAL MEDICINE

## 2020-08-23 PROCEDURE — 71045 X-RAY EXAM CHEST 1 VIEW: CPT

## 2020-08-23 PROCEDURE — 96374 THER/PROPH/DIAG INJ IV PUSH: CPT

## 2020-08-23 PROCEDURE — 81003 URINALYSIS AUTO W/O SCOPE: CPT

## 2020-08-23 RX ORDER — MORPHINE SULFATE 2 MG/ML
2 INJECTION, SOLUTION INTRAMUSCULAR; INTRAVENOUS
Status: DISCONTINUED | OUTPATIENT
Start: 2020-08-23 | End: 2020-08-26 | Stop reason: HOSPADM

## 2020-08-23 RX ORDER — VANCOMYCIN 1.75 GRAM/500 ML IN 0.9 % SODIUM CHLORIDE INTRAVENOUS
1750 ONCE
Status: COMPLETED | OUTPATIENT
Start: 2020-08-23 | End: 2020-08-23

## 2020-08-23 RX ORDER — ALBUTEROL SULFATE 0.83 MG/ML
2.5 SOLUTION RESPIRATORY (INHALATION)
Status: DISCONTINUED | OUTPATIENT
Start: 2020-08-23 | End: 2020-08-25

## 2020-08-23 RX ORDER — SODIUM CHLORIDE 0.9 % (FLUSH) 0.9 %
5-10 SYRINGE (ML) INJECTION
Status: COMPLETED | OUTPATIENT
Start: 2020-08-23 | End: 2020-08-23

## 2020-08-23 RX ORDER — INSULIN LISPRO 100 [IU]/ML
0-10 INJECTION, SOLUTION INTRAVENOUS; SUBCUTANEOUS
Status: DISCONTINUED | OUTPATIENT
Start: 2020-08-23 | End: 2020-08-26 | Stop reason: HOSPADM

## 2020-08-23 RX ORDER — HYDROCODONE BITARTRATE AND ACETAMINOPHEN 5; 325 MG/1; MG/1
1 TABLET ORAL
Status: DISCONTINUED | OUTPATIENT
Start: 2020-08-23 | End: 2020-08-26 | Stop reason: HOSPADM

## 2020-08-23 RX ORDER — PROMETHAZINE HYDROCHLORIDE 25 MG/1
12.5 TABLET ORAL
Status: DISCONTINUED | OUTPATIENT
Start: 2020-08-23 | End: 2020-08-26 | Stop reason: HOSPADM

## 2020-08-23 RX ORDER — ACETAMINOPHEN 650 MG/1
650 SUPPOSITORY RECTAL
Status: DISCONTINUED | OUTPATIENT
Start: 2020-08-23 | End: 2020-08-26 | Stop reason: HOSPADM

## 2020-08-23 RX ORDER — ONDANSETRON 2 MG/ML
4 INJECTION INTRAMUSCULAR; INTRAVENOUS
Status: DISCONTINUED | OUTPATIENT
Start: 2020-08-23 | End: 2020-08-26 | Stop reason: HOSPADM

## 2020-08-23 RX ORDER — ENOXAPARIN SODIUM 100 MG/ML
40 INJECTION SUBCUTANEOUS DAILY
Status: DISCONTINUED | OUTPATIENT
Start: 2020-08-24 | End: 2020-08-26 | Stop reason: HOSPADM

## 2020-08-23 RX ORDER — POLYETHYLENE GLYCOL 3350 17 G/17G
17 POWDER, FOR SOLUTION ORAL DAILY PRN
Status: DISCONTINUED | OUTPATIENT
Start: 2020-08-23 | End: 2020-08-26 | Stop reason: HOSPADM

## 2020-08-23 RX ORDER — ACETAMINOPHEN 500 MG
1000 TABLET ORAL
Status: COMPLETED | OUTPATIENT
Start: 2020-08-23 | End: 2020-08-23

## 2020-08-23 RX ORDER — KETOROLAC TROMETHAMINE 30 MG/ML
15 INJECTION, SOLUTION INTRAMUSCULAR; INTRAVENOUS
Status: COMPLETED | OUTPATIENT
Start: 2020-08-23 | End: 2020-08-23

## 2020-08-23 RX ORDER — SODIUM CHLORIDE 0.9 % (FLUSH) 0.9 %
5-40 SYRINGE (ML) INJECTION AS NEEDED
Status: DISCONTINUED | OUTPATIENT
Start: 2020-08-23 | End: 2020-08-26 | Stop reason: HOSPADM

## 2020-08-23 RX ORDER — ACETAMINOPHEN 325 MG/1
650 TABLET ORAL
Status: DISCONTINUED | OUTPATIENT
Start: 2020-08-23 | End: 2020-08-26 | Stop reason: HOSPADM

## 2020-08-23 RX ORDER — SODIUM CHLORIDE 9 MG/ML
100 INJECTION, SOLUTION INTRAVENOUS CONTINUOUS
Status: DISPENSED | OUTPATIENT
Start: 2020-08-23 | End: 2020-08-24

## 2020-08-23 RX ORDER — SODIUM CHLORIDE 0.9 % (FLUSH) 0.9 %
5-40 SYRINGE (ML) INJECTION EVERY 8 HOURS
Status: DISCONTINUED | OUTPATIENT
Start: 2020-08-23 | End: 2020-08-26 | Stop reason: HOSPADM

## 2020-08-23 RX ORDER — VANCOMYCIN HYDROCHLORIDE 1 G/20ML
INJECTION, POWDER, LYOPHILIZED, FOR SOLUTION INTRAVENOUS ONCE
Status: DISCONTINUED | OUTPATIENT
Start: 2020-08-23 | End: 2020-08-23 | Stop reason: DRUGHIGH

## 2020-08-23 RX ORDER — AMLODIPINE BESYLATE 5 MG/1
5 TABLET ORAL DAILY
Status: DISCONTINUED | OUTPATIENT
Start: 2020-08-23 | End: 2020-08-25

## 2020-08-23 RX ORDER — BUDESONIDE AND FORMOTEROL FUMARATE DIHYDRATE 160; 4.5 UG/1; UG/1
2 AEROSOL RESPIRATORY (INHALATION)
Status: DISCONTINUED | OUTPATIENT
Start: 2020-08-23 | End: 2020-08-26 | Stop reason: HOSPADM

## 2020-08-23 RX ADMIN — Medication 10 ML: at 13:50

## 2020-08-23 RX ADMIN — KETOROLAC TROMETHAMINE 15 MG: 30 INJECTION, SOLUTION INTRAMUSCULAR at 09:20

## 2020-08-23 RX ADMIN — ACETAMINOPHEN 1000 MG: 500 TABLET, FILM COATED ORAL at 09:20

## 2020-08-23 RX ADMIN — SODIUM CHLORIDE 100 ML: 900 INJECTION, SOLUTION INTRAVENOUS at 11:33

## 2020-08-23 RX ADMIN — PIPERACILLIN SODIUM AND TAZOBACTAM SODIUM 4.5 G: 4; .5 INJECTION, POWDER, LYOPHILIZED, FOR SOLUTION INTRAVENOUS at 10:20

## 2020-08-23 RX ADMIN — SODIUM CHLORIDE, SODIUM LACTATE, POTASSIUM CHLORIDE, AND CALCIUM CHLORIDE 1000 ML: 600; 310; 30; 20 INJECTION, SOLUTION INTRAVENOUS at 12:01

## 2020-08-23 RX ADMIN — IOPAMIDOL 100 ML: 755 INJECTION, SOLUTION INTRAVENOUS at 11:32

## 2020-08-23 RX ADMIN — Medication 10 ML: at 11:32

## 2020-08-23 RX ADMIN — BUDESONIDE AND FORMOTEROL FUMARATE DIHYDRATE 2 PUFF: 160; 4.5 AEROSOL RESPIRATORY (INHALATION) at 19:17

## 2020-08-23 RX ADMIN — ALBUTEROL SULFATE 2.5 MG: 2.5 SOLUTION RESPIRATORY (INHALATION) at 19:17

## 2020-08-23 RX ADMIN — Medication 10 ML: at 22:00

## 2020-08-23 RX ADMIN — ALBUTEROL SULFATE 2.5 MG: 2.5 SOLUTION RESPIRATORY (INHALATION) at 17:28

## 2020-08-23 RX ADMIN — VANCOMYCIN HYDROCHLORIDE 1750 MG: 10 INJECTION, POWDER, LYOPHILIZED, FOR SOLUTION INTRAVENOUS at 10:24

## 2020-08-23 RX ADMIN — PIPERACILLIN SODIUM AND TAZOBACTAM SODIUM 4.5 G: 4; .5 INJECTION, POWDER, LYOPHILIZED, FOR SOLUTION INTRAVENOUS at 17:19

## 2020-08-23 RX ADMIN — SODIUM CHLORIDE 100 ML/HR: 900 INJECTION, SOLUTION INTRAVENOUS at 14:10

## 2020-08-23 RX ADMIN — SODIUM CHLORIDE 500 ML: 900 INJECTION, SOLUTION INTRAVENOUS at 09:21

## 2020-08-23 NOTE — ED PROVIDER NOTES
Nayelis [id number] Garsia is a 60 y.o. female seen on 8/23/2020 at 8:58 AM in the [facility] EMERGENCY DEPT in room ERA/A. Chief Complaint   Patient presents with    Shortness of Breath     HPI: 61-year-old female with a history of COPD, type 2 diabetes, and hospitalization from July 1 through July 20 for COVID-19 pneumonia with acute hypoxemic respiratory failure status post treatment with lambda severe, dexamethasone, and convalescent plasma presenting to the emergency department for shortness of breath and fever. Of note, the patient also had a readmission on August 5 she presented to the emergency department hypoxic and hypotensive. She was discharged home on antibiotics for bacterial pneumonia which she states she still taking but does not member the name of. She states that she began feeling worse last night. She began running fevers, complaining of body aches and chills, and having increasing shortness of breath. She arrives to the emergency department with EMS who states that she does meet sepsis criteria for tachypnea and fever as well as tachycardia. She notes significant shortness of breath, its not related to position, she is complaining of body aches and chills. She has not taken anything for fever. She is on 2 to 4 L nasal cannula at home since discharge from the hospital.  She states that she has a follow-up appointment with pulmonary either tomorrow or on Tuesday, but \"I just felt like I could not make it. \"  She denies feeling like she is wheezing. Is currently on prednisone as well. Historian: Patient    REVIEW OF SYSTEMS     Review of Systems   Constitutional: Positive for fever. HENT: Negative. Eyes: Negative. Respiratory: Positive for cough and shortness of breath. Negative for chest tightness and wheezing. Cardiovascular: Negative for leg swelling.    Gastrointestinal: Negative for abdominal distention, abdominal pain, constipation, diarrhea and vomiting. Endocrine: Negative. Genitourinary: Negative for dysuria, flank pain, frequency and urgency. Neurological: Negative for dizziness, syncope and headaches. Psychiatric/Behavioral: Negative. All other systems reviewed and are negative. PAST MEDICAL HISTORY     Past Medical History:   Diagnosis Date    COPD (chronic obstructive pulmonary disease) [facility]     [facility] ([facility]      No past surgical history on file. Social History     Socioeconomic History    Marital status:      Spouse name: Not on file    Number of children: Not on file    Years of education: Not on file    Highest education level: Not on file   Tobacco Use    Smoking status: Current Every Day Smoker     Types: Cigars   Substance and Sexual Activity    Alcohol use: No    Drug use: No    Sexual activity: Never     Prior to Admission Medications   Prescriptions Last Dose Informant Patient Reported? Taking? OXYGEN-AIR DELIVERY SYSTEMS   Yes No   Sig: 3 L by Nasal route continuous. albuterol (PROAIR HFA) 90 mcg/actuation inhaler   No No   Sig: Take 2 Puffs by inhalation every four (4) hours as needed for Wheezing. amLODIPine (NORVASC) 5 mg tablet   No No   Sig: Take 1 Tab by mouth daily for 30 days. budesonide-formoteroL (SYMBICORT) 160-4.5 mcg/actuation HFAA   No No   Sig: Take 2 Puffs by inhalation two (2) times a day. clonazePAM (KlonoPIN) 0.5 mg tablet   No No   Sig: Take 1 Tab by mouth two (2) times a day for 10 days. Max Daily Amount: 1 mg.   ibuprofen (MOTRIN) 600 mg tablet   No No   Sig: Take 1 Tab by mouth every six (6) hours as needed for Pain.   metFORMIN (GLUCOPHAGE) 500 mg tablet   No No   Sig: Take 1 Tab by mouth two (2) times daily (with meals). predniSONE (DELTASONE) 20 mg tablet   No No   Si mg po daily x 3 days, 20 mg po daily x 3 days, 10 mg po daily x 3 days.       Facility-Administered Medications: None     No Known Allergies     PHYSICAL EXAM Vitals:    08/23/20 0838 08/23/20 0840   BP: 198/87 198/87   Pulse: (!) 147 (!) 145   Resp:  27   Temp:  (!) 103.3 °F (39.6 °C)   SpO2: 98% 98%    Vital signs were reviewed. Physical Exam  Vitals signs reviewed. Constitutional:       General: She is in acute distress. Appearance: She is well-developed. She is ill-appearing. She is not diaphoretic. HENT:      Head: Normocephalic and atraumatic. Eyes:      Pupils: Pupils are equal, round, and reactive to light. Neck:      Musculoskeletal: Normal range of motion and neck supple. Cardiovascular:      Rate and Rhythm: Regular rhythm. Tachycardia present. Heart sounds: Normal heart sounds. No murmur. No friction rub. No gallop. Pulmonary:      Effort: Tachypnea and accessory muscle usage present. No respiratory distress. Breath sounds: No stridor. Examination of the right-upper field reveals decreased breath sounds. Examination of the left-upper field reveals decreased breath sounds. Examination of the right-middle field reveals decreased breath sounds. Examination of the left-middle field reveals decreased breath sounds. Examination of the right-lower field reveals decreased breath sounds. Examination of the left-lower field reveals decreased breath sounds. Decreased breath sounds present. No wheezing, rhonchi or rales. Abdominal:      General: Bowel sounds are normal. There is no distension. Palpations: Abdomen is soft. There is no mass. Tenderness: There is no abdominal tenderness. There is no guarding or rebound. Musculoskeletal: Normal range of motion. General: No tenderness or deformity. Skin:     General: Skin is warm and dry. Findings: No erythema or rash. Neurological:      Mental Status: She is alert and oriented to person, place, and time. Sensory: No sensory deficit.       Comments: No focal neuro deficits   Psychiatric:         Behavior: Behavior normal.       MEDICAL DECISION MAKING MDM  Number of Diagnoses or Management Options  Pneumonia of right lung due to infectious organism, unspecified part of lung:   Sepsis, due to unspecified organism, unspecified whether acute organ dysfunction present [facility]):      Amount and/or Complexity of Data Reviewed  Clinical lab tests: ordered and reviewed  Tests in the radiology section of CPT®: ordered and reviewed  Review and summarize past medical records: yes      Procedures    ED Course:    10:24 AM  Treated for hospital associated pneumonia. Chest x-ray shows possible worsening right sided infiltrate. She is not hypotensive, no lactic acidosis. She has been given broad-spectrum antibiotics and cultured. I discussed case with the hospitalist will admit for further treatment. Disposition: Admission to the hospital  Diagnosis: Right-sided pneumonia, sepsis, history of COVID-19 infection  ____________________________________________________________________  A portion of this note was generated using voice recognition dictation software. While the note has been reviewed for accuracy, please note certain words and phrases may not be transcribed as intended and some grammatical and/or typographical errors may be present.

## 2020-08-23 NOTE — PROGRESS NOTES
Pharmacokinetic Consult to Pharmacist    Edy Ferrer is a 60 y.o. female being treated for HAP with vancomycin and Zosyn    Height: 5' 3\" (160 cm)  Weight: 65.8 kg (145 lb)  Lab Results   Component Value Date/Time    BUN 21 08/23/2020 08:48 AM    Creatinine 0.98 08/23/2020 08:48 AM    WBC 24.5 (H) 08/23/2020 08:48 AM    Procalcitonin 0.12 08/23/2020 10:45 AM    Lactic acid 1.6 08/23/2020 08:48 AM    Lactic Acid (POC) 1.9 09/25/2018 10:19 PM      Estimated Creatinine Clearance: 52.9 mL/min (based on SCr of 0.98 mg/dL). Day 1 of vancomycin. Goal trough is 15-20. Patient was loaded with 1750 mg x 1. Will order maintenance dose of 1000 mg Q 18 hours. Pharmacist will continue to follow patient and order levels when clinically indicated. Please call with any questions.     Thank you,  Judith Martino  Clinical Pharmacist  [phone]

## 2020-08-23 NOTE — ED TRIAGE NOTES
Pt arrives via EMS from home with c/o general aches, hard to breathe, EMS states wheezy lung sounds after walking around. 100.4 oral, bgl 114, 140hr intial, 220/100 and then 178/94 after a few min. Pt covid positive in June and recently dx w pneumonia but no recent covid test, pt currently taking antibiotics. 92% 2l 02 pt wears at home per EMS.  Pt masked on arrival.

## 2020-08-23 NOTE — ED NOTES
TRANSFER - OUT REPORT:    Verbal report given to TYLER Temple(name) on Molina Jimenes  being transferred to 817(unit) for routine progression of care       Report consisted of patients Situation, Background, Assessment and   Recommendations(SBAR). Information from the following report(s) ED Summary was reviewed with the receiving nurse. Lines:   Peripheral IV 08/23/20 Left Antecubital (Active)   Site Assessment Clean, dry, & intact 08/23/20 0846        Opportunity for questions and clarification was provided.       Patient transported with:   [employer]

## 2020-08-23 NOTE — PROGRESS NOTES
08/23/20 1350   Dual Skin Pressure Injury Assessment   Dual Skin Pressure Injury Assessment WDL   Second Care Provider (Based on [address] [place] [address]) Carlos Enrique Gamboa RN

## 2020-08-23 NOTE — PROGRESS NOTES
TRANSFER - IN REPORT:    Verbal report received from Madonna(name) on Daphne Dickson  being received from ER(unit) for routine progression of care      Report consisted of patients Situation, Background, Assessment and   Recommendations(SBAR). Information from the following report(s) SBAR, ED Summary, Procedure Summary, Intake/Output, MAR and Recent Results was reviewed with the receiving nurse. Opportunity for questions and clarification was provided.

## 2020-08-23 NOTE — PROGRESS NOTES
CM made attempt to make contact with patient. Patient stated she didn't feel like taking and hung up the phone. CM screen patient medical chart from patient last admission and completed assessment. Patient is a readmission. Currently living with her brother from the last admission. Patient was active with [facility] and they were seeing patient. Transitions of Care has made several attempt to make contact with patient and haven't been able to reach patient. Patient UDS is positive for amphetamines and has been question by physician but has denied using drugs per medical chart. CM will continue to monitor. Care Management Interventions  Mode of Transport at Discharge:  Other (see comment)  Transition of Care Consult (CM Consult): Discharge Planning, Home Health(patient currently active with [facility])  [address]: Yes  Discharge Durable Medical Equipment: No  Physical Therapy Consult: No  Occupational Therapy Consult: No  Speech Therapy Consult: No  Current Support Network: Relative's Home(Patient lives with her brother)  Confirm Follow Up Transport: Family  The Patient and/or Patient Representative was Provided with a Choice of Provider and Agrees with the Discharge Plan?: No  Freedom of Choice List was Provided with Basic Dialogue that Supports the Patient's Individualized Plan of Care/Goals, Treatment Preferences and Shares the Quality Data Associated with the Providers?: No  Discharge Location  Discharge Placement: Home with home health(patient active with [facility] will be resumed upon d/c home)

## 2020-08-23 NOTE — H&P
Hospitalist H&P Note     Admit Date:  2020  8:36 AM   Name:  Shalonda Ascencio   Age:  60 y.o.  :  1955   MRN:  [id number]   PCP:  None  Treatment Team: Attending Provider: Sergei Sanchez MD; Primary Nurse: Chilo Ruff, RN; Consulting Provider: Sergei Sanchez MD    HPI:   81-year-old female from home presents with shortness of breath. Information obtained from the patient but she does not seem like a reliable source at this time as she is sleeping and will not answer many questions. She said she woke up last night short of breath without coughing. She does wear anywhere from 2 to 3 L of oxygen at home but she cannot tell me how much she is using. She says her son adjust her oxygen for her. She says she does not use breathing treatments at home including albuterol. She denies people smoking in the house however they do smoke outside. She denies smoking. Incidentally on urine drug screen she came amphetamine positive. When questioned about this she says she started taking Sudafed yesterday for congestion. She adamantly denies doing methamphetamine. She also had a positive urine drug screen last admission. She was recently here for [facility] (-), got treated and discharged home. She came back with hospital-acquired pneumonia (-) and was discharged home. In the ER she was tachycardic with a leukocytosis and a possible right lower lobe pneumonia. The ER consulted the hospitalist for admission. 10 systems reviewed and negative except as noted in HPI. Past Medical History:   Diagnosis Date    COPD (chronic obstructive pulmonary disease) ([facility] [address]     Diabetes ([facility] [address]       No past surgical history on file.    No Known Allergies   Social History     Tobacco Use    Smoking status: Current Every Day Smoker     Types: Cigars   Substance Use Topics    Alcohol use: No      Family History   Problem Relation Age of Onset    Cancer Mother     Liver Disease Father Immunization History   Administered Date(s) Administered    TB Skin Test (PPD) Intradermal 2018     PTA Medications:  Prior to Admission Medications   Prescriptions Last Dose Informant Patient Reported? Taking? OXYGEN-AIR DELIVERY SYSTEMS   Yes No   Sig: 3 L by Nasal route continuous. albuterol (PROAIR HFA) 90 mcg/actuation inhaler   No No   Sig: Take 2 Puffs by inhalation every four (4) hours as needed for Wheezing. amLODIPine (NORVASC) 5 mg tablet   No No   Sig: Take 1 Tab by mouth daily for 30 days. budesonide-formoteroL (SYMBICORT) 160-4.5 mcg/actuation HFAA   No No   Sig: Take 2 Puffs by inhalation two (2) times a day. clonazePAM (KlonoPIN) 0.5 mg tablet   No No   Sig: Take 1 Tab by mouth two (2) times a day for 10 days. Max Daily Amount: 1 mg.   ibuprofen (MOTRIN) 600 mg tablet   No No   Sig: Take 1 Tab by mouth every six (6) hours as needed for Pain.   metFORMIN (GLUCOPHAGE) 500 mg tablet   No No   Sig: Take 1 Tab by mouth two (2) times daily (with meals). predniSONE (DELTASONE) 20 mg tablet   No No   Si mg po daily x 3 days, 20 mg po daily x 3 days, 10 mg po daily x 3 days. Facility-Administered Medications: None       Objective:     Patient Vitals for the past 24 hrs:   Temp Pulse Resp BP SpO2   20  (!) 127  189/86 96 %   20 0840 (!) 103.3 °F (39.6 °C) (!) 145 27 198/87 98 %   20 0838  (!) 147  198/87 98 %     Oxygen Therapy  O2 Sat (%): 96 % (20)  Pulse via Oximetry: 129 beats per minute (20)  O2 Device: Nasal cannula (20)  O2 Flow Rate (L/min): 4 l/min (20)  No intake or output data in the 24 hours ending 20 1120    Physical Exam:  General:    Well nourished. Sleepy but wakes up and talks if prompted. Initially on 4L NC but turned down to 3L NC and saturating 98%  Eyes:   Normal sclera. Extraocular movements intact. ENT:  Normocephalic, atraumatic. Moist mucous membranes  CV:   tachycardic. No m/r/g. Peripheral pulses present. Capillary refill normal  Lungs:  Right lower lobe crackles, otherwise the rest seems normal.  Abdomen: Soft, nontender, nondistended. Bowel sounds normal.   Extremities: Warm and dry. No cyanosis or edema. Neurologic: Sensation intact. Skin:     No rashes or jaundice. Normal coloration  Psych:  Normal mood and affect. I reviewed the labs, imaging, EKGs, telemetry, and other studies done this admission. Data Review:   Recent Results (from the past 24 hour(s))   CBC WITH AUTOMATED DIFF    Collection Time: 08/23/20  8:48 AM   Result Value Ref Range    WBC 24.5 (H) 4.3 - 11.1 K/uL    RBC 4.83 4.05 - 5.2 M/uL    HGB 13.7 11.7 - 15.4 g/dL    HCT 44.8 35.8 - 46.3 %    MCV 92.8 79.6 - 97.8 FL    MCH 28.4 26.1 - 32.9 PG    MCHC 30.6 (L) 31.4 - 35.0 g/dL    RDW 17.6 (H) 11.9 - 14.6 %    PLATELET 031 407 - 487 K/uL    MPV 9.6 9.4 - 12.3 FL    ABSOLUTE NRBC 0.02 0.0 - 0.2 K/uL    DF AUTOMATED      NEUTROPHILS 80 (H) 43 - 78 %    LYMPHOCYTES 11 (L) 13 - 44 %    MONOCYTES 5 4.0 - 12.0 %    EOSINOPHILS 2 0.5 - 7.8 %    BASOPHILS 1 0.0 - 2.0 %    IMMATURE GRANULOCYTES 2 0.0 - 5.0 %    ABS. NEUTROPHILS 19.6 (H) 1.7 - 8.2 K/UL    ABS. LYMPHOCYTES 2.8 0.5 - 4.6 K/UL    ABS. MONOCYTES 1.1 0.1 - 1.3 K/UL    ABS. EOSINOPHILS 0.4 0.0 - 0.8 K/UL    ABS. BASOPHILS 0.1 0.0 - 0.2 K/UL    ABS. IMM. GRANS. 0.5 0.0 - 0.5 K/UL   METABOLIC PANEL, COMPREHENSIVE    Collection Time: 08/23/20  8:48 AM   Result Value Ref Range    Sodium 136 136 - 145 mmol/L    Potassium 3.8 3.5 - 5.1 mmol/L    Chloride 101 98 - 107 mmol/L    CO2 27 21 - 32 mmol/L    Anion gap 8 7 - 16 mmol/L    Glucose 105 (H) 65 - 100 mg/dL    BUN 21 8 - 23 MG/DL    Creatinine 0.98 0.6 - 1.0 MG/DL    GFR est AA >60 >60 ml/min/1.73m2    GFR est non-AA >60 >60 ml/min/1.73m2    Calcium 9.1 8.3 - 10.4 MG/DL    Bilirubin, total 0.6 0.2 - 1.1 MG/DL    ALT (SGPT) 36 12 - 65 U/L    AST (SGOT) 17 15 - 37 U/L    Alk.  phosphatase 89 50 - 136 U/L Protein, total 7.6 6.3 - 8.2 g/dL    Albumin 3.0 (L) 3.2 - 4.6 g/dL    Globulin 4.6 (H) 2.3 - 3.5 g/dL    A-G Ratio 0.7 (L) 1.2 - 3.5     LACTIC ACID    Collection Time: 08/23/20  8:48 AM   Result Value Ref Range    Lactic acid 1.6 0.4 - 2.0 MMOL/L   CULTURE, BLOOD    Collection Time: 08/23/20  8:48 AM    Specimen: Blood   Result Value Ref Range    Special Requests: LEFT  Antecubital        Culture result: PENDING    NT-PRO BNP    Collection Time: 08/23/20  8:48 AM   Result Value Ref Range    NT pro- (H) 5 - 125 PG/ML   TROPONIN-HIGH SENSITIVITY    Collection Time: 08/23/20  8:48 AM   Result Value Ref Range    Troponin-High Sensitivity 17.9 (H) 0 - 14 pg/mL   EKG, 12 LEAD, INITIAL    Collection Time: 08/23/20  8:48 AM   Result Value Ref Range    Ventricular Rate 130 BPM    Atrial Rate 130 BPM    P-R Interval 158 ms    QRS Duration 86 ms    Q-T Interval 272 ms    QTC Calculation (Bezet) 400 ms    Calculated P Axis 53 degrees    Calculated R Axis -25 degrees    Calculated T Axis 73 degrees    Diagnosis       !! AGE AND GENDER SPECIFIC ECG ANALYSIS !!   Sinus tachycardia  Septal infarct (cited on or before 01-JUL-2020)  Abnormal ECG  When compared with ECG of 06-AUG-2020 00:16,  Serial changes of Septal infarct Present     URINALYSIS W/ RFLX MICROSCOPIC    Collection Time: 08/23/20  9:23 AM   Result Value Ref Range    Color YELLOW      Appearance CLEAR      Specific gravity 1.016 1.001 - 1.023      pH (UA) 5.0 5.0 - 9.0      Protein Negative NEG mg/dL    Glucose Negative mg/dL    Ketone Negative NEG mg/dL    Bilirubin Negative NEG      Blood TRACE (A) NEG      Urobilinogen 0.2 0.2 - 1.0 EU/dL    Nitrites Negative NEG      Leukocyte Esterase Negative NEG      WBC 0-3 0 /hpf    RBC 0-3 0 /hpf    Epithelial cells 0-3 0 /hpf    Bacteria 0 0 /hpf    Casts 0-3 0 /lpf   DRUG SCREEN, URINE    Collection Time: 08/23/20  9:23 AM   Result Value Ref Range    PCP(PHENCYCLIDINE) Negative      BENZODIAZEPINES Negative COCAINE Negative      AMPHETAMINES Positive      METHADONE Negative      THC (TH-CANNABINOL) Negative      OPIATES Negative      BARBITURATES Negative         All Micro Results     Procedure Component Value Units Date/Time    CULTURE, BLOOD [[id number]] Collected:  08/23/20 1030    Order Status:  Completed Specimen:  Blood Updated:  08/23/20 1032    CULTURE, BLOOD [[id number]] Collected:  08/23/20 0848    Order Status:  Completed Specimen:  Blood Updated:  08/23/20 0914     Special Requests: --        LEFT  Antecubital       Culture result: PENDING    CULTURE, BLOOD, PAIRED [[id number]]     Order Status:  Canceled Specimen:  Blood           Other Studies:  Xr Chest Port    Result Date: 8/23/2020  Portable chest x-ray CLINICAL INDICATION: Shortness of breath FINDINGS: Single AP view the chest compared to a similar exam dated 8/13/2020 shows persistent reticular nodular interstitial densities in the right mid and lower lung. The left lung is clear. The cardiac silhouette and mediastinum are stable. The support devices have been removed. IMPRESSION: Stable reticular nodular interstitial densities in the right mid and lower lung. Atypical pneumonia could be considered. Assessment and Plan:     Hospital Problems as of 8/23/2020 Date Reviewed: 8/12/2020          Codes Class Noted - Resolved POA    HTN (hypertension) (Chronic) ICD-10-CM: I10  ICD-9-CM: 401.9  8/23/2020 - Present Yes        Chronic respiratory failure ([facility] [address] ICD-10-CM: J96.10  ICD-9-CM: 518.83  8/12/2020 - Present Yes    Overview Signed 8/12/2020 11:58 AM by Hakan Zapien NP     Home O2 4-6L NC             Sepsis [facility]) ICD-10-CM: A41.9  ICD-9-CM: 038.9, 995.91  7/1/2020 - Present Yes              PLAN:  # Sepsis  Sepsis criteria met: leukocytosis, fever, tachycardia, source suspect lungs  Admit, no tele  Check CTA to rule out PE  pulm consulted for evaluation  Zosyn/vanco  Check MRSA nares.  Check sputum culture  Follow up cultures  Albuterol/symbicort  IVF    #tachycardia-EKG shows sinus tach. Treat underlying condition  #positive UTox-denies amphetamine use. Positive twice now (8/6, 8/20).  States she took pseudoephedrine recently   #HTN-home meds  #Chronic respiratory failure-oxygen as needed  #DM2-A1c 7.1 (7/2020), SSI, hold metformin    DVT ppx: lovenox  Anticipated DC needs:  Home with oxygen  Code status:  Full  Estimated LOS:  Greater than 2 midnights  Risk:  high    Signed:  Emma Hendrix MD

## 2020-08-24 LAB
ANION GAP SERPL CALC-SCNC: 7 MMOL/L (ref 7–16)
BASOPHILS # BLD: 0.1 K/UL (ref 0–0.2)
BASOPHILS NFR BLD: 1 % (ref 0–2)
BUN SERPL-MCNC: 10 MG/DL (ref 8–23)
CALCIUM SERPL-MCNC: 8.2 MG/DL (ref 8.3–10.4)
CHLORIDE SERPL-SCNC: 107 MMOL/L (ref 98–107)
CO2 SERPL-SCNC: 27 MMOL/L (ref 21–32)
CREAT SERPL-MCNC: 0.4 MG/DL (ref 0.6–1)
DIFFERENTIAL METHOD BLD: ABNORMAL
EOSINOPHIL # BLD: 0.6 K/UL (ref 0–0.8)
EOSINOPHIL NFR BLD: 4 % (ref 0.5–7.8)
ERYTHROCYTE [DISTWIDTH] IN BLOOD BY AUTOMATED COUNT: 17.7 % (ref 11.9–14.6)
GLUCOSE BLD STRIP.AUTO-MCNC: 100 MG/DL (ref 65–100)
GLUCOSE BLD STRIP.AUTO-MCNC: 177 MG/DL (ref 65–100)
GLUCOSE BLD STRIP.AUTO-MCNC: 202 MG/DL (ref 65–100)
GLUCOSE BLD STRIP.AUTO-MCNC: 458 MG/DL (ref 65–100)
GLUCOSE SERPL-MCNC: 108 MG/DL (ref 65–100)
HCT VFR BLD AUTO: 36 % (ref 35.8–46.3)
HGB BLD-MCNC: 11 G/DL (ref 11.7–15.4)
IMM GRANULOCYTES # BLD AUTO: 0.2 K/UL (ref 0–0.5)
IMM GRANULOCYTES NFR BLD AUTO: 1 % (ref 0–5)
LYMPHOCYTES # BLD: 1.9 K/UL (ref 0.5–4.6)
LYMPHOCYTES NFR BLD: 12 % (ref 13–44)
MCH RBC QN AUTO: 28.3 PG (ref 26.1–32.9)
MCHC RBC AUTO-ENTMCNC: 30.6 G/DL (ref 31.4–35)
MCV RBC AUTO: 92.5 FL (ref 79.6–97.8)
MONOCYTES # BLD: 0.9 K/UL (ref 0.1–1.3)
MONOCYTES NFR BLD: 5 % (ref 4–12)
NEUTS SEG # BLD: 12.5 K/UL (ref 1.7–8.2)
NEUTS SEG NFR BLD: 78 % (ref 43–78)
NRBC # BLD: 0 K/UL (ref 0–0.2)
PLATELET # BLD AUTO: 197 K/UL (ref 150–450)
PMV BLD AUTO: 10.1 FL (ref 9.4–12.3)
POTASSIUM SERPL-SCNC: 3.7 MMOL/L (ref 3.5–5.1)
RBC # BLD AUTO: 3.89 M/UL (ref 4.05–5.2)
SODIUM SERPL-SCNC: 141 MMOL/L (ref 136–145)
WBC # BLD AUTO: 16.1 K/UL (ref 4.3–11.1)

## 2020-08-24 PROCEDURE — 74011250636 HC RX REV CODE- 250/636: Performed by: NURSE PRACTITIONER

## 2020-08-24 PROCEDURE — 94760 N-INVAS EAR/PLS OXIMETRY 1: CPT

## 2020-08-24 PROCEDURE — 74011636637 HC RX REV CODE- 636/637: Performed by: INTERNAL MEDICINE

## 2020-08-24 PROCEDURE — 82962 GLUCOSE BLOOD TEST: CPT

## 2020-08-24 PROCEDURE — 85025 COMPLETE CBC W/AUTO DIFF WBC: CPT

## 2020-08-24 PROCEDURE — 94640 AIRWAY INHALATION TREATMENT: CPT

## 2020-08-24 PROCEDURE — 74011000258 HC RX REV CODE- 258: Performed by: INTERNAL MEDICINE

## 2020-08-24 PROCEDURE — 74011000250 HC RX REV CODE- 250: Performed by: INTERNAL MEDICINE

## 2020-08-24 PROCEDURE — 74011250637 HC RX REV CODE- 250/637: Performed by: INTERNAL MEDICINE

## 2020-08-24 PROCEDURE — 74011250636 HC RX REV CODE- 250/636: Performed by: INTERNAL MEDICINE

## 2020-08-24 PROCEDURE — 3331090002 HH PPS REVENUE DEBIT

## 2020-08-24 PROCEDURE — 3331090001 HH PPS REVENUE CREDIT

## 2020-08-24 PROCEDURE — 65270000029 HC RM PRIVATE

## 2020-08-24 PROCEDURE — 36415 COLL VENOUS BLD VENIPUNCTURE: CPT

## 2020-08-24 PROCEDURE — 80048 BASIC METABOLIC PNL TOTAL CA: CPT

## 2020-08-24 PROCEDURE — 99223 1ST HOSP IP/OBS HIGH 75: CPT | Performed by: INTERNAL MEDICINE

## 2020-08-24 RX ORDER — SODIUM CHLORIDE FOR INHALATION 3 %
4 VIAL, NEBULIZER (ML) INHALATION
Status: DISCONTINUED | OUTPATIENT
Start: 2020-08-24 | End: 2020-08-26 | Stop reason: HOSPADM

## 2020-08-24 RX ORDER — INSULIN LISPRO 100 [IU]/ML
12 INJECTION, SOLUTION INTRAVENOUS; SUBCUTANEOUS ONCE
Status: COMPLETED | OUTPATIENT
Start: 2020-08-24 | End: 2020-08-24

## 2020-08-24 RX ORDER — PREDNISONE 20 MG/1
40 TABLET ORAL
Status: DISCONTINUED | OUTPATIENT
Start: 2020-08-25 | End: 2020-08-26 | Stop reason: HOSPADM

## 2020-08-24 RX ADMIN — PROMETHAZINE HYDROCHLORIDE 12.5 MG: 25 TABLET ORAL at 19:50

## 2020-08-24 RX ADMIN — HYDROCODONE BITARTRATE AND ACETAMINOPHEN 1 TABLET: 5; 325 TABLET ORAL at 18:40

## 2020-08-24 RX ADMIN — PIPERACILLIN SODIUM AND TAZOBACTAM SODIUM 4.5 G: 4; .5 INJECTION, POWDER, LYOPHILIZED, FOR SOLUTION INTRAVENOUS at 17:04

## 2020-08-24 RX ADMIN — METHYLPREDNISOLONE SODIUM SUCCINATE 40 MG: 40 INJECTION, POWDER, FOR SOLUTION INTRAMUSCULAR; INTRAVENOUS at 12:31

## 2020-08-24 RX ADMIN — SODIUM CHLORIDE SOLN NEBU 3% 4 ML: 3 NEBU SOLN at 20:23

## 2020-08-24 RX ADMIN — Medication 10 ML: at 06:00

## 2020-08-24 RX ADMIN — PIPERACILLIN SODIUM AND TAZOBACTAM SODIUM 4.5 G: 4; .5 INJECTION, POWDER, LYOPHILIZED, FOR SOLUTION INTRAVENOUS at 01:56

## 2020-08-24 RX ADMIN — ALBUTEROL SULFATE 2.5 MG: 2.5 SOLUTION RESPIRATORY (INHALATION) at 16:20

## 2020-08-24 RX ADMIN — ALBUTEROL SULFATE 2.5 MG: 2.5 SOLUTION RESPIRATORY (INHALATION) at 12:04

## 2020-08-24 RX ADMIN — AMLODIPINE BESYLATE 5 MG: 5 TABLET ORAL at 08:23

## 2020-08-24 RX ADMIN — INSULIN LISPRO 2 UNITS: 100 INJECTION, SOLUTION INTRAVENOUS; SUBCUTANEOUS at 17:04

## 2020-08-24 RX ADMIN — SODIUM CHLORIDE 100 ML/HR: 900 INJECTION, SOLUTION INTRAVENOUS at 01:56

## 2020-08-24 RX ADMIN — INSULIN LISPRO 4 UNITS: 100 INJECTION, SOLUTION INTRAVENOUS; SUBCUTANEOUS at 12:31

## 2020-08-24 RX ADMIN — ALBUTEROL SULFATE 2.5 MG: 2.5 SOLUTION RESPIRATORY (INHALATION) at 08:44

## 2020-08-24 RX ADMIN — HYDROCODONE BITARTRATE AND ACETAMINOPHEN 1 TABLET: 5; 325 TABLET ORAL at 12:54

## 2020-08-24 RX ADMIN — VANCOMYCIN HYDROCHLORIDE 1000 MG: 1 INJECTION, POWDER, LYOPHILIZED, FOR SOLUTION INTRAVENOUS at 05:20

## 2020-08-24 RX ADMIN — PIPERACILLIN SODIUM AND TAZOBACTAM SODIUM 4.5 G: 4; .5 INJECTION, POWDER, LYOPHILIZED, FOR SOLUTION INTRAVENOUS at 09:59

## 2020-08-24 RX ADMIN — ALBUTEROL SULFATE 2.5 MG: 2.5 SOLUTION RESPIRATORY (INHALATION) at 20:23

## 2020-08-24 RX ADMIN — BUDESONIDE AND FORMOTEROL FUMARATE DIHYDRATE 2 PUFF: 160; 4.5 AEROSOL RESPIRATORY (INHALATION) at 20:25

## 2020-08-24 RX ADMIN — ENOXAPARIN SODIUM 40 MG: 40 INJECTION SUBCUTANEOUS at 08:23

## 2020-08-24 RX ADMIN — INSULIN LISPRO 12 UNITS: 100 INJECTION, SOLUTION INTRAVENOUS; SUBCUTANEOUS at 22:17

## 2020-08-24 RX ADMIN — Medication 5 ML: at 17:05

## 2020-08-24 RX ADMIN — Medication 5 ML: at 22:22

## 2020-08-24 RX ADMIN — BUDESONIDE AND FORMOTEROL FUMARATE DIHYDRATE 2 PUFF: 160; 4.5 AEROSOL RESPIRATORY (INHALATION) at 08:45

## 2020-08-24 NOTE — PROGRESS NOTES
Care Management Interventions  Mode of Transport at Discharge: Other (see comment)  Transition of Care Consult (CM Consult): Discharge Planning, Home Health(patient currently active with [facility])  [facility] - INPATIENT: Yes  Discharge Durable Medical Equipment: No  Physical Therapy Consult: No  Occupational Therapy Consult: No  Speech Therapy Consult: No  Current Support Network: Relative's Home(Patient lives with her brother)  Confirm Follow Up Transport: Family  The Patient and/or Patient Representative was Provided with a Choice of Provider and Agrees with the Discharge Plan?: No  Freedom of Choice List was Provided with Basic Dialogue that Supports the Patient's Individualized Plan of Care/Goals, Treatment Preferences and Shares the Quality Data Associated with the Providers?: No  Discharge Location  Discharge Placement: Home with home health(patient active with [facility] will be resumed upon d/c home)  CM will email [facility] to get patient a new pcp,. Patient is not interested in recovery programs. CM will resume [facility] at discharge.

## 2020-08-24 NOTE — PROGRESS NOTES
Problem: Pressure Injury - Risk of  Goal: *Prevention of pressure injury  Description: Document Dale Scale and appropriate interventions in the flowsheet. Outcome: Progressing Towards Goal  Note: Pressure Injury Interventions:  Sensory Interventions: Assess changes in LOC         Activity Interventions: Increase time out of bed    Mobility Interventions: Pressure redistribution bed/mattress (bed type)    Nutrition Interventions: Document food/fluid/supplement intake                     Problem: Patient Education: Go to Patient Education Activity  Goal: Patient/Family Education  Outcome: Progressing Towards Goal     Problem: Falls - Risk of  Goal: *Absence of Falls  Description: Document [facility] Fall Risk and appropriate interventions in the flowsheet.   Outcome: Progressing Towards Goal  Note: Fall Risk Interventions:  Mobility Interventions: Patient to call before getting OOB    Mentation Interventions: Adequate sleep, hydration, pain control    Medication Interventions: Patient to call before getting OOB, Teach patient to arise slowly    Elimination Interventions: Call light in reach              Problem: Patient Education: Go to Patient Education Activity  Goal: Patient/Family Education  Outcome: Progressing Towards Goal

## 2020-08-24 NOTE — PROGRESS NOTES
Received pt from TYLER Garcia) in stable condition. Pt in bed resting quietly. Resp even & unlabored on 4L NC; no acute signs of distress noted. Bed low & locked; call light in reach; no needs voiced.

## 2020-08-24 NOTE — PROGRESS NOTES
Problem: Pressure Injury - Risk of  Goal: *Prevention of pressure injury  Description: Document Dale Scale and appropriate interventions in the flowsheet. Outcome: Progressing Towards Goal  Note: Pressure Injury Interventions:  Sensory Interventions: Assess changes in LOC, Check visual cues for pain, Keep linens dry and wrinkle-free, Maintain/enhance activity level, Minimize linen layers, Pressure redistribution bed/mattress (bed type)         Activity Interventions: Increase time out of bed, Pressure redistribution bed/mattress(bed type), PT/OT evaluation    Mobility Interventions: Pressure redistribution bed/mattress (bed type), PT/OT evaluation    Nutrition Interventions: Document food/fluid/supplement intake, Offer support with meals,snacks and hydration                     Problem: Falls - Risk of  Goal: *Absence of Falls  Description: Document Lexie Fall Risk and appropriate interventions in the flowsheet.   Outcome: Progressing Towards Goal  Note: Fall Risk Interventions:  Mobility Interventions: Patient to call before getting OOB, PT Consult for mobility concerns    Mentation Interventions: Adequate sleep, hydration, pain control, More frequent rounding, Toileting rounds, Update white board    Medication Interventions: Patient to call before getting OOB, Teach patient to arise slowly    Elimination Interventions: Call light in reach, Patient to call for help with toileting needs, Toileting schedule/hourly rounds              Problem: Pneumonia: Day 2  Goal: Activity/Safety  Outcome: Progressing Towards Goal  Goal: Consults, if ordered  Outcome: Progressing Towards Goal  Goal: Diagnostic Test/Procedures  Outcome: Progressing Towards Goal  Goal: Nutrition/Diet  Outcome: Progressing Towards Goal  Goal: Medications  Outcome: Progressing Towards Goal  Goal: Respiratory  Outcome: Progressing Towards Goal  Goal: Treatments/Interventions/Procedures  Outcome: Progressing Towards Goal  Goal: Psychosocial  Outcome: Progressing Towards Goal  Goal: *Oxygen saturation within defined limits  Outcome: Progressing Towards Goal  Goal: *Hemodynamically stable  Outcome: Progressing Towards Goal  Goal: *Demonstrates progressive activity  Outcome: Progressing Towards Goal  Goal: *Tolerating diet  Outcome: Progressing Towards Goal  Goal: *Optimal pain control at patient's stated goal  Outcome: Progressing Towards Goal

## 2020-08-24 NOTE — PROGRESS NOTES
Hospitalist Progress Note    2020  Admit Date: 2020  8:36 AM   NAME: Coreen Correa   :  1955   MRN:  [id number]   Attending: Anup Mathew MD  PCP:  None    SUBJECTIVE:   Patient is a 57yo F with a history of COPD, recent COVID infection, subsequent hospitalization for HCAP who was admitted for sepsis due to pneumonia. UDS again positive for amphetamines - patient insists this is due to Sudafed.   Tachycardia, fever resolved  Breathing slightly more comfortable  Was not taking any BDs at home  CTA negative for clot, has pulmonary nodules and atypical PNA  MRSA nasal swab negative  pulm seeing    Review of Systems negative with exception of pertinent positives noted above  PHYSICAL EXAM     Visit Vitals  /67   Pulse 99   Temp 98.1 °F (36.7 °C)   Resp 28 Comment: primary nurse brando informed   Ht 5' 3\" (1.6 m)   Wt 65.8 kg (145 lb)   SpO2 95%   Breastfeeding No   BMI 25.69 kg/m²      Temp (24hrs), Av.1 °F (36.7 °C), Min:97.5 °F (36.4 °C), Max:99 °F (37.2 °C)    Oxygen Therapy  O2 Sat (%): 95 % (20 0727)  Pulse via Oximetry: 92 beats per minute (20 1917)  O2 Device: Nasal cannula (20 0840)  O2 Flow Rate (L/min): 4 l/min (20 0840)    Intake/Output Summary (Last 24 hours) at 2020 1022  Last data filed at 2020 1736  Gross per 24 hour   Intake 600 ml   Output    Net 600 ml      General: No distress  Lungs:  Coarse bases, mild scattered wheeze. Heart:  RR  Abdomen: Soft, Non distended, Non tender, Positive bowel sounds  Extremities: No cyanosis, clubbing or edema  Neurologic:  No focal deficits    CT CHEST W CONT   Final Result   IMPRESSION:   1. Extensive, predominantly peripheral, irregular densities in the right lower   and caudal aspect of the right lower lobe grossly unchanged from the prior exam   but most in keeping with atypical pneumonia. There is also irregular density   noted at the left lung base also concerning for pneumonia.    2. 1.5 cm spiculated pulmonary nodule in the posterior aspect of the left upper   lobe. Although this could also be infectious, malignancy is not excluded. Recommend attention on any follow-up imaging. 3. Emphysema. 4. No acute pulmonary emboli. XR CHEST PORT   Final Result   IMPRESSION: Stable reticular nodular interstitial densities in the right mid and   lower lung. Atypical pneumonia could be considered. ASSESSMENT      Active Hospital Problems    Diagnosis Date Noted    HTN (hypertension) 08/23/2020    Chronic respiratory failure ([facility] [address] 08/12/2020     Home O2 4-6L NC      Sepsis ([facility] [address] 07/01/2020     Plan:    Sepsis resolved  HCAP coverage narrowed to zosyn alone (MRSA swab negative)  Steroids and BDs per pulm - will need new Rx for inhalers at discharge  Continue home O2  Sputum culture pending    Sinus tach resolved with treatment infection  [place] on UDS - pt reports due to pseudoephedrine use.  Was also positive on 8/6    Pulm nodule  Follow-up imaging per pulmonology    Home meds for HTN  SSI for DM    DVT Prophylaxis: lovenox  Dispo: home in next day or two on PO antibiotics     Signed By: Michael Wilson MD     August 24, 2020

## 2020-08-24 NOTE — CONSULTS
PULMONARY/CCM CONSULT :  8/24/2020    Date of Admission:  8/23/2020    The patient's chart has been reviewed and the chart has been discussed with nursing staff. Subjective: This patient has been seen and evaluated at the request of Dr. Mateo Zaidi. Patient is a 60 y.o. female presents with SOB, fevers, and cough. She was recently here for [facility] (7-1-7/20), got treated and discharged home. She came with [facility] (8/6-8/13) and was discharged home. We followed her on both admissions. Incidentally on UDS +amphetamine, this also showed on admission on 8/6. She denies any methamphetamine usage and states she has taken sudafed for congestion PTA. Previous admissions, speech followed r/t aspiration noted in MBS, but this resolved with therapy and she was cleared for PO. In the ER, she was noted to be tachycardiac with leukocytosis and possible R LL PNA. She was treated with vancomycin (has since been d/c'd) and zosyn. She was admitted per the hospitalist.  She has a further PMHx of COPD (symbicort and proair at home also wears chronic O2 4-6 L NC since July 2020 Admission), previous aspiration PNA, recent COVID, former smoker, anxiety disorder, and DM type II. Family members continue to smoke, but she states they smoke outside. She had a CT done on 8/23, which showed emphysema, a round pulmonary nodule in the posterior aspect of the left upper lobe measuring 1.5 x 1.5 cm (1.4 cm on previous exam). Other findings in CT suggestive of atypical PNA. On admission, Procal 0.12, LA 1.6, and WBC 24.5. She reported fevers and SOB. Denies any sick contacts, loss of smell/taste, or productive cough. She was not re-tested for COVID. She is now afebrile and HR normal.  WBC is down to 16.1 today with zosyn. She remains on 4 LPM via NC, which is her home dose. Blood and sputum cultures pending. She reports that she does not use home inhalers of any kind, even though it is reported that she does.   She states she did not received any new scripts for inhalers/nebs on last admission, even though it appears she was d/c home with symbicort and proair. Discussed with Dr Santana Edwards to ensure she is d/c home with proper medications. Will start steroids today to assist with wheezing. Cont zosyn for PNA treatment. No past surgical history on file. Social History     Tobacco Use    Smoking status: Current Every Day Smoker     Types: Cigars   Substance Use Topics    Alcohol use: No      Family History   Problem Relation Age of Onset    Cancer Mother     Liver Disease Father       No Known Allergies   Prior to Admission Medications   Prescriptions Last Dose Informant Patient Reported? Taking? OXYGEN-AIR DELIVERY SYSTEMS   Yes No   Sig: 3 L by Nasal route continuous. albuterol (PROAIR HFA) 90 mcg/actuation inhaler   No No   Sig: Take 2 Puffs by inhalation every four (4) hours as needed for Wheezing. amLODIPine (NORVASC) 5 mg tablet   No No   Sig: Take 1 Tab by mouth daily for 30 days. budesonide-formoteroL (SYMBICORT) 160-4.5 mcg/actuation HFAA   No No   Sig: Take 2 Puffs by inhalation two (2) times a day. clonazePAM (KlonoPIN) 0.5 mg tablet   No No   Sig: Take 1 Tab by mouth two (2) times a day for 10 days. Max Daily Amount: 1 mg.   ibuprofen (MOTRIN) 600 mg tablet   No No   Sig: Take 1 Tab by mouth every six (6) hours as needed for Pain. predniSONE (DELTASONE) 20 mg tablet   No No   Si mg po daily x 3 days, 20 mg po daily x 3 days, 10 mg po daily x 3 days.       Facility-Administered Medications: None       MEDS SCHEDULED:    Current Facility-Administered Medications   Medication Dose Route Frequency    sodium chloride (NS) flush 5-40 mL  5-40 mL IntraVENous Q8H    sodium chloride (NS) flush 5-40 mL  5-40 mL IntraVENous PRN    acetaminophen (TYLENOL) tablet 650 mg  650 mg Oral Q6H PRN    Or    acetaminophen (TYLENOL) suppository 650 mg  650 mg Rectal Q6H PRN    polyethylene glycol (MIRALAX) packet 17 g  17 g Oral DAILY PRN    promethazine (PHENERGAN) tablet 12.5 mg  12.5 mg Oral Q6H PRN    Or    ondansetron (ZOFRAN) injection 4 mg  4 mg IntraVENous Q6H PRN    enoxaparin (LOVENOX) injection 40 mg  40 mg SubCUTAneous DAILY    amLODIPine (NORVASC) tablet 5 mg  5 mg Oral DAILY    budesonide-formoteroL (SYMBICORT) 160-4.5 mcg/actuation HFA inhaler 2 Puff  2 Puff Inhalation BID RT    albuterol (PROVENTIL VENTOLIN) nebulizer solution 2.5 mg  2.5 mg Nebulization Q4HWA RT    insulin lispro (HUMALOG) injection 0-10 Units  0-10 Units SubCUTAneous AC&HS    0.9% sodium chloride infusion  100 mL/hr IntraVENous CONTINUOUS    piperacillin-tazobactam (ZOSYN) 4.5 g in 0.9% sodium chloride (MBP/ADV) 100 mL  4.5 g IntraVENous Q8H    vancomycin (VANCOCIN) 1,000 mg in 0.9% sodium chloride (MBP/ADV) 250 mL  1,000 mg IntraVENous Q18H    HYDROcodone-acetaminophen (NORCO) 5-325 mg per tablet 1 Tab  1 Tab Oral Q6H PRN    morphine injection 2 mg  2 mg IntraVENous Q3H PRN         Review of Systems  Pertinent items are noted in HPI. Objective:     Vitals:    08/23/20 1938 08/23/20 2345 08/24/20 0339 08/24/20 0727   BP: 138/66 137/76 141/69 148/67   Pulse: 95 100 99 99   Resp: 22 24 24 28   Temp: 97.5 °F (36.4 °C) 99 °F (37.2 °C) 97.9 °F (36.6 °C) 98.1 °F (36.7 °C)   SpO2: 95% 96% 94% 95%   Weight:       Height:         No intake/output data recorded. 08/22 1901 - 08/24 0700  In: 600 [P.O.:200; I.V.:400]  Out: -       PHYSICAL EXAM     Physical Exam:   General:  Alert, cooperative, no acute distress, appears stated age. Eyes:  Conjunctivae/corneas clear. Nose: Nares patent and moist.    Mouth/Throat: Lips, mucosa, and tongue pink and intact. Neck: Supple, symmetrical.   Respiratory:   Anteriorly diminished, posteriorly expiratory wheezes on 4 L   Cardiovascular:  Regular rate and rhythm, S1, S2, no murmur, click, rub or gallop. GI:   Abdomen soft, non-tender. Bowel sounds active X 4 Q.  No masses,     Musculoskeletal: Extremities symmetrical, atraumatic, no cyanosis, no edema. Pulses: 2+ and symmetric all extremities. Skin: Skin color, texture, turgor normal. No rashes or lesions       Neurologic: 2+ strength bilateral upper and lower extremities, sensation throughout appropriate. Alert and oriented. CHEST X-RAYS:  8/23/2020 8/13/2020      CT:   8/23/2020      IMPRESSION:  1. Extensive, predominantly peripheral, irregular densities in the right lower  and caudal aspect of the right lower lobe grossly unchanged from the prior exam  but most in keeping with atypical pneumonia. There is also irregular density  noted at the left lung base also concerning for pneumonia. 2. 1.5 cm spiculated pulmonary nodule in the posterior aspect of the left upper  lobe. Although this could also be infectious, malignancy is not excluded. Recommend attention on any follow-up imaging. 3. Emphysema. 4. No acute pulmonary emboli.       CULTURES:  Results     Procedure Component Value Units Date/Time    CULTURE, RESPIRATORY/SPUTUM/BRONCH Ashleigh Gallegos [[id number]] Collected:  08/23/20 2248    Order Status:  Completed Specimen:  Sputum Updated:  08/23/20 2311    MSSA/MRSA SC BY PCR, NASAL SWAB [[id number]] Collected:  08/23/20 1720    Order Status:  Completed Specimen:  Nasal swab Updated:  08/23/20 2059     Special Requests: NO SPECIAL REQUESTS        Culture result:       SA target not detected. A MRSA NEGATIVE, SA NEGATIVE test result does not preclude MRSA or SA nasal colonization.           CULTURE, BLOOD [[id number]] Collected:  08/23/20 1030    Order Status:  Completed Specimen:  Blood Updated:  08/23/20 1201    CULTURE, BLOOD [[id number]] Collected:  08/23/20 0848    Order Status:  Completed Specimen:  Blood Updated:  08/23/20 0914     Special Requests: --        LEFT  Antecubital       Culture result: PENDING    CULTURE, BLOOD, PAIRED [[id number]]     Order Status:  Canceled Specimen:  Blood ECHO:  8/12/2020  SUMMARY:     -  Left ventricle: Systolic function was normal. Ejection fraction was  estimated in the range of 55 % to 60 %. There were no regional wall motion  abnormalities.       LABS    Recent Labs     08/24/20  0605 08/23/20  0848   WBC 16.1* 24.5*   HGB 11.0* 13.7   HCT 36.0 44.8    301     Recent Labs     08/24/20  0605 08/23/20  0848    136   K 3.7 3.8    101   * 105*   CO2 27 27   BUN 10 21   CREA 0.40* 0.98     No results for input(s): PH, PCO2, PO2, HCO3 in the last 72 hours. Assessment:     Hospital Problems  Date Reviewed: 8/12/2020          Codes Class Noted POA    HTN (hypertension) (Chronic) ICD-10-CM: I10  ICD-9-CM: 401.9  8/23/2020 Yes        Chronic respiratory failure ([facility] [address] ICD-10-CM: J96.10  ICD-9-CM: 518.83  8/12/2020 Yes    Overview Signed 8/12/2020 11:58 AM by Brigette Caceres NP     Home O2 4-6L NC             * (Principal) Sepsis [facility]) ICD-10-CM: A41.9  ICD-9-CM: 038.9, 995.91  7/1/2020 Yes              Plan:   --cont symbicort  --cont albuterol  --cont home dose O2  --cont zosyn D 2  --will need follow up outpt for nodule in 3-6 months with scan  --will need f/u in office 2-3 weeks upon discharge home   --start solumedrol today 40 mg q6h  for wheezing  --ensure she is sent home with scripts for inhalers/nebs. She states she did not have these upon last discharge and has only been using O2  JAMES Brown    More than 50% of time documented was spent in face-to-face contact with the patient and in the care of the patient on the floor/unit where the patient is located. Lungs: No wheezes, good air movement 3L NC  Heart:  RRR with no Murmur/Rubs/Gallops    Additional Comments:  Continue abx. Resp culture pending from last night. Will check AFB, fungal given chronic lung disease and recurrent/non-resolving pneumonia post COVID. Change steroids to PO. Continue nebs. Add 3% saline nebs.  Had some mucus in RLL airway, aid with clearance. Sputum was white this morning per patient. Wean O2 as able. I have spoken with and examined the patient. I agree with the above assessment and plan as documented.     Emily Stark MD

## 2020-08-25 ENCOUNTER — HOME CARE VISIT (OUTPATIENT)
Dept: HOME HEALTH SERVICES | Facility: HOME HEALTH | Age: 65
End: 2020-08-25
Payer: MEDICARE

## 2020-08-25 PROBLEM — J43.9 EMPHYSEMA LUNG (HCC): Chronic | Status: ACTIVE | Noted: 2020-08-25

## 2020-08-25 PROBLEM — J15.20 STAPHYLOCOCCAL PNEUMONIA (HCC): Status: ACTIVE | Noted: 2020-08-25

## 2020-08-25 PROBLEM — J96.10 CHRONIC RESPIRATORY FAILURE (HCC): Chronic | Status: ACTIVE | Noted: 2020-08-12

## 2020-08-25 LAB
GLUCOSE BLD STRIP.AUTO-MCNC: 108 MG/DL (ref 65–100)
GLUCOSE BLD STRIP.AUTO-MCNC: 113 MG/DL (ref 65–100)
GLUCOSE BLD STRIP.AUTO-MCNC: 159 MG/DL (ref 65–100)
GLUCOSE BLD STRIP.AUTO-MCNC: 193 MG/DL (ref 65–100)

## 2020-08-25 PROCEDURE — 74011636637 HC RX REV CODE- 636/637: Performed by: INTERNAL MEDICINE

## 2020-08-25 PROCEDURE — 82962 GLUCOSE BLOOD TEST: CPT

## 2020-08-25 PROCEDURE — 87116 MYCOBACTERIA CULTURE: CPT

## 2020-08-25 PROCEDURE — 74011000258 HC RX REV CODE- 258: Performed by: INTERNAL MEDICINE

## 2020-08-25 PROCEDURE — 3331090002 HH PPS REVENUE DEBIT

## 2020-08-25 PROCEDURE — 99232 SBSQ HOSP IP/OBS MODERATE 35: CPT | Performed by: INTERNAL MEDICINE

## 2020-08-25 PROCEDURE — 3331090001 HH PPS REVENUE CREDIT

## 2020-08-25 PROCEDURE — 74011000250 HC RX REV CODE- 250: Performed by: INTERNAL MEDICINE

## 2020-08-25 PROCEDURE — 94760 N-INVAS EAR/PLS OXIMETRY 1: CPT

## 2020-08-25 PROCEDURE — 77010033678 HC OXYGEN DAILY

## 2020-08-25 PROCEDURE — 74011250636 HC RX REV CODE- 250/636: Performed by: INTERNAL MEDICINE

## 2020-08-25 PROCEDURE — 94640 AIRWAY INHALATION TREATMENT: CPT

## 2020-08-25 PROCEDURE — 74011250637 HC RX REV CODE- 250/637: Performed by: INTERNAL MEDICINE

## 2020-08-25 PROCEDURE — 65270000029 HC RM PRIVATE

## 2020-08-25 PROCEDURE — 74011250637 HC RX REV CODE- 250/637: Performed by: NURSE PRACTITIONER

## 2020-08-25 RX ORDER — HYDRALAZINE HYDROCHLORIDE 20 MG/ML
10 INJECTION INTRAMUSCULAR; INTRAVENOUS
Status: DISCONTINUED | OUTPATIENT
Start: 2020-08-25 | End: 2020-08-26 | Stop reason: HOSPADM

## 2020-08-25 RX ORDER — INSULIN GLARGINE 100 [IU]/ML
10 INJECTION, SOLUTION SUBCUTANEOUS DAILY
Status: DISCONTINUED | OUTPATIENT
Start: 2020-08-26 | End: 2020-08-25

## 2020-08-25 RX ORDER — CLONAZEPAM 0.5 MG/1
0.5 TABLET ORAL
Status: DISCONTINUED | OUTPATIENT
Start: 2020-08-25 | End: 2020-08-26 | Stop reason: HOSPADM

## 2020-08-25 RX ORDER — AMLODIPINE BESYLATE 10 MG/1
10 TABLET ORAL DAILY
Status: DISCONTINUED | OUTPATIENT
Start: 2020-08-26 | End: 2020-08-26 | Stop reason: HOSPADM

## 2020-08-25 RX ORDER — IPRATROPIUM BROMIDE AND ALBUTEROL SULFATE 2.5; .5 MG/3ML; MG/3ML
3 SOLUTION RESPIRATORY (INHALATION)
Status: DISCONTINUED | OUTPATIENT
Start: 2020-08-25 | End: 2020-08-26 | Stop reason: HOSPADM

## 2020-08-25 RX ADMIN — Medication 5 ML: at 21:26

## 2020-08-25 RX ADMIN — PIPERACILLIN SODIUM AND TAZOBACTAM SODIUM 4.5 G: 4; .5 INJECTION, POWDER, LYOPHILIZED, FOR SOLUTION INTRAVENOUS at 09:00

## 2020-08-25 RX ADMIN — INSULIN LISPRO 2 UNITS: 100 INJECTION, SOLUTION INTRAVENOUS; SUBCUTANEOUS at 16:49

## 2020-08-25 RX ADMIN — PIPERACILLIN SODIUM AND TAZOBACTAM SODIUM 4.5 G: 4; .5 INJECTION, POWDER, LYOPHILIZED, FOR SOLUTION INTRAVENOUS at 17:05

## 2020-08-25 RX ADMIN — SODIUM CHLORIDE SOLN NEBU 3% 4 ML: 3 NEBU SOLN at 21:30

## 2020-08-25 RX ADMIN — Medication 10 ML: at 13:24

## 2020-08-25 RX ADMIN — INSULIN LISPRO 2 UNITS: 100 INJECTION, SOLUTION INTRAVENOUS; SUBCUTANEOUS at 21:35

## 2020-08-25 RX ADMIN — PREDNISONE 40 MG: 20 TABLET ORAL at 08:53

## 2020-08-25 RX ADMIN — SODIUM CHLORIDE SOLN NEBU 3% 4 ML: 3 NEBU SOLN at 08:07

## 2020-08-25 RX ADMIN — IPRATROPIUM BROMIDE AND ALBUTEROL SULFATE 3 ML: .5; 3 SOLUTION RESPIRATORY (INHALATION) at 21:31

## 2020-08-25 RX ADMIN — HYDROCODONE BITARTRATE AND ACETAMINOPHEN 1 TABLET: 5; 325 TABLET ORAL at 09:00

## 2020-08-25 RX ADMIN — HYDROCODONE BITARTRATE AND ACETAMINOPHEN 1 TABLET: 5; 325 TABLET ORAL at 16:13

## 2020-08-25 RX ADMIN — BUDESONIDE AND FORMOTEROL FUMARATE DIHYDRATE 2 PUFF: 160; 4.5 AEROSOL RESPIRATORY (INHALATION) at 21:31

## 2020-08-25 RX ADMIN — ALBUTEROL SULFATE 2.5 MG: 2.5 SOLUTION RESPIRATORY (INHALATION) at 11:40

## 2020-08-25 RX ADMIN — BUDESONIDE AND FORMOTEROL FUMARATE DIHYDRATE 2 PUFF: 160; 4.5 AEROSOL RESPIRATORY (INHALATION) at 08:07

## 2020-08-25 RX ADMIN — ALBUTEROL SULFATE 2.5 MG: 2.5 SOLUTION RESPIRATORY (INHALATION) at 17:16

## 2020-08-25 RX ADMIN — ENOXAPARIN SODIUM 40 MG: 40 INJECTION SUBCUTANEOUS at 08:53

## 2020-08-25 RX ADMIN — CLONAZEPAM 0.5 MG: 0.5 TABLET ORAL at 21:38

## 2020-08-25 RX ADMIN — ALBUTEROL SULFATE 2.5 MG: 2.5 SOLUTION RESPIRATORY (INHALATION) at 08:07

## 2020-08-25 RX ADMIN — PIPERACILLIN SODIUM AND TAZOBACTAM SODIUM 4.5 G: 4; .5 INJECTION, POWDER, LYOPHILIZED, FOR SOLUTION INTRAVENOUS at 02:49

## 2020-08-25 RX ADMIN — AMLODIPINE BESYLATE 5 MG: 5 TABLET ORAL at 08:53

## 2020-08-25 RX ADMIN — Medication 5 ML: at 06:00

## 2020-08-25 RX ADMIN — CLONAZEPAM 0.5 MG: 0.5 TABLET ORAL at 14:19

## 2020-08-25 NOTE — PROGRESS NOTES
Matthew Drummond  Admission Date: 8/23/2020             Daily Progress Note: 8/25/2020   Patient is a 60 y.o. female presents with SOB, fevers, and cough. She was recently here for [facility] (7-1-7/20), got treated and discharged home. She came with [facility] (8/6-8/13) and was discharged home. We followed her on both admissions. Incidentally on UDS +amphetamine, this also showed on admission on 8/6. She denies any methamphetamine usage and states she has taken sudafed for congestion PTA. Previous admissions, speech followed r/t aspiration noted in MBS, but this resolved with therapy and she was cleared for PO.       RLL PNA. She was treated with vancomycin (has since been d/c'd) and zosyn. She was admitted again. She has a further PMHx of COPD (symbicort and proair at home also wears chronic O2 4-6 L NC since July 2020 Admission), previous aspiration PNA, recent COVID, former smoker, anxiety disorder, and DM type II. Family members continue to smoke, but she states they smoke outside. She had a CT done on 8/23, which showed emphysema, a round pulmonary nodule in the posterior aspect of the left upper lobe measuring 1.5 x 1.5 cm (1.4 cm on previous exam). Other findings in CT suggestive of atypical PNA. On admission, Procal 0.12, LA 1.6, and WBC 24.5. She reported fevers and SOB. She was not re-tested for COVID.      Afebrile, WBC is down to 16.1 today with zosyn. She remains on 4 LPM via NC, which is her home dose. Blood and sputum cultures pending. She reports that she does not use home inhalers of any kind, even though it is reported that she does. She was d/c home with symbicort and proair. Started on steroids. Cont zosyn for PNA treatment.      Subjective: On 3 lpm 96%. Chest ct completed with extensive densities in RLL. Anxious, asking when she can go home. Uses klonopin o/p and not ordered here.     Review of Systems  Constitutional: negative for fevers and chills  Respiratory: positive for cough or dyspnea on exertion  Cardiovascular: negative for chest pain, chest pressure/discomfort, irregular heart beats, near-syncope    Current Facility-Administered Medications   Medication Dose Route Frequency    predniSONE (DELTASONE) tablet 40 mg  40 mg Oral DAILY WITH BREAKFAST    sodium chloride 3% hypertonic nebulizer soln  4 mL Nebulization BID RT    sodium chloride (NS) flush 5-40 mL  5-40 mL IntraVENous Q8H    sodium chloride (NS) flush 5-40 mL  5-40 mL IntraVENous PRN    acetaminophen (TYLENOL) tablet 650 mg  650 mg Oral Q6H PRN    Or    acetaminophen (TYLENOL) suppository 650 mg  650 mg Rectal Q6H PRN    polyethylene glycol (MIRALAX) packet 17 g  17 g Oral DAILY PRN    promethazine (PHENERGAN) tablet 12.5 mg  12.5 mg Oral Q6H PRN    Or    ondansetron (ZOFRAN) injection 4 mg  4 mg IntraVENous Q6H PRN    enoxaparin (LOVENOX) injection 40 mg  40 mg SubCUTAneous DAILY    amLODIPine (NORVASC) tablet 5 mg  5 mg Oral DAILY    budesonide-formoteroL (SYMBICORT) 160-4.5 mcg/actuation HFA inhaler 2 Puff  2 Puff Inhalation BID RT    albuterol (PROVENTIL VENTOLIN) nebulizer solution 2.5 mg  2.5 mg Nebulization Q4HWA RT    insulin lispro (HUMALOG) injection 0-10 Units  0-10 Units SubCUTAneous AC&HS    piperacillin-tazobactam (ZOSYN) 4.5 g in 0.9% sodium chloride (MBP/ADV) 100 mL  4.5 g IntraVENous Q8H    HYDROcodone-acetaminophen (NORCO) 5-325 mg per tablet 1 Tab  1 Tab Oral Q6H PRN    morphine injection 2 mg  2 mg IntraVENous Q3H PRN         Objective:     Vitals:    08/25/20 0352 08/25/20 0733 08/25/20 0807 08/25/20 1255   BP: 152/73 145/81  (P) 126/52   Pulse: 100 89  (P) 96   Resp: 20 17  (P) 17   Temp: 98.6 °F (37 °C) 98.2 °F (36.8 °C)  (P) 98.9 °F (37.2 °C)   SpO2: 93% 93% 96% (P) 96%   Weight:       Height:         Intake and Output:   08/23 1901 - 08/25 0700  In: 490 [P.O.:490]  Out: -   08/25 0701 - 08/25 1900  In: 360 [P.O.:360]  Out: -       Intake/Output Summary (Last 24 hours) at 8/25/2020 1333  Last data filed at 8/25/2020 6697  Gross per 24 hour   Intake 360 ml   Output    Net 360 ml       Physical Exam:          Constitutional: the patient is well develop  HEENT: Sclera clear, pupils equal, oral mucosa moist  Lungs: nonproductive, harsh cough, decreased on right on 4 lpm  Cardiovascular: RRR without M,G,R  Abd/GI: soft and non-tender; with positive bowel sounds. Ext: warm without cyanosis. There is no lower leg edema. Musculoskeletal: moves all four extremities with equal strength  Skin: no jaundice or rashes, no wounds   Neuro: anxious, jittery      Lines/Drains: PIV  Nutrition: ADA      LAB  Recent Labs     08/24/20  0605 08/23/20  0848   WBC 16.1* 24.5*   HGB 11.0* 13.7   HCT 36.0 44.8    301     Recent Labs     08/24/20  0605 08/23/20  0848    136   K 3.7 3.8    101   CO2 27 27   * 105*   BUN 10 21   CREA 0.40* 0.98                 Sputum- 8/23.  MRSA negative  Special Requests:  NO SPECIAL REQUESTS     Preliminary    GRAM STAIN      Preliminary    WBCS SEEN TOO NUMEROUS TO COUNT    GRAM STAIN  NO EPITHELIAL CELLS SEEN     Preliminary    GRAM STAIN  MANY GRAM POSITIVE COCCI     Preliminary    GRAM STAIN  4+ MUCUS PRESENT     Preliminary    Culture result: Abnormal        Preliminary    HEAVY STAPHYLOCOCCUS AUREUS SUBCULTURE IN PROGRESS          Assessment:     Hospital Problems  Date Reviewed: 8/12/2020          Codes Class Noted POA    Emphyse[facility] lung ([facility] (Chronic) ICD-10-CM: J43.9  ICD-9-CM: 492.8  8/25/2020 Yes        Staphylococcal pneumonia ([facility] ICD-10-CM: J15.20  ICD-9-CM: 482.40  8/25/2020 Unknown        HTN (hypertension) (Chronic) ICD-10-CM: I10  ICD-9-CM: 401.9  8/23/2020 Yes        Chronic respiratory failure ([facility] (Chronic) ICD-10-CM: J96.10  ICD-9-CM: 518.83  8/12/2020 Yes    Overview Signed 8/12/2020 11:58 AM by Sung Burch, MAJOR     Home O2 4-6L NC             * (Principal) Sepsis [facility]) ICD-10-CM: A41.9  ICD-9-CM: 038.9, 995.91  7/1/2020 Yes              PLAN:     She is on zosyn, staph in sputum, MRSA negative. AFB and fungal pending.   --if fails to improve may need BAL  Wean Oxygen as able  Continue nebs (albuterol and pulmicort) she has emphysema   Cessation of methaphetamines needed  On prednisone 40 mg daily  Add klonopin PRN  --Significant RLL desenities with spiculated nodule on chest CT. Treat for PNA. Will need follow up imaging for resolution. Pulmonary nodule follow up and may need PET scan/biopsy. Osmin Fonseca NP-C    More than 50% of time documented was spent in face-to-face contact with the patient and in the care of the patient on the floor/unit where the patient is located. Lungs:  Clear, 2L  Heart:  RRR with no Murmur/Rubs/Gallops    Additional Comments: Improved, pending staph in sputum. Check AFB, Fungal sputum if able to produce. Seems improved though. If get susceptibilities tomorrow may be able to d/c with oral antibiotics. Feels better and asking about going home. I have spoken with and examined the patient. I agree with the above assessment and plan as documented.     Andre Mckenzie MD

## 2020-08-25 NOTE — PROGRESS NOTES
Shift assessment complete. A&OX4. Lungs have crackles on auscultation. Respirations present. Even and unlabored. No s/s of distress. Zero c/o pain at this time. Call light within reach. Encouraged patient to call for assistance. Patient verbalizes understanding. See Doc Flowsheet for assessment details. Patient resting in bed. Bed alarm in progress. Will continue to monitor.

## 2020-08-25 NOTE — PROGRESS NOTES
Hospitalist Note     Admit Date:  2020  8:36 AM   Name:  Joseph Graham   Age:  60 y.o.  :  1955   MRN:  [id number]   PCP:  None  Treatment Team: Attending Provider: Sally Mello MD; Consulting Provider: Ami Medellin MD; Consulting Provider: Camille Cardenas MD; Primary Nurse: Jenny Tolbert; Utilization Review: Conor Robledo; Care Manager: Court Oakley.; Primary Nurse: Mercy Felipe    HPI/Subjective:   Patient is a 59yo F with a history of COPD, recent COVID infection, subsequent hospitalization for HCAP who was admitted for sepsis due to pneumonia. UDS again positive for amphetamines - patient insists this is due to Sudafed. She was recently here for [facility] (-), got treated and discharged home. Yolanda Veloz came with HCAP (-) and was discharged home. She had a CT done on , which showed emphysema, a round pulmonary nodule in the posterior aspect of the left upper lobe measuring 1.5 x 1.5 cm (1.4 cm on previous exam). Other findings in CT suggestive of atypical PNA.  On admission, Procal 0.12, LA 1.6, and WBC 24.5. She reported fevers and SOB. She was not re-tested for COVID.       Patient seen and examined at bedside in no acute distress. Patient reports no new complaints at this time. She reports continued cough. Patient denies any chest pain or shortness of breath.     Objective:     Patient Vitals for the past 24 hrs:   Temp Pulse Resp BP SpO2   20 1630 97.7 °F (36.5 °C) 99 21 171/77 96 %   20 1255 98.9 °F (37.2 °C) 96 17 126/52 96 %   20 0807     96 %   20 0733 98.2 °F (36.8 °C) 89 17 145/81 93 %   20 0352 98.6 °F (37 °C) 100 20 152/73 93 %   20 2336 98.4 °F (36.9 °C) 99 20 149/71 94 %   207     94 %   20 98.8 °F (37.1 °C) 100 20 153/79 91 %     Oxygen Therapy  O2 Sat (%): 96 % (20 1630)  Pulse via Oximetry: 88 beats per minute (20 08)  O2 Device: Nasal cannula (20)  O2 Flow Rate (L/min): 3 l/min(reduced from 4 L. ) (08/25/20 0807)    Estimated body mass index is 25.69 kg/m² as calculated from the following:    Height as of this encounter: 5' 3\" (1.6 m). Weight as of this encounter: 65.8 kg (145 lb). Intake/Output Summary (Last 24 hours) at 8/25/2020 1711  Last data filed at 8/25/2020 9247  Gross per 24 hour   Intake 360 ml   Output    Net 360 ml       *Note that automatically entered I/Os may not be accurate; dependent on patient compliance with collection and accurate  by techs. General:    Well nourished. Alert. CV:   RRR. No murmur, rub, or gallop. Lungs:   Decreased breath sounds bilaterally  Abdomen:   Soft, nontender, nondistended. Extremities: Warm and dry. No cyanosis or edema. Skin:     No rashes or jaundice.    Neuro:  No gross focal deficits    Data Review:  I have reviewed all labs, meds, and studies from the last 24 hours:    Recent Results (from the past 24 hour(s))   GLUCOSE, POC    Collection Time: 08/24/20  9:34 PM   Result Value Ref Range    Glucose (POC) 458 (HH) 65 - 100 mg/dL   GLUCOSE, POC    Collection Time: 08/25/20  7:29 AM   Result Value Ref Range    Glucose (POC) 113 (H) 65 - 100 mg/dL   GLUCOSE, POC    Collection Time: 08/25/20 11:31 AM   Result Value Ref Range    Glucose (POC) 108 (H) 65 - 100 mg/dL   GLUCOSE, POC    Collection Time: 08/25/20  4:26 PM   Result Value Ref Range    Glucose (POC) 193 (H) 65 - 100 mg/dL        All Micro Results     Procedure Component Value Units Date/Time    AFB CULTURE + SMEAR W/RFLX ID FROM CULTURE [[id number]]     Order Status:  [facility] CULTURE AND SMEAR [[id number]]     Order Status:  Sent Specimen:  Other     CULTURE, BLOOD [[id number]] Collected:  08/23/20 1030    Order Status:  Completed Specimen:  Blood Updated:  08/25/20 0912     Special Requests: --        RIGHT  HAND       Culture result: NO GROWTH 2 DAYS       CULTURE, BLOOD [[id number]] Collected:  08/23/20 0848    Order Status:  Completed Specimen:  Blood Updated:  08/25/20 0912     Special Requests: --        LEFT  Antecubital       Culture result: NO GROWTH 2 DAYS       CULTURE, RESPIRATORY/SPUTUM/BRONCH Orpah Reggie STAIN [[id number]]  (Abnormal) Collected:  08/23/20 2248    Order Status:  Completed Specimen:  Sputum Updated:  08/25/20 0802     Special Requests: NO SPECIAL REQUESTS        GRAM STAIN       WBCS SEEN TOO NUMEROUS TO COUNT            NO EPITHELIAL CELLS SEEN         MANY GRAM POSITIVE COCCI         4+ MUCUS PRESENT        Culture result:       HEAVY STAPHYLOCOCCUS AUREUS SUBCULTURE IN PROGRESS                  MODERATE NORMAL RESPIRATORY MICHAEL          AFB CULTURE + SMEAR W/RFLX ID FROM CULTURE [[id number]]     Order Status:  [facility] CULTURE AND SMEAR [[id number]]     Order Status:  Canceled Specimen:  Other     MSSA/MRSA SC BY PCR, NASAL SWAB [[id number]] Collected:  08/23/20 1720    Order Status:  Completed Specimen:  Nasal swab Updated:  08/23/20 2059     Special Requests: NO SPECIAL REQUESTS        Culture result:       SA target not detected. A MRSA NEGATIVE, SA NEGATIVE test result does not preclude MRSA or SA nasal colonization. CULTURE, BLOOD, PAIRED [[id number]]     Order Status:  Canceled Specimen:  Blood           No results found for this visit on 08/23/20.     Current Meds:  Current Facility-Administered Medications   Medication Dose Route Frequency    clonazePAM (KlonoPIN) tablet 0.5 mg  0.5 mg Oral BID PRN    predniSONE (DELTASONE) tablet 40 mg  40 mg Oral DAILY WITH BREAKFAST    sodium chloride 3% hypertonic nebulizer soln  4 mL Nebulization BID RT    sodium chloride (NS) flush 5-40 mL  5-40 mL IntraVENous Q8H    sodium chloride (NS) flush 5-40 mL  5-40 mL IntraVENous PRN    acetaminophen (TYLENOL) tablet 650 mg  650 mg Oral Q6H PRN    Or    acetaminophen (TYLENOL) suppository 650 mg  650 mg Rectal Q6H PRN    polyethylene glycol (MIRALAX) packet 17 g 17 g Oral DAILY PRN    promethazine (PHENERGAN) tablet 12.5 mg  12.5 mg Oral Q6H PRN    Or    ondansetron (ZOFRAN) injection 4 mg  4 mg IntraVENous Q6H PRN    enoxaparin (LOVENOX) injection 40 mg  40 mg SubCUTAneous DAILY    amLODIPine (NORVASC) tablet 5 mg  5 mg Oral DAILY    budesonide-formoteroL (SYMBICORT) 160-4.5 mcg/actuation HFA inhaler 2 Puff  2 Puff Inhalation BID RT    albuterol (PROVENTIL VENTOLIN) nebulizer solution 2.5 mg  2.5 mg Nebulization Q4HWA RT    insulin lispro (HUMALOG) injection 0-10 Units  0-10 Units SubCUTAneous AC&HS    piperacillin-tazobactam (ZOSYN) 4.5 g in 0.9% sodium chloride (MBP/ADV) 100 mL  4.5 g IntraVENous Q8H    HYDROcodone-acetaminophen (NORCO) 5-325 mg per tablet 1 Tab  1 Tab Oral Q6H PRN    morphine injection 2 mg  2 mg IntraVENous Q3H PRN       Other Studies (last 24 hours):  No results found. Assessment and Plan:     Hospital Problems as of 8/25/2020 Date Reviewed: 8/12/2020          Codes Class Noted - Resolved POA    Emphyse[facility] lung ([facility] (Chronic) ICD-10-CM: J43.9  ICD-9-CM: 492.8  8/25/2020 - Present Yes        Staphylococcal pneumonia ([facility] ICD-10-CM: J15.20  ICD-9-CM: 482.40  8/25/2020 - Present Unknown        HTN (hypertension) (Chronic) ICD-10-CM: I10  ICD-9-CM: 401.9  8/23/2020 - Present Yes        Chronic respiratory failure ([facility] (Chronic) ICD-10-CM: J96.10  ICD-9-CM: 518.83  8/12/2020 - Present Yes    Overview Signed 8/12/2020 11:58 AM by Winsome Garcia NP     Home O2 4-6L NC             * (Principal) Sepsis [facility]) ICD-10-CM: A41.9  ICD-9-CM: 038.9, 995.91  7/1/2020 - Present Yes              Sepsis/HCAP/staphylococcal pneumonia  Sepsis on admission criteria met: Fever+ elevated WBC+ tachycardia  WBC: 24.516.1  Lactic acid:  Within normal limits  Procalcitonin: 0.12  CXR: Interstitial densities in the right mid and lower lung  CT chest: Concerning for pneumonia+ 1.5 cm spiculated pulmonary nodule+ emphysema  UA: Negative for signs of UTI  Suspected source pulmonary  Sputum culture: Heavy Staphylococcus aureus    ABX #3  Zosyn 8/23  Vancomycin 8/23    Plan:  -DuoNebs every 6  -Continue Zosyn  -Flutter valve  -Follow-up pulmonary recommendations    Chronic respiratory failure/emphysema/COPD exacerbation  Baseline oxygen requirements: 3 to 4 L nasal cannula  CT chest with contrast: Signs of emphysema    Plan:  -Maintain O2 saturation>88%    Hypertension  BP intermittently elevated    Plan:  -Increase amlodipine to 10 mg daily  -Hydralazine PRN    Diabetes mellitus  -Hold home medications  -POC before meals and at bedtime  -Insulin sliding scale    DC planning/Dispo: Home with home health in next 24 to 48 hours      Diet:  DIET DIABETIC CONSISTENT CARB  DVT ppx: Lovenox    Signed:  Sara Liu MD

## 2020-08-25 NOTE — PROGRESS NOTES
Problem: Pressure Injury - Risk of  Goal: *Prevention of pressure injury  Description: Document Dale Scale and appropriate interventions in the flowsheet.   Note: Pressure Injury Interventions:  Sensory Interventions: Assess changes in LOC         Activity Interventions: Increase time out of bed    Mobility Interventions: Pressure redistribution bed/mattress (bed type)    Nutrition Interventions: Document food/fluid/supplement intake

## 2020-08-26 ENCOUNTER — HOME CARE VISIT (OUTPATIENT)
Dept: HOME HEALTH SERVICES | Facility: HOME HEALTH | Age: 65
End: 2020-08-26
Payer: MEDICARE

## 2020-08-26 VITALS
OXYGEN SATURATION: 93 % | TEMPERATURE: 98.2 F | HEIGHT: 63 IN | RESPIRATION RATE: 18 BRPM | BODY MASS INDEX: 25.69 KG/M2 | HEART RATE: 104 BPM | WEIGHT: 145 LBS | DIASTOLIC BLOOD PRESSURE: 73 MMHG | SYSTOLIC BLOOD PRESSURE: 136 MMHG

## 2020-08-26 LAB
ANION GAP SERPL CALC-SCNC: 7 MMOL/L (ref 7–16)
BASOPHILS # BLD: 0.1 K/UL (ref 0–0.2)
BASOPHILS NFR BLD: 1 % (ref 0–2)
BUN SERPL-MCNC: 21 MG/DL (ref 8–23)
CALCIUM SERPL-MCNC: 8.6 MG/DL (ref 8.3–10.4)
CHLORIDE SERPL-SCNC: 110 MMOL/L (ref 98–107)
CO2 SERPL-SCNC: 27 MMOL/L (ref 21–32)
CREAT SERPL-MCNC: 0.44 MG/DL (ref 0.6–1)
DIFFERENTIAL METHOD BLD: ABNORMAL
EOSINOPHIL # BLD: 0.4 K/UL (ref 0–0.8)
EOSINOPHIL NFR BLD: 3 % (ref 0.5–7.8)
ERYTHROCYTE [DISTWIDTH] IN BLOOD BY AUTOMATED COUNT: 17.2 % (ref 11.9–14.6)
GLUCOSE BLD STRIP.AUTO-MCNC: 130 MG/DL (ref 65–100)
GLUCOSE BLD STRIP.AUTO-MCNC: 246 MG/DL (ref 65–100)
GLUCOSE BLD STRIP.AUTO-MCNC: 93 MG/DL (ref 65–100)
GLUCOSE SERPL-MCNC: 88 MG/DL (ref 65–100)
HCT VFR BLD AUTO: 35.4 % (ref 35.8–46.3)
HGB BLD-MCNC: 10.7 G/DL (ref 11.7–15.4)
IMM GRANULOCYTES # BLD AUTO: 0.6 K/UL (ref 0–0.5)
IMM GRANULOCYTES NFR BLD AUTO: 4 % (ref 0–5)
LYMPHOCYTES # BLD: 2.9 K/UL (ref 0.5–4.6)
LYMPHOCYTES NFR BLD: 20 % (ref 13–44)
MCH RBC QN AUTO: 27.9 PG (ref 26.1–32.9)
MCHC RBC AUTO-ENTMCNC: 30.2 G/DL (ref 31.4–35)
MCV RBC AUTO: 92.4 FL (ref 79.6–97.8)
MONOCYTES # BLD: 1 K/UL (ref 0.1–1.3)
MONOCYTES NFR BLD: 7 % (ref 4–12)
NEUTS SEG # BLD: 9.5 K/UL (ref 1.7–8.2)
NEUTS SEG NFR BLD: 66 % (ref 43–78)
NRBC # BLD: 0 K/UL (ref 0–0.2)
PLATELET # BLD AUTO: 263 K/UL (ref 150–450)
PMV BLD AUTO: 10.1 FL (ref 9.4–12.3)
POTASSIUM SERPL-SCNC: 3.6 MMOL/L (ref 3.5–5.1)
RBC # BLD AUTO: 3.83 M/UL (ref 4.05–5.2)
SODIUM SERPL-SCNC: 144 MMOL/L (ref 136–145)
WBC # BLD AUTO: 14.5 K/UL (ref 4.3–11.1)

## 2020-08-26 PROCEDURE — 74011000250 HC RX REV CODE- 250: Performed by: INTERNAL MEDICINE

## 2020-08-26 PROCEDURE — 80048 BASIC METABOLIC PNL TOTAL CA: CPT

## 2020-08-26 PROCEDURE — 99232 SBSQ HOSP IP/OBS MODERATE 35: CPT | Performed by: INTERNAL MEDICINE

## 2020-08-26 PROCEDURE — 82962 GLUCOSE BLOOD TEST: CPT

## 2020-08-26 PROCEDURE — 74011250636 HC RX REV CODE- 250/636: Performed by: INTERNAL MEDICINE

## 2020-08-26 PROCEDURE — 74011250637 HC RX REV CODE- 250/637: Performed by: NURSE PRACTITIONER

## 2020-08-26 PROCEDURE — 94760 N-INVAS EAR/PLS OXIMETRY 1: CPT

## 2020-08-26 PROCEDURE — 74011000258 HC RX REV CODE- 258: Performed by: INTERNAL MEDICINE

## 2020-08-26 PROCEDURE — 3331090002 HH PPS REVENUE DEBIT

## 2020-08-26 PROCEDURE — 77010033678 HC OXYGEN DAILY

## 2020-08-26 PROCEDURE — 74011250637 HC RX REV CODE- 250/637: Performed by: INTERNAL MEDICINE

## 2020-08-26 PROCEDURE — 74011636637 HC RX REV CODE- 636/637: Performed by: INTERNAL MEDICINE

## 2020-08-26 PROCEDURE — 3331090001 HH PPS REVENUE CREDIT

## 2020-08-26 PROCEDURE — 85025 COMPLETE CBC W/AUTO DIFF WBC: CPT

## 2020-08-26 PROCEDURE — 36415 COLL VENOUS BLD VENIPUNCTURE: CPT

## 2020-08-26 PROCEDURE — 94640 AIRWAY INHALATION TREATMENT: CPT

## 2020-08-26 RX ORDER — ALBUTEROL SULFATE 90 UG/1
2 AEROSOL, METERED RESPIRATORY (INHALATION)
Qty: 1 INHALER | Refills: 0 | Status: SHIPPED | OUTPATIENT
Start: 2020-08-26 | End: 2020-09-23 | Stop reason: SDUPTHER

## 2020-08-26 RX ORDER — BUDESONIDE AND FORMOTEROL FUMARATE DIHYDRATE 160; 4.5 UG/1; UG/1
2 AEROSOL RESPIRATORY (INHALATION) 2 TIMES DAILY
Qty: 1 INHALER | Refills: 0 | Status: SHIPPED | OUTPATIENT
Start: 2020-08-26 | End: 2020-09-23 | Stop reason: SDUPTHER

## 2020-08-26 RX ORDER — AMLODIPINE BESYLATE 10 MG/1
10 TABLET ORAL DAILY
Qty: 30 TAB | Refills: 0 | Status: SHIPPED | OUTPATIENT
Start: 2020-08-26 | End: 2020-09-25

## 2020-08-26 RX ORDER — PREDNISONE 20 MG/1
40 TABLET ORAL
Qty: 4 TAB | Refills: 0 | Status: SHIPPED | OUTPATIENT
Start: 2020-08-27 | End: 2020-08-29

## 2020-08-26 RX ORDER — AMOXICILLIN AND CLAVULANATE POTASSIUM 875; 125 MG/1; MG/1
1 TABLET, FILM COATED ORAL EVERY 12 HOURS
Qty: 12 TAB | Refills: 0 | Status: SHIPPED | OUTPATIENT
Start: 2020-08-26 | End: 2020-08-27

## 2020-08-26 RX ADMIN — HYDROCODONE BITARTRATE AND ACETAMINOPHEN 1 TABLET: 5; 325 TABLET ORAL at 15:55

## 2020-08-26 RX ADMIN — PREDNISONE 40 MG: 20 TABLET ORAL at 09:16

## 2020-08-26 RX ADMIN — IPRATROPIUM BROMIDE AND ALBUTEROL SULFATE 3 ML: .5; 3 SOLUTION RESPIRATORY (INHALATION) at 09:30

## 2020-08-26 RX ADMIN — INSULIN LISPRO 4 UNITS: 100 INJECTION, SOLUTION INTRAVENOUS; SUBCUTANEOUS at 16:30

## 2020-08-26 RX ADMIN — ENOXAPARIN SODIUM 40 MG: 40 INJECTION SUBCUTANEOUS at 08:26

## 2020-08-26 RX ADMIN — Medication 5 ML: at 05:24

## 2020-08-26 RX ADMIN — IPRATROPIUM BROMIDE AND ALBUTEROL SULFATE 3 ML: .5; 3 SOLUTION RESPIRATORY (INHALATION) at 14:56

## 2020-08-26 RX ADMIN — IPRATROPIUM BROMIDE AND ALBUTEROL SULFATE 3 ML: .5; 3 SOLUTION RESPIRATORY (INHALATION) at 02:09

## 2020-08-26 RX ADMIN — PIPERACILLIN SODIUM AND TAZOBACTAM SODIUM 4.5 G: 4; .5 INJECTION, POWDER, LYOPHILIZED, FOR SOLUTION INTRAVENOUS at 01:51

## 2020-08-26 RX ADMIN — HYDROCODONE BITARTRATE AND ACETAMINOPHEN 1 TABLET: 5; 325 TABLET ORAL at 08:25

## 2020-08-26 RX ADMIN — SODIUM CHLORIDE SOLN NEBU 3% 4 ML: 3 NEBU SOLN at 09:30

## 2020-08-26 RX ADMIN — PIPERACILLIN SODIUM AND TAZOBACTAM SODIUM 4.5 G: 4; .5 INJECTION, POWDER, LYOPHILIZED, FOR SOLUTION INTRAVENOUS at 09:17

## 2020-08-26 RX ADMIN — CLONAZEPAM 0.5 MG: 0.5 TABLET ORAL at 12:05

## 2020-08-26 RX ADMIN — AMLODIPINE BESYLATE 10 MG: 10 TABLET ORAL at 08:26

## 2020-08-26 RX ADMIN — BUDESONIDE AND FORMOTEROL FUMARATE DIHYDRATE 2 PUFF: 160; 4.5 AEROSOL RESPIRATORY (INHALATION) at 09:30

## 2020-08-26 NOTE — PROGRESS NOTES
Oxygen Qualifier       Room air: SpO2 with O2 and liter flow   Resting SpO2  89% 93% on 1 L   Ambulating SpO2  86%  87% on 1 L, 89% on 2 L, 93% on 3 L.          Completed by:    Demetris Dowell, RT

## 2020-08-26 NOTE — DISCHARGE INSTRUCTIONS
DISCHARGE SUMMARY from Nurse    PATIENT INSTRUCTIONS:    After general anesthesia or intravenous sedation, for 24 hours or while taking prescription Narcotics:  · Limit your activities  · Do not drive and operate hazardous machinery  · Do not make important personal or business decisions  · Do  not drink alcoholic beverages  · If you have not urinated within 8 hours after discharge, please contact your surgeon on call. What to do at Home:  Recommended activity: Activity as tolerated. Oxygen use: 1L at rest, 3L with exertion    If you experience any of the following symptoms temp > 101.5, worsening cough or wheezing, shortness of breath or fatigue not relieved with rest, please follow up with MD.    *  Please give a list of your current medications to your Primary Care Provider. *  Please update this list whenever your medications are discontinued, doses are      changed, or new medications (including over-the-counter products) are added. *  Please carry medication information at all times in case of emergency situations. These are general instructions for a healthy lifestyle:    No smoking/ No tobacco products/ Avoid exposure to second hand smoke  Surgeon General's Warning:  Quitting smoking now greatly reduces serious risk to your health. Obesity, smoking, and sedentary lifestyle greatly increases your risk for illness    A healthy diet, regular physical exercise & weight monitoring are important for maintaining a healthy lifestyle    You may be retaining fluid if you have a history of heart failure or if you experience any of the following symptoms:  Weight gain of 3 pounds or more overnight or 5 pounds in a week, increased swelling in our hands or feet or shortness of breath while lying flat in bed. Please call your doctor as soon as you notice any of these symptoms; do not wait until your next office visit. The discharge information has been reviewed with the patient.   The patient verbalized understanding. Discharge medications reviewed with the patient and appropriate educational materials and side effects teaching were provided. Patient Education        Pneumonia: Care Instructions  Your Care Instructions     Pneumonia is an infection of the lungs. Most cases are caused by infections from bacteria or viruses. Pneumonia may be mild or very severe. If it is caused by bacteria, you will be treated with antibiotics. It may take a few weeks to a few months to recover fully from pneumonia, depending on how sick you were and whether your overall health is good. Follow-up care is a key part of your treatment and safety. Be sure to make and go to all appointments, and call your doctor if you are having problems. It's also a good idea to know your test results and keep a list of the medicines you take. How can you care for yourself at home? · Take your antibiotics exactly as directed. Do not stop taking the medicine just because you are feeling better. You need to take the full course of antibiotics. · Take your medicines exactly as prescribed. Call your doctor if you think you are having a problem with your medicine. · Get plenty of rest and sleep. You may feel weak and tired for a while, but your energy level will improve with time. · To prevent dehydration, drink plenty of fluids, enough so that your urine is light yellow or clear like water. Choose water and other caffeine-free clear liquids until you feel better. If you have kidney, heart, or liver disease and have to limit fluids, talk with your doctor before you increase the amount of fluids you drink. · Take care of your cough so you can rest. A cough that brings up mucus from your lungs is common with pneumonia. It is one way your body gets rid of the infection. But if coughing keeps you from resting or causes severe fatigue and chest-wall pain, talk to your doctor.  He or she may suggest that you take a medicine to reduce the cough.  · Use a vaporizer or humidifier to add moisture to your bedroom. Follow the directions for cleaning the machine. · Do not smoke or allow others to smoke around you. Smoke will make your cough last longer. If you need help quitting, talk to your doctor about stop-smoking programs and medicines. These can increase your chances of quitting for good. · Take an over-the-counter pain medicine, such as acetaminophen (Tylenol), ibuprofen (Advil, Motrin), or naproxen (Aleve). Read and follow all instructions on the label. · Do not take two or more pain medicines at the same time unless the doctor told you to. Many pain medicines have acetaminophen, which is Tylenol. Too much acetaminophen (Tylenol) can be harmful. · If you were given a spirometer to measure how well your lungs are working, use it as instructed. This can help your doctor tell how your recovery is going. · To prevent pneumonia in the future, talk to your doctor about getting a flu vaccine (once a year) and a pneumococcal vaccine (one time only for most people). When should you call for help? [id number] anytime you think you may need emergency care. For example, call if:  · You have severe trouble breathing. Call your doctor now or seek immediate medical care if:  · You cough up dark brown or bloody mucus (sputum). · You have new or worse trouble breathing. · You are dizzy or lightheaded, or you feel like you may faint. Watch closely for changes in your health, and be sure to contact your doctor if:  · You have a new or higher fever. · You are coughing more deeply or more often. · You are not getting better after 2 days (48 hours). · You do not get better as expected. Where can you learn more? Go to http://www.[employer].com/  Enter D336 in the search box to learn more about \"Pneumonia: Care Instructions. \"  Current as of: February 24, 2020               Content Version: 12.5  © 8119-5676 Healthwise, Incorporated.    Care instructions adapted under license by [employer] (which disclaims liability or warranty for this information). If you have questions about a medical condition or this instruction, always ask your healthcare professional. [address] any warranty or liability for your use of this information. Patient Education        Learning About Emphysema  What is emphysema? Emphysema is damage to the air sacs in your lungs. In a healthy person, the tiny air sacs in the lungs are like balloons. As you breathe in and out, they get bigger and smaller to move air through your lungs. With emphysema, these air sacs lose their stretch. Less oxygen gets into your blood and you feel short of breath. Emphysema is a form of COPD (chronic obstructive pulmonary disease). Emphysema is usually caused by smoking. But chemical fumes, dust, or air pollution also can cause it over time. People who get it in their 35s or 45s may have a disorder that runs in families, called alpha-1 antitrypsin deficiency. But this is rare. What can you expect when you have emphysema? Emphysema gets worse over time. You cannot undo the damage to your lungs. Over time, you may find that:  · You get short of breath even when you do simple things like get dressed or fix a meal.  · It is hard to eat or exercise. · You lose weight and feel weaker. But there are things you can do to prevent more damage and feel better. What are the symptoms? The main symptoms of emphysema are:  · A cough that will not go away. · Mucus that comes up when you cough. · Shortness of breath that gets worse when you exercise. At times, your symptoms may suddenly flare up and get much worse. This is a called an exacerbation (say \"egg-ZASS-er-BAY-shun\"). When this happens, your usual symptoms quickly get worse and stay bad. This can be dangerous. You may have to go to the hospital.  How can you keep emphysema from getting worse?   Don't smoke. That is the best way to keep emphysema from getting worse. If you already smoke, it is never too late to stop. If you need help quitting, talk to your doctor about stop-smoking programs and medicines. These can increase your chances of quitting for good. You can do other things to keep emphysema from getting worse:  · Avoid bad air. Air pollution, chemical fumes, and dust also can make emphysema worse. · Get a flu shot every year. A shot may keep the flu from turning into something more serious, like pneumonia. A flu shot also may lower your chances of having a flare-up. · Get a pneumococcal shot. A shot can prevent some of the serious complications of pneumonia. Ask your doctor how often you should get this shot. How is emphysema treated? Emphysema is treated with medicines and oxygen. You also can take steps at home to stay healthy and keep your condition from getting worse. Medicines and oxygen therapy  · You may be taking medicines such as:  ? Bronchodilators. These help open your airways and make breathing easier. Bronchodilators are either short-acting (work for 6 to 9 hours) or long-acting (work for 24 hours). You inhale most bronchodilators, so they start to act quickly. Always carry your quick-relief inhaler with you in case you need it while you are away from home. ? Corticosteroids. These reduce airway inflammation. They come in pill or inhaled form. You must take these medicines every day for them to work well. ? Antibiotics. These medicines are used when you have a bacterial lung infection. · Take your medicines exactly as prescribed. Call your doctor if you think you are having a problem with your medicine. · Oxygen therapy boosts the amount of oxygen in your blood and helps you breathe easier. Use the flow rate your doctor has recommended, and do not change it without talking to your doctor first.  Other care at home  · If your doctor recommends it, get more exercise.  Walking is a good choice. Bit by bit, increase the amount you walk every day. Try for at least 30 minutes on most days of the week. · Learn breathing methods--such as breathing through pursed lips--to help you become less short of breath. · If your doctor has not set you up with a pulmonary rehabilitation program, talk to him or her about whether rehab is right for you. Rehab includes exercise programs, education about your disease and how to manage it, help with diet and other changes, and emotional support. · Eat regular, healthy meals. Use bronchodilators about 1 hour before you eat to make it easier to eat. Eat several small meals instead of three large ones. Drink beverages at the end of the meal. Avoid foods that are hard to chew. Follow-up care is a key part of your treatment and safety. Be sure to make and go to all appointments, and call your doctor if you are having problems. It's also a good idea to know your test results and keep a list of the medicines you take. Where can you learn more? Go to http://www.[employer].com/  Enter L488 in the search box to learn more about \"Learning About Emphysema. \"  Current as of: February 24, 2020               Content Version: 12.5  © 1871-9061 Healthwise, Incorporated. Care instructions adapted under license by [employer] (which disclaims liability or warranty for this information). If you have questions about a medical condition or this instruction, always ask your healthcare professional. [address] any warranty or liability for your use of this information.          ___________________________________________________________________________________________________________________________________

## 2020-08-26 NOTE — PROGRESS NOTES
Discharge instructions, follow up information, medication list, and prescriptions provided and explained to the pt. IV removed from left forearm and right wrist, no remote telemetry on. Opportunity for questions provided. Patient states daughter is coming to transport patient home and is bringing portable tank from home. Instructed to call once ready to leave.

## 2020-08-26 NOTE — PROGRESS NOTES
Bryon Wong  Admission Date: 8/23/2020             Daily Progress Note: 8/26/2020   Patient is a 60 y.o. female presents with SOB, fevers, and cough. She was recently here for Jimmy (7-1-7/20), got treated and discharged home. She came with HCAP (8/6-8/13) and was discharged home. We followed her on both admissions. Incidentally on UDS +amphetamine, this also showed on admission on 8/6. She denies any methamphetamine usage and states she has taken sudafed for congestion PTA. Previous admissions, speech followed r/t aspiration noted in MBS, but this resolved with therapy and she was cleared for PO.       RLL PNA. She was treated with vancomycin (has since been d/c'd) and zosyn. She was admitted again. She has a further PMHx of COPD (symbicort and proair at home also wears chronic O2 4-6 L NC since July 2020 Admission), previous aspiration PNA, recent COVID, former smoker, anxiety disorder, and DM type II. Family members continue to smoke, but she states they smoke outside. She had a CT done on 8/23, which showed emphysema, a round pulmonary nodule in the posterior aspect of the left upper lobe measuring 1.5 x 1.5 cm (1.4 cm on previous exam). Other findings in CT suggestive of atypical PNA. On admission, Procal 0.12, LA 1.6, and WBC 24.5. She reported fevers and SOB. She was not re-tested for COVID.      Afebrile, WBC is down to 16.1 today with zosyn. She remains on 4 LPM via NC, which is her home dose. Blood and sputum cultures pending. She reports that she does not use home inhalers of any kind, even though it is reported that she does. She was d/c home with symbicort and proair. Started on steroids. Cont zosyn for PNA treatment.      Subjective:     On 2 lpm 96%. Chest ct completed with extensive densities in RLL. Wants to go home. Calm today.      Review of Systems  Constitutional: negative for fevers and chills  Respiratory: positive for cough or dyspnea on exertion  Cardiovascular: negative for chest pain, chest pressure/discomfort, irregular heart beats, near-syncope    Current Facility-Administered Medications   Medication Dose Route Frequency    clonazePAM (KlonoPIN) tablet 0.5 mg  0.5 mg Oral BID PRN    albuterol-ipratropium (DUO-NEB) 2.5 MG-0.5 MG/3 ML  3 mL Nebulization Q6H RT    amLODIPine (NORVASC) tablet 10 mg  10 mg Oral DAILY    hydrALAZINE (APRESOLINE) 20 mg/mL injection 10 mg  10 mg IntraVENous Q6H PRN    predniSONE (DELTASONE) tablet 40 mg  40 mg Oral DAILY WITH BREAKFAST    sodium chloride 3% hypertonic nebulizer soln  4 mL Nebulization BID RT    sodium chloride (NS) flush 5-40 mL  5-40 mL IntraVENous Q8H    sodium chloride (NS) flush 5-40 mL  5-40 mL IntraVENous PRN    acetaminophen (TYLENOL) tablet 650 mg  650 mg Oral Q6H PRN    Or    acetaminophen (TYLENOL) suppository 650 mg  650 mg Rectal Q6H PRN    polyethylene glycol (MIRALAX) packet 17 g  17 g Oral DAILY PRN    promethazine (PHENERGAN) tablet 12.5 mg  12.5 mg Oral Q6H PRN    Or    ondansetron (ZOFRAN) injection 4 mg  4 mg IntraVENous Q6H PRN    enoxaparin (LOVENOX) injection 40 mg  40 mg SubCUTAneous DAILY    budesonide-formoteroL (SYMBICORT) 160-4.5 mcg/actuation HFA inhaler 2 Puff  2 Puff Inhalation BID RT    insulin lispro (HUMALOG) injection 0-10 Units  0-10 Units SubCUTAneous AC&HS    piperacillin-tazobactam (ZOSYN) 4.5 g in 0.9% sodium chloride (MBP/ADV) 100 mL  4.5 g IntraVENous Q8H    HYDROcodone-acetaminophen (NORCO) 5-325 mg per tablet 1 Tab  1 Tab Oral Q6H PRN    morphine injection 2 mg  2 mg IntraVENous Q3H PRN         Objective:     Vitals:    08/26/20 0209 08/26/20 0350 08/26/20 0718 08/26/20 0930   BP:  126/58 133/72    Pulse:  96 88    Resp:  20 18    Temp:  97.7 °F (36.5 °C) 98.1 °F (36.7 °C)    SpO2: 97% 94% 96% 95%   Weight:       Height:         Intake and Output:   08/24 1901 - 08/26 0700  In: 360 [P.O.:360]  Out: -   No intake/output data recorded.     No intake or output data in the 24 hours ending 08/26/20 1038    Physical Exam:          Constitutional: the patient is well develop  HEENT: Sclera clear, pupils equal, oral mucosa moist  Lungs: nonproductive cough, decreased on right on 4 lpm  Cardiovascular: RRR without M,G,R  Abd/GI: soft and non-tender; with positive bowel sounds. Ext: warm without cyanosis. There is no lower leg edema. Musculoskeletal: moves all four extremities with equal strength  Skin: no jaundice or rashes, no wounds   Neuro: calm today      Lines/Drains: PIV  Nutrition: ADA      LAB  Recent Labs     08/26/20  0653 08/24/20  0605   WBC 14.5* 16.1*   HGB 10.7* 11.0*   HCT 35.4* 36.0    197     Recent Labs     08/26/20  0653 08/24/20  0605    141   K 3.6 3.7   * 107   CO2 27 27   GLU 88 108*   BUN 21 10   CREA 0.44* 0.40*                 Sputum- 8/23.  MRSA negative  Special Requests:  NO SPECIAL REQUESTS     Preliminary    GRAM STAIN      Preliminary    WBCS SEEN TOO NUMEROUS TO COUNT    GRAM STAIN  NO EPITHELIAL CELLS SEEN     Preliminary    GRAM STAIN  MANY GRAM POSITIVE COCCI     Preliminary    GRAM STAIN  4+ MUCUS PRESENT     Preliminary    Culture result: Abnormal        Preliminary    HEAVY STAPHYLOCOCCUS AUREUS SUBCULTURE IN PROGRESS          Assessment:     Hospital Problems  Date Reviewed: 8/12/2020          Codes Class Noted POA    Emphysema lung ([facility] (Chronic) ICD-10-CM: J43.9  ICD-9-CM: 492.8  8/25/2020 Yes        Staphylococcal pneumonia ([facility] ICD-10-CM: J15.20  ICD-9-CM: 482.40  8/25/2020 Unknown        HTN (hypertension) (Chronic) ICD-10-CM: I10  ICD-9-CM: 401.9  8/23/2020 Yes        Chronic respiratory failure ([facility] (Chronic) ICD-10-CM: J96.10  ICD-9-CM: 518.83  8/12/2020 Yes    Overview Signed 8/12/2020 11:58 AM by Milena García NP     Home O2 4-6L NC             * (Principal) Sepsis [facility]) ICD-10-CM: A41.9  ICD-9-CM: 038.9, 995.91  7/1/2020 Yes            Ex-Smoker with emphysema, here with staph PNA and pulmonary nodule  PLAN: She is on zosyn, staph in sputum, MRSA negative. AFB and fungal pending.   --improving, convert to oral Abx soon  Assess RA sat  Continue nebs (duoneb and pulmicort); she has emphysema   Cessation of methaphetamines needed  On prednisone 40 mg daily, taper to off- 5 days  --Significant RLL desenities with spiculated nodule on chest CT. Treat for PNA. Will need follow up imaging for resolution. Pulmonary nodule follow up and may need PET scan/biopsy. --if discharged, she will need follow up visit in 4-6 weeks with repeat Chest CT and PFT. Also has emphysema and would continue aerosols along with smoking cessation (methamphetamine positive)     JAMES Rice    More than 50% of time documented was spent in face-to-face contact with the patient and in the care of the patient on the floor/unit where the patient is located. Lungs:  coarse  Heart:  RRR with no Murmur/Rubs/Gallops    Additional Comments:  Growing Staph, sensitivity still pending ,hopefully can go home soon on PO ABX once sensitivity back   I have spoken with and examined the patient. I agree with the above assessment and plan as documented.     Lovely Solorio MD

## 2020-08-26 NOTE — DISCHARGE SUMMARY
Hospitalist Discharge Summary     Admit Date:  2020  8:36 AM   Name:  Caroline Romeo   Age:  60 y.o.  :  1955   MRN:  [id number]   PCP:  None  Treatment Team: Consulting Provider: Rogelio Garcia MD; Consulting Provider: Amador Malin MD; Primary Nurse: Lucita Min; Utilization Review: Crystal Breen; Care Manager: Shun Parr.; Staff Nurse: Jordan Fragoso RN    Problem List for this Hospitalization:  Hospital Problems as of 2020 Date Reviewed: 2020          Codes Class Noted - Resolved POA    Emphysema lung ([facility] (Chronic) ICD-10-CM: J43.9  ICD-9-CM: 492.8  2020 - Present Yes        Staphylococcal pneumonia ([facility] ICD-10-CM: J15.20  ICD-9-CM: 482.40  2020 - Present Unknown        HTN (hypertension) (Chronic) ICD-10-CM: I10  ICD-9-CM: 401.9  2020 - Present Yes        Chronic respiratory failure ([facility] (Chronic) ICD-10-CM: J96.10  ICD-9-CM: 518.83  2020 - Present Yes    Overview Signed 2020 11:58 AM by Zandra Brady NP     Home O2 4-6L NC             * (Principal) Sepsis [facility]) ICD-10-CM: A41.9  ICD-9-CM: 038.9, 995.91  2020 - Present Yes                Admission HPI from 2020:    \"60year-old female from home presents with shortness of breath. Information obtained from the patient but she does not seem like a reliable source at this time as she is sleeping and will not answer many questions. She said she woke up last night short of breath without coughing. She does wear anywhere from 2 to 3 L of oxygen at home but she cannot tell me how much she is using. She says her son adjust her oxygen for her. She says she does not use breathing treatments at home including albuterol. She denies people smoking in the house however they do smoke outside. She denies smoking.     Incidentally on urine drug screen she came amphetamine positive. When questioned about this she says she started taking Sudafed yesterday for congestion.   She adamantly denies doing methamphetamine. She also had a positive urine drug screen last admission.     She was recently here for COVID (7/1-7/20), got treated and discharged home. She came back with hospital-acquired pneumonia (8/6-8/13) and was discharged home.     In the ER she was tachycardic with a leukocytosis and a possible right lower lobe pneumonia. The ER consulted the hospitalist for admission. \"    Hospital Course:  Patient admitted again for HCAP. She underwent chest x-ray that showed interstitial densities in the right mid and lower lung. She underwent CT chest which was concerning for pneumonia and showed a 1.5 cm spiculated pulmonary nodule as well as findings consistent with emphysema. She was treated with 4-day course of IV antibiotics, Zosyn. She was treated with steroids. Patient was treated with nebulizers. Final culture results show presence of MRSA in sputum. Patient called and informed of findings 8/27. Antibiotics sent to patient's preferred pharmacy. She is to complete a course of oral clindamycin and doxycycline. She was evaluated by pulmonary medicine. She will need outpatient pulmonary follow-up for ongoing emphysema and for spiculated nodule. Patient noted to have elevated blood pressure during hospital stay. Patient's medications were adjusted prior to discharge. Patient noted to have amphetamines in drug screen. Patient denies amphetamine usage. Has had multiple amphetamine positive urine samples. Patient advised to discontinue amphetamine usage. Patient seen and examined on day of discharge. All questions and concerns addressed. Discharge plan discussed in detail. Patient with history of noncompliance. Advised to follow medical treatment plan as explained to her. Patient provided with information for [facility] includes the [facility] health care clinic. She was provided about this information on previous discharges as well.     Disposition: Home or Self Care  Activity: Ambulate as tolerated  Diet: No diet orders on file  Code Status: Prior      Follow up instructions, discharge meds at bottom of this note. Plan was discussed with patient. All questions answered. Patient was stable at time of discharge. Patient will call a physician or return if any concerns. Diagnostic Imaging/Tests:   Ct Chest W Cont    Result Date: 8/23/2020  CT OF THE CHEST WITH CONTRAST, PULMONARY EMBOLUS PROTOCOL. CLINICAL INDICATION: Shortness of breath, prior Covid19 positive, prior pneumonia PROCEDURE: Serial thin section axial images are obtained from the thoracic inlet through the upper abdomen following the administration of intravenous contrast per a dedicated, institutional pulmonary embolus protocol. Coronal MIP reformatted images are generated. Radiation dose reduction techniques were used for this study. Our CT scanners use one or all of the following: Automated exposure control, adjusted of the mA and/or kV according to patient size, iterative reconstruction COMPARISON: Chest CT dated 8/6/2020 FINDINGS: The aorta is unremarkable. There is adequate opacification of the central pulmonary arterial tree. No central intraluminal filling defect noted to indicate acute pulmonary embolus. There is no mediastinal or axillary adenopathy. Irregular dense airspace consolidation is noted in the peripheral aspect of the right lower lobe occupying nearly the entirety of the right lower lobe and also in the caudal, peripheral aspect of the right upper lobe. This has not changed significantly from the prior exam. Mild irregular densities also present in the subpleural left lower lobe. Emphysematous changes are present. There is no pneumothorax. There is no pleural effusion. There is a round pulmonary nodule in the posterior aspect of the left upper lobe on image number 43 that measures 1.5 x 1.5 cm (1.4 cm on the previous exam). This is a different morphology and appearance than the other opacities.  Primarily lung malignancy is not excluded. Limited evaluation of the upper abdomen is unremarkable. No aggressive osseous lesions identified. IMPRESSION: 1. Extensive, predominantly peripheral, irregular densities in the right lower and caudal aspect of the right lower lobe grossly unchanged from the prior exam but most in keeping with atypical pneumonia. There is also irregular density noted at the left lung base also concerning for pneumonia. 2. 1.5 cm spiculated pulmonary nodule in the posterior aspect of the left upper lobe. Although this could also be infectious, malignancy is not excluded. Recommend attention on any follow-up imaging. 3. Emphysema. 4. No acute pulmonary emboli. Xr Chest Port    Result Date: 8/23/2020  Portable chest x-ray CLINICAL INDICATION: Shortness of breath FINDINGS: Single AP view the chest compared to a similar exam dated 8/13/2020 shows persistent reticular nodular interstitial densities in the right mid and lower lung. The left lung is clear. The cardiac silhouette and mediastinum are stable. The support devices have been removed. IMPRESSION: Stable reticular nodular interstitial densities in the right mid and lower lung. Atypical pneumonia could be considered. Echocardiogram results:  No results found for this visit on 08/23/20.     Procedures done this admission:  * No surgery found *    All Micro Results     Procedure Component Value Units Date/Time    CULTURE, RESPIRATORY/SPUTUM/BRONCH M[facility] STAIN [[id number]]  (Abnormal)  (Susceptibility) Collected:  08/23/20 3168    Order Status:  Completed Specimen:  Sputum Updated:  08/27/20 1140     Special Requests: NO SPECIAL REQUESTS        GRAM STAIN       WBCS SEEN TOO NUMEROUS TO COUNT            NO EPITHELIAL CELLS SEEN         MANY GRAM POSITIVE COCCI         4+ MUCUS PRESENT        Culture result:       HEAVY * METHICILLIN RESISTANT STAPHYLOCOCCUS AUREUS *                  MODERATE NORMAL RESPIRATORY MICHAEL                  RESULTS VERIFIED, PHONED TO AND READ BACK BY  DR Andrew Kang @5177 8/27/20 HF      FUNGUS CULTURE AND SMEAR [[id number]] Collected:  08/26/20 1300    Order Status:  No result     CULTURE, BLOOD [[id number]] Collected:  08/23/20 0848    Order Status:  Completed Specimen:  Blood Updated:  08/26/20 0958     Special Requests: --        LEFT  Antecubital       Culture result: NO GROWTH 3 DAYS       CULTURE, BLOOD [[id number]] Collected:  08/23/20 1030    Order Status:  Completed Specimen:  Blood Updated:  08/26/20 0958     Special Requests: --        RIGHT  HAND       Culture result: NO GROWTH 3 DAYS       AFB CULTURE + SMEAR W/RFLX ID FROM CULTURE [[id number]] Collected:  08/25/20 2002    Order Status:  Completed Updated:  08/25/20 2137    FUNGUS CULTURE AND SMEAR [[id number]] Collected:  08/25/20 2002    Order Status:  Canceled Specimen:  Other     AFB CULTURE + SMEAR W/RFLX ID FROM CULTURE [[id number]]     Order Status:  Canceled     FUNGUS CULTURE AND SMEAR [[id number]]     Order Status:  Canceled Specimen:  Other     MSSA/MRSA SC BY PCR, NASAL SWAB [[id number]] Collected:  08/23/20 1720    Order Status:  Completed Specimen:  Nasal swab Updated:  08/23/20 2059     Special Requests: NO SPECIAL REQUESTS        Culture result:       SA target not detected. A MRSA NEGATIVE, SA NEGATIVE test result does not preclude MRSA or SA nasal colonization. CULTURE, BLOOD, PAIRED [[id number]]     Order Status:  Canceled Specimen:  Blood           Labs: Results:       BMP, Mg, Phos Recent Labs     08/26/20  0653      K 3.6   *   CO2 27   AGAP 7   BUN 21   CREA 0.44*   CA 8.6   GLU 88      CBC Recent Labs     08/26/20  0653   WBC 14.5*   RBC 3.83*   HGB 10.7*   HCT 35.4*      GRANS 66   LYMPH 20   EOS 3   MONOS 7   BASOS 1   IG 4   ANEU 9.5*   ABL 2.9   ALFONZO 0.4   ABM 1.0   ABB 0.1   AIG 0.6*      LFT No results for input(s): ALT, TBIL, AP, TP, ALB, GLOB, AGRAT in the last 72 hours.     No lab exists for component: SGOT, GPT   Cardiac Testing Lab Results   Component Value Date/Time    CK 1,079 (H) 08/06/2020 12:16 AM    CK - MB 20.1 (H) 08/06/2020 12:16 AM    CK-MB Index 1.9 08/06/2020 12:16 AM      Coagulation Tests No results found for: PTP, INR, APTT, INREXT, INREXT   A1c Lab Results   Component Value Date/Time    Hemoglobin A1c 7.1 (H) 07/08/2020 03:32 AM      Lipid Panel Lab Results   Component Value Date/Time    Triglyceride 102 08/11/2020 05:16 AM      Thyroid Panel No results found for: TSH, T4, FT4, TT3, T3U, TSHEXT, TSHEXT     Most Recent UA Lab Results   Component Value Date/Time    Color YELLOW 08/23/2020 09:23 AM    Appearance CLEAR 08/23/2020 09:23 AM    Specific gravity 1.016 08/23/2020 09:23 AM    pH (UA) 5.0 08/23/2020 09:23 AM    Protein Negative 08/23/2020 09:23 AM    Glucose Negative 08/23/2020 09:23 AM    Ketone Negative 08/23/2020 09:23 AM    Bilirubin Negative 08/23/2020 09:23 AM    Blood TRACE (A) 08/23/2020 09:23 AM    Urobilinogen 0.2 08/23/2020 09:23 AM    Nitrites Negative 08/23/2020 09:23 AM    Leukocyte Esterase Negative 08/23/2020 09:23 AM    WBC 0-3 08/23/2020 09:23 AM    RBC 0-3 08/23/2020 09:23 AM    Epithelial cells 0-3 08/23/2020 09:23 AM    Bacteria 0 08/23/2020 09:23 AM    Casts 0-3 08/23/2020 09:23 AM    Crystals, urine 0 09/26/2018 12:02 AM    Mucus 0 09/26/2018 12:02 AM        No Known Allergies  Immunization History   Administered Date(s) Administered    TB Skin Test (PPD) Intradermal 09/27/2018       All Labs from Last 24 Hrs:  Recent Results (from the past 24 hour(s))   GLUCOSE, POC    Collection Time: 08/26/20  4:26 PM   Result Value Ref Range    Glucose (POC) 246 (H) 65 - 100 mg/dL       Current Med List in Hospital:   No current facility-administered medications for this encounter. Current Outpatient Medications   Medication Sig    doxycycline (ADOXA) 100 mg tablet Take 1 Tab by mouth two (2) times a day for 14 days.     clindamycin (CLEOCIN) 300 mg capsule Take 2 Caps by mouth three (3) times daily for 7 days.  albuterol (ProAir HFA) 90 mcg/actuation inhaler Take 2 Puffs by inhalation every four (4) hours as needed for Wheezing.  amLODIPine (NORVASC) 10 mg tablet Take 1 Tab by mouth daily for 30 days.  budesonide-formoteroL (SYMBICORT) 160-4.5 mcg/actuation HFAA Take 2 Puffs by inhalation two (2) times a day.  predniSONE (DELTASONE) 20 mg tablet Take 40 mg by mouth daily (with breakfast) for 2 days.  OXYGEN-AIR DELIVERY SYSTEMS 3 L by Nasal route continuous.  ibuprofen (MOTRIN) 600 mg tablet Take 1 Tab by mouth every six (6) hours as needed for Pain. Discharge Exam:  Patient Vitals for the past 24 hrs:   Temp Pulse Resp BP SpO2   08/26/20 1520 98.2 °F (36.8 °C) (!) 104 18 136/73 93 %   08/26/20 1456     95 %     Oxygen Therapy  O2 Sat (%): 93 % (08/26/20 1520)  Pulse via Oximetry: 99 beats per minute (08/26/20 1456)  O2 Device: Nasal cannula (08/26/20 1456)  O2 Flow Rate (L/min): 1 l/min (08/26/20 1456)    Estimated body mass index is 25.69 kg/m² as calculated from the following:    Height as of this encounter: 5' 3\" (1.6 m). Weight as of this encounter: 65.8 kg (145 lb). No intake or output data in the 24 hours ending 08/27/20 1157    *Note that automatically entered I/Os may not be accurate; dependent on patient compliance with collection and accurate  by assistants. General:    Well nourished. Alert. Eyes:   Normal sclerae. Extraocular movements intact. ENT:  Normocephalic, atraumatic. Moist mucous membranes  CV:   Regular rate and rhythm. No murmur, rub, or gallop. Lungs:  Clear to auscultation bilaterally. No wheezing, rhonchi, or rales. Abdomen: Soft, nontender, nondistended. Bowel sounds normal.   Extremities: Warm and dry. No cyanosis or edema. Neurologic: No focal deficits  Skin:     No rashes or jaundice. Psych:  Normal mood and affect.       Discharge Info:   Discharge Medication List as of 8/26/2020  3:28 PM      START taking these medications    Details   amoxicillin-clavulanate (AUGMENTIN) 875-125 mg per tablet Take 1 Tab by mouth every twelve (12) hours for 6 days. , Print, Disp-12 Tab,R-0         CONTINUE these medications which have CHANGED    Details   albuterol (ProAir HFA) 90 mcg/actuation inhaler Take 2 Puffs by inhalation every four (4) hours as needed for Wheezing., Print, Disp-1 Inhaler,R-0      amLODIPine (NORVASC) 10 mg tablet Take 1 Tab by mouth daily for 30 days. , Print, Disp-30 Tab,R-0      budesonide-formoteroL (SYMBICORT) 160-4.5 mcg/actuation HFAA Take 2 Puffs by inhalation two (2) times a day., Print, Disp-1 Inhaler,R-0      predniSONE (DELTASONE) 20 mg tablet Take 40 mg by mouth daily (with breakfast) for 2 days. , Print, Disp-4 Tab,R-0         CONTINUE these medications which have NOT CHANGED    Details   OXYGEN-AIR DELIVERY SYSTEMS 3 L by Nasal route continuous. , Historical Med      ibuprofen (MOTRIN) 600 mg tablet Take 1 Tab by mouth every six (6) hours as needed for Pain., No Print, Disp-1 Tab,R-0         STOP taking these medications       clonazePAM (KlonoPIN) 0.5 mg tablet Comments:   Reason for Stopping: Follow Up Orders: Follow-up Appointments   Procedures    FOLLOW UP VISIT Appointment in: One Week Follow-up with PCP within 1 week Follow-up with pulmonary medicine in 4 to 6 weeks     Follow-up with PCP within 1 week  Follow-up with pulmonary medicine in 4 to 6 weeks     Standing Status:   Standing     Number of Occurrences:   1     Order Specific Question:   Appointment in     Answer:    One Week       Follow-up Information     Follow up With Specialties Details Why [place]  In 1 week  [address] [facility] [address] [phone]    [facility]  On 9/23/2020 2:40pm with Paulo Mayorga NP [address] [address] [place] [address] A  [phone]          Time spent in patient discharge planning and coordination 35 minutes. *Additional Antibiotics to Cover for MRSA sent to patient's pharmacy.      Signed:  Rhina Pearce MD

## 2020-08-27 ENCOUNTER — PATIENT OUTREACH (OUTPATIENT)
Dept: CASE MANAGEMENT | Age: 65
End: 2020-08-27

## 2020-08-27 LAB
BACTERIA SPEC CULT: ABNORMAL
GRAM STN SPEC: ABNORMAL
SERVICE CMNT-IMP: ABNORMAL

## 2020-08-27 PROCEDURE — 3331090001 HH PPS REVENUE CREDIT

## 2020-08-27 PROCEDURE — 3331090002 HH PPS REVENUE DEBIT

## 2020-08-27 RX ORDER — DOXYCYCLINE 100 MG/1
100 TABLET ORAL 2 TIMES DAILY
Qty: 28 TAB | Refills: 0 | Status: SHIPPED | OUTPATIENT
Start: 2020-08-27 | End: 2020-09-10

## 2020-08-27 RX ORDER — CLINDAMYCIN HYDROCHLORIDE 300 MG/1
600 CAPSULE ORAL 3 TIMES DAILY
Qty: 42 CAP | Refills: 0 | Status: SHIPPED | OUTPATIENT
Start: 2020-08-27 | End: 2020-09-03

## 2020-08-27 NOTE — PROGRESS NOTES
Informed by laboratory that patient's sputum culture positive for MRSA. Sensitivities of culture reviewed. Susceptible to tetracycline. Called and informed the patient of findings. New prescription sent to pharmacy of patient's preference.     Plan:  Doxycycline 100 mg BID for 14 days   Clindamycin 600 mg TID for 7 days

## 2020-08-28 ENCOUNTER — TELEPHONE (OUTPATIENT)
Dept: HOME HEALTH SERVICES | Facility: HOME HEALTH | Age: 65
End: 2020-08-28

## 2020-08-28 ENCOUNTER — HOME CARE VISIT (OUTPATIENT)
Dept: SCHEDULING | Facility: HOME HEALTH | Age: 65
End: 2020-08-28
Payer: MEDICARE

## 2020-08-28 ENCOUNTER — PATIENT OUTREACH (OUTPATIENT)
Dept: CASE MANAGEMENT | Age: 65
End: 2020-08-28

## 2020-08-28 VITALS
SYSTOLIC BLOOD PRESSURE: 170 MMHG | OXYGEN SATURATION: 92 % | TEMPERATURE: 96.9 F | RESPIRATION RATE: 20 BRPM | DIASTOLIC BLOOD PRESSURE: 80 MMHG | HEART RATE: 84 BPM

## 2020-08-28 PROCEDURE — 3331090001 HH PPS REVENUE CREDIT

## 2020-08-28 PROCEDURE — 3331090002 HH PPS REVENUE DEBIT

## 2020-08-28 PROCEDURE — G0299 HHS/HOSPICE OF RN EA 15 MIN: HCPCS

## 2020-08-29 PROCEDURE — 3331090001 HH PPS REVENUE CREDIT

## 2020-08-29 PROCEDURE — 3331090002 HH PPS REVENUE DEBIT

## 2020-08-30 PROCEDURE — 3331090001 HH PPS REVENUE CREDIT

## 2020-08-30 PROCEDURE — 3331090002 HH PPS REVENUE DEBIT

## 2020-08-31 ENCOUNTER — HOME CARE VISIT (OUTPATIENT)
Dept: SCHEDULING | Facility: HOME HEALTH | Age: 65
End: 2020-08-31
Payer: MEDICARE

## 2020-08-31 ENCOUNTER — PATIENT OUTREACH (OUTPATIENT)
Dept: CASE MANAGEMENT | Age: 65
End: 2020-08-31

## 2020-08-31 PROCEDURE — G0299 HHS/HOSPICE OF RN EA 15 MIN: HCPCS

## 2020-08-31 PROCEDURE — 3331090002 HH PPS REVENUE DEBIT

## 2020-08-31 PROCEDURE — 3331090001 HH PPS REVENUE CREDIT

## 2020-08-31 NOTE — PROGRESS NOTES
Third [facility] outreach attempt made to home/mobile numbers. Left message to return calls. Unable to reach for [facility] program, will close case. Will reopen if call is returned.

## 2020-09-01 ENCOUNTER — HOME CARE VISIT (OUTPATIENT)
Dept: SCHEDULING | Facility: HOME HEALTH | Age: 65
End: 2020-09-01
Payer: MEDICARE

## 2020-09-01 VITALS
HEART RATE: 120 BPM | DIASTOLIC BLOOD PRESSURE: 80 MMHG | TEMPERATURE: 98.5 F | OXYGEN SATURATION: 94 % | RESPIRATION RATE: 16 BRPM | SYSTOLIC BLOOD PRESSURE: 140 MMHG

## 2020-09-01 VITALS
RESPIRATION RATE: 18 BRPM | SYSTOLIC BLOOD PRESSURE: 160 MMHG | TEMPERATURE: 98.3 F | DIASTOLIC BLOOD PRESSURE: 80 MMHG | HEART RATE: 104 BPM

## 2020-09-01 PROCEDURE — 3331090001 HH PPS REVENUE CREDIT

## 2020-09-01 PROCEDURE — G0151 HHCP-SERV OF PT,EA 15 MIN: HCPCS

## 2020-09-01 PROCEDURE — 3331090002 HH PPS REVENUE DEBIT

## 2020-09-02 ENCOUNTER — TELEPHONE (OUTPATIENT)
Dept: HOME HEALTH SERVICES | Facility: HOME HEALTH | Age: 65
End: 2020-09-02

## 2020-09-02 PROBLEM — R91.1 LUNG NODULE: Status: ACTIVE | Noted: 2020-09-02

## 2020-09-02 PROBLEM — F41.9 ANXIETY: Status: ACTIVE | Noted: 2020-09-02

## 2020-09-02 PROCEDURE — 3331090001 HH PPS REVENUE CREDIT

## 2020-09-02 PROCEDURE — 3331090002 HH PPS REVENUE DEBIT

## 2020-09-02 NOTE — TELEPHONE ENCOUNTER
I called the cell number and spoke with Mrs. Nugent's daughter in law. I explained my part of the [facility] team. She said Mrs. Steven Dueñas was eating lunch now and could I call back later. I called back 30 minutes later and got voice mail. I left my name and cell phone number. I asked her to call me with any medication questions or issues.

## 2020-09-03 ENCOUNTER — HOME CARE VISIT (OUTPATIENT)
Dept: SCHEDULING | Facility: HOME HEALTH | Age: 65
End: 2020-09-03
Payer: MEDICARE

## 2020-09-03 VITALS
OXYGEN SATURATION: 97 % | RESPIRATION RATE: 17 BRPM | TEMPERATURE: 97.5 F | DIASTOLIC BLOOD PRESSURE: 82 MMHG | SYSTOLIC BLOOD PRESSURE: 150 MMHG | HEART RATE: 110 BPM

## 2020-09-03 PROCEDURE — G0299 HHS/HOSPICE OF RN EA 15 MIN: HCPCS

## 2020-09-03 PROCEDURE — 3331090002 HH PPS REVENUE DEBIT

## 2020-09-03 PROCEDURE — 3331090001 HH PPS REVENUE CREDIT

## 2020-09-04 PROCEDURE — 3331090002 HH PPS REVENUE DEBIT

## 2020-09-04 PROCEDURE — 3331090001 HH PPS REVENUE CREDIT

## 2020-09-05 PROCEDURE — 3331090002 HH PPS REVENUE DEBIT

## 2020-09-05 PROCEDURE — 3331090001 HH PPS REVENUE CREDIT

## 2020-09-06 PROCEDURE — 3331090001 HH PPS REVENUE CREDIT

## 2020-09-06 PROCEDURE — 3331090002 HH PPS REVENUE DEBIT

## 2020-09-07 PROCEDURE — 3331090001 HH PPS REVENUE CREDIT

## 2020-09-07 PROCEDURE — 3331090002 HH PPS REVENUE DEBIT

## 2020-09-08 ENCOUNTER — HOME CARE VISIT (OUTPATIENT)
Dept: SCHEDULING | Facility: HOME HEALTH | Age: 65
End: 2020-09-08
Payer: MEDICARE

## 2020-09-08 VITALS
DIASTOLIC BLOOD PRESSURE: 74 MMHG | RESPIRATION RATE: 16 BRPM | TEMPERATURE: 97.6 F | HEART RATE: 105 BPM | OXYGEN SATURATION: 96 % | SYSTOLIC BLOOD PRESSURE: 138 MMHG

## 2020-09-08 PROCEDURE — G0299 HHS/HOSPICE OF RN EA 15 MIN: HCPCS

## 2020-09-08 PROCEDURE — 3331090002 HH PPS REVENUE DEBIT

## 2020-09-08 PROCEDURE — 3331090001 HH PPS REVENUE CREDIT

## 2020-09-09 ENCOUNTER — TELEPHONE (OUTPATIENT)
Dept: HOME HEALTH SERVICES | Facility: HOME HEALTH | Age: 65
End: 2020-09-09

## 2020-09-09 PROCEDURE — 3331090001 HH PPS REVENUE CREDIT

## 2020-09-09 PROCEDURE — 3331090002 HH PPS REVENUE DEBIT

## 2020-09-10 PROCEDURE — 3331090002 HH PPS REVENUE DEBIT

## 2020-09-10 PROCEDURE — 3331090001 HH PPS REVENUE CREDIT

## 2020-09-11 ENCOUNTER — HOME CARE VISIT (OUTPATIENT)
Dept: SCHEDULING | Facility: HOME HEALTH | Age: 65
End: 2020-09-11
Payer: MEDICARE

## 2020-09-11 PROCEDURE — 3331090001 HH PPS REVENUE CREDIT

## 2020-09-11 PROCEDURE — G0299 HHS/HOSPICE OF RN EA 15 MIN: HCPCS

## 2020-09-11 PROCEDURE — 3331090002 HH PPS REVENUE DEBIT

## 2020-09-12 PROCEDURE — 3331090001 HH PPS REVENUE CREDIT

## 2020-09-12 PROCEDURE — 3331090002 HH PPS REVENUE DEBIT

## 2020-09-13 VITALS
DIASTOLIC BLOOD PRESSURE: 72 MMHG | TEMPERATURE: 97.4 F | OXYGEN SATURATION: 96 % | SYSTOLIC BLOOD PRESSURE: 146 MMHG | HEART RATE: 104 BPM

## 2020-09-25 NOTE — PROGRESS NOTES
Received bedside shift report from [facility], [address] [place] [address]. Patient is alert and oriented x 4, on BiPaP at 45% FiO2, dyspnea with exertion, bilateral lung sounds are coarse, normal sinus rhythm on the monitor. [Yes] : Patient goes to dentist yearly [Up to date] : Up to date [Normal] : normal [Eats meals with family] : eats meals with family [Has family members/adults to turn to for help] : has family members/adults to turn to for help [Is permitted and is able to make independent decisions] : Is permitted and is able to make independent decisions [No] : Patient has not had sexual intercourse. [Has ways to cope with stress] : has ways to cope with stress [Displays self-confidence] : displays self-confidence [With Teen] : teen [With Parent/Guardian] : parent/guardian [Sleep Concerns] : no sleep concerns [Uses electronic nicotine delivery system] : does not use electronic nicotine delivery system [Exposure to electronic nicotine delivery system] : no exposure to electronic nicotine delivery system [Uses tobacco] : does not use tobacco [Exposure to tobacco] : no exposure to tobacco [Uses drugs] : does not use drugs  [Exposure to drugs] : no exposure to drugs [Drinks alcohol] : does not drink alcohol [Exposure to alcohol] : no exposure to alcohol [Has problems with sleep] : does not have problems with sleep [Gets depressed, anxious, or irritable/has mood swings] : does not get depressed, anxious, or irritable/has mood swings [Has thought about hurting self or considered suicide] : has not thought about hurting self or considered suicide

## 2020-10-09 LAB
ACID FAST STN SPEC: NEGATIVE
MYCOBACTERIUM SPEC QL CULT: NEGATIVE
SPECIMEN PREPARATION: NORMAL
SPECIMEN SOURCE: NORMAL
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
## 2020-12-15 ENCOUNTER — HOSPITAL ENCOUNTER (INPATIENT)
Age: 65
LOS: 3 days | Discharge: HOME | DRG: 871 | End: 2020-12-19
Attending: EMERGENCY MEDICINE | Admitting: INTERNAL MEDICINE
Payer: MEDICARE

## 2020-12-15 ENCOUNTER — APPOINTMENT (OUTPATIENT)
Dept: GENERAL RADIOLOGY | Age: 65
DRG: 871 | End: 2020-12-15
Attending: EMERGENCY MEDICINE
Payer: MEDICARE

## 2020-12-15 DIAGNOSIS — Z20.822 SUSPECTED COVID-19 VIRUS INFECTION: ICD-10-CM

## 2020-12-15 DIAGNOSIS — J96.21 ACUTE ON CHRONIC RESPIRATORY FAILURE WITH HYPOXIA AND HYPERCAPNIA (HCC): ICD-10-CM

## 2020-12-15 DIAGNOSIS — J96.22 ACUTE ON CHRONIC RESPIRATORY FAILURE WITH HYPOXIA AND HYPERCAPNIA (HCC): ICD-10-CM

## 2020-12-15 DIAGNOSIS — R65.20 SEPSIS WITH ACUTE HYPOXIC RESPIRATORY FAILURE WITHOUT SEPTIC SHOCK, DUE TO UNSPECIFIED ORGANISM (HCC): ICD-10-CM

## 2020-12-15 DIAGNOSIS — G93.40 ACUTE ENCEPHALOPATHY: ICD-10-CM

## 2020-12-15 DIAGNOSIS — A41.9 SEPSIS, DUE TO UNSPECIFIED ORGANISM, UNSPECIFIED WHETHER ACUTE ORGAN DYSFUNCTION PRESENT (HCC): Primary | ICD-10-CM

## 2020-12-15 DIAGNOSIS — A41.9 SEPSIS WITH ACUTE HYPOXIC RESPIRATORY FAILURE WITHOUT SEPTIC SHOCK, DUE TO UNSPECIFIED ORGANISM (HCC): ICD-10-CM

## 2020-12-15 DIAGNOSIS — J98.6 ELEVATED DIAPHRAGM: ICD-10-CM

## 2020-12-15 DIAGNOSIS — J96.01 SEPSIS WITH ACUTE HYPOXIC RESPIRATORY FAILURE WITHOUT SEPTIC SHOCK, DUE TO UNSPECIFIED ORGANISM (HCC): ICD-10-CM

## 2020-12-15 DIAGNOSIS — J43.2 CENTRILOBULAR EMPHYSEMA (HCC): Chronic | ICD-10-CM

## 2020-12-15 DIAGNOSIS — J96.90 RESPIRATORY FAILURE, UNSPECIFIED CHRONICITY, UNSPECIFIED WHETHER WITH HYPOXIA OR HYPERCAPNIA (HCC): ICD-10-CM

## 2020-12-15 LAB
BASOPHILS # BLD: 0.1 K/UL (ref 0–0.2)
BASOPHILS NFR BLD: 0 % (ref 0–2)
DIFFERENTIAL METHOD BLD: ABNORMAL
EOSINOPHIL # BLD: 0 K/UL (ref 0–0.8)
EOSINOPHIL NFR BLD: 0 % (ref 0.5–7.8)
ERYTHROCYTE [DISTWIDTH] IN BLOOD BY AUTOMATED COUNT: 14.9 % (ref 11.9–14.6)
HCT VFR BLD AUTO: 39.8 % (ref 35.8–46.3)
HGB BLD-MCNC: 12 G/DL (ref 11.7–15.4)
IMM GRANULOCYTES # BLD AUTO: 0.2 K/UL (ref 0–0.5)
IMM GRANULOCYTES NFR BLD AUTO: 1 % (ref 0–5)
LYMPHOCYTES # BLD: 0.7 K/UL (ref 0.5–4.6)
LYMPHOCYTES NFR BLD: 3 % (ref 13–44)
MCH RBC QN AUTO: 26.8 PG (ref 26.1–32.9)
MCHC RBC AUTO-ENTMCNC: 30.2 G/DL (ref 31.4–35)
MCV RBC AUTO: 88.8 FL (ref 79.6–97.8)
MONOCYTES # BLD: 0.8 K/UL (ref 0.1–1.3)
MONOCYTES NFR BLD: 3 % (ref 4–12)
NEUTS SEG # BLD: 22.2 K/UL (ref 1.7–8.2)
NEUTS SEG NFR BLD: 93 % (ref 43–78)
NRBC # BLD: 0 K/UL (ref 0–0.2)
PLATELET # BLD AUTO: 328 K/UL (ref 150–450)
PMV BLD AUTO: 9.9 FL (ref 9.4–12.3)
RBC # BLD AUTO: 4.48 M/UL (ref 4.05–5.2)
WBC # BLD AUTO: 24 K/UL (ref 4.3–11.1)

## 2020-12-15 PROCEDURE — 83605 ASSAY OF LACTIC ACID: CPT

## 2020-12-15 PROCEDURE — 99285 EMERGENCY DEPT VISIT HI MDM: CPT

## 2020-12-15 PROCEDURE — 74018 RADEX ABDOMEN 1 VIEW: CPT

## 2020-12-15 PROCEDURE — 94002 VENT MGMT INPAT INIT DAY: CPT

## 2020-12-15 PROCEDURE — 84484 ASSAY OF TROPONIN QUANT: CPT

## 2020-12-15 PROCEDURE — 80053 COMPREHEN METABOLIC PANEL: CPT

## 2020-12-15 PROCEDURE — 93005 ELECTROCARDIOGRAM TRACING: CPT | Performed by: EMERGENCY MEDICINE

## 2020-12-15 PROCEDURE — 71045 X-RAY EXAM CHEST 1 VIEW: CPT

## 2020-12-15 PROCEDURE — 87040 BLOOD CULTURE FOR BACTERIA: CPT

## 2020-12-15 PROCEDURE — 83735 ASSAY OF MAGNESIUM: CPT

## 2020-12-15 PROCEDURE — 84145 PROCALCITONIN (PCT): CPT

## 2020-12-15 PROCEDURE — 74011250636 HC RX REV CODE- 250/636: Performed by: EMERGENCY MEDICINE

## 2020-12-15 PROCEDURE — 85025 COMPLETE CBC W/AUTO DIFF WBC: CPT

## 2020-12-15 PROCEDURE — 83880 ASSAY OF NATRIURETIC PEPTIDE: CPT

## 2020-12-15 RX ORDER — PROPOFOL 10 MG/ML
0-50 VIAL (ML) INTRAVENOUS
Status: DISCONTINUED | OUTPATIENT
Start: 2020-12-15 | End: 2020-12-18

## 2020-12-15 RX ADMIN — PROPOFOL 10 MCG/KG/MIN: 10 INJECTION, EMULSION INTRAVENOUS at 23:41

## 2020-12-15 RX ADMIN — PROPOFOL 15 MCG/KG/MIN: 10 INJECTION, EMULSION INTRAVENOUS at 23:46

## 2020-12-16 ENCOUNTER — APPOINTMENT (OUTPATIENT)
Dept: CT IMAGING | Age: 65
DRG: 871 | End: 2020-12-16
Attending: EMERGENCY MEDICINE
Payer: MEDICARE

## 2020-12-16 PROBLEM — J96.22 ACUTE ON CHRONIC RESPIRATORY FAILURE WITH HYPOXIA AND HYPERCAPNIA (HCC): Status: ACTIVE | Noted: 2020-12-16

## 2020-12-16 PROBLEM — J43.9 EMPHYSEMA LUNG (HCC): Chronic | Status: ACTIVE | Noted: 2020-12-16

## 2020-12-16 PROBLEM — G93.40 ACUTE ENCEPHALOPATHY: Status: ACTIVE | Noted: 2020-12-16

## 2020-12-16 PROBLEM — Z20.822 SUSPECTED COVID-19 VIRUS INFECTION: Status: ACTIVE | Noted: 2020-12-16

## 2020-12-16 PROBLEM — J96.21 ACUTE ON CHRONIC RESPIRATORY FAILURE WITH HYPOXIA AND HYPERCAPNIA (HCC): Status: ACTIVE | Noted: 2020-12-16

## 2020-12-16 PROBLEM — J96.01 ACUTE HYPOXEMIC RESPIRATORY FAILURE (HCC): Status: RESOLVED | Noted: 2018-09-26 | Resolved: 2020-12-16

## 2020-12-16 PROBLEM — J96.01 ACUTE RESPIRATORY FAILURE WITH HYPOXEMIA (HCC): Status: RESOLVED | Noted: 2020-07-01 | Resolved: 2020-12-16

## 2020-12-16 LAB
ALBUMIN SERPL-MCNC: 3.2 G/DL (ref 3.2–4.6)
ALBUMIN/GLOB SERPL: 0.8 {RATIO} (ref 1.2–3.5)
ALP SERPL-CCNC: 105 U/L (ref 50–136)
ALT SERPL-CCNC: 22 U/L (ref 12–65)
AMPHET UR QL SCN: POSITIVE
ANION GAP SERPL CALC-SCNC: 9 MMOL/L (ref 7–16)
APPEARANCE UR: ABNORMAL
ARTERIAL PATENCY WRIST A: YES
ARTERIAL PATENCY WRIST A: YES
AST SERPL-CCNC: 26 U/L (ref 15–37)
ATRIAL RATE: 113 BPM
BACTERIA URNS QL MICRO: 0 /HPF
BARBITURATES UR QL SCN: NEGATIVE
BASE DEFICIT BLD-SCNC: 1 MMOL/L
BASE DEFICIT BLD-SCNC: 2 MMOL/L
BDY SITE: ABNORMAL
BDY SITE: ABNORMAL
BENZODIAZ UR QL: POSITIVE
BILIRUB SERPL-MCNC: 0.7 MG/DL (ref 0.2–1.1)
BILIRUB UR QL: NEGATIVE
BNP SERPL-MCNC: 846 PG/ML (ref 5–125)
BUN SERPL-MCNC: 17 MG/DL (ref 8–23)
CALCIUM SERPL-MCNC: 8.3 MG/DL (ref 8.3–10.4)
CALCULATED P AXIS, ECG09: 58 DEGREES
CALCULATED R AXIS, ECG10: 10 DEGREES
CALCULATED T AXIS, ECG11: 81 DEGREES
CANNABINOIDS UR QL SCN: NEGATIVE
CASTS URNS QL MICRO: ABNORMAL /LPF
CHLORIDE SERPL-SCNC: 102 MMOL/L (ref 98–107)
CO2 BLD-SCNC: 27 MMOL/L
CO2 BLD-SCNC: 28 MMOL/L
CO2 SERPL-SCNC: 26 MMOL/L (ref 21–32)
COCAINE UR QL SCN: NEGATIVE
COLLECT TIME,HTIME: 130
COLLECT TIME,HTIME: 607
COLOR UR: YELLOW
COVID-19 RAPID TEST, COVR: NOT DETECTED
CREAT SERPL-MCNC: 0.98 MG/DL (ref 0.6–1)
DIAGNOSIS, 93000: NORMAL
EPI CELLS #/AREA URNS HPF: ABNORMAL /HPF
EXHALED MINUTE VOLUME, VE: 8.04 L/MIN
EXHALED MINUTE VOLUME, VE: 8.5 L/MIN
GAS FLOW.O2 O2 DELIVERY SYS: ABNORMAL L/MIN
GAS FLOW.O2 O2 DELIVERY SYS: ABNORMAL L/MIN
GAS FLOW.O2 SETTING OXYMISER: 10 BPM
GAS FLOW.O2 SETTING OXYMISER: 20 BPM
GLOBULIN SER CALC-MCNC: 4.1 G/DL (ref 2.3–3.5)
GLUCOSE SERPL-MCNC: 228 MG/DL (ref 65–100)
GLUCOSE UR STRIP.AUTO-MCNC: 100 MG/DL
HCO3 BLD-SCNC: 25.8 MMOL/L (ref 22–26)
HCO3 BLD-SCNC: 26.5 MMOL/L (ref 22–26)
HGB UR QL STRIP: ABNORMAL
KETONES UR QL STRIP.AUTO: NEGATIVE MG/DL
LACTATE SERPL-SCNC: 0.9 MMOL/L (ref 0.4–2)
LACTATE SERPL-SCNC: 3.4 MMOL/L (ref 0.4–2)
LEUKOCYTE ESTERASE UR QL STRIP.AUTO: NEGATIVE
MAGNESIUM SERPL-MCNC: 1.6 MG/DL (ref 1.8–2.4)
METHADONE UR QL: NEGATIVE
NITRITE UR QL STRIP.AUTO: NEGATIVE
O2/TOTAL GAS SETTING VFR VENT: 40 %
O2/TOTAL GAS SETTING VFR VENT: 50 %
OPIATES UR QL: NEGATIVE
OTHER OBSERVATIONS,UCOM: ABNORMAL
P-R INTERVAL, ECG05: 178 MS
PCO2 BLD: 50.9 MMHG (ref 35–45)
PCO2 BLD: 58.9 MMHG (ref 35–45)
PCP UR QL: NEGATIVE
PEEP RESPIRATORY: 8 CMH2O
PEEP RESPIRATORY: 8 CMH2O
PH BLD: 7.26 [PH] (ref 7.35–7.45)
PH BLD: 7.31 [PH] (ref 7.35–7.45)
PH UR STRIP: 5.5 [PH] (ref 5–9)
PO2 BLD: 116 MMHG (ref 75–100)
PO2 BLD: 98 MMHG (ref 75–100)
POTASSIUM SERPL-SCNC: 4 MMOL/L (ref 3.5–5.1)
PRESSURE SUPPORT SETTING VENT: 10 CMH2O
PROCALCITONIN SERPL-MCNC: 0.2 NG/ML
PROT SERPL-MCNC: 7.3 G/DL (ref 6.3–8.2)
PROT UR STRIP-MCNC: 100 MG/DL
Q-T INTERVAL, ECG07: 312 MS
QRS DURATION, ECG06: 88 MS
QTC CALCULATION (BEZET), ECG08: 427 MS
RBC #/AREA URNS HPF: ABNORMAL /HPF
SAO2 % BLD: 97 % (ref 95–98)
SAO2 % BLD: 98 % (ref 95–98)
SARS COV-2, XPGCVT: NEGATIVE
SERVICE CMNT-IMP: ABNORMAL
SODIUM SERPL-SCNC: 137 MMOL/L (ref 136–145)
SOURCE, COVRS: NORMAL
SP GR UR REFRACTOMETRY: 1.01 (ref 1–1.02)
SPECIMEN TYPE: ABNORMAL
SPECIMEN TYPE: ABNORMAL
TROPONIN-HIGH SENSITIVITY: 185.3 PG/ML (ref 0–14)
TROPONIN-HIGH SENSITIVITY: 467.8 PG/ML (ref 0–14)
UROBILINOGEN UR QL STRIP.AUTO: 0.2 EU/DL (ref 0.2–1)
VENTILATION MODE VENT: ABNORMAL
VENTILATION MODE VENT: ABNORMAL
VENTRICULAR RATE, ECG03: 113 BPM
VT SETTING VENT: 350 ML
VT SETTING VENT: 400 ML
WBC URNS QL MICRO: ABNORMAL /HPF

## 2020-12-16 PROCEDURE — 36600 WITHDRAWAL OF ARTERIAL BLOOD: CPT

## 2020-12-16 PROCEDURE — 84484 ASSAY OF TROPONIN QUANT: CPT

## 2020-12-16 PROCEDURE — 65620000000 HC RM CCU GENERAL

## 2020-12-16 PROCEDURE — 70450 CT HEAD/BRAIN W/O DYE: CPT

## 2020-12-16 PROCEDURE — 74011000258 HC RX REV CODE- 258: Performed by: EMERGENCY MEDICINE

## 2020-12-16 PROCEDURE — 81001 URINALYSIS AUTO W/SCOPE: CPT

## 2020-12-16 PROCEDURE — 99223 1ST HOSP IP/OBS HIGH 75: CPT | Performed by: INTERNAL MEDICINE

## 2020-12-16 PROCEDURE — 94640 AIRWAY INHALATION TREATMENT: CPT

## 2020-12-16 PROCEDURE — 74011000258 HC RX REV CODE- 258: Performed by: INTERNAL MEDICINE

## 2020-12-16 PROCEDURE — 96365 THER/PROPH/DIAG IV INF INIT: CPT

## 2020-12-16 PROCEDURE — 80307 DRUG TEST PRSMV CHEM ANLYZR: CPT

## 2020-12-16 PROCEDURE — 74011250636 HC RX REV CODE- 250/636: Performed by: EMERGENCY MEDICINE

## 2020-12-16 PROCEDURE — 96375 TX/PRO/DX INJ NEW DRUG ADDON: CPT

## 2020-12-16 PROCEDURE — 87635 SARS-COV-2 COVID-19 AMP PRB: CPT

## 2020-12-16 PROCEDURE — 87040 BLOOD CULTURE FOR BACTERIA: CPT

## 2020-12-16 PROCEDURE — 74011250636 HC RX REV CODE- 250/636: Performed by: INTERNAL MEDICINE

## 2020-12-16 PROCEDURE — 82803 BLOOD GASES ANY COMBINATION: CPT

## 2020-12-16 PROCEDURE — 74011000250 HC RX REV CODE- 250: Performed by: INTERNAL MEDICINE

## 2020-12-16 PROCEDURE — 83605 ASSAY OF LACTIC ACID: CPT

## 2020-12-16 PROCEDURE — 96366 THER/PROPH/DIAG IV INF ADDON: CPT

## 2020-12-16 RX ORDER — VANCOMYCIN HYDROCHLORIDE 1 G/20ML
INJECTION, POWDER, LYOPHILIZED, FOR SOLUTION INTRAVENOUS ONCE
Status: DISCONTINUED | OUTPATIENT
Start: 2020-12-16 | End: 2020-12-16 | Stop reason: DRUGHIGH

## 2020-12-16 RX ORDER — ALBUTEROL SULFATE 0.83 MG/ML
5 SOLUTION RESPIRATORY (INHALATION)
Status: DISCONTINUED | OUTPATIENT
Start: 2020-12-16 | End: 2020-12-17

## 2020-12-16 RX ORDER — HEPARIN SODIUM 5000 [USP'U]/ML
5000 INJECTION, SOLUTION INTRAVENOUS; SUBCUTANEOUS EVERY 8 HOURS
Status: DISCONTINUED | OUTPATIENT
Start: 2020-12-16 | End: 2020-12-20 | Stop reason: HOSPADM

## 2020-12-16 RX ORDER — VANCOMYCIN 1.75 GRAM/500 ML IN 0.9 % SODIUM CHLORIDE INTRAVENOUS
1750 ONCE
Status: COMPLETED | OUTPATIENT
Start: 2020-12-16 | End: 2020-12-16

## 2020-12-16 RX ORDER — MAGNESIUM SULFATE HEPTAHYDRATE 40 MG/ML
2 INJECTION, SOLUTION INTRAVENOUS ONCE
Status: COMPLETED | OUTPATIENT
Start: 2020-12-16 | End: 2020-12-16

## 2020-12-16 RX ADMIN — SODIUM CHLORIDE 1000 ML: 900 INJECTION, SOLUTION INTRAVENOUS at 01:10

## 2020-12-16 RX ADMIN — ALBUTEROL SULFATE 5 MG: 2.5 SOLUTION RESPIRATORY (INHALATION) at 16:16

## 2020-12-16 RX ADMIN — FAMOTIDINE 20 MG: 10 INJECTION INTRAVENOUS at 08:14

## 2020-12-16 RX ADMIN — PIPERACILLIN SODIUM AND TAZOBACTAM SODIUM 3.38 G: 3; .375 INJECTION, POWDER, LYOPHILIZED, FOR SOLUTION INTRAVENOUS at 16:22

## 2020-12-16 RX ADMIN — PIPERACILLIN SODIUM AND TAZOBACTAM SODIUM 3.38 G: 3; .375 INJECTION, POWDER, LYOPHILIZED, FOR SOLUTION INTRAVENOUS at 09:18

## 2020-12-16 RX ADMIN — SODIUM CHLORIDE 932 ML: 900 INJECTION, SOLUTION INTRAVENOUS at 01:22

## 2020-12-16 RX ADMIN — PIPERACILLIN SODIUM AND TAZOBACTAM SODIUM 4.5 G: 4; .5 INJECTION, POWDER, LYOPHILIZED, FOR SOLUTION INTRAVENOUS at 00:16

## 2020-12-16 RX ADMIN — VANCOMYCIN HYDROCHLORIDE 1000 MG: 1 INJECTION, POWDER, LYOPHILIZED, FOR SOLUTION INTRAVENOUS at 22:03

## 2020-12-16 RX ADMIN — ALBUTEROL SULFATE 5 MG: 2.5 SOLUTION RESPIRATORY (INHALATION) at 12:35

## 2020-12-16 RX ADMIN — ALBUTEROL SULFATE 5 MG: 2.5 SOLUTION RESPIRATORY (INHALATION) at 07:50

## 2020-12-16 RX ADMIN — ALBUTEROL SULFATE 5 MG: 2.5 SOLUTION RESPIRATORY (INHALATION) at 20:23

## 2020-12-16 RX ADMIN — HEPARIN SODIUM 5000 UNITS: 5000 INJECTION INTRAVENOUS; SUBCUTANEOUS at 17:36

## 2020-12-16 RX ADMIN — MAGNESIUM SULFATE HEPTAHYDRATE 2 G: 40 INJECTION, SOLUTION INTRAVENOUS at 08:14

## 2020-12-16 RX ADMIN — FAMOTIDINE 20 MG: 10 INJECTION INTRAVENOUS at 22:04

## 2020-12-16 RX ADMIN — PROPOFOL 30 MCG/KG/MIN: 10 INJECTION, EMULSION INTRAVENOUS at 06:40

## 2020-12-16 RX ADMIN — VANCOMYCIN HYDROCHLORIDE 1750 MG: 10 INJECTION, POWDER, LYOPHILIZED, FOR SOLUTION INTRAVENOUS at 00:09

## 2020-12-16 RX ADMIN — PROPOFOL 30 MCG/KG/MIN: 10 INJECTION, EMULSION INTRAVENOUS at 16:23

## 2020-12-16 NOTE — PROGRESS NOTES
Ventilator check complete; patient has a #6.5 ET tube secured at the 22 at the lip. Patient is sedated. Patient is not able to follow commands. Breath sounds are coarse. Trachea is midline, Negative for subcutaneous air, and chest excursion is symmetric. Patient is also Negative for cyanosis and is Negative for pitting edema. All alarms are set and audible. Resuscitation bag is at the head of the bed. Ventilator Settings  Mode FIO2 Rate Tidal Volume Pressure PEEP I:E Ratio   Volume control  40 %   20 400 ml    8 cm H20  1:2.0      Peak airway pressure: 20 cm H2O   Minute ventilation: 8.5 l/min     ABG: No results for input(s): PH, PCO2, PO2, HCO3 in the last 72 hours.       Naveen Parker

## 2020-12-16 NOTE — PROGRESS NOTES
Pharmacokinetic Consult to Pharmacist    Kirby Villarreal is a 60 y.o. female being treated for sepsis with vancomycin. Height: 5' 3\" (160 cm)  Weight: 64.4 kg (142 lb)  Lab Results   Component Value Date/Time    BUN 17 12/15/2020 11:35 PM    Creatinine 0.98 12/15/2020 11:35 PM    WBC 24.0 (H) 12/15/2020 11:35 PM    Procalcitonin 0.20 12/15/2020 11:35 PM    Lactic acid 0.9 12/16/2020 03:00 AM    Lactic Acid (POC) 1.9 09/25/2018 10:19 PM      Estimated Creatinine Clearance: 52.4 mL/min (based on SCr of 0.98 mg/dL). CULTURES:  pending    Day 1 of vancomycin. Goal trough is 15-20. Patient was loaded with 1750 mg overnight. Will initiate 1000 mg every 18 hours for now. Pharmacy will order levels and adjust the dose as clinically indicated. Will continue to follow patient.       Thank you,  Manuel Quinonez, PharmD, [address]  Clinical Pharmacy Specialist  [phone]

## 2020-12-16 NOTE — ED TRIAGE NOTES
Pt coming from home by [facility] emergenct for SOB and chest px. When EMS arrived pt was SOB and tachypenic . Pt started to become combative. EMS gave 5mg versed with no improvement. Pt RSI with 20 of etomidate and 150 of succs and then 150 ketamine due to patient fighting the tube. /90, , RR 20, 99% ventilated, 97.6 axillary. Hx of COPD. Pt had covid 3 months ago.

## 2020-12-16 NOTE — PROGRESS NOTES
Patient was intubated with a number 6.5 ET Tube. Tube placement verified by auscultation and by CXR. ET Tube is secured at the 22 cm itzel at the lip and on the bilateral side. Breath sounds are coarse. Patient is Negative for subcutaneous air and chest excursion is symmetric. Trachea is midline. Patient is also Negative for cyanosis and is Negative for pitting edema. Patient placed on ventilator on documented settings. All alarms are set and audible. Resuscitation bag is at the head of the bed.       Ventilator Settings  Mode FIO2 Rate Tidal Volume Pressure PEEP I:E Ratio   SIMV  60 %   10 350 ml  10 cm H2O  8 cm H20         Peak airway pressure: 19 cm H2O   Minute ventilation: 11.9 l/min

## 2020-12-16 NOTE — ED NOTES
TRANSFER - OUT REPORT:    Verbal report given to Ryan RN (name) on Luisa Bowden  being transferred to [facility] CCU (unit) for routine progression of care       Report consisted of patients Situation, Background, Assessment and   Recommendations(SBAR). Information from the following report(s) SBAR, ED Summary, [facility] and Recent Results was reviewed with the receiving nurse. Lines:   Peripheral IV 12/15/20 Right Antecubital (Active)   Site Assessment Clean, dry, & intact 12/15/20 2337   Phlebitis Assessment 0 12/15/20 2337   Infiltration Assessment 0 12/15/20 2337   Dressing Status Clean, dry, & intact 12/15/20 2337   Dressing Type Transparent 12/15/20 2337   Hub Color/Line Status Green 12/15/20 2337       Peripheral IV 12/16/20 Left Antecubital (Active)        Opportunity for questions and clarification was provided.       Patient transported with:   Registered Nurse

## 2020-12-16 NOTE — ED NOTES
Pt boosted in bed and HOB raised. Pt rolled and extra linens and clothing removed. Rectal temperature tube placed at this time to monitor temperature. Pt did respond when being moved and pulled against us. Other then that appears to bed comfortable. Given a pillow for increased comfort.  Soft wrist restraints continued as they were already in place on this RN's arrival.

## 2020-12-16 NOTE — ED PROVIDER NOTES
Patient has a history of recently diagnosed diabetes, hypertension, COPD, tobacco use and recent Covid 19 diagnosis who presents via EMS with chest pain. Per EMS, they were called to her house for chest pain. They found her in extremis and combative. They were unable to obtain any history and the patient and they state the family was a poor historian. They gave her Versed which seemed to help initially but then she stopped protecting her airway and required RSI in the field. She presents intubated. Family is not present for further history.            Past Medical History:   Diagnosis Date    Anxiety     COPD (chronic obstructive pulmonary disease) ([facility]     Diabetes ([facility]     Hypertension        Past Surgical History:   Procedure Laterality Date    HX GYN      cervical cone         Family History:   Problem Relation Age of Onset    Cancer Mother     Liver Disease Father     Alcohol abuse Sister     Diabetes Brother     Hypertension Brother     Heart Disease Sister        Social History     Socioeconomic History    Marital status:      Spouse name: Not on file    Number of children: Not on file    Years of education: Not on file    Highest education level: Not on file   Occupational History    Not on file   Social Needs    Financial resource strain: Not on file    Food insecurity     Worry: Not on file     Inability: Not on file    Transportation needs     Medical: Not on file     Non-medical: Not on file   Tobacco Use    Smoking status: Former Smoker     Packs/day: 1.00     Years: 40.00     Pack years: 40.00     Types: Cigars    Smokeless tobacco: Never Used   Substance and Sexual Activity    Alcohol use: No    Drug use: No    Sexual activity: Not Currently   Lifestyle    Physical activity     Days per week: Not on file     Minutes per session: Not on file    Stress: Not on file   Relationships    Social connections     Talks on phone: Not on file     Gets together: Not on file     Attends Mandaeism service: Not on file     Active member of club or organization: Not on file     Attends meetings of clubs or organizations: Not on file     Relationship status: Not on file    Intimate partner violence     Fear of current or ex partner: Not on file     Emotionally abused: Not on file     Physically abused: Not on file     Forced sexual activity: Not on file   Other Topics Concern    Not on file   Social History Narrative    Not on file         ALLERGIES: Patient has no known allergies. Review of Systems   Unable to perform ROS: Intubated       Vitals:    12/15/20 2324 12/15/20 2325 12/15/20 2328   BP: (!) 122/58     Pulse: (!) 119     Resp: 25 20    SpO2: 96%     Weight:   64.4 kg (142 lb)            Physical Exam  Vitals signs and nursing note reviewed. Constitutional:       Appearance: Normal appearance. She is well-developed and normal weight. Comments: Elderly white female who appears her stated age. She is intubated   HENT:      Head: Normocephalic and atraumatic. Eyes:      Conjunctiva/sclera: Conjunctivae normal.      Pupils: Pupils are equal, round, and reactive to light. Neck:      Musculoskeletal: No neck rigidity. Cardiovascular:      Rate and Rhythm: Regular rhythm. Tachycardia present. Heart sounds: Normal heart sounds. No murmur. Pulmonary:      Effort: Pulmonary effort is normal. No respiratory distress. Abdominal:      General: There is no distension. Palpations: Abdomen is soft. Tenderness: There is no guarding. Musculoskeletal:         General: No tenderness. Right lower leg: No edema. Left lower leg: No edema. Lymphadenopathy:      Cervical: No cervical adenopathy. Skin:     General: Skin is warm and dry.    Neurological:      Comments: Unable to do neurologic exam due to to the patient being sedated          MDM  Number of Diagnoses or Management Options  Respiratory failure, unspecified chronicity, unspecified whether with hypoxia or hypercapnia ([facility]): new and requires workup  Sepsis, due to unspecified organism, unspecified whether acute organ dysfunction present [facility]): new and requires workup  Diagnosis management comments: 8/12/20 echo report:  -  Left ventricle: Systolic function was normal. Ejection fraction was estimated in the range of 55 % to 60 %. There were no regional wall motion abnormalities. 11:42 PM per old records, patient was admitted for pneumonia, followed up with [facility]. No record of known coronary artery disease.        Amount and/or Complexity of Data Reviewed  Clinical lab tests: ordered and reviewed  Tests in the radiology section of CPT®: ordered and reviewed  Tests in the medicine section of CPT®: ordered and reviewed  Decide to obtain previous medical records or to obtain history from someone other than the patient: yes  Review and summarize past medical records: yes    Risk of Complications, Morbidity, and/or Mortality  Presenting problems: high  Diagnostic procedures: high  Management options: high    Patient Progress  Patient progress: stable         Procedures

## 2020-12-16 NOTE — ED PROVIDER NOTES
Patient has a history of recently diagnosed diabetes, hypertension, COPD, tobacco use and recent Covid 19 diagnosis who presents via EMS with chest pain. Per EMS, they were called to her house for chest pain. They found her in extremis and combative. They were unable to obtain any history and the patient and they state the family was a poor historian. They gave her Versed which seemed to help initially but then she stopped protecting her airway and required RSI in the field. She presents intubated. Family is not present for further history. Past Medical History:  
Diagnosis Date  Anxiety  COPD (chronic obstructive pulmonary disease) (HCC)  Diabetes ([facility] [address]  Hypertension Past Surgical History:  
Procedure Laterality Date  HX GYN    
 cervical cone Family History:  
Problem Relation Age of Onset  Cancer Mother  Liver Disease Father  Alcohol abuse Sister  Diabetes Brother  Hypertension Brother  Heart Disease Sister Social History Socioeconomic History  Marital status:  Spouse name: Not on file  Number of children: Not on file  Years of education: Not on file  Highest education level: Not on file Occupational History  Not on file Social Needs  Financial resource strain: Not on file  Food insecurity Worry: Not on file Inability: Not on file  Transportation needs Medical: Not on file Non-medical: Not on file Tobacco Use  Smoking status: Former Smoker Packs/day: 1.00 Years: 40.00 Pack years: 40.00 Types: Cigars  Smokeless tobacco: Never Used Substance and Sexual Activity  Alcohol use: No  
 Drug use: No  
 Sexual activity: Not Currently Lifestyle  Physical activity Days per week: Not on file Minutes per session: Not on file  Stress: Not on file Relationships  Social connections Talks on phone: Not on file Gets together: Not on file Attends Caodaism service: Not on file Active member of club or organization: Not on file Attends meetings of clubs or organizations: Not on file Relationship status: Not on file  Intimate partner violence Fear of current or ex partner: Not on file Emotionally abused: Not on file Physically abused: Not on file Forced sexual activity: Not on file Other Topics Concern  Not on file Social History Narrative  Not on file ALLERGIES: Patient has no known allergies. Review of Systems Unable to perform ROS: Intubated Vitals:  
 12/15/20 2324 12/15/20 2325 12/15/20 2328 BP: (!) 122/58 Pulse: (!) 119 Resp: 25 20 SpO2: 96% Weight:   64.4 kg (142 lb) Physical Exam 
Vitals signs and nursing note reviewed. Constitutional:   
   Appearance: Normal appearance. She is well-developed and normal weight. Comments: Elderly white female who appears her stated age. She is intubated HENT:  
   Head: Normocephalic and atraumatic. Eyes:  
   Conjunctiva/sclera: Conjunctivae normal.  
   Pupils: Pupils are equal, round, and reactive to light. Neck: Musculoskeletal: No neck rigidity. Cardiovascular:  
   Rate and Rhythm: Regular rhythm. Tachycardia present. Heart sounds: Normal heart sounds. No murmur. Pulmonary:  
   Effort: Pulmonary effort is normal. No respiratory distress. Abdominal:  
   General: There is no distension. Palpations: Abdomen is soft. Tenderness: There is no guarding. Musculoskeletal:     
   General: No tenderness. Right lower leg: No edema. Left lower leg: No edema. Lymphadenopathy:  
   Cervical: No cervical adenopathy. Skin: 
   General: Skin is warm and dry. Neurological:  
   Comments: Unable to do neurologic exam due to to the patient being sedated MDM Number of Diagnoses or Management Options Diagnosis management comments: 8/12/20 echo report: -  Left ventricle: Systolic function was normal. Ejection fraction was estimated in the range of 55 % to 60 %. There were no regional wall motion abnormalities. 11:42 PM per old records, patient was admitted for pneumonia, followed up with [facility]. No record of known coronary artery disease. 12:28 AM elevated white count, will start antibiotics 12:58 AM elevated troponin with unremarkable EKG. Will call intensivist. 
 
  
Amount and/or Complexity of Data Reviewed Clinical lab tests: ordered and reviewed Tests in the radiology section of CPT®: ordered and reviewed Tests in the medicine section of CPT®: ordered and reviewed Decide to obtain previous medical records or to obtain history from someone other than the patient: yes Review and summarize past medical records: yes Discuss the patient with other providers: yes Risk of Complications, Morbidity, and/or Mortality Presenting problems: high Diagnostic procedures: high Management options: high Patient Progress Patient progress: stable Critical Care Performed by: Yamil Schaefer MD 
Authorized by: Yamil Schaefer MD  
 
Critical care provider statement:  
  Critical care time (minutes):  40 Critical care time was exclusive of:  Separately billable procedures and treating other patients Critical care was necessary to treat or prevent imminent or life-threatening deterioration of the following conditions:  Cardiac failure, circulatory failure, CNS failure or compromise, dehydration, endocrine crisis, hepatic failure, metabolic crisis, renal failure, respiratory failure, sepsis, shock and toxidrome Critical care was time spent personally by me on the following activities:  Ordering and performing treatments and interventions, ordering and review of laboratory studies, ordering and review of radiographic studies, pulse oximetry, re-evaluation of patient's condition, review of old charts, examination of patient, evaluation of patient's response to treatment, discussions with consultants and development of treatment plan with patient or surrogate   I assumed direction of critical care for this patient from another provider in my specialty: no

## 2020-12-16 NOTE — H&P
HISTORY AND PHYSICAL      Daphne Davila    12/16/2020    Date of Admission:  12/15/2020    The patient's chart is reviewed and the patient is discussed with the staff. Subjective:     Patient is a 60 y.o.  female presents with altered mental status from home and intubated    patient has COPD/DM/HTN/covid in July 20 and came in s/p altered mental status and intubated from home. Patient with elevated WBC at 24. cxr worse. CT head done and negative for CVA/ICH. Started ABX in ER and covid R/o again. ABG noted elevated PCO2      Review of Systems  Unable to assess since unresponsive on vent. 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 Taking? OXYGEN-AIR DELIVERY SYSTEMS   Yes No   Sig: 3 L by Nasal route continuous. PARoxetine (PAXIL) 10 mg tablet   No No   Sig: Take 1 Tab by mouth daily. Symbicort 160-4.5 mcg/actuation HFAA   No No   Sig: TAKE 2 PUFFS BY INHALATION TWO (2) TIMES A DAY. albuterol (ProAir HFA) 90 mcg/actuation inhaler   No No   Sig: Take 2 Puffs by inhalation every four (4) hours as needed for Wheezing.    clonazePAM (KlonoPIN) 0.5 mg tablet   Yes No Sig: Take  by mouth nightly as needed for Anxiety. umeclidinium (Incruse Ellipta) 62.5 mcg/actuation inhaler   No No   Sig: Take 1 Puff by inhalation daily.       Facility-Administered Medications: None       Past Medical History:   Diagnosis Date    Anxiety     [facility]     Diabetes ([facility]     Hypertension      Past Surgical History:   Procedure Laterality Date    HX GYN      cervical cone     Social History     Socioeconomic History    Marital status:      Spouse name: Not on file    Number of children: Not on file    Years of education: Not on file    Highest education level: Not on file   Occupational History    Not on file   Social Needs    Financial resource strain: Not on file    Food insecurity     Worry: Not on file     Inability: Not on file    Transportation needs     Medical: Not on file     Non-medical: Not on file   Tobacco Use    Smoking status: Former Smoker     Packs/day: 1.00     Years: 40.00     Pack years: 40.00     Types: Cigars    Smokeless tobacco: Never Used   Substance and Sexual Activity    Alcohol use: No    Drug use: No    Sexual activity: Not Currently   Lifestyle    Physical activity     Days per week: Not on file     Minutes per session: Not on file    Stress: Not on file   Relationships    Social connections     Talks on phone: Not on file     Gets together: Not on file     Attends Mandaeism service: Not on file     Active member of club or organization: Not on file     Attends meetings of clubs or organizations: Not on file     Relationship status: Not on file    Intimate partner violence     Fear of current or ex partner: Not on file     Emotionally abused: Not on file     Physically abused: Not on file     Forced sexual activity: Not on file   Other Topics Concern    Not on file   Social History Narrative    Not on file     Family History   Problem Relation Age of Onset    Cancer Mother     Liver Disease Father     Alcohol abuse Sister     Diabetes Brother     Hypertension Brother     Heart Disease Sister      No Known Allergies    Current Facility-Administered Medications   Medication Dose Route Frequency    heparin (porcine) injection 5,000 Units  5,000 Units SubCUTAneous Q8H    albuterol (PROVENTIL VENTOLIN) nebulizer solution 5 mg  5 mg Nebulization QID RT    famotidine (PF) (PEPCID) 20 mg in 0.9% sodium chloride 10 mL injection  20 mg IntraVENous Q12H    piperacillin-tazobactam (ZOSYN) 3.375 g in 0.9% sodium chloride (MBP/ADV) 100 mL MBP  3.375 g IntraVENous Q8H    propofol (DIPRIVAN) 10 mg/mL infusion  0-50 mcg/kg/min IntraVENous TITRATE     Current Outpatient Medications   Medication Sig    Symbicort 160-4.5 mcg/actuation HFAA TAKE 2 PUFFS BY INHALATION TWO (2) TIMES A DAY.  umeclidinium (Incruse Ellipta) 62.5 mcg/actuation inhaler Take 1 Puff by inhalation daily.  albuterol (ProAir HFA) 90 mcg/actuation inhaler Take 2 Puffs by inhalation every four (4) hours as needed for Wheezing.  clonazePAM (KlonoPIN) 0.5 mg tablet Take  by mouth nightly as needed for Anxiety.  PARoxetine (PAXIL) 10 mg tablet Take 1 Tab by mouth daily.  OXYGEN-AIR DELIVERY SYSTEMS 3 L by Nasal route continuous. Objective:     Vitals:    12/16/20 0050 12/16/20 0620 12/16/20 0621 12/16/20 0630   BP: (!) 112/53 (!) 102/51  (!) 109/55   Pulse: (!) 105 75 77 77   Resp: 21 20 20 20   SpO2: 92% 99% 100% 100%   Weight:           PHYSICAL EXAM     Constitutional:  the patient is well developed and in no acute distress on Vent   EENMT:  Sclera clear, pupils equal, oral mucosa moist with ETT  Respiratory: distant sounds. No wheezing  Cardiovascular:  RRR without M,G,R  Gastrointestinal: soft and non-tender; with positive bowel sounds. Musculoskeletal: warm without cyanosis. There is no lower extremity edema. Skin:  no jaundice or rashes, no wounds   Neurologic: unresponsive on vent.  Pupils equal. Breathing above vent.     Psychiatric:  Unresponsive on Vent. CXR: worsening infiltrates vs prior x-ray      CXR Results  (Last 48 hours)               12/15/20 2340  XR CHEST PORT Final result    Impression:  IMPRESSION:       1. ET tube tip is in satisfactory position. 2. Right lung base consolidation, appearing similar to prior exam of August 2020. Narrative:  Portable chest xray         COMPARISON: August 23, 2020       INDICATION: Intubated       FINDINGS:        Endotracheal tube tip is at 3.5 cm above the loe. Enteric tube courses below   the diaphragm. Cardiac mediastinal contour is within normal limits. There is   stable right lung base consolidation. . There is no pneumothorax. No large   pleural effusion. Surrounding bones are stable.                      Recent Labs     12/15/20  2335   WBC 24.0*   HGB 12.0   HCT 39.8        Recent Labs     12/15/20  2335      K 4.0      *   CO2 26   BUN 17   CREA 0.98   MG 1.6*   CA 8.3   ALB 3.2   TBILI 0.7   ALT 22     Recent Labs     12/16/20  0609 12/16/20  0132   PHI 7.31* 7.26*   PCO2I 50.9* 58.9*   PO2I 98 116*   HCO3I 25.8 26.5*     Recent Labs     12/16/20  0300 12/15/20  2335   LAC 0.9 3.4*       Assessment:  (Medical Decision Making)     Hospital Problems  Date Reviewed: 9/23/2020          Codes Class Noted POA    Acute encephalopathy ICD-10-CM: G93.40  ICD-9-CM: 348.30  12/16/2020 Unknown        Acute on chronic respiratory failure with hypoxia and hypercapnia (HCC) ICD-10-CM: J96.21, J96.22  ICD-9-CM: 518.84, 786.09, 799.02  12/16/2020 Unknown        Suspected COVID-19 virus infection -- had Covid 3 months agao in July 2020 ICD-10-CM: Z20.828  ICD-9-CM: V01.79  12/16/2020 Yes        Emphysema lung (HCC) (Chronic) ICD-10-CM: J43.9  ICD-9-CM: 492.8  12/16/2020 Yes        Elevated diaphragm ICD-10-CM: J98.6  ICD-9-CM: 519.4  7/2/2020 Yes        Sepsis ([address] ICD-10-CM: A41.9  ICD-9-CM: 038.9, 995.91  7/1/2020 Unknown Patient with COPD/DM/HTN/Covid in 7/20 came in since was having altered mental status at home and was intubated by EMS. Plan:  (Medical Decision Making)     --Will admit to ICU  --covid r/o and droplet plus precautions -- noted had it 3 months ago  --pancultures and started Zosyn/vanco in ER and would continue. Add in azithro  --CT head  --drugs screen  --continue vent support [facility]  and rate of 20. F/u ABG noted improved PCO2 and would check again later today  --nebs since has a history of COPD  --replace electrolytes  --continue remaining treatment. Get further information from family when here. More than 50% of the time documented was spent in face-to-face contact with the patient and in the care of the patient on the floor/unit where the patient is located.     Bill Sutton MD

## 2020-12-17 ENCOUNTER — APPOINTMENT (OUTPATIENT)
Dept: CT IMAGING | Age: 65
DRG: 871 | End: 2020-12-17
Attending: INTERNAL MEDICINE
Payer: MEDICARE

## 2020-12-17 LAB
ANION GAP SERPL CALC-SCNC: 5 MMOL/L (ref 7–16)
ARTERIAL PATENCY WRIST A: YES
BASE EXCESS BLD CALC-SCNC: 1 MMOL/L
BASOPHILS # BLD: 0.1 K/UL (ref 0–0.2)
BASOPHILS NFR BLD: 1 % (ref 0–2)
BDY SITE: ABNORMAL
BUN SERPL-MCNC: 8 MG/DL (ref 8–23)
CALCIUM SERPL-MCNC: 8.1 MG/DL (ref 8.3–10.4)
CHLORIDE SERPL-SCNC: 109 MMOL/L (ref 98–107)
CO2 BLD-SCNC: 28 MMOL/L
CO2 SERPL-SCNC: 28 MMOL/L (ref 21–32)
COLLECT TIME,HTIME: 244
CREAT SERPL-MCNC: 0.42 MG/DL (ref 0.6–1)
DIFFERENTIAL METHOD BLD: ABNORMAL
EOSINOPHIL # BLD: 0.2 K/UL (ref 0–0.8)
EOSINOPHIL NFR BLD: 2 % (ref 0.5–7.8)
ERYTHROCYTE [DISTWIDTH] IN BLOOD BY AUTOMATED COUNT: 15.5 % (ref 11.9–14.6)
GAS FLOW.O2 O2 DELIVERY SYS: ABNORMAL L/MIN
GLUCOSE SERPL-MCNC: 80 MG/DL (ref 65–100)
HCO3 BLD-SCNC: 26.6 MMOL/L (ref 22–26)
HCT VFR BLD AUTO: 34.6 % (ref 35.8–46.3)
HGB BLD-MCNC: 10.7 G/DL (ref 11.7–15.4)
IMM GRANULOCYTES # BLD AUTO: 0.1 K/UL (ref 0–0.5)
IMM GRANULOCYTES NFR BLD AUTO: 1 % (ref 0–5)
LYMPHOCYTES # BLD: 1.1 K/UL (ref 0.5–4.6)
LYMPHOCYTES NFR BLD: 12 % (ref 13–44)
MCH RBC QN AUTO: 27 PG (ref 26.1–32.9)
MCHC RBC AUTO-ENTMCNC: 30.9 G/DL (ref 31.4–35)
MCV RBC AUTO: 87.2 FL (ref 79.6–97.8)
MONOCYTES # BLD: 0.6 K/UL (ref 0.1–1.3)
MONOCYTES NFR BLD: 7 % (ref 4–12)
NEUTS SEG # BLD: 7 K/UL (ref 1.7–8.2)
NEUTS SEG NFR BLD: 78 % (ref 43–78)
NRBC # BLD: 0 K/UL (ref 0–0.2)
O2/TOTAL GAS SETTING VFR VENT: 35 %
PCO2 BLD: 47.5 MMHG (ref 35–45)
PH BLD: 7.36 [PH] (ref 7.35–7.45)
PLATELET # BLD AUTO: 233 K/UL (ref 150–450)
PMV BLD AUTO: 10.7 FL (ref 9.4–12.3)
PO2 BLD: 125 MMHG (ref 75–100)
POTASSIUM SERPL-SCNC: 3.5 MMOL/L (ref 3.5–5.1)
RBC # BLD AUTO: 3.97 M/UL (ref 4.05–5.2)
SAO2 % BLD: 99 % (ref 95–98)
SERVICE CMNT-IMP: ABNORMAL
SERVICE CMNT-IMP: ABNORMAL
SODIUM SERPL-SCNC: 142 MMOL/L (ref 136–145)
SPECIMEN TYPE: ABNORMAL
VENTILATION MODE VENT: ABNORMAL
WBC # BLD AUTO: 9 K/UL (ref 4.3–11.1)

## 2020-12-17 PROCEDURE — 74011250636 HC RX REV CODE- 250/636: Performed by: EMERGENCY MEDICINE

## 2020-12-17 PROCEDURE — 2709999900 HC NON-CHARGEABLE SUPPLY

## 2020-12-17 PROCEDURE — 87070 CULTURE OTHR SPECIMN AEROBIC: CPT

## 2020-12-17 PROCEDURE — 85025 COMPLETE CBC W/AUTO DIFF WBC: CPT

## 2020-12-17 PROCEDURE — 74011250636 HC RX REV CODE- 250/636: Performed by: INTERNAL MEDICINE

## 2020-12-17 PROCEDURE — 71260 CT THORAX DX C+: CPT

## 2020-12-17 PROCEDURE — 94640 AIRWAY INHALATION TREATMENT: CPT

## 2020-12-17 PROCEDURE — 74011000250 HC RX REV CODE- 250: Performed by: INTERNAL MEDICINE

## 2020-12-17 PROCEDURE — 80048 BASIC METABOLIC PNL TOTAL CA: CPT

## 2020-12-17 PROCEDURE — 65610000001 HC ROOM ICU GENERAL

## 2020-12-17 PROCEDURE — 74011000636 HC RX REV CODE- 636: Performed by: INTERNAL MEDICINE

## 2020-12-17 PROCEDURE — 82803 BLOOD GASES ANY COMBINATION: CPT

## 2020-12-17 PROCEDURE — 99291 CRITICAL CARE FIRST HOUR: CPT | Performed by: INTERNAL MEDICINE

## 2020-12-17 PROCEDURE — 74011000258 HC RX REV CODE- 258: Performed by: INTERNAL MEDICINE

## 2020-12-17 PROCEDURE — 36600 WITHDRAWAL OF ARTERIAL BLOOD: CPT

## 2020-12-17 RX ORDER — SODIUM CHLORIDE 0.9 % (FLUSH) 0.9 %
10 SYRINGE (ML) INJECTION
Status: COMPLETED | OUTPATIENT
Start: 2020-12-17 | End: 2020-12-17

## 2020-12-17 RX ORDER — ALBUTEROL SULFATE 0.83 MG/ML
5 SOLUTION RESPIRATORY (INHALATION)
Status: DISCONTINUED | OUTPATIENT
Start: 2020-12-17 | End: 2020-12-18

## 2020-12-17 RX ADMIN — Medication 10 ML: at 11:57

## 2020-12-17 RX ADMIN — HEPARIN SODIUM 5000 UNITS: 5000 INJECTION INTRAVENOUS; SUBCUTANEOUS at 02:07

## 2020-12-17 RX ADMIN — PIPERACILLIN SODIUM AND TAZOBACTAM SODIUM 3.38 G: 3; .375 INJECTION, POWDER, LYOPHILIZED, FOR SOLUTION INTRAVENOUS at 00:53

## 2020-12-17 RX ADMIN — FAMOTIDINE 20 MG: 10 INJECTION INTRAVENOUS at 08:07

## 2020-12-17 RX ADMIN — ALBUTEROL SULFATE 5 MG: 2.5 SOLUTION RESPIRATORY (INHALATION) at 19:20

## 2020-12-17 RX ADMIN — HEPARIN SODIUM 5000 UNITS: 5000 INJECTION INTRAVENOUS; SUBCUTANEOUS at 10:00

## 2020-12-17 RX ADMIN — PIPERACILLIN SODIUM AND TAZOBACTAM SODIUM 3.38 G: 3; .375 INJECTION, POWDER, LYOPHILIZED, FOR SOLUTION INTRAVENOUS at 16:17

## 2020-12-17 RX ADMIN — PROPOFOL 30 MCG/KG/MIN: 10 INJECTION, EMULSION INTRAVENOUS at 02:07

## 2020-12-17 RX ADMIN — ALBUTEROL SULFATE 5 MG: 2.5 SOLUTION RESPIRATORY (INHALATION) at 07:37

## 2020-12-17 RX ADMIN — IOPAMIDOL 75 ML: 755 INJECTION, SOLUTION INTRAVENOUS at 11:57

## 2020-12-17 RX ADMIN — PROPOFOL 25 MCG/KG/MIN: 10 INJECTION, EMULSION INTRAVENOUS at 08:06

## 2020-12-17 RX ADMIN — FAMOTIDINE 20 MG: 10 INJECTION INTRAVENOUS at 20:01

## 2020-12-17 RX ADMIN — HEPARIN SODIUM 5000 UNITS: 5000 INJECTION INTRAVENOUS; SUBCUTANEOUS at 17:48

## 2020-12-17 RX ADMIN — PIPERACILLIN SODIUM AND TAZOBACTAM SODIUM 3.38 G: 3; .375 INJECTION, POWDER, LYOPHILIZED, FOR SOLUTION INTRAVENOUS at 08:07

## 2020-12-17 RX ADMIN — VANCOMYCIN HYDROCHLORIDE 1000 MG: 1 INJECTION, POWDER, LYOPHILIZED, FOR SOLUTION INTRAVENOUS at 17:48

## 2020-12-17 NOTE — PROGRESS NOTES
Ventilator check complete; patient has a #6.5 ET tube secured at the 22 at the lip. Patient is sedated. Patient is not able to follow commands. Breath sounds are diminished. Trachea is midline, Negative for subcutaneous air, and chest excursion is symmetric. Patient is also Negative for cyanosis. All alarms are set and audible. Resuscitation bag is at the head of the bed. Ventilator Settings  Mode FIO2 Rate Tidal Volume Pressure PEEP I:E Ratio   PRVC  35 %   20 400 ml  10 cm H2O  8 cm H20  1:2.0      Peak airway pressure: 19 cm H2O   Minute ventilation: 8.9 l/min     ABG: No results for input(s): PH, PCO2, PO2, HCO3 in the last 72 hours.       Ander Mac

## 2020-12-17 NOTE — PROGRESS NOTES
Respiratory Mechanics completed and are as follows:  Weaning Parameters  Spontaneous Breathing Trial Complete: Yes  Resp Rate Observed: 18  Ve: 7.6  VT: 845  NIF: -20  Rodriguez Agitation Sedation Scale (RASS): Drowsy  Patient extubated to a 2L NC. Patient is able to communicate and is negative for stridor. Breath sounds are clear. No complications with extubation.      [address], [place]

## 2020-12-17 NOTE — PROGRESS NOTES
Pt arrived to CCU at 1930, VSS, propofol at 30mcg zosyn infusing, pt able to follow commands and nod head yes/no. 2025 Spoke to pt's son Anup Silvestre provided updated and code [id number] for future updates.

## 2020-12-17 NOTE — PROGRESS NOTES
TRANSFER - OUT REPORT:    Verbal report given to [facility] RN(name) on Mike Martinez  being transferred to ICU(unit) for routine progression of care       Report consisted of patients Situation, Background, Assessment and   Recommendations(SBAR). Information from the following report(s) SBAR, Kardex, ED Summary, Intake/Output, MAR, Recent Results and Cardiac Rhythm SR was reviewed with the receiving nurse. Lines:   Peripheral IV 12/15/20 Right Antecubital (Active)   Site Assessment Clean, dry, & intact 12/17/20 0045   Phlebitis Assessment 0 12/17/20 0045   Infiltration Assessment 0 12/17/20 0045   Dressing Status Clean, dry, & intact 12/17/20 0045   Dressing Type Transparent;Tape 12/17/20 0045   Hub Color/Line Status Green; Infusing 12/17/20 0045       Peripheral IV 12/16/20 Left Antecubital (Active)   Site Assessment Clean, dry, & intact 12/17/20 0045   Phlebitis Assessment 0 12/17/20 0045   Infiltration Assessment 0 12/17/20 0045   Dressing Status Clean, dry, & intact 12/17/20 0045   Dressing Type Transparent;Tape 12/17/20 0045   Hub Color/Line Status Green;Patent 12/17/20 0045        Opportunity for questions and clarification was provided.       Patient transported with:   Monitor  O2 @ 2 liters  Registered Nurse

## 2020-12-17 NOTE — PROGRESS NOTES
Pt seen in ICU s/p admission AMS and acute on chronic respiratory failure with hypoxia and hypercapnia. Now extubated. Awake and alert. Confirms demographics. States son and GF living with her. States 436 phone number is Constance Ansari, son's GF. No current HH or rehab. No DME's. Discussed possible needs for d/c of HH vs STR and any DME's. Aware CM will follow for d/c needs/POC when stable. LW/HCPOA, pt is  and [place] Fess is only child. Discussed ACP. CM following. Care Management Interventions  PCP Verified by CM: Yes  Mode of Transport at Discharge:  Other (see comment)  Transition of Care Consult (CM Consult): Discharge Planning  Discharge Durable Medical Equipment: (none currently)  Current Support Network: Own Home, Other(son and GF currently live with pt)  Confirm Follow Up Transport: Family  Freedom of Choice List was Provided with Basic Dialogue that Supports the Patient's Individualized Plan of Care/Goals, Treatment Preferences and Shares the Quality Data Associated with the Providers?: Yes  [employer] Information Provided?: (MCR/ATILIO)  Discharge Location  Discharge Placement: Unable to determine at this time

## 2020-12-17 NOTE — PROGRESS NOTES
Bedside shift change report given to Ryan SCOTT (oncoming nurse) by Mehul Mcleod (offgoing nurse).  Report included the following information SBAR, Kardex, ED Summary, Intake/Output, MAR and Cardiac Rhythm SR.

## 2020-12-17 NOTE — ACP (ADVANCE CARE PLANNING)
Advance Care Planning     Advance Care Planning Activator (Inpatient)  Conversation Note      Date of ACP Conversation: 12/17/20     Conversation Conducted with:   Patient with Decision Making Capacity    ACP Activator: Kel Fox RN    *When Decision Maker makes decisions on behalf of the incapacitated patient: Decision Maker is asked to consider and make decisions based on patient values, known preferences, or best interests. Health Care Decision Maker:    Current Designated Health Care Decision Maker:   (If there is a valid [place] named in the [address] [place] [address] Makers\" box in the ACP activity, but it is not visible above, be sure to open that field and then select the health care decision maker relationship (ie \"primary\") in the blank space to the right of the name.) Validate  this information as still accurate & up-to-date; edit [place] field as needed.)    Note: Assess and validate information in current ACP documents, as indicated. If no Decision Maker listed above or available through scanned documents, then:    If no Authorized Decision Maker has previously been identified, then patient chooses [place]:  \"Who would you like to name as your primary health care decision-maker? \"    Name: Christina Bonilla Relationship: son Phone number: [phone]  Koleen Ports this person be reached easily? \" YES      Note: If the relationship of these Decision-Makers to the patient does NOT follow your state's Next of Kin hierarchy, recommend that patient complete ACP document that meets state-specific requirements to allow them to act on the patient's behalf when appropriate. Care Preferences    Ventilation: \"If you were in your present state of health and suddenly became very ill and were unable to breathe on your own, what would your preference be about the use of a ventilator (breathing machine) if it were available to you? \"      If patient would desire the use of a ventilator (breathing machine), answer \"yes\", if not \"no\":yes    \"If your health worsens and it becomes clear that your chance of recovery is unlikely, what would your preference be about the use of a ventilator (breathing machine) if it were available to you? \"     Would the patient desire the use of a ventilator (breathing machine)? Pt was unsure and would leave up to Joseph Kaur, pt's son. Resuscitation  \"CPR works best to restart the heart when there is a sudden event, like a heart attack, in someone who is otherwise healthy. Unfortunately, CPR does not typically restart the heart for people who have serious health conditions or who are very sick. \"    \"In the event your heart stopped as a result of an underlying serious health condition, would you want attempts to be made to restart your heart (answer \"yes\" for attempt to resuscitate) or would you prefer a natural death (answer \"no\" for do not attempt to resuscitate)? \" yes      NOTE: If the patient has a valid advance directive AND now provides care preference(s) that are inconsistent with that prior directive, advise the patient to consider either: creating a new advance directive that complies with state-specific requirements; or, if that is not possible, orally revoking that prior directive in accordance with state-specific requirements, which must be documented in the EHR. [x] Yes  [] No   Educated Patient / Carmina Barthel regarding differences between Advance Directives and portable DNR orders.     Length of ACP Conversation in minutes:      Conversation Outcomes:  [x] ACP discussion completed  [] Existing advance directive reviewed with patient; no changes to patient's previously recorded wishes     [] New Advance Directive completed   [] Portable Do Not Resuscitate prepared for Provider review and signature  [] POLST/POST/MOLST/MOST prepared for Provider review and signature      Follow-up plan:    [] Schedule follow-up conversation to continue planning  [] Referred individual to Provider for additional questions/concerns   [] Advised patient/agent/surrogate to review completed ACP document and update if needed with changes in condition, patient preferences or care setting     [] This note routed to one or more involved healthcare providers

## 2020-12-17 NOTE — PROGRESS NOTES
TRANSFER - IN REPORT:    Verbal report received from [facility] RN (name) on Pamella Fothergill  being received from CCU (unit) for routine progression of care      Report consisted of patients Situation, Background, Assessment and   Recommendations(SBAR). Information from the following report(s) SBAR, Kardex, ED Summary, Procedure Summary, Intake/Output, MAR, Recent Results, Med Rec Status, Cardiac Rhythm NSR, Alarm Parameters  and Quality Measures was reviewed with the receiving nurse. Opportunity for questions and clarification was provided. Assessment completed upon patients arrival to unit and care assumed.

## 2020-12-17 NOTE — PROGRESS NOTES
Critical Care Daily Progress Note: 12/17/2020    Cedrikc Braden   Admission Date: 12/15/2020         The patient's chart is reviewed and the patient is discussed with the staff. Jase Morton is a 78-year-old female with a past medical history of COPD, diabetes, hypertension, prior Covid in July 2020 who was admitted on 12/15/2020 with leukocytosis and acute respiratory failure with hypercarbia. Subjective:     Covid testing x2 is negative. Leukocytosis down to 9000. Renal function stable. ABG improving. Afebrile.   Awake and following commands    Current Facility-Administered Medications   Medication Dose Route Frequency    albuterol (PROVENTIL VENTOLIN) nebulizer solution 5 mg  5 mg Nebulization Q6H RT    [START ON 12/18/2020] Vancomycin Trough Level Reminder   Other ONCE    heparin (porcine) injection 5,000 Units  5,000 Units SubCUTAneous Q8H    famotidine (PF) (PEPCID) 20 mg in 0.9% sodium chloride 10 mL injection  20 mg IntraVENous Q12H    piperacillin-tazobactam (ZOSYN) 3.375 g in 0.9% sodium chloride (MBP/ADV) 100 mL MBP  3.375 g IntraVENous Q8H    vancomycin (VANCOCIN) 1,000 mg in 0.9% sodium chloride 250 mL (VIAL-MATE)  1,000 mg IntraVENous Q18H    propofol (DIPRIVAN) 10 mg/mL infusion  0-50 mcg/kg/min IntraVENous TITRATE       Review of Systems  Constitutional:  negative for fever, chills, sweats  Cardiovascular:  negative for chest pain, palpitations, syncope, edema  Gastrointestinal:  negative for dysphagia, reflux, vomiting, diarrhea, abdominal pain, or melena  Neurologic:  negative for focal weakness, numbness, headache      Objective:     Vitals:    12/17/20 0745 12/17/20 0800 12/17/20 0816 12/17/20 0830   BP: (!) 129/58 (!) 127/59 129/62 (!) 167/72   Pulse: 83 79 81 100   Resp: 15 20 20 27   Temp: 98.7 °F (37.1 °C) 98.7 °F (37.1 °C) 98.7 °F (37.1 °C) 98.7 °F (37.1 °C)   SpO2: 100% 100% 100% 95%   Weight:       Height:             Intake/Output Summary (Last 24 hours) at 12/17/2020 1042  Last data filed at 12/17/2020 0600  Gross per 24 hour   Intake 713.92 ml   Output 1540 ml   Net -826.08 ml       Physical Exam:          Constitutional:  the patient is well developed and in no acute distress  EENMT:  Sclera clear, pupils equal, oral mucosa moist  Respiratory: decreased bilaterally but no wheezes  Cardiovascular:  RRR without M,G,R  Gastrointestinal: soft and non-tender; with positive bowel sounds. Musculoskeletal: warm without cyanosis. There is no lower extremity edema. Skin:  no jaundice or rashes, no wounds   Neurologic: no gross neuro deficits     Psychiatric:  alert and oriented x 3    LINES:  18 12/15/2020  NG tube 12/15/2020  PIV  Tilley 12/15/2020    CXR        MICRO  Date Source Result   12/15/2020  blood  pending   12/15/2020  blood pending       LAB  No results for input(s): GLUCPOC in the last 72 hours.     No lab exists for component: [address]     12/17/20  0359 12/15/20  2335   WBC 9.0 24.0*   HGB 10.7* 12.0   HCT 34.6* 39.8    328     Recent Labs     12/17/20  0359 12/15/20  2335    137   K 3.5 4.0   * 102   CO2 28 26   GLU 80 228*   BUN 8 17   CREA 0.42* 0.98   MG  --  1.6*   CA 8.1* 8.3   ALB  --  3.2   TBILI  --  0.7   ALT  --  22     Recent Labs     12/17/20  0245 12/16/20  0609 12/16/20  0132   PHI 7.36 7.31* 7.26*   PCO2I 47.5* 50.9* 58.9*   PO2I 125* 98 116*   HCO3I 26.6* 25.8 26.5*     Recent Labs     12/16/20  0300 12/15/20  2335   LAC 0.9 3.4*     Recent Labs     12/17/20  0245 12/16/20  0609 12/16/20  0132   PHI 7.36 7.31* 7.26*   PCO2I 47.5* 50.9* 58.9*   PO2I 125* 98 116*   HCO3I 26.6* 25.8 26.5*         Assessment:  (Medical Decision Making)     Hospital Problems  Date Reviewed: 9/23/2020          Codes Class Noted POA    Suspected COVID-19 virus infection -- had Covid 3 months agao in July 2020 ICD-10-CM: Z20.828  ICD-9-CM: V01.79  12/16/2020 Yes        Emphysema lung (HCC) (Chronic) ICD-10-CM: J43.9  ICD-9-CM: 492.8 12/16/2020 Yes        Acute encephalopathy ICD-10-CM: G93.40  ICD-9-CM: 348.30  12/16/2020 Unknown        Acute on chronic respiratory failure with hypoxia and hypercapnia (HCC) ICD-10-CM: J96.21, J96.22  ICD-9-CM: 518.84, 786.09, 799.02  12/16/2020 Unknown        Elevated diaphragm ICD-10-CM: J98.6  ICD-9-CM: 519.4  7/2/2020 Yes        Sepsis ([facility] [address] ICD-10-CM: A41.9  ICD-9-CM: 038.9, 995.91  7/1/2020 Unknown              Plan:  (Medical Decision Making)     --Needs CT w contrast to r/o PE, stat. --Extubate  --continue measures for COPD  --d/c droplet plus precautions and transfer to non-COVID area. --f/u cultures. Add sputum cultures given h/o staph pneumonia. Narrow abx tomorrow as appropriate.     The patient is critically ill with respiratory failure, circulatory failure and requires high complexity decision making for assessment and support including frequent ventilator adjustment , frequent evaluation and titration of therapies , application of advanced monitoring technologies and extensive interpretation of multiple databases    cumulative time devoted to patient care services by me for day of service -45 min     Karine Rae MD

## 2020-12-17 NOTE — PROGRESS NOTES
Ventilator check complete; patient has a #6.5 ET tube secured at the 22 at the lip. Patient is sedated. Patient is not able to follow commands. Breath sounds are diminished. Trachea is midline, Negative for subcutaneous air, and chest excursion is symmetric. Patient is also Negative for cyanosis and is Negative for pitting edema. All alarms are set and audible. Resuscitation bag is at the head of the bed.       Ventilator Settings  Mode FIO2 Rate Tidal Volume Pressure PEEP I:E Ratio   PRVC  35 %  20  0.4 ml  10 cm H2O  8 cm H20  1:2.0      Peak airway pressure: 20 cm H2O   Minute ventilation: 8.36 l/min       Bakari Sarmiento, RT

## 2020-12-17 NOTE — INTERDISCIPLINARY ROUNDS
Interdisciplinary team rounds were held 12/17/2020 with the following team members:Care Management, Nursing, Nutrition, Pharmacy, Physician, Respiratory Therapy and Clinical Coordinator and the patient. Plan of care discussed. See clinical pathway and/or care plan for interventions and desired outcomes.

## 2020-12-18 LAB
ANION GAP SERPL CALC-SCNC: 8 MMOL/L (ref 7–16)
BASOPHILS # BLD: 0.1 K/UL (ref 0–0.2)
BASOPHILS NFR BLD: 1 % (ref 0–2)
BUN SERPL-MCNC: 8 MG/DL (ref 8–23)
CALCIUM SERPL-MCNC: 9 MG/DL (ref 8.3–10.4)
CHLORIDE SERPL-SCNC: 106 MMOL/L (ref 98–107)
CO2 SERPL-SCNC: 27 MMOL/L (ref 21–32)
CREAT SERPL-MCNC: 0.44 MG/DL (ref 0.6–1)
DIFFERENTIAL METHOD BLD: ABNORMAL
EOSINOPHIL # BLD: 0.1 K/UL (ref 0–0.8)
EOSINOPHIL NFR BLD: 1 % (ref 0.5–7.8)
ERYTHROCYTE [DISTWIDTH] IN BLOOD BY AUTOMATED COUNT: 15.3 % (ref 11.9–14.6)
GLUCOSE SERPL-MCNC: 68 MG/DL (ref 65–100)
HCT VFR BLD AUTO: 38.3 % (ref 35.8–46.3)
HGB BLD-MCNC: 11.6 G/DL (ref 11.7–15.4)
IMM GRANULOCYTES # BLD AUTO: 0 K/UL (ref 0–0.5)
IMM GRANULOCYTES NFR BLD AUTO: 0 % (ref 0–5)
LYMPHOCYTES # BLD: 1.2 K/UL (ref 0.5–4.6)
LYMPHOCYTES NFR BLD: 15 % (ref 13–44)
MCH RBC QN AUTO: 26.7 PG (ref 26.1–32.9)
MCHC RBC AUTO-ENTMCNC: 30.3 G/DL (ref 31.4–35)
MCV RBC AUTO: 88 FL (ref 79.6–97.8)
MONOCYTES # BLD: 0.7 K/UL (ref 0.1–1.3)
MONOCYTES NFR BLD: 9 % (ref 4–12)
NEUTS SEG # BLD: 5.9 K/UL (ref 1.7–8.2)
NEUTS SEG NFR BLD: 74 % (ref 43–78)
NRBC # BLD: 0 K/UL (ref 0–0.2)
PLATELET # BLD AUTO: 292 K/UL (ref 150–450)
PMV BLD AUTO: 10.1 FL (ref 9.4–12.3)
POTASSIUM SERPL-SCNC: 3.7 MMOL/L (ref 3.5–5.1)
RBC # BLD AUTO: 4.35 M/UL (ref 4.05–5.2)
SODIUM SERPL-SCNC: 141 MMOL/L (ref 136–145)
WBC # BLD AUTO: 8 K/UL (ref 4.3–11.1)

## 2020-12-18 PROCEDURE — 74011250636 HC RX REV CODE- 250/636: Performed by: INTERNAL MEDICINE

## 2020-12-18 PROCEDURE — 36415 COLL VENOUS BLD VENIPUNCTURE: CPT

## 2020-12-18 PROCEDURE — 99232 SBSQ HOSP IP/OBS MODERATE 35: CPT | Performed by: INTERNAL MEDICINE

## 2020-12-18 PROCEDURE — 74011000250 HC RX REV CODE- 250: Performed by: INTERNAL MEDICINE

## 2020-12-18 PROCEDURE — 74011000258 HC RX REV CODE- 258: Performed by: INTERNAL MEDICINE

## 2020-12-18 PROCEDURE — 65610000001 HC ROOM ICU GENERAL

## 2020-12-18 PROCEDURE — 74011000302 HC RX REV CODE- 302: Performed by: INTERNAL MEDICINE

## 2020-12-18 PROCEDURE — 97161 PT EVAL LOW COMPLEX 20 MIN: CPT

## 2020-12-18 PROCEDURE — 94640 AIRWAY INHALATION TREATMENT: CPT

## 2020-12-18 PROCEDURE — 86580 TB INTRADERMAL TEST: CPT | Performed by: INTERNAL MEDICINE

## 2020-12-18 PROCEDURE — 97110 THERAPEUTIC EXERCISES: CPT

## 2020-12-18 PROCEDURE — 85025 COMPLETE CBC W/AUTO DIFF WBC: CPT

## 2020-12-18 PROCEDURE — 74011250637 HC RX REV CODE- 250/637: Performed by: INTERNAL MEDICINE

## 2020-12-18 PROCEDURE — 77010033678 HC OXYGEN DAILY

## 2020-12-18 PROCEDURE — 80048 BASIC METABOLIC PNL TOTAL CA: CPT

## 2020-12-18 PROCEDURE — 92610 EVALUATE SWALLOWING FUNCTION: CPT

## 2020-12-18 RX ORDER — AZITHROMYCIN 250 MG/1
500 TABLET, FILM COATED ORAL DAILY
Status: DISCONTINUED | OUTPATIENT
Start: 2020-12-18 | End: 2020-12-20 | Stop reason: HOSPADM

## 2020-12-18 RX ORDER — ALBUTEROL SULFATE 0.83 MG/ML
5 SOLUTION RESPIRATORY (INHALATION)
Status: DISCONTINUED | OUTPATIENT
Start: 2020-12-18 | End: 2020-12-20 | Stop reason: HOSPADM

## 2020-12-18 RX ADMIN — FAMOTIDINE 20 MG: 10 INJECTION INTRAVENOUS at 09:12

## 2020-12-18 RX ADMIN — Medication 1 AMPULE: at 15:57

## 2020-12-18 RX ADMIN — HEPARIN SODIUM 5000 UNITS: 5000 INJECTION INTRAVENOUS; SUBCUTANEOUS at 17:47

## 2020-12-18 RX ADMIN — ALBUTEROL SULFATE 5 MG: 2.5 SOLUTION RESPIRATORY (INHALATION) at 09:47

## 2020-12-18 RX ADMIN — PIPERACILLIN SODIUM AND TAZOBACTAM SODIUM 3.38 G: 3; .375 INJECTION, POWDER, LYOPHILIZED, FOR SOLUTION INTRAVENOUS at 01:19

## 2020-12-18 RX ADMIN — ALBUTEROL SULFATE 5 MG: 2.5 SOLUTION RESPIRATORY (INHALATION) at 13:45

## 2020-12-18 RX ADMIN — TUBERCULIN PURIFIED PROTEIN DERIVATIVE 5 UNITS: 5 INJECTION, SOLUTION INTRADERMAL at 17:47

## 2020-12-18 RX ADMIN — HEPARIN SODIUM 5000 UNITS: 5000 INJECTION INTRAVENOUS; SUBCUTANEOUS at 01:19

## 2020-12-18 RX ADMIN — HEPARIN SODIUM 5000 UNITS: 5000 INJECTION INTRAVENOUS; SUBCUTANEOUS at 09:14

## 2020-12-18 RX ADMIN — ALBUTEROL SULFATE 2.5 MG: 2.5 SOLUTION RESPIRATORY (INHALATION) at 21:02

## 2020-12-18 RX ADMIN — AZITHROMYCIN MONOHYDRATE 500 MG: 250 TABLET ORAL at 10:01

## 2020-12-18 RX ADMIN — ALBUTEROL SULFATE 5 MG: 2.5 SOLUTION RESPIRATORY (INHALATION) at 01:17

## 2020-12-18 RX ADMIN — CEFTRIAXONE 2 G: 2 INJECTION, POWDER, FOR SOLUTION INTRAMUSCULAR; INTRAVENOUS at 09:19

## 2020-12-18 RX ADMIN — FAMOTIDINE 20 MG: 10 INJECTION INTRAVENOUS at 21:18

## 2020-12-18 RX ADMIN — Medication 1 AMPULE: at 21:18

## 2020-12-18 NOTE — PROGRESS NOTES
Physical Therapy Note:    Participated in interdisciplinary rounds in ICU/CCU and chart reviewed. Patient is experiencing decrease in function from baseline. Patient would benefit from skilled acute therapy to increase independence with self care/ADLs, strength, endurance, and functional mobility. Recommend PT/OT consult when medically stable and MD agrees.     Thank you for your consideration,  Tang Schaffer, PT, DPT

## 2020-12-18 NOTE — PROGRESS NOTES
Problem: Delirium Treatment  Goal: *Level of consciousness restored to baseline  Outcome: Progressing Towards Goal  Goal: *Level of environmental perceptions restored to baseline  Outcome: Progressing Towards Goal  Goal: *Sensory perception restored to baseline  Outcome: Progressing Towards Goal  Goal: *Emotional stability restored to baseline  Outcome: Progressing Towards Goal  Goal: *Functional assessment restored to baseline  Outcome: Progressing Towards Goal  Goal: *Absence of falls  Outcome: Progressing Towards Goal  Goal: *Will remain free of delirium, CAM Score negative  Outcome: Progressing Towards Goal  Goal: *Cognitive status will be restored to baseline  Outcome: Progressing Towards Goal  Goal: Interventions  Outcome: Progressing Towards Goal     Problem: Patient Education: Go to Patient Education Activity  Goal: Patient/Family Education  Outcome: Progressing Towards Goal     Problem: Falls - Risk of  Goal: *Absence of Falls  Description: Document Yolie Heads Fall Risk and appropriate interventions in the flowsheet. Outcome: Progressing Towards Goal  Note: Fall Risk Interventions:  Mobility Interventions: Communicate number of staff needed for ambulation/transfer    Mentation Interventions: Adequate sleep, hydration, pain control    Medication Interventions: Evaluate medications/consider consulting pharmacy    Elimination Interventions: Toileting schedule/hourly rounds              Problem: Patient Education: Go to Patient Education Activity  Goal: Patient/Family Education  Outcome: Progressing Towards Goal     Problem: Pressure Injury - Risk of  Goal: *Prevention of pressure injury  Description: Document Dale Scale and appropriate interventions in the flowsheet.   Outcome: Progressing Towards Goal  Note: Pressure Injury Interventions:  Sensory Interventions: Assess changes in LOC         Activity Interventions: Pressure redistribution bed/mattress(bed type)    Mobility Interventions: Float heels    Nutrition Interventions: Document food/fluid/supplement intake                     Problem: Patient Education: Go to Patient Education Activity  Goal: Patient/Family Education  Outcome: Progressing Towards Goal     Problem: Non-Violent Restraints  Goal: *Removal from restraints as soon as assessed to be safe  Outcome: Progressing Towards Goal  Goal: *No harm/injury to patient while restraints in use  Outcome: Progressing Towards Goal  Goal: *Patient's dignity will be maintained  Outcome: Progressing Towards Goal  Goal: *Patient Specific Goal (EDIT GOAL, INSERT TEXT)  Outcome: Progressing Towards Goal  Goal: Non-violent Restaints:Standard Interventions  Outcome: Progressing Towards Goal  Goal: Non-violent Restraints:Patient Interventions  Outcome: Progressing Towards Goal  Goal: Patient/Family Education  Outcome: Progressing Towards Goal

## 2020-12-18 NOTE — PROGRESS NOTES
Critical Care Daily Progress Note: 12/18/2020    Lahoma Dancer   Admission Date: 12/15/2020         The patient's chart is reviewed and the patient is discussed with the staff. Akil Isbell is a 41-year-old female with a past medical history of COPD, diabetes, hypertension, prior Covid in July 2020 who was admitted on 12/15/2020 with leukocytosis and acute respiratory failure with hypercarbia. Subjective:   Extubated yesterday, on 2 LPM NC.      Current Facility-Administered Medications   Medication Dose Route Frequency    albuterol (PROVENTIL VENTOLIN) nebulizer solution 5 mg  5 mg Nebulization Q6H RT    Vancomycin Trough Level Reminder   Other ONCE    heparin (porcine) injection 5,000 Units  5,000 Units SubCUTAneous Q8H    famotidine (PF) (PEPCID) 20 mg in 0.9% sodium chloride 10 mL injection  20 mg IntraVENous Q12H    piperacillin-tazobactam (ZOSYN) 3.375 g in 0.9% sodium chloride (MBP/ADV) 100 mL MBP  3.375 g IntraVENous Q8H    vancomycin (VANCOCIN) 1,000 mg in 0.9% sodium chloride 250 mL (VIAL-MATE)  1,000 mg IntraVENous Q18H    propofol (DIPRIVAN) 10 mg/mL infusion  0-50 mcg/kg/min IntraVENous TITRATE       Review of Systems  Constitutional:  negative for fever, chills, sweats  Cardiovascular:  negative for chest pain, palpitations, syncope, edema  Gastrointestinal:  negative for dysphagia, reflux, vomiting, diarrhea, abdominal pain, or melena  Neurologic:  negative for focal weakness, numbness, headache      Objective:     Vitals:    12/18/20 0300 12/18/20 0400 12/18/20 0500 12/18/20 0543   BP: (!) 108/52 (!) 125/58 135/63    Pulse: 81 83 78    Resp: 16 16 15    Temp:  98.6 °F (37 °C)     SpO2: 91% 94% 94%    Weight:    136 lb 11 oz (62 kg)   Height:             Intake/Output Summary (Last 24 hours) at 12/18/2020 0758  Last data filed at 12/18/2020 0543  Gross per 24 hour   Intake 595 ml   Output 1550 ml   Net -955 ml       Physical Exam:          Constitutional:  the patient is well developed and in no acute distress  EENMT:  Sclera clear, pupils equal, oral mucosa moist  Respiratory: decreased bilaterally but no wheezes  Cardiovascular:  RRR without M,G,R  Gastrointestinal: soft and non-tender; with positive bowel sounds. Musculoskeletal: warm without cyanosis. There is no lower extremity edema. Skin:  no jaundice or rashes, no wounds   Neurologic: no gross neuro deficits     Psychiatric:  alert and oriented x 3    LINES:  18 12/15/2020  NG tube 12/15/2020  PIV  Tilley 12/15/2020    CXR        MICRO  Date Source Result   12/15/2020  blood  pending   12/15/2020  blood pending       LAB  No results for input(s): GLUCPOC in the last 72 hours.     No lab exists for component: [address]     12/18/20  0405 12/17/20  0359 12/15/20  2335   WBC 8.0 9.0 24.0*   HGB 11.6* 10.7* 12.0   HCT 38.3 34.6* 39.8    233 328     Recent Labs     12/18/20  0405 12/17/20  0359 12/15/20  2335    142 137   K 3.7 3.5 4.0    109* 102   CO2 27 28 26   GLU 68 80 228*   BUN 8 8 17   CREA 0.44* 0.42* 0.98   MG  --   --  1.6*   CA 9.0 8.1* 8.3   ALB  --   --  3.2   TBILI  --   --  0.7   ALT  --   --  22     Recent Labs     12/17/20  0245 12/16/20  0609 12/16/20  0132   PHI 7.36 7.31* 7.26*   PCO2I 47.5* 50.9* 58.9*   PO2I 125* 98 116*   HCO3I 26.6* 25.8 26.5*     Recent Labs     12/16/20  0300 12/15/20  2335   LAC 0.9 3.4*     Recent Labs     12/17/20  0245 12/16/20  0609 12/16/20  0132   PHI 7.36 7.31* 7.26*   PCO2I 47.5* 50.9* 58.9*   PO2I 125* 98 116*   HCO3I 26.6* 25.8 26.5*         Assessment:  (Medical Decision Making)     Hospital Problems  Date Reviewed: 9/23/2020          Codes Class Noted POA    Suspected COVID-19 virus infection -- had Covid 3 months agao in July 2020 ICD-10-CM: Z20.828  ICD-9-CM: V01.79  12/16/2020 Yes        Emphysema lung (HCC) (Chronic) ICD-10-CM: J43.9  ICD-9-CM: 492.8  12/16/2020 Yes        Acute encephalopathy ICD-10-CM: G93.40  ICD-9-CM: 348.30  12/16/2020 Unknown        Acute on chronic respiratory failure with hypoxia and hypercapnia (HCC) ICD-10-CM: J96.21, J96.22  ICD-9-CM: 518.84, 786.09, 799.02  12/16/2020 Unknown        Elevated diaphragm ICD-10-CM: J98.6  ICD-9-CM: 519.4  7/2/2020 Yes        Sepsis ([facility] [address] ICD-10-CM: A41.9  ICD-9-CM: 038.9, 995.91  7/1/2020 Unknown              Plan:  (Medical Decision Making)   --transfer to floor  --hospitalist to assume primary today   --Needs CT w contrast to r/o PE, stat. --continue measures for COPD  --d/c droplet plus precautions and transfer to non-COVID area. --f/u cultures. Add sputum cultures given h/o staph pneumonia. Narrow abx tomorrow as appropriate. Ellen Parson, MAJOR     Lungs: no wheezing  Heart:  RRR with no Murmur/Rubs/Gallops    Additional Comments:  Cont. BD and steroid and transfer to floor. Ask hospitalist to assume care    I have spoken with and examined the patient. I agree with the above assessment and plan as documented.     Sachin Weathers MD

## 2020-12-18 NOTE — CONSULTS
Hospitalist Consult Note     Admit Date:  12/15/2020 11:20 PM   Name:  Devante Cary   Age:  60 y.o.  :  1955   MRN:  [id number]   PCP:  Cathy Lui DO  Treatment Team: Attending Provider: Samuel Hung MD; Utilization Review: Nickolas Loyola RN; Care Manager: Pool Garnett RN; Consulting Provider: Claudy Valdovinos DO      Reason for consult: Assumed care now that the patient is moving out of the intensive care unit. HPI:   Mrs. Rosio Dozier is a 29-year-old female with past medical history significant for COPD, diabetes, hypertension, and prior diagnosis of COVID-19 in July of this year who was admitted for leukocytosis and acute respiratory failure with hypercarbia on December 15, 2020. She had repeat COVID-19 testing done on this admission which was negative. She has been weaned down to nasal cannula 2 to 3.5 L and is stable for the medical floor so hospitalists are asked to assume care. Of note the patient had a CT of the chest done yesterday for possible PE that was negative for PE. Unfortunately showed a spiculated left upper lobe mass with interval progression that is concerning for bronchogenic carcinoma. In addition to this bilateral lower lobe airspace opacities were seen right greater than left. Some of the airspace opacities previously seen in the right lung have resolved. It also showed mediastinal adenopathy and emphysema. The patient denies any chest pain nausea vomiting or shortness of breath at this time. She has not had a bowel movement since admission    10 systems reviewed and negative except as noted in HPI.   Past Medical History:   Diagnosis Date    Anxiety     COPD [facility]chronic obstructive [facility] disease) ([facility]     Diabetes ([facility]     Hypertension       Past Surgical History:   Procedure Laterality Date    HX GYN      cervical cone      Current Facility-Administered Medications   Medication Dose Route Frequency    cefTRIAXone (ROCEPHIN) 2 g in 0.9% sodium chloride (MBP/ADV) 50 mL MBP  2 g IntraVENous Q24H    azithromycin (ZITHROMAX) tablet 500 mg  500 mg Oral DAILY    albuterol (PROVENTIL VENTOLIN) nebulizer solution 5 mg  5 mg Nebulization Q4HWA RT    alcohol 62% (NOZIN) nasal  1 Ampule  1 Ampule Topical Q12H    heparin (porcine) injection 5,000 Units  5,000 Units SubCUTAneous Q8H    famotidine (PF) (PEPCID) 20 mg in 0.9% sodium chloride 10 mL injection  20 mg IntraVENous Q12H     No Known Allergies   Social History     Tobacco Use    Smoking status: Former Smoker     Packs/day: 1.00     Years: 40.00     Pack years: 40.00     Types: Cigars    Smokeless tobacco: Never Used   Substance Use Topics    Alcohol use: No      Family History   Problem Relation Age of Onset    Cancer Mother     Liver Disease Father     Alcohol abuse Sister     Diabetes Brother     Hypertension Brother     Heart Disease Sister       Immunization History   Administered Date(s) Administered    TB Skin Test (PPD) Intradermal 09/27/2018       Objective:     Patient Vitals for the past 24 hrs:   Temp Pulse Resp BP SpO2   12/18/20 1529     92 %   12/18/20 1501  84 18 (!) 114/55 92 %   12/18/20 1500  83 18  93 %   12/18/20 1430  84 17  93 %   12/18/20 1401  (!) 123 25 118/82 91 %   12/18/20 1400  (!) 122 30  92 %   12/18/20 1345     98 %   12/18/20 1330  89 18  95 %   12/18/20 1301  82 20 (!) 118/56 96 %   12/18/20 1300  83 25  97 %   12/18/20 1230  82 21  97 %   12/18/20 1201  81 19 (!) 141/63 97 %   12/18/20 1200  83 20  97 %   12/18/20 1130  84 18  94 %   12/18/20 1101  84 19 130/63 90 %   12/18/20 1100 97.9 °F (36.6 °C) 86 19 130/63 (!) 89 %   12/18/20 1001  100 25 (!) 147/67 98 %   12/18/20 1000  99 21  99 %   12/18/20 0947     100 %   12/18/20 0930  81 17  95 %   12/18/20 0901  81 18 (!) 140/65 95 %   12/18/20 0900  82 16  95 %   12/18/20 0830  (!) 101 23  91 %   12/18/20 0800  83 23 (!) 158/70 95 %   12/18/20 0743  76 18 (!) 162/72 92 %   12/18/20 0730 97.8 °F (36.6 °C) 77 22  92 %   12/18/20 0700  77 18  94 %   12/18/20 0500  78 15 135/63 94 %   12/18/20 0400 98.6 °F (37 °C) 83 16 (!) 125/58 94 %   12/18/20 0300  81 16 (!) 108/52 91 %   12/18/20 0200  86 16 (!) 116/56 92 %   12/18/20 0117     98 %   12/18/20 0100  83 19 126/60 92 %   12/18/20 0000  84 18 (!) 145/65 91 %   12/17/20 2300 98.7 °F (37.1 °C) 83 17 (!) 141/66 91 %   12/17/20 2200 98.7 °F (37.1 °C) 92 25 (!) 141/64 91 %   12/17/20 2100 98.2 °F (36.8 °C) 80 17 138/62 92 %   12/17/20 2000 98.4 °F (36.9 °C) 92 18 132/60 91 %   12/17/20 1920     95 %   12/17/20 1900 98.9 °F (37.2 °C) 80 19 (!) 142/64 95 %   12/17/20 1730 98.7 °F (37.1 °C) 86 18 (!) 140/65 94 %   12/17/20 1700 98.7 °F (37.1 °C) 87 18 131/60 95 %   12/17/20 1630 98.7 °F (37.1 °C) 89 19 137/62 93 %     Oxygen Therapy  O2 Sat (%): 92 % (12/18/20 1529)  Pulse via Oximetry: 86 beats per minute (12/18/20 1501)  O2 Device: Nasal cannula (12/18/20 1529)  O2 Flow Rate (L/min): 3.5 l/min (12/18/20 1529)  FIO2 (%): 30 % (12/17/20 1042)    Intake/Output Summary (Last 24 hours) at 12/18/2020 1604  Last data filed at 12/18/2020 1106  Gross per 24 hour   Intake 645 ml   Output 2250 ml   Net -1605 ml       Physical Exam:  General:    Well nourished. Alert. Weak  Eyes:   Normal sclera. Extraocular movements intact. ENT:  Normocephalic, atraumatic. Moist mucous membranes  CV:   RRR. No murmur, rub, or gallop. Lungs:  CTAB. No wheezing, rhonchi, or rales. Abdomen: Soft, nontender, nondistended. Bowel sounds normal.   Extremities: Warm and dry. No cyanosis or edema. Neurologic: CN II-XII grossly intact. Sensation intact. Skin:     No rashes or jaundice. Psych:  Normal mood and affect. I reviewed the labs, imaging, EKGs, telemetry, and other studies done this admission.   Data Review:   Recent Results (from the past 24 hour(s))   CBC WITH AUTOMATED DIFF    Collection Time: 12/18/20 4:05 AM   Result Value Ref Range    WBC 8.0 4.3 - 11.1 K/uL    RBC 4.35 4.05 - 5.2 M/uL    HGB 11.6 (L) 11.7 - 15.4 g/dL    HCT 38.3 35.8 - 46.3 %    MCV 88.0 79.6 - 97.8 FL    MCH 26.7 26.1 - 32.9 PG    MCHC 30.3 (L) 31.4 - 35.0 g/dL    RDW 15.3 (H) 11.9 - 14.6 %    PLATELET 757 763 - 618 K/uL    MPV 10.1 9.4 - 12.3 FL    ABSOLUTE NRBC 0.00 0.0 - 0.2 K/uL    DF AUTOMATED      NEUTROPHILS 74 43 - 78 %    LYMPHOCYTES 15 13 - 44 %    MONOCYTES 9 4.0 - 12.0 %    EOSINOPHILS 1 0.5 - 7.8 %    BASOPHILS 1 0.0 - 2.0 %    IMMATURE GRANULOCYTES 0 0.0 - 5.0 %    ABS. NEUTROPHILS 5.9 1.7 - 8.2 K/UL    ABS. LYMPHOCYTES 1.2 0.5 - 4.6 K/UL    ABS. MONOCYTES 0.7 0.1 - 1.3 K/UL    ABS. EOSINOPHILS 0.1 0.0 - 0.8 K/UL    ABS. BASOPHILS 0.1 0.0 - 0.2 K/UL    ABS. IMM.  GRANS. 0.0 0.0 - 0.5 K/UL   METABOLIC PANEL, BASIC    Collection Time: 12/18/20  4:05 AM   Result Value Ref Range    Sodium 141 136 - 145 mmol/L    Potassium 3.7 3.5 - 5.1 mmol/L    Chloride 106 98 - 107 mmol/L    CO2 27 21 - 32 mmol/L    Anion gap 8 7 - 16 mmol/L    Glucose 68 65 - 100 mg/dL    BUN 8 8 - 23 MG/DL    Creatinine 0.44 (L) 0.6 - 1.0 MG/DL    GFR est AA >60 >60 ml/min/1.73m2    GFR est non-AA >60 >60 ml/min/1.73m2    Calcium 9.0 8.3 - 10.4 MG/DL       All Micro Results     Procedure Component Value Units Date/Time    CULTURE, RESPIRATORY/SPUTUM/BRONCH Marito Stephen STAIN [[id number]] Collected: 12/17/20 1116    Order Status: Completed Specimen: Sputum Updated: 12/18/20 1348     Special Requests: NO SPECIAL REQUESTS        GRAM STAIN 1 TO 15 WBCS SEEN PER OIF      NO EPITHELIAL CELLS SEEN         RARE GRAM POSITIVE COCCI         1+ MUCUS PRESENT        Culture result:       LIGHT NORMAL RESPIRATORY MICHAEL          CULTURE, BLOOD [[id number]] Collected: 12/15/20 7176    Order Status: Completed Specimen: Blood Updated: 12/18/20 1131     Special Requests: --        RIGHT  Antecubital       Culture result: NO GROWTH 2 DAYS       CULTURE, BLOOD [[id number]] Collected: 12/16/20 0007    Order Status: Completed Specimen: Blood Updated: 12/18/20 1131     Special Requests: --        LEFT  HAND       Culture result: NO GROWTH 2 DAYS             Other Studies:  Xr Abd (kub)    Result Date: 12/16/2020  Clinical history: OG tube placement TECHNIQUE: AP supine abdominal x-ray. COMPARISON: August 2020. FINDINGS: Enteric tube tip terminates in the area of GE junction. Bowel gas pattern appears within normal limits. Airspace consolidation is noted in the right lung base. IMPRESSION: 1. Tip of the enteric tube is in the area of gastroesophageal junction. Xr Chest Port    Result Date: 12/16/2020  Portable chest xray  COMPARISON: August 23, 2020 INDICATION: Intubated FINDINGS: Endotracheal tube tip is at 3.5 cm above the leo. Enteric tube courses below the diaphragm. Cardiac mediastinal contour is within normal limits. There is stable right lung base consolidation. . There is no pneumothorax. No large pleural effusion. Surrounding bones are stable. IMPRESSION: 1. ET tube tip is in satisfactory position. 2. Right lung base consolidation, appearing similar to prior exam of August 2020.        Assessment and Plan:     Hospital Problems as of 12/18/2020 Date Reviewed: 9/23/2020          Codes Class Noted - Resolved POA    Suspected COVID-19 virus infection -- had Covid 3 months agao in July 2020 ICD-10-CM: Z20.828  ICD-9-CM: V01.79  12/16/2020 - Present Yes        Emphysema lung ([address] (Chronic) ICD-10-CM: J43.9  ICD-9-CM: 492.8  12/16/2020 - Present Yes        Acute encephalopathy ICD-10-CM: G93.40  ICD-9-CM: 348.30  12/16/2020 - Present Unknown        Acute on chronic respiratory failure with hypoxia and hypercapnia (HCC) ICD-10-CM: J96.21, J96.22  ICD-9-CM: 518.84, 786.09, 799.02  12/16/2020 - Present Unknown        Elevated diaphragm ICD-10-CM: J98.6  ICD-9-CM: 519.4  7/2/2020 - Present Yes        Sepsis ([address] ICD-10-CM: A41.9  ICD-9-CM: 038.9, 995.91  7/1/2020 - Present Unknown PLAN:  · Suspected COVID-19 virus infectionpatient had Covid about 3 months ago in July of this year. Her test was negative and COVID-19 has been ruled out  · Acute COPD exacerbation with acute on chronic respiratory failure with hypoxia and hypercapnia- patient has been weaned to 2-3.5 L. We will continue bronchodilator therapy and steroids  · Sepsiscurrently resolved  · Pneumoniacontinue current antibiotics the patient does have a history of staph pneumonia but her antibiotics have been tailored by the pulmonary service to Rocephin and azithromycin  · Left upper lobe mass concerning for bronchogenic carcinomadiscussed with pulmonology and they plan for an outpatient PET scan. They do not plan to get any further work-up in the hospital.  · PT and OT eval  · PPD  · Continue other current appropriate medical management  · Further work-up and management based on clinical course    Signed:  Huy Mcgowan MD      This note was created with the help of Dragon voice recognition software. Although the note was reviewed for errors and corrected were noted, additional errors and inconsistencies may remain embedded in this note secondary to the use of voice recognition software.

## 2020-12-18 NOTE — PROGRESS NOTES
Bedside, Verbal and Written shift change report given to MANNY RN  (oncoming nurse) by Bette Pantoja RN  (offgoing nurse). Report included the following information SBAR, Kardex, ED Summary, Procedure Summary, Intake/Output, MAR, Recent Results, Med Rec Status, Cardiac Rhythm NSR, Alarm Parameters  and Quality Measures     PT A/O X3 FOLLOWING COMMANDS     SPEECH TO SEE TODAY     TRANSFER ORDERS PLACED    .

## 2020-12-18 NOTE — PROGRESS NOTES
ACUTE PHYSICAL THERAPY GOALS:  (Developed with and agreed upon by patient and/or caregiver. )  LTG:  (1.)Ms. Gregory Messer will move from supine to sit and sit to supine, scoot up and down and roll side to side in bed INDEPENDENTLY within 7 treatment day(s). (2.)Ms. Gregory Messer will transfer from bed to chair and chair to bed INDEPENDENTLY within 7 treatment day(s). (3.)Ms. Gregory Messer will ambulate with SUPERVISION for 250+ feet with the least restrictive device within 7 treatment day(s). (4.)Ms. Gregory Messer will perform exercises per HEP independently to improve strength and mobility within 7 days. ________________________________________________________________________________________________      PHYSICAL THERAPY ASSESSMENT: Initial Assessment and PM PT Treatment Day # 1      Daphne Bliss is a 60 y.o. female   PRIMARY DIAGNOSIS: <principal problem not specified>  Sepsis, due to unspecified organism, unspecified whether acute organ dysfunction present ([facility] [A41.9]       Reason for Referral:  Respiratory failure, sepsis  ICD-10: Treatment Diagnosis: Generalized Muscle Weakness (M62.81)  Difficulty in walking, Not elsewhere classified (R26.2)  Other abnormalities of gait and mobility (R26.89)  [place]PAT[place]: [place] / [place] / Product Type: Medicare /     ASSESSMENT:     REHAB RECOMMENDATIONS:   Recommendation to date pending progress:  [address] [place] [address]  Equipment:    To Be Determined     PRIOR LEVEL OF FUNCTION:  (Prior to Hospitalization) INITIAL/CURRENT LEVEL OF FUNCTION:  (Most Recently Demonstrated)   Bed Mobility:   Independent  Sit to Stand:   Independent  Transfers:   Independent  Gait/Mobility:   Independent Bed Mobility:   Contact Guard Assistance  Sit to Stand:   Contact Guard Assistance  Transfers:   Contact Guard Assistance  Gait/Mobility:   Contact Guard Assistance     ASSESSMENT:  Ms. Gregory Messer is admitted from home with respiratory failure.  She is independent at baseline without DME use, uses home O2 at night and prn during the day. Pt presents with generalized weakness due to hospital stay and inactivity. SBA for bed mobility and transfers, close CGA for ambulation in room and few steps into hallway. Cueing for posture, gait mechanics, and sequencing. SpO2 92% after activity on 3.5L. Pt requesting to return to supine after activity which she does with SBA. Positioned comfortably with needs in reach. Functioning below baseline with mobility, balance, transfers, and activity tolerance and will benefit from continued therapy to maximize safety/independence with mobility. SUBJECTIVE:   Ms. Rizwan Vallejo states, \"I can walk a little, I just feel stiff from being in this bed. \"    SOCIAL HISTORY/LIVING ENVIRONMENT: Lives with son and daughter in law in single story home with 2 steps to enter. Has walker, does not use.  Denies recent falls      OBJECTIVE:     PAIN: VITAL SIGNS: LINES/DRAINS:   Pre Treatment: Pain Screen  Pain Scale 1: Numeric (0 - 10)  Pain Intensity 1: 0  Post Treatment: 0/10 Vital Signs  O2 Sat (%): 92 %  O2 Device: Nasal cannula  O2 Flow Rate (L/min): 3.5 l/min Tilley Catheter and IV  O2 Device: Nasal cannula     GROSS EVALUATION:   Within Functional Limits Abnormal/ Functional Abnormal/ Non-Functional (see comments) Not Tested Comments:   AROM [x] [] [] []    PROM [] [] [] []    Strength [] [x] [] []    Balance [] [x] [] []    Posture [] [x] [] []    Sensation [] [] [] []    Coordination [] [x] [] []    Tone [] [] [] []    Edema [] [] [] []    Activity Tolerance [] [x] [] []     [] [] [] []      COGNITION/  PERCEPTION: Intact Impaired   (see comments) Comments:   Orientation [x] []    Vision [x] []    Hearing [x] []    Command Following [x] []    Safety Awareness [x] []     [] []      MOBILITY: I Mod I S SBA CGA Min Mod Max Total  NT x2 Comments:   Bed Mobility    Rolling [] [] [] [x] [] [] [] [] [] [] []    Supine to Sit [] [] [] [x] [] [] [] [] [] [] []    Scooting [] [] [] [x] [] [] [] [] [] [] []    Sit to Supine [] [] [] [x] [] [] [] [] [] [] []    Transfers    Sit to Stand [] [] [] [] [x] [] [] [] [] [] []    Bed to Chair [] [] [] [] [] [] [] [] [] [] []    Stand to Sit [] [] [] [] [x] [] [] [] [] [] []    I=Independent, Mod I=Modified Independent, S=Supervision, SBA=Standby Assistance, CGA=Contact Guard Assistance,   Min=Minimal Assistance, Mod=Moderate Assistance, Max=Maximal Assistance, Total=Total Assistance, NT=Not Tested  GAIT: I Mod I S SBA CGA Min Mod Max Total  NT x2 Comments:   Level of Assistance [] [] [] [] [x] [x] [] [] [] [] []    Distance 80    DME None    Gait Quality Trunk sway, path deviations    I=Independent, Mod I=Modified Independent, S=Supervision, SBA=Standby Assistance, CGA=Contact Guard Assistance,   Min=Minimal Assistance, Mod=Moderate Assistance, Max=Maximal Assistance, Total=Total Assistance, NT=Not Tested    [address] [place] [address] [address] [facility]       How much difficulty does the patient currently have. .. Unable A Lot A Little None   1. Turning over in bed (including adjusting bedclothes, sheets and blankets)? [] 1   [] 2   [] 3   [x] 4   2. Sitting down on and standing up from a chair with arms ( e.g., wheelchair, bedside commode, etc.)   [] 1   [] 2   [x] 3   [] 4   3. Moving from lying on back to sitting on the side of the bed? [] 1   [] 2   [] 3   [x] 4   How much help from another person does the patient currently need. .. Total A Lot A Little None   4. Moving to and from a bed to a chair (including a wheelchair)? [] 1   [] 2   [x] 3   [] 4   5. Need to walk in hospital room? [] 1   [] 2   [x] 3   [] 4   6. Climbing 3-5 steps with a railing? [] 1   [x] 2   [] 3   [] 4   © 2007, Trustees of [address] [place] [address] [address] [address], under license to [employer].  All rights reserved     Score:  Initial: 19 Most Recent: X (Date: -- )    Interpretation of Tool:  Represents activities that are increasingly more difficult (i.e. Bed mobility, Transfers, Gait). PLAN:   FREQUENCY/DURATION: PT Plan of Care: 3 times/week for duration of hospital stay or until stated goals are met, whichever comes first.    PROBLEM LIST:   (Skilled intervention is medically necessary to address:)  1. Decreased Activity Tolerance  2. Decreased Balance  3. Decreased Coordination  4. Decreased Gait Ability  5. Decreased Strength  6. Decreased Transfer Abilities   INTERVENTIONS PLANNED:   (Benefits and precautions of physical therapy have been discussed with the patient.)  1. Therapeutic Activity  2. Therapeutic Exercise/HEP  3. Neuromuscular Re-education  4. Gait Training  5. Manual Therapy  6. Education     TREATMENT:     EVALUATION: Low Complexity : (Untimed Charge)    TREATMENT:   ($$ Therapeutic Exercises: 8-22 mins    )  Therapeutic Exercise (10 Minutes): Therapeutic exercises noted below to improve functional activity tolerance, strength and mobility.        Date:  12/18/20 Date:   Date:     Activity/Exercise Parameters Parameters Parameters   Ambulation 80 ft CGA                                               AFTER TREATMENT POSITION/PRECAUTIONS:  Alarm Activated, Bed and Needs within reach    INTERDISCIPLINARY COLLABORATION:  RN/PCT and PT/PTA    TOTAL TREATMENT DURATION:  PT Patient Time In/Time Out  Time In: 1510  Time Out: Rc 124, DPT

## 2020-12-18 NOTE — PROGRESS NOTES
Interdisciplinary team rounds were held 12/18/2020 with the following team members:Care Management, Nursing, Nurse Practitioner, Nutrition, Occupational Therapy, Palliative Care, Pastoral Care, Pharmacy, Physical Therapy, Physician, Respiratory Therapy and Clinical Coordinator and the patient. Plan of care discussed. See clinical pathway and/or care plan for interventions and desired outcomes.

## 2020-12-18 NOTE — PROGRESS NOTES
LTG: Patient will tolerate least restrictive diet without overt signs or symptoms of airway compromise. STG: Patient will tolerate mechanical soft diet and thin liquids without overt signs or symptoms of airway compromise. STG: Patient will tolerate mixed consistencies without overt s/sx airway compromise. STG: Patient will participate in modified barium swallow study as clinically indicated. SPEECH LANGUAGE PATHOLOGY: DYSPHAGIA- Initial Assessment    NAME/AGE/GENDER: Jan Alexandra is a 60 y.o. female  DATE: 12/18/2020  PRIMARY DIAGNOSIS: Sepsis, due to unspecified organism, unspecified whether acute organ dysfunction present ([facility] [A41.9]      ICD-10: Treatment Diagnosis: R13.12 Dysphagia, Oropharyngeal Phase    RECOMMENDATIONS   DIET:    PO:  Mechanical soft   Liquids:  regular thin    MEDICATIONS: Whole in puree     ASPIRATION PRECAUTIONS  · Slow rate of intake  · Small bites/sips  · Upright at 90 degrees during meal     COMPENSATORY STRATEGIES/MODIFICATIONS  · Small sips and bites  · No mixed consistencies      RECOMMENDATIONS for CONTINUED SPEECH THERAPY:   YES: Anticipate need for ongoing speech therapy during this hospitalization and at next level of care. ASSESSMENT   Patient presents with mild oropharyngeal dysphagia. Oral phase increased with chewables due to edentulous state. Overt s/sx airway compromise with mixed consistencies only. Recommend mechanical soft diet and thin liquids. No mixed consistencies. Float meds. Will follow up for tolerance. CONTINUATION OF SKILLED SERVICES/MEDICAL NECESSITY:   Patient is expected to demonstrate progress in  swallow strength, swallow timeliness, swallow function, diet tolerance and swallow safety in order to  improve swallow safety, work toward diet advancement and decrease aspiration risk.  Patient continues to require skilled intervention due to dysphagia.    EDUCATION:  · Recommendations discussed with Patient and RN  REHABILITATION POTENTIAL FOR STATED GOALS: Excellent    PLAN    FREQUENCY/DURATION: Continue to follow patient 2 times a week for duration of hospital stay to address above goals. - Recommendations for next treatment session: Next treatment will address diet tolerance/mixed trials    SUBJECTIVE   Sat upright in bed for evaluation. Alert. Oxygen Device: nasal cannula   Pain: Pain Scale 1: Numeric (0 - 10)  Pain Intensity 1: 0    History of Present Injury/Illness: Ms. Kyle Jones  has a past medical history of Anxiety, COPD (chronic obstructive pulmonary disease) ([facility] [address] Diabetes ([facility] [address] and Hypertension. She also has no past medical history of Hypercholesterolemia. . She also  has a past surgical history that includes hx gyn. PRECAUTIONS/ALLERGIES: Patient has no known allergies. Problem List:  (Impairments causing functional limitations):  1. Oropharyngeal dysphagia    Previous Dysphagia: YES modified barium swallow study 8/10/2020 recommendation of NPO; repeat modified barium swallow study 8/13/2020 recommendation mechanical soft/thin liquids  Diet Prior to Evaluation: NPO ; failed stand     Orientation:  Person  Place  Time    Cognitive-Linguistic Screening:   Speech Production:   o WFL   Expressive Language:  o WFL   Receptive Language:  o WFL   Cognition:   o Appears at baseline. Prior Level of Function: lives with her daughter. Recommendations: Given results of screening, patient appears to be functioning at baseline. No acute assessment of speech, language, or cognition warranted. OBJECTIVE   Oral Motor:   · Labial: No impairment  · Dentition: Edentulous  · Oral Hygiene: Dry  · Lingual: No impairment    Swallow evaluation:   Patient presented with thin liquid via cup and straw, puree, mixed, and solid consistencies.  Appropriate oral prep with all textures with mildly increased time due to edentulous state. (patient requesting family bring in dentures) Timely swallow initiation, and single swallows upon palpation. Adequate oral clearing. No overt signs or symptoms of airway compromise observed with thin liquids, puree, or solids. Overt s/sx airway compromise, coughing, with mixed consistency. Tool Used: Dysphagia Outcome and Severity Scale (REG)    Score Comments   Normal Diet  [] 7 With no strategies or extra time needed   Functional Swallow  [] 6 May have mild oral or pharyngeal delay   Mild Dysphagia  [] 5 Which may require one diet consistency restricted    Mild-Moderate Dysphagia  [] 4 With 1-2 diet consistencies restricted   Moderate Dysphagia  [] 3 With 2 or more diet consistencies restricted   Moderate-Severe Dysphagia  [] 2 With partial PO strategies (trials with ST only)   Severe Dysphagia  [] 1 With inability to tolerate any PO safely      Score:  Initial: 5 Most Recent: 5 (Date 12/18/20 )   Interpretation of Tool: The Dysphagia Outcome and Severity Scale (REG) is a simple, easy-to-use, 7-point scale developed to systematically rate the functional severity of dysphagia based on objective assessment and make recommendations for diet level, independence level, and type of nutrition. Current Medications:   No current facility-administered medications on file prior to encounter. Current Outpatient Medications on File Prior to Encounter   Medication Sig Dispense Refill    Symbicort 160-4.5 mcg/actuation HFAA TAKE 2 PUFFS BY INHALATION TWO (2) TIMES A DAY. 10.2 Inhaler 0    umeclidinium (Incruse Ellipta) 62.5 mcg/actuation inhaler Take 1 Puff by inhalation daily. 1 Inhaler 11    albuterol (ProAir HFA) 90 mcg/actuation inhaler Take 2 Puffs by inhalation every four (4) hours as needed for Wheezing. 1 Inhaler 0    clonazePAM (KlonoPIN) 0.5 mg tablet Take  by mouth nightly as needed for Anxiety.  PARoxetine (PAXIL) 10 mg tablet Take 1 Tab by mouth daily. 30 Tab 3    OXYGEN-AIR DELIVERY SYSTEMS 3 L by Nasal route continuous.           INTERDISCIPLINARY COLLABORATION: RN    After treatment position/precautions:  · Upright in bed  · RN notified    Total Treatment Duration:   Time In: 0830  Time Out: [address], [facility], [facility] [employer], CCC-SLP

## 2020-12-19 VITALS
TEMPERATURE: 98.2 F | OXYGEN SATURATION: 94 % | HEIGHT: 63 IN | WEIGHT: 136.69 LBS | RESPIRATION RATE: 18 BRPM | BODY MASS INDEX: 24.22 KG/M2 | SYSTOLIC BLOOD PRESSURE: 163 MMHG | DIASTOLIC BLOOD PRESSURE: 75 MMHG | HEART RATE: 75 BPM

## 2020-12-19 PROBLEM — R59.0 MEDIASTINAL LYMPHADENOPATHY: Status: ACTIVE | Noted: 2020-12-19

## 2020-12-19 PROBLEM — R91.8 MASS OF UPPER LOBE OF LEFT LUNG: Status: ACTIVE | Noted: 2020-12-19

## 2020-12-19 PROBLEM — J44.1 COPD EXACERBATION (HCC): Status: ACTIVE | Noted: 2020-12-19

## 2020-12-19 LAB
BACTERIA SPEC CULT: NORMAL
GRAM STN SPEC: NORMAL
SERVICE CMNT-IMP: NORMAL

## 2020-12-19 PROCEDURE — 77010033678 HC OXYGEN DAILY

## 2020-12-19 PROCEDURE — 74011250636 HC RX REV CODE- 250/636: Performed by: INTERNAL MEDICINE

## 2020-12-19 PROCEDURE — 74011000250 HC RX REV CODE- 250: Performed by: INTERNAL MEDICINE

## 2020-12-19 PROCEDURE — 74011000258 HC RX REV CODE- 258: Performed by: INTERNAL MEDICINE

## 2020-12-19 PROCEDURE — 94640 AIRWAY INHALATION TREATMENT: CPT

## 2020-12-19 PROCEDURE — 74011250637 HC RX REV CODE- 250/637: Performed by: INTERNAL MEDICINE

## 2020-12-19 RX ORDER — AZITHROMYCIN 500 MG/1
500 TABLET, FILM COATED ORAL DAILY
Qty: 4 TAB | Refills: 0 | Status: SHIPPED | OUTPATIENT
Start: 2020-12-19 | End: 2020-12-23

## 2020-12-19 RX ORDER — DEXAMETHASONE 4 MG/1
8 TABLET ORAL
Qty: 14 TAB | Refills: 0 | Status: SHIPPED | OUTPATIENT
Start: 2020-12-19 | End: 2020-12-26

## 2020-12-19 RX ORDER — CEFPODOXIME PROXETIL 200 MG/1
200 TABLET, FILM COATED ORAL 2 TIMES DAILY
Qty: 12 TAB | Refills: 0 | Status: SHIPPED | OUTPATIENT
Start: 2020-12-19 | End: 2020-12-25

## 2020-12-19 RX ORDER — LEVALBUTEROL INHALATION SOLUTION 1.25 MG/3ML
1.25 SOLUTION RESPIRATORY (INHALATION)
Qty: 30 NEBULE | Refills: 1 | Status: SHIPPED | OUTPATIENT
Start: 2020-12-19 | End: 2020-12-24 | Stop reason: SDUPTHER

## 2020-12-19 RX ORDER — NEBULIZER AND COMPRESSOR
EACH MISCELLANEOUS
Qty: 1 EACH | Refills: 0 | Status: SHIPPED | OUTPATIENT
Start: 2020-12-19 | End: 2020-12-24 | Stop reason: ALTCHOICE

## 2020-12-19 RX ADMIN — ALBUTEROL SULFATE 5 MG: 2.5 SOLUTION RESPIRATORY (INHALATION) at 09:45

## 2020-12-19 RX ADMIN — AZITHROMYCIN MONOHYDRATE 500 MG: 250 TABLET ORAL at 08:16

## 2020-12-19 RX ADMIN — HEPARIN SODIUM 5000 UNITS: 5000 INJECTION INTRAVENOUS; SUBCUTANEOUS at 11:01

## 2020-12-19 RX ADMIN — HEPARIN SODIUM 5000 UNITS: 5000 INJECTION INTRAVENOUS; SUBCUTANEOUS at 02:00

## 2020-12-19 RX ADMIN — FAMOTIDINE 20 MG: 10 INJECTION INTRAVENOUS at 08:17

## 2020-12-19 RX ADMIN — Medication 1 AMPULE: at 08:16

## 2020-12-19 RX ADMIN — CEFTRIAXONE 2 G: 2 INJECTION, POWDER, FOR SOLUTION INTRAMUSCULAR; INTRAVENOUS at 11:01

## 2020-12-19 RX ADMIN — ALBUTEROL SULFATE 5 MG: 2.5 SOLUTION RESPIRATORY (INHALATION) at 11:40

## 2020-12-19 NOTE — DISCHARGE SUMMARY
Discharge Summary     Patient: Adry Morales MRN: [id number]  SSN: [id number]    YOB: 1955  Age: 60 y.o.   Sex: female       Admit Date: 12/15/2020    Discharge Date: 12/19/2020      Admission Diagnoses: Sepsis, due to unspecified organism, unspecified whether acute organ dysfunction present [facility]) [A41.9]    Discharge Diagnoses:   Problem List as of 12/19/2020 Date Reviewed: 9/23/2020          Codes Class Noted - Resolved    COPD exacerbation ([facility] ICD-10-CM: J44.1  ICD-9-CM: 491.21  12/19/2020 - Present        Mass of upper lobe of left lung ICD-10-CM: R91.8  ICD-9-CM: 786.6  12/19/2020 - Present        Mediastinal lymphadenopathy ICD-10-CM: R59.0  ICD-9-CM: 785.6  12/19/2020 - Present        Suspected COVID-19 virus infection -- had Covid 3 months agao in July 2020 ICD-10-CM: Z20.828  ICD-9-CM: V01.79  12/16/2020 - Present        Emphysema lung ([facility] (Chronic) ICD-10-CM: J43.9  ICD-9-CM: 492.8  12/16/2020 - Present        Acute encephalopathy ICD-10-CM: G93.40  ICD-9-CM: 348.30  12/16/2020 - Present        * (Principal) Acute on chronic respiratory failure with hypoxia and hypercapnia ([facility] ICD-10-CM: J96.21, J96.22  ICD-9-CM: 518.84, 786.09, 799.02  12/16/2020 - Present        Anxiety ICD-10-CM: F41.9  ICD-9-CM: 300.00  9/2/2020 - Present        Lung nodule ICD-10-CM: R91.1  ICD-9-CM: 793.11  9/2/2020 - Present        Staphylococcal pneumonia ([facility] ICD-10-CM: J15.20  ICD-9-CM: 482.40  8/25/2020 - Present        HTN (hypertension) (Chronic) ICD-10-CM: I10  ICD-9-CM: 401.9  8/23/2020 - Present        Chronic respiratory failure ([facility] [address] (Chronic) ICD-10-CM: J96.10  ICD-9-CM: 518.83  8/12/2020 - Present    Overview Signed 8/12/2020 11:58 AM by Carmen Bowen NP     Home O2 4-6L NC             Aspiration pneumonia ([facility] [address] ICD-10-CM: J69.0  ICD-9-CM: 507.0  8/12/2020 - Present        Elevated ALT measurement ICD-10-CM: R74.01  ICD-9-CM: 790.4  8/6/2020 - Present        Elevated lactic acid level ICD-10-CM: R79.89  ICD-9-CM: 276.2  8/6/2020 - Present        Hypomagnesemia ICD-10-CM: E83.42  ICD-9-CM: 275.2  8/6/2020 - Present        Pneumonia due to COVID-19 virus ICD-10-CM: U07.1, J12.89  ICD-9-CM: 480.8  7/2/2020 - Present        Elevated diaphragm ICD-10-CM: J98.6  ICD-9-CM: 519.4  7/2/2020 - Present        Sepsis ([facility] ICD-10-CM: A41.9  ICD-9-CM: 038.9, 995.91  7/1/2020 - Present        CAP (community acquired pneumonia) ICD-10-CM: J18.9  ICD-9-CM: 006  9/26/2018 - Present        Leukocytosis ICD-10-CM: D72.829  ICD-9-CM: 288.60  9/26/2018 - Present        Tobacco abuse ICD-10-CM: Z72.0  ICD-9-CM: 305.1  9/26/2018 - Present        RESOLVED: Acute respiratory failure with hypoxemia ([facility] ICD-10-CM: J96.01  ICD-9-CM: 518.81  7/1/2020 - 12/16/2020        RESOLVED: Hypoxia ICD-10-CM: R09.02  ICD-9-CM: 799.02  9/26/2018 - 9/26/2018        RESOLVED: Acute hypoxemic respiratory failure ([facility] ICD-10-CM: J96.01  ICD-9-CM: 518.81  9/26/2018 - 12/16/2020               Discharge Condition: Stable    Hospital Course:   45-year-old female with past medical history significant for COPD, diabetes, hypertension, and prior diagnosis of COVID-19 in July of this year who was admitted for leukocytosis and acute respiratory failure with hypercarbia on December 15, 2020. She had repeat COVID-19 testing done on this admission which was negative. She has been weaned down to nasal cannula 2 to 3.5 L and is stable for the medical floor so hospitalists are asked to assume care. Of note the patient had a CT of the chest done yesterday for possible PE that was negative for PE. Unfortunately showed a spiculated left upper lobe mass with interval progression that is concerning for bronchogenic carcinoma. In addition to this bilateral lower lobe airspace opacities were seen right greater than left. Some of the airspace opacities previously seen in the right lung have resolved. It also showed mediastinal adenopathy and emphysema. Patient quickly weaned to her home oxygen requirement. Patient is to have outpatient follow-up with pulmonology for further evaluation of suspicious spiculated mass with mediastinal lymphadenopathy. Patient is aware that this is likely cancer. Patient does not have a primary care physician. Patient provided with nebulizer machine along with nebulizer solution, antibiotics, corticosteroids. These and other discharge instructions were discussed with patient at length and she voiced understanding and agreement. Return precautions were given the patient voiced understanding. Physical Exam:  General:          Well nourished. Alert. Weak  Eyes:               Normal sclera. Extraocular movements intact. ENT:                Normocephalic, atraumatic. Moist mucous membranes  CV:                  RRR. No murmur, rub, or gallop. Lungs:              Nonlabored respirations on 3 LPM via NC, CTAB. No wheezing, rhonchi, or rales. Abdomen:        Soft, nontender, nondistended. Bowel sounds normal.   Extremities:     Warm and dry. No cyanosis or edema. Neurologic:      CN II-XII grossly intact. Sensation intact. Skin:                No rashes or jaundice. Psych:             Normal mood and affect. Consults: Pulmonary/Critical Care    Significant Diagnostic Studies:     CT chest with contrast (pulmonary embolism protocol)     History: r/o PE. Acute respiratory failure with hypercarbia     Technique: Helically acquired images were obtained from the lung apices to the  domes of the diaphragms reconstructed at 2.5mm intervals after the uneventful  administration of 100 cc's intravenous Isovue-370 in order to evaluate the  pulmonary arteries.  Coronal reformatted images were submitted.     Radiation dose reduction techniques were used for this study:  Our CT scanners  use one or all of the following: Automated exposure control, adjustment of the  mA and/or kVp according to patient's size, iterative reconstruction.     Comparison: 08/23/2020     Findings: A nasogastric tube courses below the diaphragm with its tip not  included on this image. There are trace bilateral pleural effusions. There is no  pericardial effusion. There is moderately extensive dense right lower lobe  airspace opacity. Airspace opacities elsewhere within the superior segment right  lower lobe and right upper and middle lobes have essentially resolved. There are  is mild left lower lobe atelectasis/infiltrate which appears slightly more  pronounced. There is a spiculated mass within the left upper lobe measuring 1.8  x 2.0 cm which has increased in size since the previous exam. There are no  filling defects within the pulmonary arteries to suggest pulmonary emboli.     There are several mediastinal lymph nodes some of which appear more conspicuous  including high right paratracheal lymph nodes measuring up to 12 mm in short  axis compared to 9 mm. A left hilar lymph node measures 1.0 cm in short axis  compared to 9 mm. Right hilar and infrahilar adenopathy measures up to 1.8 cm,  unchanged. There is moderate emphysema.     IMPRESSION  IMPRESSION:  1. No evidence of pulmonary embolism. 2. Spiculated left upper lobe mass with interval progression most concerning for  bronchogenic carcinoma. 3. Bilateral lower lobe airspace opacities, right greater than left with  progression at these locations. Airspace opacities elsewhere within the right  lung have resolved. 4. Mediastinal adenopathy some of which appears more conspicuous. 5. Emphysema. Disposition: home    Discharge Medications:   Current Discharge Medication List      START taking these medications    Details   azithromycin (ZITHROMAX) 500 mg tab Take 1 Tab by mouth daily for 4 days. Qty: 4 Tab, Refills: 0      cefpodoxime (VANTIN) 200 mg tablet Take 1 Tab by mouth two (2) times a day for 6 days.   Qty: 12 Tab, Refills: 0      Nebulizer & Compressor machine Use with nebulizer solution as needed  Qty: 1 Each, Refills: 0      Nebulizer Accessories kit Use with nebulizer machine  Qty: 1 Kit, Refills: 0      levalbuterol (XOPENEX) 1.25 mg/3 mL nebu 3 mL by Nebulization route every four (4) hours as needed for Wheezing or Shortness of Breath for up to 30 days. Qty: 30 Nebule, Refills: 1      dexAMETHasone (DECADRON) 4 mg tablet Take 8 mg by mouth Daily (before breakfast) for 7 days. Qty: 14 Tab, Refills: 0         CONTINUE these medications which have NOT CHANGED    Details   Symbicort 160-4.5 mcg/actuation HFAA TAKE 2 PUFFS BY INHALATION TWO (2) TIMES A DAY. Qty: 10.2 Inhaler, Refills: 0      umeclidinium (Incruse Ellipta) 62.5 mcg/actuation inhaler Take 1 Puff by inhalation daily. Qty: 1 Inhaler, Refills: 11      albuterol (ProAir HFA) 90 mcg/actuation inhaler Take 2 Puffs by inhalation every four (4) hours as needed for Wheezing. Qty: 1 Inhaler, Refills: 0      clonazePAM (KlonoPIN) 0.5 mg tablet Take  by mouth nightly as needed for Anxiety. PARoxetine (PAXIL) 10 mg tablet Take 1 Tab by mouth daily. Qty: 30 Tab, Refills: 3      OXYGEN-AIR DELIVERY SYSTEMS 3 L by Nasal route continuous. Activity: Activity as tolerated  Diet: Regular Diet  Wound Care: None needed    Follow-up Appointments   Procedures    FOLLOW UP VISIT Appointment in: One Week Pulmonology     Pulmonology     Standing Status:   Standing     Number of Occurrences:   1     Order Specific Question:   Appointment in     Answer:    One Week       Signed By: Allan Glover MD     December 19, 2020

## 2020-12-19 NOTE — PROGRESS NOTES
Pt transferred to PACU from ICU  TRANSFER - IN REPORT:    Verbal report received from TYLER Stokes (name) on Imagene Scarce  being received from ICU (unit) for routine progression of care      Report consisted of patients Situation, Background, Assessment and   Recommendations(SBAR). Information from the following report(s) SBAR, Kardex, ED Summary, MAR, Accordion, Recent Results, Alarm Parameters  and Quality Measures was reviewed with the receiving nurse. Opportunity for questions and clarification was provided. Assessment completed upon patients arrival to unit and care assumed.

## 2020-12-19 NOTE — DISCHARGE INSTRUCTIONS
DISCHARGE SUMMARY from Nurse    PATIENT INSTRUCTIONS:    After general anesthesia or intravenous sedation, for 24 hours or while taking prescription Narcotics:  · Limit your activities  · Do not drive and operate hazardous machinery  · Do not make important personal or business decisions  · Do  not drink alcoholic beverages  · If you have not urinated within 8 hours after discharge, please contact your surgeon on call. Report the following to your surgeon:  · Excessive pain, swelling, redness or odor of or around the surgical area  · Temperature over 100.5  · Nausea and vomiting lasting longer than 4 hours or if unable to take medications  · Any signs of decreased circulation or nerve impairment to extremity: change in color, persistent  numbness, tingling, coldness or increase pain  · Any questions    What to do at Home:  Recommended activity: Activity as tolerated,     Patient Education        Using a Metered-Dose Inhaler: Care Instructions  Your Care Instructions     A metered-dose inhaler lets you breathe medicine into your lungs quickly. Inhaled medicine works faster than the same medicine in a pill. An inhaler allows you to take less medicine than you would need if you took it as a pill. \"Metered-dose\" means that the inhaler gives a measured amount of medicine each time you use it. A metered-dose inhaler gives medicine in the form of a liquid mist.  Your doctor may want you to use a spacer with your inhaler. A spacer is a chamber that you attach to the inhaler. The chamber holds the medicine before you inhale it. That way, you can inhale the medicine in as many breaths as you need. Doctors recommend using a spacer with most metered-dose inhalers, especially those with corticosteroid medicines. Follow-up care is a key part of your treatment and safety. Be sure to make and go to all appointments, and call your doctor if you are having problems.  It's also a good idea to know your test results and keep a list of the medicines you take. How can you care for yourself at home? To get started  · Talk with your health care provider to be sure you are using your inhaler the right way. It might help if you practice using it in front of a mirror. Use the inhaler exactly as prescribed. · Check that you have the correct medicine. If you use more than one inhaler, put a label on each one. This will let you know which one to use at the right time. · Keep track of how much medicine is in the inhaler. Check the label to see how many doses are in the container. If you know how many puffs you can take, you can replace the inhaler before you run out. Ask your health care provider how you can keep track of how much medicine is left. · Talk to your health care provider about using a spacer with your inhaler. Spacers make it easier to get the medicine into your lungs. You may need a spacer if you are using corticosteroid medicines. A spacer can also help if you have problems pressing the inhaler and breathing in at the same time. · If you are using a corticosteroid inhaler, gargle and rinse out your mouth with water after use. Do not swallow the water. Swallowing the water will increase the chance that the medicine will get into your bloodstream. This may make it more likely that you will have side effects. To use a spacer with an inhaler  1. Shake the inhaler. Remove the inhaler cap, and place the mouthpiece of the inhaler into the spacer. Check the inhaler instructions to see if you need to prime your inhaler before you use it. If it needs priming, follow the instructions on how to prime your inhaler. 2. Remove the cap from the spacer. 3. Hold the inhaler upright with the mouthpiece at the bottom. 4. Tilt your head back a little, and breathe out slowly and completely. 5. Place the spacer's mouthpiece in your mouth.   6. Press down on the inhaler to spray one puff of medicine into the spacer, and then start breathing in slowly. Wait to inhale until after you have pressed down on the inhaler. Some spacers have a whistle. If you hear it, you should breathe in more slowly. 7. Hold your breath for 10 seconds. This will let the medicine settle in your lungs. 8. If you need to take a second dose, wait 30 to 60 seconds to allow the inhaler valve to refill. To use an inhaler without a spacer  1. Shake the inhaler as directed. Remove the cap. Check the instructions to see if you need to prime your inhaler before you use it. If it needs priming, follow the instructions on how to prime your inhaler. 2. Hold the inhaler upright with the mouthpiece at the bottom. 3. Tilt your head back a little, and breathe out slowly and completely. 4. Position the inhaler in one of two ways:  ? You can place the inhaler in your mouth. This is easier for most people. And it lowers the risk that any of the medicine will get into your eyes. ? Or you can place the inhaler 1 to 2 inches in front of your open mouth, without closing your lips over it. Try to open your mouth as wide as you can. Placing the inhaler in front of your open mouth may be better for getting the medicine into your lungs. But some people may find this too hard to do. 5. Start taking slow, even breaths through your mouth. Press down on the inhaler once, then inhale fully. 6. Hold your breath for 10 seconds. This will let the medicine settle in your lungs. 7. If you need to take a second dose, wait 30 to 60 seconds to allow the inhaler valve to refill. Where can you learn more? Go to http://www.lawson.com/  Enter K111 in the search box to learn more about \"Using a Metered-Dose Inhaler: Care Instructions. \"  Current as of: February 24, 2020               Content Version: 12.6  © 4817-4796 [employer]. Care instructions adapted under license by [employer] (which disclaims liability or warranty for this information).  If you have questions about a medical condition or this instruction, always ask your healthcare professional. [address] any warranty or liability for your use of this information. Patient Education        Chronic Obstructive Pulmonary Disease (COPD): Care Instructions  Your Care Instructions     Chronic obstructive pulmonary disease (COPD) is a general term for a group of lung diseases, including emphysema and chronic bronchitis. People with COPD have decreased airflow in and out of the lungs, which makes it hard to breathe. The airways also can get clogged with thick mucus. Cigarette smoking is a major cause of COPD. Although there is no cure for COPD, you can slow its progress. Following your treatment plan and taking care of yourself can help you feel better and live longer. Follow-up care is a key part of your treatment and safety. Be sure to make and go to all appointments, and call your doctor if you are having problems. It's also a good idea to know your test results and keep a list of the medicines you take. How can you care for yourself at home? Staying healthy    · Do not smoke. This is the most important step you can take to prevent more damage to your lungs. If you need help quitting, talk to your doctor about stop-smoking programs and medicines. These can increase your chances of quitting for good.     · Avoid colds and flu. Get a pneumococcal vaccine shot. If you have had one before, ask your doctor whether you need a second dose. Get the flu vaccine every fall. If you must be around people with colds or the flu, wash your hands often.     · Avoid secondhand smoke, air pollution, and high altitudes. Also avoid cold, dry air and hot, humid air. Stay at home with your windows closed when air pollution is bad. Medicines and oxygen therapy    · Take your medicines exactly as prescribed. Call your doctor if you think you are having a problem with your medicine.  You may be taking medicines such as:  ? Bronchodilators. These help open your airways and make breathing easier. They are either short-acting (work for 6 to 9 hours) or long-acting (work for 24 hours). You inhale most bronchodilators, so they start to act quickly. Always carry your quick-relief inhaler with you in case you need it while you are away from home. ? Corticosteroids (prednisone, budesonide). These reduce airway inflammation. They come in pill or inhaled form. You must take these medicines every day for them to work well.     · Ask your doctor or pharmacist if a spacer is right for you. A spacer may help you get more inhaled medicine to your lungs. If you use one, ask how to use it properly.     · Do not take any vitamins, over-the-counter medicine, or herbal products without talking to your doctor first.     · If your doctor prescribed antibiotics, take them as directed. Do not stop taking them just because you feel better. You need to take the full course of antibiotics.     · If you use oxygen therapy, use the flow rate your doctor has recommended. Don't change it without talking to your doctor first. Oxygen therapy boosts the amount of oxygen in your blood and helps you breathe easier. Activity    · Get regular exercise. Walking is an easy way to get exercise. Start out slowly, and walk a little more each day.     · Pay attention to your breathing. You are exercising too hard if you can't talk while you exercise.     · Take short rest breaks when doing household chores and other activities.     · Learn breathing methods--such as breathing through pursed lips--to help you become less short of breath.     · If your doctor has not set you up with a pulmonary rehabilitation program, ask if rehab is right for you. Rehab includes exercise programs, education about your disease and how to manage it, help with diet and other changes, and emotional support. Diet    · Eat regular, healthy meals.  Use bronchodilators about 1 hour before you eat to make it easier to eat. Eat several small meals instead of three large ones. Drink beverages at the end of the meal. Avoid foods that are hard to chew.     · Eat foods that contain protein so you don't lose muscle mass.     · Talk with your doctor if you gain too much weight or if you lose weight without trying. Mental health    · Talk to your family, friends, or a therapist about your feelings. Some people feel frightened, angry, hopeless, helpless, and even guilty. Talking openly about bad feelings can help you cope. If these feelings last, talk to your doctor. When should you call for help? Call 911 anytime you think you may need emergency care. For example, call if:    · You have severe trouble breathing. Call your doctor now or seek immediate medical care if:    · You have new or worse trouble breathing.     · You cough up blood.     · You have a fever. Watch closely for changes in your health, and be sure to contact your doctor if:    · You cough more deeply or more often, especially if you notice more mucus or a change in the color of your mucus.     · You have new or worse swelling in your legs or belly.     · You are not getting better as expected. Where can you learn more? Go to http://www.gray.com/  Enter U751 in the search box to learn more about \"Chronic Obstructive Pulmonary Disease (COPD): Care Instructions. \"  Current as of: February 24, 2020               Content Version: 12.6  © 4962-4048 [employer]. Care instructions adapted under license by [employer] (which disclaims liability or warranty for this information). If you have questions about a medical condition or this instruction, always ask your healthcare professional. [address] any warranty or liability for your use of this information. , please follow up with PCP and [facility].    .    *  Please give a list of your current medications to your Primary Care Provider. *  Please update this list whenever your medications are discontinued, doses are      changed, or new medications (including over-the-counter products) are added. *  Please carry medication information at all times in case of emergency situations. These are general instructions for a healthy lifestyle:    No smoking/ No tobacco products/ Avoid exposure to second hand smoke  Surgeon General's Warning:  Quitting smoking now greatly reduces serious risk to your health. Obesity, smoking, and sedentary lifestyle greatly increases your risk for illness    A healthy diet, regular physical exercise & weight monitoring are important for maintaining a healthy lifestyle    You may be retaining fluid if you have a history of heart failure or if you experience any of the following symptoms:  Weight gain of 3 pounds or more overnight or 5 pounds in a week, increased swelling in our hands or feet or shortness of breath while lying flat in bed. Please call your doctor as soon as you notice any of these symptoms; do not wait until your next office visit. The discharge information has been reviewed with the patient. The patient verbalized understanding. Discharge medications reviewed with the patient and appropriate educational materials and side effects teaching were provided.   ___________________________________________________________________________________________________________________________________

## 2020-12-19 NOTE — PROGRESS NOTES
Patient will be d/c home today. PT has recommended HH, patient has had [facility] in the past.  Left voicemail informing patient about the [facility] referral.  Family will transport home. CM will continue to monitor and remain available for any needs or concerns that may occur. Care Management Interventions  PCP Verified by CM: Yes(Aysha Christopher, )  Mode of Transport at Discharge:  Other (see comment)(family )  Transition of Care Consult (CM Consult): Discharge Planning, Home Health(referral made to [facility] )  [address]: Yes  Discharge Durable Medical Equipment: No(none currently)  Physical Therapy Consult: Yes  Occupational Therapy Consult: Yes  Speech Therapy Consult: No  Current Support Network: Own Home, Other, Lives with Spouse(son and GF currently live with pt)  Confirm Follow Up Transport: Family  The Patient and/or Patient Representative was Provided with a Choice of Provider and Agrees with the Discharge Plan?: Yes  Freedom of Choice List was Provided with Basic Dialogue that Supports the Patient's Individualized Plan of Care/Goals, Treatment Preferences and Shares the Quality Data Associated with the Providers?: Yes   Resource Information Provided?: (MCR/ATILIO)  Discharge Location  Discharge Placement: Home with home health(referral made to [facility])

## 2020-12-19 NOTE — PROGRESS NOTES
Rn reviewed d/c instructions including new prescription use/benefit, dosing instructions and home management. Pt asked to call Monday to make f/u appt and to bring paperwork. Rn educated on home oxygen management use and community use to maintain oxygen 88-90 percent for hx copd and encouraged her to purchase pulse ox meter when picking up prescriptions. Timmy Gonzalez with belongings for pickup. Pt verbalized understanding. She was instructed to use code to access My Chart for review of inpt care/labs/diagnostics/vitals etc. Iv and suazo d/c no complications. Pt resting in bed 3l nc.

## 2020-12-20 ENCOUNTER — HOME HEALTH ADMISSION (OUTPATIENT)
Dept: HOME HEALTH SERVICES | Facility: HOME HEALTH | Age: 65
End: 2020-12-20

## 2020-12-22 NOTE — PROGRESS NOTES
Attempted to contact patient received message stating subscriber not taking calls at this time try your call again later.

## 2020-12-23 ENCOUNTER — HOME CARE VISIT (OUTPATIENT)
Dept: HOME HEALTH SERVICES | Facility: HOME HEALTH | Age: 65
End: 2020-12-23

## 2020-12-23 NOTE — PROGRESS NOTES
CM received a call from daughter / [facility] stating that a hard copy prescription was needed with diagnosis in order for patient to receive nebulizer. CM made contact with Dr. Alfredo Pacheco because d/c MD that d/c patient is not scheduled to work today. Dr. Ana East provided CM with hard copy and daughter Armen Brooks) will  the hard copy and take to the pharmacy. CM will continue to monitor.

## 2021-01-07 PROBLEM — J15.20 STAPHYLOCOCCAL PNEUMONIA (HCC): Status: RESOLVED | Noted: 2020-08-25 | Resolved: 2021-01-07

## 2021-01-07 PROBLEM — J12.82 PNEUMONIA DUE TO COVID-19 VIRUS: Status: RESOLVED | Noted: 2020-07-02 | Resolved: 2021-01-07

## 2021-01-07 PROBLEM — J43.9 EMPHYSEMA LUNG (HCC): Chronic | Status: RESOLVED | Noted: 2020-12-16 | Resolved: 2021-01-07

## 2021-01-07 PROBLEM — J18.9 CAP (COMMUNITY ACQUIRED PNEUMONIA): Status: RESOLVED | Noted: 2018-09-26 | Resolved: 2021-01-07

## 2021-01-07 PROBLEM — U07.1 PNEUMONIA DUE TO COVID-19 VIRUS: Status: RESOLVED | Noted: 2020-07-02 | Resolved: 2021-01-07

## 2021-01-07 PROBLEM — J96.22 ACUTE ON CHRONIC RESPIRATORY FAILURE WITH HYPOXIA AND HYPERCAPNIA (HCC): Status: RESOLVED | Noted: 2020-12-16 | Resolved: 2021-01-07

## 2021-01-07 PROBLEM — J96.21 ACUTE ON CHRONIC RESPIRATORY FAILURE WITH HYPOXIA AND HYPERCAPNIA (HCC): Status: RESOLVED | Noted: 2020-12-16 | Resolved: 2021-01-07

## 2021-01-07 PROBLEM — J44.9 COPD (CHRONIC OBSTRUCTIVE PULMONARY DISEASE) (HCC): Status: ACTIVE | Noted: 2020-12-19

## 2021-01-07 NOTE — H&P (VIEW-ONLY)
[address] Eyal Rausch. [address] [facility], [address] [place] [address] 
[phone] Patient Name:  Rm Marinelli YOB: 1955 Office Visit 1/7/2021 CHIEF COMPLAINT:   
Chief Complaint Patient presents with  
[facility] Follow Up  
 COPD HISTORY OF PRESENT ILLNESS:   
I had the pleasure of seeing Ms. Rm Marinelli in our clinic today. Ms. Kelli Singh is a 60 y.o. female who presents with a history of COPD, multiple recurrent pneumonias some of which were suspected to be aspiration related, anxiety, diabetes who was admitted in July and then in August and most recently in December all for COPD exacerbations with pneumonia. This most recent episode she was intubated for a couple days treated with antibiotics and steroids and improved. She was discharged home and returns today feeling improved. She is currently using oxygen frequently when she exerts herself, but is doing more around the house without putting the oxygen on. She has run out of her Symbicort, but she is continuing to use Spiriva and albuterol. She also has a nebulizer but uses this only couple times a week. She denies any significant sputum production or cough. She is not having any chest pain. She states she quit smoking in July and has not restarted. She had a previous UDS that was positive for amphetamines, but states this was due to [facility] and she denies using any illicit substances. She has anxious appearing, but pleasant and reasonable with her responses. Past Medical History:  
Diagnosis Date  Anxiety  COPD (chronic obstructive pulmonary disease) (HCC)  Diabetes ([facility] [address]  Hypertension Past Surgical History:  
Procedure Laterality Date  HX GYN    
 cervical cone Social History Socioeconomic History  Marital status:  Spouse name: Not on file  Number of children: Not on file  Years of education: Not on file  Highest education level: Not on file Occupational History  Not on file Social Needs  Financial resource strain: Not on file  Food insecurity Worry: Not on file Inability: Not on file  Transportation needs Medical: Not on file Non-medical: Not on file Tobacco Use  Smoking status: Former Smoker Packs/day: 1.00 Years: 40.00 Pack years: 40.00 Types: Cigars, Cigarettes  Smokeless tobacco: Never Used Substance and Sexual Activity  Alcohol use: No  
 Drug use: No  
 Sexual activity: Not Currently Lifestyle  Physical activity Days per week: Not on file Minutes per session: Not on file  Stress: Not on file Relationships  Social connections Talks on phone: Not on file Gets together: Not on file Attends Qu[employer] service: Not on file Active member of club or organization: Not on file Attends meetings of clubs or organizations: Not on file Relationship status: Not on file  Intimate partner violence Fear of current or ex partner: Not on file Emotionally abused: Not on file Physically abused: Not on file Forced sexual activity: Not on file Other Topics Concern  Not on file Social History Narrative  Not on file Family History Problem Relation Age of Onset  Cancer Mother  Liver Disease Father  Alcohol abuse Sister  Diabetes Brother  Hypertension Brother  Heart Disease Sister Allergies Allergen Reactions  Influenza Virus Vaccines Anaphylaxis Current Outpatient Medications Medication Sig  budesonide-formoteroL (Symbicort) 160-4.5 mcg/actuation HFAA TAKE 2 PUFFS BY INHALATION TWO (2) TIMES A DAY.  levalbuterol (XOPENEX) 1.25 mg/3 mL nebu 3 mL by Nebulization route every four (4) hours as needed for Wheezing or Shortness of Breath for up to 30 days. Dx J43.2, j96.11 r91.1 NP [id number]  tiotropium bromide (Spiriva Respimat) 2.5 mcg/actuation inhaler Take 2 Puffs by inhalation daily.  clonazePAM (KlonoPIN) 0.5 mg tablet Take 1 Tab by mouth two (2) times a day for 30 days. Max Daily Amount: 1 mg.  Nebulizer & Compressor machine J43.2,r91.1,j96.11   Use every 4 hr with respules  Nebulizer Accessories kit Use with nebulizer machine  albuterol (ProAir HFA) 90 mcg/actuation inhaler Take 2 Puffs by inhalation every four (4) hours as needed for Wheezing.  PARoxetine (PAXIL) 10 mg tablet Take 1 Tab by mouth daily.  OXYGEN-AIR DELIVERY SYSTEMS 3 L by Nasal route continuous.  umeclidinium (Incruse Ellipta) 62.5 mcg/actuation inhaler Take 1 Puff by inhalation daily. No current facility-administered medications for this visit. Review of Systems Constitutional: Negative for chills, diaphoresis, fever, malaise/fatigue and weight loss. Respiratory: Negative for cough, hemoptysis, sputum production, shortness of breath and wheezing. Cardiovascular: Negative for chest pain, palpitations, claudication, leg swelling and PND. Gastrointestinal: Negative for abdominal pain, constipation, diarrhea, heartburn, nausea and vomiting. Neurological: Negative for dizziness, tremors, seizures, weakness and headaches. PHYSICAL EXAM: 
Visit Vitals BP (!) 181/88 Pulse (!) 111 Temp 98 °F (36.7 °C) (Temporal) Resp 20 Ht 5' 3\" (1.6 m) Wt 129 lb (58.5 kg) SpO2 91% BMI 22.85 kg/m² GENERAL APPEARANCE: 
The patient is normal weight and in no respiratory distress. HEENT: 
PERRL. Conjunctivae unremarkable. Nasal mucosa is without epistaxis, exudate, or polyps. Gums and dentition are unremarkable. There is no oropharyngeal narrowing. TMs are clear. NECK/LYMPHATIC: 
Symmetrical with no elevation of jugular venous pulsation. Trachea midline. No thyroid enlargement. No cervical adenopathy.  
LUNGS: 
 Normal respiratory effort with symmetrical lung expansion. Breath sounds clear bilaterally. HEART: 
There is a regular rate and rhythm. No murmur, rub, or gallop. There is no edema in the lower extremities. ABDOMEN: 
Soft and non-tender. No hepatosplenomegaly. Bowel sounds are normal.   
NEURO: 
The patient is alert and oriented to person, place, and time. Memory appears intact and mood is normal.  No gross sensorimotor deficits are present. DIAGNOSTIC TESTS: 
CT of chest: increased size and spiculation of SARA lung nodule, good airway for ALEX bronch Results from [facility] [place] on 12/15/20 CT CHEST W CONT Impression IMPRESSION: 
1. No evidence of pulmonary embolism. 2. Spiculated left upper lobe mass with interval progression most concerning for 
bronchogenic carcinoma. 3. Bilateral lower lobe airspace opacities, right greater than left with 
progression at these locations. Airspace opacities elsewhere within the right 
lung have resolved. 4. Mediastinal adenopathy some of which appears more conspicuous. 5. Emphysema. ASSESSMENT:  (Medical Decision Making) Diagnoses and all orders for this visit: 1. Lung nodule Assessment & Plan: 
Enlarging left upper lobe spiculated pulmonary nodule which appears to be very concerning for primary lung cancer seen on CT scan during recent hospital admission for recurrent pneumonia. We were able to get recent CT images converted to navigational imaging and I think this is able to be biopsied by navigational bronchoscopy. However, given procedure limitations due to hospital beds shortages from COVID-19 we will start with a PET CT scan and likely plan for navigational bronchoscopy shortly after with or without endobronchial ultrasound. She also needs complete PFTs which we will order and schedule. Orders: 
-     NOVEL CORONAVIRUS (COVID-19) 
-     SCHEDULE PROCEDURE 
-     PET/CT TUMOR IMAGE SKULL THIGH W (INI); Future 2. Chronic obstructive pulmonary disease, unspecified COPD type ([facility] [address] Assessment & Plan: 
She states she is improved from her hospitalization. She has run out of her Symbicort however and this needs to be renewed. She continues to use Spiriva and albuterol as well as some nebulizer at times. She feels back to her baseline currently does not have significant cough or sputum production. I do believe she has had some intermittent aspiration episodes and at least at one point demonstrated this during her previous hospitalization in July, but passed her swallow study later. I still have suspicion for ongoing drug abuse given her anxious appearance and recurrent pneumonias, but she adamantly denies this. 3. Chronic respiratory failure with hypoxia ([facility] [address] Assessment & Plan: Is 3 L O2 intermittently with exertion when she feels she needs it and with sleep. Normally does not use it at rest.  She is however benefiting from this and this should be continued. 4. Tobacco abuse Assessment & Plan: 
Reports she quit in July 2020. Other orders -     budesonide-formoteroL (Symbicort) 160-4.5 mcg/actuation HFAA; TAKE 2 PUFFS BY INHALATION TWO (2) TIMES A DAY. PLAN: 
See above Orders Placed This Encounter  SCHEDULE PROCEDURE Navigational bronchoscopy.  PET/CT TUMOR IMAGE SKULL THIGH W (INI) Standing Status:   Future Standing Expiration Date:   2/7/2022 Order Specific Question:   Reason for Exam  
  Answer:   spiculated SARA lung nodule  NOVEL CORONAVIRUS (COVID-19)   Patient getting [facility]'s  
  Scheduling Instructions:  
   1) Due to current limited availability of the COVID-19 PCR test, tests will be prioritized and may not be completed.    
     
   2) Order only if the test result will change clinical management or necessary for a return to mission-critical employment decision.    
     
   3) Print and instruct patient to adhere to [facility] home isolation program. (Morgan Roch  
 4) Set up or refer patient for a monitoring program.    
     
   5) Have patient sign up for and leverage MyChart (if not previously done). Order Specific Question:   Is this test for diagnosis or screening? Answer:   Screening Order Specific Question:   Symptomatic for COVID-19 as defined by CDC? Answer:   No  
  Order Specific Question:   Hospitalized for COVID-19? Answer:   No  
  Order Specific Question:   Admitted to ICU for COVID-19? Answer:   No  
  Order Specific Question:   Employed in healthcare setting? Answer:   No  
  Order Specific Question:   Resident in a congregate (group) care setting? Answer:   No  
  Order Specific Question:   Pregnant? Answer:   No  
  Order Specific Question:   Previously tested for COVID-19? Answer: Yes  budesonide-formoteroL (Symbicort) 160-4.5 mcg/actuation HFAA Sig: TAKE 2 PUFFS BY INHALATION TWO (2) TIMES A DAY. Dispense:  10.2 Inhaler Refill:  0 Randy Sarmiento MD 
Electronically signed

## 2021-01-14 ENCOUNTER — HOSPITAL ENCOUNTER (OUTPATIENT)
Dept: PET IMAGING | Age: 66
Discharge: HOME | End: 2021-01-14
Payer: MEDICARE

## 2021-01-14 DIAGNOSIS — R91.1 LUNG NODULE: ICD-10-CM

## 2021-01-14 PROCEDURE — 74011000636 HC RX REV CODE- 636: Performed by: INTERNAL MEDICINE

## 2021-01-14 PROCEDURE — A9552 F18 FDG: HCPCS

## 2021-01-14 RX ORDER — SODIUM CHLORIDE 0.9 % (FLUSH) 0.9 %
10 SYRINGE (ML) INJECTION
Status: COMPLETED | OUTPATIENT
Start: 2021-01-14 | End: 2021-01-14

## 2021-01-14 RX ADMIN — DIATRIZOATE MEGLUMINE AND DIATRIZOATE SODIUM 10 ML: 660; 100 LIQUID ORAL; RECTAL at 07:44

## 2021-01-14 RX ADMIN — Medication 10 ML: at 07:44

## 2021-01-20 ENCOUNTER — ANESTHESIA EVENT (OUTPATIENT)
Dept: ENDOSCOPY | Age: 66
End: 2021-01-20
Payer: MEDICARE

## 2021-01-20 RX ORDER — SODIUM CHLORIDE, SODIUM LACTATE, POTASSIUM CHLORIDE, CALCIUM CHLORIDE 600; 310; 30; 20 MG/100ML; MG/100ML; MG/100ML; MG/100ML
75 INJECTION, SOLUTION INTRAVENOUS CONTINUOUS
Status: CANCELLED | OUTPATIENT
Start: 2021-01-20 | End: 2021-01-21

## 2021-01-20 RX ORDER — LIDOCAINE HYDROCHLORIDE 10 MG/ML
0.1 INJECTION INFILTRATION; PERINEURAL AS NEEDED
Status: CANCELLED | OUTPATIENT
Start: 2021-01-20

## 2021-01-20 RX ORDER — MIDAZOLAM HYDROCHLORIDE 1 MG/ML
2 INJECTION, SOLUTION INTRAMUSCULAR; INTRAVENOUS
Status: CANCELLED | OUTPATIENT
Start: 2021-01-20 | End: 2021-01-21

## 2021-01-21 ENCOUNTER — ANESTHESIA (OUTPATIENT)
Dept: ENDOSCOPY | Age: 66
End: 2021-01-21
Payer: MEDICARE

## 2021-01-21 ENCOUNTER — APPOINTMENT (OUTPATIENT)
Dept: GENERAL RADIOLOGY | Age: 66
End: 2021-01-21
Attending: INTERNAL MEDICINE
Payer: MEDICARE

## 2021-01-21 ENCOUNTER — HOSPITAL ENCOUNTER (OUTPATIENT)
Age: 66
Setting detail: OUTPATIENT SURGERY
Discharge: HOME | End: 2021-01-21
Attending: INTERNAL MEDICINE | Admitting: INTERNAL MEDICINE
Payer: MEDICARE

## 2021-01-21 VITALS
DIASTOLIC BLOOD PRESSURE: 64 MMHG | HEIGHT: 63 IN | OXYGEN SATURATION: 97 % | BODY MASS INDEX: 22.86 KG/M2 | WEIGHT: 129 LBS | TEMPERATURE: 97.8 F | HEART RATE: 68 BPM | SYSTOLIC BLOOD PRESSURE: 133 MMHG | RESPIRATION RATE: 17 BRPM

## 2021-01-21 DIAGNOSIS — C34.12 MALIGNANT NEOPLASM OF UPPER LOBE OF LEFT LUNG (HCC): ICD-10-CM

## 2021-01-21 DIAGNOSIS — R59.0 MEDIASTINAL LYMPHADENOPATHY: ICD-10-CM

## 2021-01-21 DIAGNOSIS — R91.1 LUNG NODULE: ICD-10-CM

## 2021-01-21 PROCEDURE — 77030021922 HC FCPS BIOP SD DISP SPDM -D: Performed by: INTERNAL MEDICINE

## 2021-01-21 PROCEDURE — 74011250636 HC RX REV CODE- 250/636: Performed by: INTERNAL MEDICINE

## 2021-01-21 PROCEDURE — 74011000250 HC RX REV CODE- 250: Performed by: NURSE ANESTHETIST, CERTIFIED REGISTERED

## 2021-01-21 PROCEDURE — 77030012699 HC VLV SUC CNTRL OCOA -A: Performed by: INTERNAL MEDICINE

## 2021-01-21 PROCEDURE — 76040000027: Performed by: INTERNAL MEDICINE

## 2021-01-21 PROCEDURE — 77030037088 HC TUBE ENDOTRACH ORAL NSL COVD-A: Performed by: ANESTHESIOLOGY

## 2021-01-21 PROCEDURE — 77030031404 HC PTCH SENS SD DISP SPDM -A: Performed by: INTERNAL MEDICINE

## 2021-01-21 PROCEDURE — 77030031420 HC NDL TIP BRSH ASP SD SPDM -B: Performed by: INTERNAL MEDICINE

## 2021-01-21 PROCEDURE — 77030003406 HC NDL ASPIR BIOP OCOA -C: Performed by: INTERNAL MEDICINE

## 2021-01-21 PROCEDURE — 77030009046 HC CATH BRNCH BLLN OCOA -B: Performed by: INTERNAL MEDICINE

## 2021-01-21 PROCEDURE — 31627 NAVIGATIONAL BRONCHOSCOPY: CPT | Performed by: INTERNAL MEDICINE

## 2021-01-21 PROCEDURE — 77030040361 HC SLV COMPR DVT MDII -B: Performed by: INTERNAL MEDICINE

## 2021-01-21 PROCEDURE — 88305 TISSUE EXAM BY PATHOLOGIST: CPT

## 2021-01-21 PROCEDURE — 88177 CYTP FNA EVAL EA ADDL: CPT

## 2021-01-21 PROCEDURE — 88172 CYTP DX EVAL FNA 1ST EA SITE: CPT

## 2021-01-21 PROCEDURE — 74011250636 HC RX REV CODE- 250/636: Performed by: NURSE ANESTHETIST, CERTIFIED REGISTERED

## 2021-01-21 PROCEDURE — 88173 CYTOPATH EVAL FNA REPORT: CPT

## 2021-01-21 PROCEDURE — 77030028571 HC CATH ENDOBRNCH DISP BIOP KT SPMD -G1: Performed by: INTERNAL MEDICINE

## 2021-01-21 PROCEDURE — 76060000033 HC ANESTHESIA 1 TO 1.5 HR: Performed by: INTERNAL MEDICINE

## 2021-01-21 PROCEDURE — 77030039425 HC BLD LARYNG TRULITE DISP TELE -A: Performed by: ANESTHESIOLOGY

## 2021-01-21 PROCEDURE — 31628 BRONCHOSCOPY/LUNG BX EACH: CPT | Performed by: INTERNAL MEDICINE

## 2021-01-21 PROCEDURE — 31653 BRONCH EBUS SAMPLNG 3/> NODE: CPT | Performed by: INTERNAL MEDICINE

## 2021-01-21 PROCEDURE — 2709999900 HC NON-CHARGEABLE SUPPLY: Performed by: INTERNAL MEDICINE

## 2021-01-21 PROCEDURE — 77030019908 HC STETH ESOPH SIMS -A: Performed by: ANESTHESIOLOGY

## 2021-01-21 PROCEDURE — 31654 BRONCH EBUS IVNTJ PERPH LES: CPT | Performed by: INTERNAL MEDICINE

## 2021-01-21 RX ORDER — ROCURONIUM BROMIDE 10 MG/ML
INJECTION, SOLUTION INTRAVENOUS AS NEEDED
Status: DISCONTINUED | OUTPATIENT
Start: 2021-01-21 | End: 2021-01-21 | Stop reason: HOSPADM

## 2021-01-21 RX ORDER — LIDOCAINE HYDROCHLORIDE 20 MG/ML
INJECTION, SOLUTION EPIDURAL; INFILTRATION; INTRACAUDAL; PERINEURAL AS NEEDED
Status: DISCONTINUED | OUTPATIENT
Start: 2021-01-21 | End: 2021-01-21 | Stop reason: HOSPADM

## 2021-01-21 RX ORDER — PROPOFOL 10 MG/ML
INJECTION, EMULSION INTRAVENOUS AS NEEDED
Status: DISCONTINUED | OUTPATIENT
Start: 2021-01-21 | End: 2021-01-21 | Stop reason: HOSPADM

## 2021-01-21 RX ORDER — DEXAMETHASONE SODIUM PHOSPHATE 4 MG/ML
INJECTION, SOLUTION INTRA-ARTICULAR; INTRALESIONAL; INTRAMUSCULAR; INTRAVENOUS; SOFT TISSUE AS NEEDED
Status: DISCONTINUED | OUTPATIENT
Start: 2021-01-21 | End: 2021-01-21 | Stop reason: HOSPADM

## 2021-01-21 RX ORDER — FENTANYL CITRATE 50 UG/ML
INJECTION, SOLUTION INTRAMUSCULAR; INTRAVENOUS AS NEEDED
Status: DISCONTINUED | OUTPATIENT
Start: 2021-01-21 | End: 2021-01-21 | Stop reason: HOSPADM

## 2021-01-21 RX ORDER — LIDOCAINE HYDROCHLORIDE 40 MG/ML
SOLUTION TOPICAL
Status: DISCONTINUED | OUTPATIENT
Start: 2021-01-21 | End: 2021-01-21 | Stop reason: HOSPADM

## 2021-01-21 RX ORDER — OXYCODONE AND ACETAMINOPHEN 5; 325 MG/1; MG/1
1 TABLET ORAL AS NEEDED
Status: DISCONTINUED | OUTPATIENT
Start: 2021-01-21 | End: 2021-01-21 | Stop reason: HOSPADM

## 2021-01-21 RX ORDER — HYDROMORPHONE HYDROCHLORIDE 2 MG/ML
0.5 INJECTION, SOLUTION INTRAMUSCULAR; INTRAVENOUS; SUBCUTANEOUS
Status: DISCONTINUED | OUTPATIENT
Start: 2021-01-21 | End: 2021-01-21 | Stop reason: HOSPADM

## 2021-01-21 RX ORDER — GLYCOPYRROLATE 0.2 MG/ML
INJECTION INTRAMUSCULAR; INTRAVENOUS AS NEEDED
Status: DISCONTINUED | OUTPATIENT
Start: 2021-01-21 | End: 2021-01-21 | Stop reason: HOSPADM

## 2021-01-21 RX ORDER — EPHEDRINE SULFATE/0.9% NACL/PF 50 MG/5 ML
SYRINGE (ML) INTRAVENOUS AS NEEDED
Status: DISCONTINUED | OUTPATIENT
Start: 2021-01-21 | End: 2021-01-21 | Stop reason: HOSPADM

## 2021-01-21 RX ORDER — NALOXONE HYDROCHLORIDE 0.4 MG/ML
0.2 INJECTION, SOLUTION INTRAMUSCULAR; INTRAVENOUS; SUBCUTANEOUS AS NEEDED
Status: DISCONTINUED | OUTPATIENT
Start: 2021-01-21 | End: 2021-01-21 | Stop reason: HOSPADM

## 2021-01-21 RX ORDER — SODIUM CHLORIDE 0.9 % (FLUSH) 0.9 %
5-40 SYRINGE (ML) INJECTION AS NEEDED
Status: DISCONTINUED | OUTPATIENT
Start: 2021-01-21 | End: 2021-01-21 | Stop reason: HOSPADM

## 2021-01-21 RX ORDER — SODIUM CHLORIDE, SODIUM LACTATE, POTASSIUM CHLORIDE, CALCIUM CHLORIDE 600; 310; 30; 20 MG/100ML; MG/100ML; MG/100ML; MG/100ML
75 INJECTION, SOLUTION INTRAVENOUS CONTINUOUS
Status: DISCONTINUED | OUTPATIENT
Start: 2021-01-21 | End: 2021-01-21 | Stop reason: HOSPADM

## 2021-01-21 RX ORDER — SODIUM CHLORIDE 0.9 % (FLUSH) 0.9 %
5-40 SYRINGE (ML) INJECTION EVERY 8 HOURS
Status: DISCONTINUED | OUTPATIENT
Start: 2021-01-21 | End: 2021-01-21 | Stop reason: HOSPADM

## 2021-01-21 RX ORDER — SODIUM CHLORIDE, SODIUM LACTATE, POTASSIUM CHLORIDE, CALCIUM CHLORIDE 600; 310; 30; 20 MG/100ML; MG/100ML; MG/100ML; MG/100ML
1000 INJECTION, SOLUTION INTRAVENOUS CONTINUOUS
Status: DISCONTINUED | OUTPATIENT
Start: 2021-01-21 | End: 2021-01-21 | Stop reason: HOSPADM

## 2021-01-21 RX ORDER — ONDANSETRON 2 MG/ML
INJECTION INTRAMUSCULAR; INTRAVENOUS AS NEEDED
Status: DISCONTINUED | OUTPATIENT
Start: 2021-01-21 | End: 2021-01-21 | Stop reason: HOSPADM

## 2021-01-21 RX ADMIN — Medication 5 MG: at 10:32

## 2021-01-21 RX ADMIN — PHENYLEPHRINE HYDROCHLORIDE 100 MCG: 10 INJECTION INTRAVENOUS at 10:35

## 2021-01-21 RX ADMIN — SODIUM CHLORIDE, SODIUM LACTATE, POTASSIUM CHLORIDE, AND CALCIUM CHLORIDE: 600; 310; 30; 20 INJECTION, SOLUTION INTRAVENOUS at 09:59

## 2021-01-21 RX ADMIN — LIDOCAINE HYDROCHLORIDE 80 MG: 20 INJECTION, SOLUTION EPIDURAL; INFILTRATION; INTRACAUDAL; PERINEURAL at 10:05

## 2021-01-21 RX ADMIN — FENTANYL CITRATE 50 MCG: 50 INJECTION INTRAMUSCULAR; INTRAVENOUS at 10:05

## 2021-01-21 RX ADMIN — ROCURONIUM BROMIDE 35 MG: 10 INJECTION, SOLUTION INTRAVENOUS at 10:05

## 2021-01-21 RX ADMIN — PHENYLEPHRINE HYDROCHLORIDE 150 MCG: 10 INJECTION INTRAVENOUS at 10:38

## 2021-01-21 RX ADMIN — DEXAMETHASONE SODIUM PHOSPHATE 4 MG: 4 INJECTION, SOLUTION INTRAMUSCULAR; INTRAVENOUS at 10:15

## 2021-01-21 RX ADMIN — ONDANSETRON 4 MG: 2 INJECTION INTRAMUSCULAR; INTRAVENOUS at 10:15

## 2021-01-21 RX ADMIN — GLYCOPYRROLATE 0.1 MG: 0.2 INJECTION INTRAMUSCULAR; INTRAVENOUS at 10:33

## 2021-01-21 RX ADMIN — PROPOFOL 200 MG: 10 INJECTION, EMULSION INTRAVENOUS at 10:05

## 2021-01-21 RX ADMIN — PHENYLEPHRINE HYDROCHLORIDE 100 MCG: 10 INJECTION INTRAVENOUS at 10:21

## 2021-01-21 RX ADMIN — Medication 5 MG: at 10:29

## 2021-01-21 RX ADMIN — FENTANYL CITRATE 25 MCG: 50 INJECTION INTRAMUSCULAR; INTRAVENOUS at 11:01

## 2021-01-21 NOTE — PROGRESS NOTES
Spiritual Care visit. Initial Visit, Pre Surgery Consult. Visit and prayer before patient goes to surgery. Visit by Mitzi Major M.Ed., Th.B. ,Staff      While patient was in GI Lab Waiting Room, her daughter in law witnessed  speaking and praying with another patient. She asked me to pray for her mother and her mother in law who were both having the same procedure today--her mother in law (whom I had already seen) and her mother who was in another hospital.  gladly accommodated her. Prayed for both people.     Visit by Mitzi Major M.Ed., Th.B. ,Staff

## 2021-01-21 NOTE — ANESTHESIA PREPROCEDURE EVALUATION
Relevant Problems   No relevant active problems       Anesthetic History               Review of Systems / Medical History  Patient summary reviewed and pertinent labs reviewed    Pulmonary    COPD: moderate               Neuro/Psych   Within defined limits           Cardiovascular    Hypertension: well controlled              Exercise tolerance: <4 METS     GI/Hepatic/Renal                Endo/Other    Diabetes: well controlled, type 2         Other Findings              Physical Exam    Airway  Mallampati: II  TM Distance: 4 - 6 cm  Neck ROM: normal range of motion   Mouth opening: Normal     Cardiovascular    Rhythm: regular           Dental    Dentition: Full upper dentures and Full lower dentures     Pulmonary      Decreased breath sounds: bilateral      Prolonged expiration     Abdominal         Other Findings            Anesthetic Plan    ASA: 3  Anesthesia type: general          Induction: Intravenous  Anesthetic plan and risks discussed with: Patient

## 2021-01-21 NOTE — DISCHARGE INSTRUCTIONS
RESPIRATORY CARE - BRONCHOSCOPY - DISCHARGE INSTRUCTIONS      You received a lot of numbing medication for your throat and nose, and you also received medication to make you sleepy during your procedure. Because of this and because the bronchoscopy may have irritated your airways, we ask that you follow these directions:    1. Do not eat or drink until  1:00 PM .   After that, you may have what you please. You may want to try some liquids first, because your throat may be a little sore. 2. Medication may cause drowsiness for several hours, therefore:  · Do not drive or operate machinery for remainder of the day. · No alcohol today  · Do not make any important or legal decisions for 24 hours  · Do not sign any legal documents for 24 hours    3. You may cough up more mucus than usual and you may see some blood, but this is expected and should subside by the following day. 4. If severe throat irritation, coughing, or bleeding continue, call your doctor. 5.         If you run a fever greater than 102, call [facility] at [phone]. 6.         Dr. Elina Fragoso has asked that you:                A. Call the doctor's office at as needed for follow up appointment. B.  Office will contact you to set up PFT's. Any additional instructions:  NA    After general anesthesia or intravenous sedation, for 24 hours or while taking prescription Narcotics:  · Limit your activities  · A responsible adult needs to be with you for the next 24 hours  · Do not drive and operate hazardous machinery  · Do not make important personal or business decisions  · Do not drink alcoholic beverages  · If you have not urinated within 8 hours after discharge, and you are experiencing discomfort from urinary retention, please go to the nearest ED. · If you have sleep apnea and have a CPAP machine, please use it for all naps and sleeping.   · Please use caution when taking narcotics and any of your home medications that may cause drowsiness. *  Please give a list of your current medications to your Primary Care Provider. *  Please update this list whenever your medications are discontinued, doses are      changed, or new medications (including over-the-counter products) are added. *  Please carry medication information at all times in case of emergency situations. These are general instructions for a healthy lifestyle:  No smoking/ No tobacco products/ Avoid exposure to second hand smoke  Surgeon General's Warning:  Quitting smoking now greatly reduces serious risk to your health. Obesity, smoking, and sedentary lifestyle greatly increases your risk for illness  A healthy diet, regular physical exercise & weight monitoring are important for maintaining a healthy lifestyle    You may be retaining fluid if you have a history of heart failure or if you experience any of the following symptoms:  Weight gain of 3 pounds or more overnight or 5 pounds in a week, increased swelling in our hands or feet or shortness of breath while lying flat in bed. Please call your doctor as soon as you notice any of these symptoms; do not wait until your next office visit.

## 2021-01-21 NOTE — ANESTHESIA POSTPROCEDURE EVALUATION
Procedure(s):  BRONCHOSCOPY WITH RADIAL PROBE  ENDOSCOPIC BRONCHOSCOPY ULTRASOUND (EBUS)  BRONCHOSCOPY NAVIGATIONAL W C-ARM  BRONCHOSCOPY. general    Anesthesia Post Evaluation      Multimodal analgesia: multimodal analgesia used between 6 hours prior to anesthesia start to PACU discharge  Patient location during evaluation: PACU  Patient participation: complete - patient participated  Level of consciousness: awake and alert  Pain management: adequate  Airway patency: patent  Anesthetic complications: no  Cardiovascular status: acceptable  Respiratory status: acceptable  Hydration status: acceptable  Post anesthesia nausea and vomiting:  none  Final Post Anesthesia Temperature Assessment:  Normothermia (36.0-37.5 degrees C)      INITIAL Post-op Vital signs:   Vitals Value Taken Time   /65 01/21/21 1149   Temp 36.4 °C (97.6 °F) 01/21/21 1121   Pulse 68 01/21/21 1152   Resp 16 01/21/21 1152   SpO2 98 % 01/21/21 1152   Vitals shown include unvalidated device data.

## 2021-01-21 NOTE — PERIOP NOTES
DC instructions reviewed with sister in law at bedside. Verbalized understanding. All questions answered.

## 2021-01-21 NOTE — PROCEDURES
PROCEDURE  Bronchoscopy with endobronchial ultrasound guided fine needle aspiration of hilar/mediastinal lymph nodes and airway inspection and EMN aided transbronchial lung biopsy of peripheral lung nodule. EQUIPEMENT  Olympus  Bronchoscope  Super Dimension EMN system  90 deg stearable sheath  Olympus [id number] EBUS scope   17/20 Radial probe  Super D forceps        INDICATION   Peripheral nodule suspicious for malignancy /staging of presumed malignancy    IMAGING  CT Chest 12/17/20        POST OP DIAGNOSIS:  Super Dimension EMN system was employed to identify and biopsy the SARA nodule seen on CT.  8 transbronchial lung biopsies were performed with touch preparations revealing malignant cells on GERONIMO. Histology from obtained biopsies is pending. Following EMN procedure, concave EBUS was performed for mediastinal staging. Stations 11Ri, 7, and 11L were biopsied and were negative for malignancy on GERONIMO. Based on imaging and biopsies, pt is a STAGE [id number] (IA2) non-small cell lung cancer. ANESTHESIA  Intubation and general sedation provided by anesthesiology      AIRWAY INSPECTION  After obtaining informed consent, using a bite block, an Olympus  Bronchoscope was introduced into the trachea through an ETT without complication. RIGHT  LOCATION NORM/ABNORMAL DESCRIPTION   VOCAL CORDS ETT    TRACHEA NL    GASTON NL    RMSB NL    RUL NL    BI NL    RML NL    RLL NL    SUP SEGM RLL NL    MED BASAL NL    ANTERIOR BASAL NL    LATERAL BASAL NL    POSTERIOR BASAL NL            LEFT  LOCATION NORMAL/ABNORMAL TYPE   LMSB NL    SARA NL    LINGULA NL    LLL NL    SUPERIOR SEG LLL NL    MCKENZIE-MEDIAL LLL NL    LATERAL LLL NL    POSTERIOR LLL NL             Navigation    Olympus  Bronchoscope was introduced into the airways to identify and biopsy the SARA nodule with the aid of Super D EMN system and radial probe US.     CT images acquired with Super D protocol were used to plan the pathway to target lesion planned using Super D software. Planning data was then used to navigate to the nodule, with verification of location using radial US. Visibility with radial US was: excellent  8 transbronchial lung biopsies were obtained and evaluated via touch prep and GERONIMO. Touch preps revealed malignant cells. Histology from obtained tissue is currently pending. TOUCH PREP BIOPSY# MALIGNANT ATYPIA/SUSPICIOUS DIAGNOSIS   SARA 1  Treasure     2  Treasure     3 +      4 Formalin      5 Formalin      6 Formalin      7 Formalin      8 Formalin         ADDITIONAL BIOPSIES  None      EBUS  After completing the airway inspection an Olympus  F EBUS bronchoscope was introduced into the trachea through the vocal chords without complication. The balloon was inflated with saline and a mediastinal inspection commenced:      STATION SIZE IN CM   12R  1cm   11R No target   10R No target   4R No target   2R No target   7 2cm   2L No target   4L No target   10L No target   11L 1.5cm         After identifying targets the following samples were obtained:    STATION PASS# LYMPHOCYTES ATYPIA GRANULOMA DIAGNOSIS   11Ri 1 blood - - -    2 + - - -    3 + - - -    4 + - - -   7 1 + - - -    2 + - - -    3 blood - - -    4 blood - - -   11L 1 blood - - -    2 blood - - -    3 blood - - -     EBL: <24HT    Complications: None    Diagnosis: Stage IA2 NSCLC    Plan:  Patient will need to have cPFT's and be discussed at tumor board for possible surgical resection vs SBRT.        Dominick Brandt MD

## 2021-01-22 NOTE — ADDENDUM NOTE
Addendum  created 01/22/21 1042 by Elizabeth Chua CRNA    Flowsheet accepted, Intraprocedure Flowsheets edited

## 2021-02-10 PROBLEM — C34.90 SQUAMOUS CELL CARCINOMA OF LUNG (HCC): Status: ACTIVE | Noted: 2021-02-10

## 2021-02-17 NOTE — PROGRESS NOTES
Patient: Don Blanco MRN: [id number]  SSN: [id number]    YOB: 1955  Age: 73 y.o. Sex: female      Other Providers:  Dr. Alecia Souza, Dr. Agatha Marquez: Dyspnea    DIAGNOSIS: Stage I SCC of the SARA    PREVIOUS RADIATION TREATMENT:  1) None    HISTORY OF PRESENT ILLNESS:  Don Blanco is a 73 y.o. female who I am seeing at the request of Dr. Alecia Souza. Ms. Chris Rangel has a history of COPD and multiple recurrent pneumonias with frequent admissions who is a former smoker, quit in 07/21. During a workup for shortness of breath a CT chest was obtained 8/23/2020 which demonstrated a rounded pulmonary nodule in the posterior left upper lobe measuring 1.5 x 1.5cm concerning for primary malignancy. She underwent a repeat CT chest on 12/17/2020 for acute respiratory failure which also demonstrated the spiculated SARA mass measuring 1.8 x 2.0cm, increased in size compared to prior exams, as well as several conspicuous mediastinal lymph nodes including a high right paratracheal node, left hilar node, and right hilar and infrahilar adenopathy. A PET/CT was performed 1/14/2021 which was significant for the left upper lobe spiculated mass abutting the interlobular fissure similar in size to the prior CT, SUV max 4.7. The previously noted adenopathy showed only mildly elevated FDG avidity. On 1/21/2020 she underwent bronchoscopy and biopsy. The left upper lobe lung node was consistent with squamous cell carcinoma. FNA of 11R and  7 lymph nodes were negative for malignant cells. FNA of 11L was nondiagnostic consisting mostly of blood. Currently, she reports stable shortness of breath with exertion. She denies recent fever, productive cough, or hemoptysis. She does endorse significant anxiety and agoraphobia and that it was difficult to come in for the appointment today.      PAST MEDICAL HISTORY:    Past Medical History:   Diagnosis Date    Anxiety     COPD [facility]chronic obstructive pulmonary disease) [facility] [address]  Diabetes ([facility] [address]     no meds    Hypertension     no meds       The patient denies history of collagen vascular diseases, pacemaker insertion, prior radiation or prior chemotherapy. PAST SURGICAL HISTORY:   Past Surgical History:   Procedure Laterality Date    HX GYN      cervical cone       MEDICATIONS:     Current Outpatient Medications:     albuterol (ProAir HFA) 90 mcg/actuation inhaler, Take 2 Puffs by inhalation every four (4) hours as needed for Wheezing., Disp: 1 Inhaler, Rfl: 5    amLODIPine (NORVASC) 5 mg tablet, Take 5 mg by mouth daily. , Disp: , Rfl:     budesonide-formoteroL (Symbicort) 160-4.5 mcg/actuation HFAA, TAKE 2 PUFFS BY INHALATION TWO (2) TIMES A DAY., Disp: 10.2 Inhaler, Rfl: 0    Nebulizer & Compressor machine, J43.2,r91.1,j96.11   Use every 4 hr with respules, Disp: 1 Each, Rfl: 0    Nebulizer Accessories kit, Use with nebulizer machine, Disp: 1 Kit, Rfl: 0    OXYGEN-AIR DELIVERY SYSTEMS, 3 L by Nasal route continuous. , Disp: , Rfl:     ALLERGIES:   Allergies   Allergen Reactions    Influenza Virus Vaccines Anaphylaxis       SOCIAL HISTORY:   Social History     Socioeconomic History    Marital status:      Spouse name: Not on file    Number of children: Not on file    Years of education: Not on file    Highest education level: Not on file   Occupational History    Not on file   Social Needs    Financial resource strain: Not on file    Food insecurity     Worry: Not on file     Inability: Not on file    Transportation needs     Medical: Not on file     Non-medical: Not on file   Tobacco Use    Smoking status: Former Smoker     Packs/day: 1.00     Years: 40.00     Pack years: 40.00     Types: Cigars, Cigarettes    Smokeless tobacco: Never Used   Substance and Sexual Activity    Alcohol use: No    Drug use: No    Sexual activity: Not Currently   Lifestyle    Physical activity     Days per week: Not on file     Minutes per session: Not on file    Stress: Not on file   Relationships    Social connections     Talks on phone: Not on file     Gets together: Not on file     Attends [employer] service: Not on file     Active member of club or organization: Not on file     Attends meetings of clubs or organizations: Not on file     Relationship status: Not on file    Intimate partner violence     Fear of current or ex partner: Not on file     Emotionally abused: Not on file     Physically abused: Not on file     Forced sexual activity: Not on file   Other Topics Concern    Not on file   Social History Narrative    Not on file       FAMILY HISTORY:   Family History   Problem Relation Age of Onset    Cancer Mother     Liver Disease Father     Alcohol abuse Sister     Diabetes Brother     Hypertension Brother     Heart Disease Sister        REVIEW OF SYSTEMS:   A full 12-point review of systems was completed and was negative unless noted in the history of present illness. PHYSICAL EXAMINATION:   ECOG Performance status 1  VITAL SIGNS:   Visit Vitals  BP (!) 163/83 (BP 1 Location: Left upper arm)   Pulse (!) 121   Temp 97.2 °F (36.2 °C)   Wt 62.6 kg (138 lb)   SpO2 91%   BMI 24.45 kg/m²       General: well developed/nourished adult Female in no acute distress; appears stated age  [de-identified]: normocephalic, atraumatic; EOMI  Neck: supple with full ROM; no cervical lymphadenopathy  Cardiovascular: normal S1 and S2, RRR, no murmurs  Respiratory: normal inspiratory effort, no audible wheezes  Extremities: no cyanosis, clubbing, or edema  Musculoskeletal: mobility intact x4; normal ROM in all joints  Skin: no skin lesions identified  Neuro: AOx3; sensation intact x 4; CNII-XII grossly intact  Psych: appropriate affect, insight, and judgement  GI: abdomen soft, non-distended  : not indicated   Breast: not indicated     PATHOLOGY:    I personally reviewed the pathology reports as documented in the HPI.     LABORATORY:   Lab Results   Component Value Date/Time    Sodium 141 12/18/2020 04:05 AM    Potassium 3.7 12/18/2020 04:05 AM    Chloride 106 12/18/2020 04:05 AM    CO2 27 12/18/2020 04:05 AM    Anion gap 8 12/18/2020 04:05 AM    Glucose 68 12/18/2020 04:05 AM    BUN 8 12/18/2020 04:05 AM    Creatinine 0.44 (L) 12/18/2020 04:05 AM    GFR est AA >60 12/18/2020 04:05 AM    GFR est non-AA >60 12/18/2020 04:05 AM    Calcium 9.0 12/18/2020 04:05 AM    Magnesium 1.6 (L) 12/15/2020 11:35 PM    Phosphorus 3.5 08/13/2020 05:40 AM    Albumin 3.2 12/15/2020 11:35 PM    Protein, total 7.3 12/15/2020 11:35 PM    Globulin 4.1 (H) 12/15/2020 11:35 PM    A-G Ratio 0.8 (L) 12/15/2020 11:35 PM    ALT (SGPT) 22 12/15/2020 11:35 PM     Lab Results   Component Value Date/Time    WBC 8.0 12/18/2020 04:05 AM    HGB 11.6 (L) 12/18/2020 04:05 AM    HCT 38.3 12/18/2020 04:05 AM    PLATELET 805 59/40/7073 04:05 AM       RADIOLOGY:    I personally reviewed the images and agree with the findings as documented in the HPI. IMPRESSION:  Pavithra Lomeli is a 73 y.o. female with recently diagnosed stage I SCC of the left upper lobe presenting for discussion of radiation therapy. I had a long discussion with Pavithra Lomeli and her future daughter in law regarding treatment options, specifically surgery versus radiation therapy. I reviewed that stereotactic body radiation is a very reasonable alternative to surgery with high rates of local control and disease related survival. I reviewed in detail the anticipated acute and late toxicities of radiation therapy as well as the logistics of treatment and anticipated disease control. Daphne Garcia Kei and her family had the opportunity to ask questions which appeared to be answered to their satisfaction and have elected to proceed with stereotactic body radiation. PLAN:    1) Consented patient for treatment with external beam radiation after discussing risk, benefits, and side effects from treatment. 2) CT simulation to be scheduled.     Patty Cedillo Laurent Lynn MD   February 19, 2021

## 2021-02-19 ENCOUNTER — HOSPITAL ENCOUNTER (OUTPATIENT)
Dept: RADIATION ONCOLOGY | Age: 66
Discharge: HOME | End: 2021-02-19
Payer: MEDICARE

## 2021-02-19 VITALS
WEIGHT: 138 LBS | BODY MASS INDEX: 24.45 KG/M2 | DIASTOLIC BLOOD PRESSURE: 83 MMHG | HEART RATE: 121 BPM | OXYGEN SATURATION: 91 % | TEMPERATURE: 97.2 F | SYSTOLIC BLOOD PRESSURE: 163 MMHG

## 2021-02-19 PROCEDURE — 99211 OFF/OP EST MAY X REQ PHY/QHP: CPT

## 2021-02-19 NOTE — NURSE NAVIGATOR
Consult lung cancer. Future daughter in law present for consult. Pt wears O2 at home as needed. Hx Agoraphobia/Anxiety. Pet scan 1-14-21. Pathology 1-21-21. Pt and future daughter in law were told that pt would be oxygen dependent post surgery. They are concerned about similar effects from RT.  CONSENTS SIGNED FOR SBRT.   CT [facility] SCHEDULED Wednesday, 3-3-21 AT 9 AM.  APT GIVEN TO PT.         Danielle Woodward RN

## 2021-03-03 ENCOUNTER — HOSPITAL ENCOUNTER (OUTPATIENT)
Dept: RADIATION ONCOLOGY | Age: 66
Discharge: HOME | End: 2021-03-03
Payer: MEDICARE

## 2021-03-03 PROCEDURE — 77470 SPECIAL RADIATION TREATMENT: CPT

## 2021-03-03 PROCEDURE — 77334 RADIATION TREATMENT AID(S): CPT

## 2021-03-05 ENCOUNTER — HOSPITAL ENCOUNTER (OUTPATIENT)
Dept: RADIATION ONCOLOGY | Age: 66
Discharge: HOME | End: 2021-03-05
Payer: MEDICARE

## 2021-03-05 PROCEDURE — 77293 RESPIRATOR MOTION MGMT SIMUL: CPT

## 2021-03-05 PROCEDURE — 77300 RADIATION THERAPY DOSE PLAN: CPT

## 2021-03-05 PROCEDURE — 77301 RADIOTHERAPY DOSE PLAN IMRT: CPT

## 2021-03-05 PROCEDURE — 77399 UNLISTED PX MED RADJ PHYSICS: CPT

## 2021-03-09 ENCOUNTER — HOSPITAL ENCOUNTER (OUTPATIENT)
Dept: RADIATION ONCOLOGY | Age: 66
Discharge: HOME | End: 2021-03-09
Payer: MEDICARE

## 2021-03-09 PROCEDURE — 77338 DESIGN MLC DEVICE FOR IMRT: CPT

## 2021-03-10 ENCOUNTER — HOSPITAL ENCOUNTER (OUTPATIENT)
Dept: RADIATION ONCOLOGY | Age: 66
Discharge: HOME | End: 2021-03-10
Payer: MEDICARE

## 2021-03-11 ENCOUNTER — HOSPITAL ENCOUNTER (OUTPATIENT)
Dept: RADIATION ONCOLOGY | Age: 66
Discharge: HOME | End: 2021-03-11
Payer: MEDICARE

## 2021-03-12 ENCOUNTER — APPOINTMENT (OUTPATIENT)
Dept: RADIATION ONCOLOGY | Age: 66
End: 2021-03-12
Payer: MEDICARE

## 2021-03-15 ENCOUNTER — APPOINTMENT (OUTPATIENT)
Dept: RADIATION ONCOLOGY | Age: 66
End: 2021-03-15
Payer: MEDICARE

## 2021-03-16 ENCOUNTER — HOSPITAL ENCOUNTER (OUTPATIENT)
Dept: RADIATION ONCOLOGY | Age: 66
Discharge: HOME | End: 2021-03-16
Payer: MEDICARE

## 2021-03-16 DIAGNOSIS — C34.12 MALIGNANT NEOPLASM OF UPPER LOBE OF LEFT LUNG (HCC): Primary | ICD-10-CM

## 2021-03-16 PROCEDURE — 77373 STRTCTC BDY RAD THER TX DLVR: CPT

## 2021-03-17 ENCOUNTER — HOSPITAL ENCOUNTER (OUTPATIENT)
Dept: RADIATION ONCOLOGY | Age: 66
Discharge: HOME | End: 2021-03-17
Payer: MEDICARE

## 2021-03-17 PROCEDURE — 77373 STRTCTC BDY RAD THER TX DLVR: CPT

## 2021-03-18 ENCOUNTER — HOSPITAL ENCOUNTER (OUTPATIENT)
Dept: RADIATION ONCOLOGY | Age: 66
Discharge: HOME | End: 2021-03-18
Payer: MEDICARE

## 2021-03-18 PROCEDURE — 77373 STRTCTC BDY RAD THER TX DLVR: CPT

## 2021-03-19 ENCOUNTER — HOSPITAL ENCOUNTER (OUTPATIENT)
Dept: RADIATION ONCOLOGY | Age: 66
Discharge: HOME | End: 2021-03-19
Payer: MEDICARE

## 2021-03-19 VITALS
TEMPERATURE: 97.8 F | BODY MASS INDEX: 24.94 KG/M2 | SYSTOLIC BLOOD PRESSURE: 152 MMHG | WEIGHT: 140.8 LBS | HEART RATE: 110 BPM | DIASTOLIC BLOOD PRESSURE: 76 MMHG | OXYGEN SATURATION: 93 %

## 2021-03-19 PROCEDURE — 77373 STRTCTC BDY RAD THER TX DLVR: CPT

## 2021-03-19 NOTE — PROGRESS NOTES
Patient: Rita Contreras MRN: [id number]  SSN: [id number]    YOB: 1955  Age: 73 y.o. Sex: female      DIAGNOSIS:  Stage I SCC of the SARA    TREATMENT SITE:  SARA    DOSE and FRACTIONATION:  5000/5000    INTERVAL HISTORY:  Rita Contreras is a 73 y.o. female being treated for Stage I SCC of the SARA. Week 1:  Mild fatigue otherwise no new symptoms. OBJECTIVE:  NAD  Visit Vitals  BP (!) 152/76 (BP 1 Location: Left upper arm, BP Patient Position: Sitting)   Pulse (!) 110   Temp 97.8 °F (36.6 °C)   Wt 63.9 kg (140 lb 12.8 oz)   SpO2 93%   BMI 24.94 kg/m²       Lab Results   Component Value Date/Time    Sodium 141 12/18/2020 04:05 AM    Potassium 3.7 12/18/2020 04:05 AM    Chloride 106 12/18/2020 04:05 AM    CO2 27 12/18/2020 04:05 AM    Anion gap 8 12/18/2020 04:05 AM    Glucose 68 12/18/2020 04:05 AM    BUN 8 12/18/2020 04:05 AM    Creatinine 0.44 (L) 12/18/2020 04:05 AM    GFR est AA >60 12/18/2020 04:05 AM    GFR est non-AA >60 12/18/2020 04:05 AM    Calcium 9.0 12/18/2020 04:05 AM    Magnesium 1.6 (L) 12/15/2020 11:35 PM    Phosphorus 3.5 08/13/2020 05:40 AM    Albumin 3.2 12/15/2020 11:35 PM    Protein, total 7.3 12/15/2020 11:35 PM    Globulin 4.1 (H) 12/15/2020 11:35 PM    A-G Ratio 0.8 (L) 12/15/2020 11:35 PM    ALT (SGPT) 22 12/15/2020 11:35 PM     Lab Results   Component Value Date/Time    WBC 8.0 12/18/2020 04:05 AM    HGB 11.6 (L) 12/18/2020 04:05 AM    HCT 38.3 12/18/2020 04:05 AM    PLATELET 185 42/50/3320 04:05 AM       ASSESSMENT and PLAN:  Rita Contreras is tolerating radiation as anticipated for the current dose and fraction.   She will return for follow up in 2 months with a repeat CT of the chest.      Adarsh Mccauley MD   March 19, 2021

## 2021-03-22 ENCOUNTER — HOSPITAL ENCOUNTER (OUTPATIENT)
Dept: RADIATION ONCOLOGY | Age: 66
Discharge: HOME | End: 2021-03-22
Payer: MEDICARE

## 2021-03-22 PROCEDURE — 77336 RADIATION PHYSICS CONSULT: CPT

## 2021-05-11 ENCOUNTER — HOSPITAL ENCOUNTER (OUTPATIENT)
Dept: CT IMAGING | Age: 66
Discharge: HOME | End: 2021-05-11
Attending: RADIOLOGY
Payer: MEDICARE

## 2021-05-11 DIAGNOSIS — C34.12 MALIGNANT NEOPLASM OF UPPER LOBE OF LEFT LUNG (HCC): ICD-10-CM

## 2021-05-11 LAB — CREAT BLD-MCNC: 0.84 MG/DL (ref 0.8–1.5)

## 2021-05-11 PROCEDURE — 74011000258 HC RX REV CODE- 258: Performed by: RADIOLOGY

## 2021-05-11 PROCEDURE — 82565 ASSAY OF CREATININE: CPT

## 2021-05-11 PROCEDURE — 74011000636 HC RX REV CODE- 636: Performed by: RADIOLOGY

## 2021-05-11 PROCEDURE — 71260 CT THORAX DX C+: CPT

## 2021-05-11 RX ORDER — SODIUM CHLORIDE 0.9 % (FLUSH) 0.9 %
10 SYRINGE (ML) INJECTION
Status: COMPLETED | OUTPATIENT
Start: 2021-05-11 | End: 2021-05-11

## 2021-05-11 RX ADMIN — SODIUM CHLORIDE 100 ML: 900 INJECTION, SOLUTION INTRAVENOUS at 14:26

## 2021-05-11 RX ADMIN — Medication 10 ML: at 14:26

## 2021-05-11 RX ADMIN — IOPAMIDOL 80 ML: 755 INJECTION, SOLUTION INTRAVENOUS at 14:26

## 2021-05-18 ENCOUNTER — HOSPITAL ENCOUNTER (OUTPATIENT)
Dept: RADIATION ONCOLOGY | Age: 66
Discharge: HOME | End: 2021-05-18
Payer: MEDICARE

## 2021-05-18 VITALS
BODY MASS INDEX: 26.22 KG/M2 | WEIGHT: 148 LBS | SYSTOLIC BLOOD PRESSURE: 175 MMHG | DIASTOLIC BLOOD PRESSURE: 75 MMHG | OXYGEN SATURATION: 94 % | TEMPERATURE: 97.8 F | HEART RATE: 87 BPM

## 2021-05-18 DIAGNOSIS — F40.00 AGORAPHOBIA, UNSPECIFIED: ICD-10-CM

## 2021-05-18 DIAGNOSIS — C34.12 MALIGNANT NEOPLASM OF UPPER LOBE OF LEFT LUNG (HCC): Primary | ICD-10-CM

## 2021-05-18 DIAGNOSIS — I10 HYPERTENSION, UNSPECIFIED TYPE: ICD-10-CM

## 2021-05-18 PROCEDURE — 99211 OFF/OP EST MAY X REQ PHY/QHP: CPT

## 2021-05-18 NOTE — NURSE NAVIGATOR
F/u SCC SARA. RT end 3-19-21. CT chest 5-11-21. Pt suffers from Agoraphobia and is out of Klonopin. Repeat ct chest ordered for six month f/u.   Referred to primary care per pt request.    Elda Ahn RN

## 2021-11-30 ENCOUNTER — APPOINTMENT (OUTPATIENT)
Dept: RADIATION ONCOLOGY | Age: 66
End: 2021-11-30

## 2025-05-28 NOTE — INTERVAL H&P NOTE
Update History & Physical 
 
The Patient's History and Physical of January 7,  
 was reviewed with the patient and I examined the patient. There was no change. The surgical site was confirmed by the patient and me. Plan:  The risk, benefits, expected outcome, and alternative to the recommended procedure have been discussed with the patient. Patient understands and wants to proceed with the procedure.  
 
Electronically signed by Isaac Arndt MD on 1/21/2021 at 10:02 AM 
 (1) More than 48 hours/None

## (undated) DEVICE — Device: Brand: BALLOON

## (undated) DEVICE — GARMENT,MEDLINE,DVT,INT,CALF,MED, GEN2: Brand: MEDLINE

## (undated) DEVICE — BRONCHOSCOPY PACK: Brand: MEDLINE INDUSTRIES, INC.

## (undated) DEVICE — MOUTHPIECE ENDOSCP 20X27MM --

## (undated) DEVICE — ADAPTER BRONCHSCP FOR USE W/ OLY EDGE

## (undated) DEVICE — SOL INJ SOD CL0.9% 10ML PREFIL --

## (undated) DEVICE — SINGLE USE BIOPSY VALVE MAJ-210: Brand: SINGLE USE BIOPSY VALVE (STERILE)

## (undated) DEVICE — PATCH SENS PT FOR ELECTROMAGNETIC NAVIGATION BRONCHSCP SYS

## (undated) DEVICE — SET EXTN L6IN W/ S STL CLMP

## (undated) DEVICE — SYR 10ML LUER LOK 1/5ML GRAD --

## (undated) DEVICE — BRUSH CYTO L120MM DIA1.7MM SHARPENED NDL TIP FWD FACING

## (undated) DEVICE — SINGLE USE ASPIRATION NEEDLE: Brand: SINGLE USE ASPIRATION NEEDLE

## (undated) DEVICE — FORCEPS BX WRK L110MM CHN L2MM DIA1.7MM SUPERDIMENSION

## (undated) DEVICE — SINGLE USE SUCTION VALVE MAJ-209: Brand: SINGLE USE SUCTION VALVE (STERILE)

## (undated) DEVICE — BE 105-8 BRONCHOSCOPE SWIVEL - 15MM ID/22MM OD (PATIENT PORT) X15MM OD (EQUIPMENT PORT). REUSABLE.  FITS COMPONENTS OF ADULT VENTILATOR CIRCUITS.  MOLDED OF POLYETHERIMIDE. INCLUDES TWO SILICONE RUBBER CAPS; ONE CAP ALLOWS FOR THE USE OF A SUCTIONING CATHETER WHILE THE OTHER CAP ALLOWS FOR THE USE OF A FIBER-OPTIC BRONCHOSCOPE WITHOUT SIGNIFICANT LOSS OF PEEP.: Brand: BE 105-8 BRONCHOSCOPE SWIVEL

## (undated) DEVICE — KENDALL RADIOLUCENT FOAM MONITORING ELECTRODE RECTANGULAR SHAPE: Brand: KENDALL

## (undated) DEVICE — KIT PROC W/ 90DEG FIRM TIP EXT WRK CHN LOCATABLE GUID FOR